# Patient Record
Sex: MALE | Race: WHITE | Employment: FULL TIME | URBAN - NONMETROPOLITAN AREA
[De-identification: names, ages, dates, MRNs, and addresses within clinical notes are randomized per-mention and may not be internally consistent; named-entity substitution may affect disease eponyms.]

---

## 2020-12-29 ENCOUNTER — HOSPITAL ENCOUNTER (OUTPATIENT)
Age: 50
Discharge: HOME | End: 2020-12-29
Payer: COMMERCIAL

## 2020-12-29 LAB
CHOLESTEROL, TOTAL: 208 MG/DL (ref 100–199)
GLUCOSE BLD-MCNC: 96 MG/DL (ref 70–108)
HDLC SERPL-MCNC: 42 MG/DL
LDL CHOLESTEROL CALCULATED: 126 MG/DL
PROSTATE SPECIFIC ANTIGEN: 0.51 NG/ML (ref 0–1)
TRIGL SERPL-MCNC: 200 MG/DL (ref 0–199)

## 2020-12-29 PROCEDURE — 80061 LIPID PANEL: CPT

## 2020-12-29 PROCEDURE — G0103 PSA SCREENING: HCPCS

## 2020-12-29 PROCEDURE — 82947 ASSAY GLUCOSE BLOOD QUANT: CPT

## 2020-12-29 PROCEDURE — 36415 COLL VENOUS BLD VENIPUNCTURE: CPT

## 2022-07-14 ENCOUNTER — HOSPITAL ENCOUNTER (OUTPATIENT)
Dept: ULTRASOUND IMAGING | Age: 52
Discharge: HOME | End: 2022-07-14
Payer: COMMERCIAL

## 2022-07-14 DIAGNOSIS — N50.89 MASS OF RIGHT TESTICLE: ICD-10-CM

## 2022-07-14 PROCEDURE — 76870 US EXAM SCROTUM: CPT

## 2024-08-09 ENCOUNTER — TELEPHONE (OUTPATIENT)
Dept: SURGERY | Age: 54
End: 2024-08-09

## 2024-08-09 NOTE — TELEPHONE ENCOUNTER
Left voicemail; NP; Umbilical hernia without obstruction and without gangrene; [place] referral to Dr. Colon

## 2024-08-16 ENCOUNTER — HOSPITAL ENCOUNTER (OUTPATIENT)
Dept: GENERAL RADIOLOGY | Age: 54
Discharge: HOME | End: 2024-08-16
Payer: COMMERCIAL

## 2024-08-16 ENCOUNTER — HOSPITAL ENCOUNTER (OUTPATIENT)
Age: 54
Discharge: HOME | End: 2024-08-16
Payer: COMMERCIAL

## 2024-08-16 DIAGNOSIS — Z12.5 PROSTATE CANCER SCREENING: ICD-10-CM

## 2024-08-16 DIAGNOSIS — M25.561 ACUTE PAIN OF RIGHT KNEE: ICD-10-CM

## 2024-08-16 DIAGNOSIS — Z00.00 WELLNESS EXAMINATION: ICD-10-CM

## 2024-08-16 DIAGNOSIS — Z13.6 SCREENING FOR ISCHEMIC HEART DISEASE: ICD-10-CM

## 2024-08-16 LAB
CHOLEST SERPL-MCNC: 169 MG/DL (ref 100–199)
HDLC SERPL-MCNC: 33 MG/DL
LDLC SERPL CALC-MCNC: 119 MG/DL
PSA SERPL-MCNC: 0.45 NG/ML (ref 0–1)
TRIGL SERPL-MCNC: 84 MG/DL (ref 0–199)

## 2024-08-16 PROCEDURE — 84153 ASSAY OF PSA TOTAL: CPT

## 2024-08-16 PROCEDURE — 73562 X-RAY EXAM OF KNEE 3: CPT

## 2024-08-16 PROCEDURE — 80061 LIPID PANEL: CPT

## 2024-08-16 PROCEDURE — 36415 COLL VENOUS BLD VENIPUNCTURE: CPT

## 2024-09-09 ENCOUNTER — TELEPHONE (OUTPATIENT)
Dept: SURGERY | Age: 54
End: 2024-09-09

## 2024-09-25 ENCOUNTER — OFFICE VISIT (OUTPATIENT)
Dept: SURGERY | Age: 54
End: 2024-09-25
Payer: COMMERCIAL

## 2024-09-25 VITALS
OXYGEN SATURATION: 97 % | HEART RATE: 75 BPM | RESPIRATION RATE: 18 BRPM | DIASTOLIC BLOOD PRESSURE: 80 MMHG | BODY MASS INDEX: 30.54 KG/M2 | HEIGHT: 70 IN | TEMPERATURE: 98.9 F | SYSTOLIC BLOOD PRESSURE: 126 MMHG | WEIGHT: 213.3 LBS

## 2024-09-25 DIAGNOSIS — K42.0 INCARCERATED UMBILICAL HERNIA: Primary | ICD-10-CM

## 2024-09-25 PROCEDURE — G8427 DOCREV CUR MEDS BY ELIG CLIN: HCPCS | Performed by: SURGERY

## 2024-09-25 PROCEDURE — 3017F COLORECTAL CA SCREEN DOC REV: CPT | Performed by: SURGERY

## 2024-09-25 PROCEDURE — 1036F TOBACCO NON-USER: CPT | Performed by: SURGERY

## 2024-09-25 PROCEDURE — 99204 OFFICE O/P NEW MOD 45 MIN: CPT | Performed by: SURGERY

## 2024-09-25 PROCEDURE — G8417 CALC BMI ABV UP PARAM F/U: HCPCS | Performed by: SURGERY

## 2024-09-28 ASSESSMENT — ENCOUNTER SYMPTOMS
ABDOMINAL DISTENTION: 0
EYE REDNESS: 0
APNEA: 0
EYE ITCHING: 0
CONSTIPATION: 0
SORE THROAT: 0
SINUS PRESSURE: 0
CHEST TIGHTNESS: 0
TROUBLE SWALLOWING: 0
NAUSEA: 0
BACK PAIN: 0
ABDOMINAL PAIN: 1
STRIDOR: 0
ANAL BLEEDING: 0
RHINORRHEA: 0
FACIAL SWELLING: 0
RECTAL PAIN: 0
DIARRHEA: 0
CHOKING: 0
SHORTNESS OF BREATH: 0
BLOOD IN STOOL: 0
COLOR CHANGE: 0
COUGH: 0
WHEEZING: 0
VOICE CHANGE: 0
PHOTOPHOBIA: 0
EYE PAIN: 0
EYE DISCHARGE: 0
VOMITING: 0
ALLERGIC/IMMUNOLOGIC NEGATIVE: 1

## 2024-10-21 ENCOUNTER — HOSPITAL ENCOUNTER (INPATIENT)
Age: 54
LOS: 2 days | Discharge: HOME | DRG: 436 | End: 2024-10-23
Attending: EMERGENCY MEDICINE
Payer: COMMERCIAL

## 2024-10-21 ENCOUNTER — APPOINTMENT (OUTPATIENT)
Dept: MRI IMAGING | Age: 54
DRG: 436 | End: 2024-10-21
Payer: COMMERCIAL

## 2024-10-21 ENCOUNTER — APPOINTMENT (OUTPATIENT)
Dept: CT IMAGING | Age: 54
DRG: 436 | End: 2024-10-21
Payer: COMMERCIAL

## 2024-10-21 DIAGNOSIS — Z51.5 PALLIATIVE CARE PATIENT: ICD-10-CM

## 2024-10-21 DIAGNOSIS — M54.50 ACUTE RIGHT-SIDED LOW BACK PAIN WITHOUT SCIATICA: ICD-10-CM

## 2024-10-21 DIAGNOSIS — G89.3 CANCER RELATED PAIN: ICD-10-CM

## 2024-10-21 DIAGNOSIS — K86.89 PANCREATIC MASS: ICD-10-CM

## 2024-10-21 DIAGNOSIS — R68.89 SUSPECTED MALIGNANT NEOPLASM: ICD-10-CM

## 2024-10-21 DIAGNOSIS — R10.31 ABDOMINAL PAIN, RIGHT LOWER QUADRANT: ICD-10-CM

## 2024-10-21 DIAGNOSIS — K76.9 LIVER LESION: ICD-10-CM

## 2024-10-21 DIAGNOSIS — Q45.3 PANCREATIC ABNORMALITY: Primary | ICD-10-CM

## 2024-10-21 LAB
ALBUMIN SERPL BCG-MCNC: 3.4 G/DL (ref 3.5–5.1)
ALP SERPL-CCNC: 138 U/L (ref 38–126)
ALT SERPL W/O P-5'-P-CCNC: 37 U/L (ref 11–66)
ANION GAP SERPL CALC-SCNC: 12 MEQ/L (ref 8–16)
AST SERPL-CCNC: 42 U/L (ref 5–40)
BACTERIA URNS QL MICRO: ABNORMAL /HPF
BASOPHILS ABSOLUTE: 0 THOU/MM3 (ref 0–0.1)
BASOPHILS NFR BLD AUTO: 0.3 %
BILIRUB CONJ SERPL-MCNC: 0.2 MG/DL (ref 0.1–13.8)
BILIRUB SERPL-MCNC: 0.6 MG/DL (ref 0.3–1.2)
BILIRUB UR QL STRIP.AUTO: NEGATIVE
BUN SERPL-MCNC: 14 MG/DL (ref 7–22)
CALCIUM SERPL-MCNC: 9.1 MG/DL (ref 8.5–10.5)
CANCER AG19-9 SERPL-ACNC: 5483 U/ML (ref 0–35)
CASTS #/AREA URNS LPF: ABNORMAL /LPF
CASTS 2: ABNORMAL /LPF
CHARACTER UR: CLEAR
CHLORIDE SERPL-SCNC: 101 MEQ/L (ref 98–111)
CO2 SERPL-SCNC: 23 MEQ/L (ref 23–33)
COLOR, UA: YELLOW
CREAT SERPL-MCNC: 0.7 MG/DL (ref 0.4–1.2)
CRYSTALS URNS MICRO: ABNORMAL
DEPRECATED RDW RBC AUTO: 42.6 FL (ref 35–45)
EKG ATRIAL RATE: 83 BPM
EKG P AXIS: 41 DEGREES
EKG P-R INTERVAL: 154 MS
EKG Q-T INTERVAL: 370 MS
EKG QRS DURATION: 88 MS
EKG QTC CALCULATION (BAZETT): 434 MS
EKG R AXIS: 7 DEGREES
EKG T AXIS: 10 DEGREES
EKG VENTRICULAR RATE: 83 BPM
EOSINOPHIL NFR BLD AUTO: 1.8 %
EOSINOPHILS ABSOLUTE: 0.2 THOU/MM3 (ref 0–0.4)
EPITHELIAL CELLS, UA: ABNORMAL /HPF
ERYTHROCYTE [DISTWIDTH] IN BLOOD BY AUTOMATED COUNT: 12.5 % (ref 11.5–14.5)
GFR SERPL CREATININE-BSD FRML MDRD: > 90 ML/MIN/1.73M2
GLUCOSE SERPL-MCNC: 113 MG/DL (ref 70–108)
GLUCOSE UR QL STRIP.AUTO: NEGATIVE MG/DL
HCT VFR BLD AUTO: 42 % (ref 42–52)
HGB BLD-MCNC: 14.1 GM/DL (ref 14–18)
HGB UR QL STRIP.AUTO: NEGATIVE
IMM GRANULOCYTES # BLD AUTO: 0.07 THOU/MM3 (ref 0–0.07)
IMM GRANULOCYTES NFR BLD AUTO: 0.6 %
INR PPP: 1.27 (ref 0.85–1.13)
KETONES UR QL STRIP.AUTO: ABNORMAL
LIPASE SERPL-CCNC: 32.9 U/L (ref 5.6–51.3)
LYMPHOCYTES ABSOLUTE: 1.1 THOU/MM3 (ref 1–4.8)
LYMPHOCYTES NFR BLD AUTO: 8.9 %
MAGNESIUM SERPL-MCNC: 2 MG/DL (ref 1.6–2.4)
MCH RBC QN AUTO: 31.1 PG (ref 26–33)
MCHC RBC AUTO-ENTMCNC: 33.6 GM/DL (ref 32.2–35.5)
MCV RBC AUTO: 92.5 FL (ref 80–94)
MISCELLANEOUS 2: ABNORMAL
MONOCYTES ABSOLUTE: 1.1 THOU/MM3 (ref 0.4–1.3)
MONOCYTES NFR BLD AUTO: 9.6 %
NEUTROPHILS ABSOLUTE: 9.3 THOU/MM3 (ref 1.8–7.7)
NEUTROPHILS NFR BLD AUTO: 78.8 %
NITRITE UR QL STRIP: NEGATIVE
NRBC BLD AUTO-RTO: 0 /100 WBC
OSMOLALITY SERPL CALC.SUM OF ELEC: 273.2 MOSMOL/KG (ref 275–300)
PH UR STRIP.AUTO: 5.5 [PH] (ref 5–9)
PLATELET # BLD AUTO: 201 THOU/MM3 (ref 130–400)
PMV BLD AUTO: 9.1 FL (ref 9.4–12.4)
POTASSIUM SERPL-SCNC: 4.5 MEQ/L (ref 3.5–5.2)
PROT SERPL-MCNC: 7 G/DL (ref 6.1–8)
PROT UR STRIP.AUTO-MCNC: ABNORMAL MG/DL
RBC # BLD AUTO: 4.54 MILL/MM3 (ref 4.7–6.1)
RBC URINE: ABNORMAL /HPF
RENAL EPI CELLS #/AREA URNS HPF: ABNORMAL /[HPF]
SODIUM SERPL-SCNC: 136 MEQ/L (ref 135–145)
SP GR UR REFRACT.AUTO: 1.02 (ref 1–1.03)
TROPONIN, HIGH SENSITIVITY: 6 NG/L (ref 0–12)
UROBILINOGEN, URINE: 1 EU/DL (ref 0–1)
WBC # BLD AUTO: 11.8 THOU/MM3 (ref 4.8–10.8)
WBC #/AREA URNS HPF: ABNORMAL /HPF
WBC #/AREA URNS HPF: NEGATIVE /[HPF]
YEAST LIKE FUNGI URNS QL MICRO: ABNORMAL

## 2024-10-21 PROCEDURE — 86301 IMMUNOASSAY TUMOR CA 19-9: CPT

## 2024-10-21 PROCEDURE — 80053 COMPREHEN METABOLIC PANEL: CPT

## 2024-10-21 PROCEDURE — 74177 CT ABD & PELVIS W/CONTRAST: CPT

## 2024-10-21 PROCEDURE — 99223 1ST HOSP IP/OBS HIGH 75: CPT

## 2024-10-21 PROCEDURE — 81001 URINALYSIS AUTO W/SCOPE: CPT

## 2024-10-21 PROCEDURE — 99222 1ST HOSP IP/OBS MODERATE 55: CPT | Performed by: NURSE PRACTITIONER

## 2024-10-21 PROCEDURE — 2580000003 HC RX 258

## 2024-10-21 PROCEDURE — 6360000004 HC RX CONTRAST MEDICATION: Performed by: EMERGENCY MEDICINE

## 2024-10-21 PROCEDURE — 93005 ELECTROCARDIOGRAM TRACING: CPT | Performed by: EMERGENCY MEDICINE

## 2024-10-21 PROCEDURE — 83735 ASSAY OF MAGNESIUM: CPT

## 2024-10-21 PROCEDURE — 99285 EMERGENCY DEPT VISIT HI MDM: CPT

## 2024-10-21 PROCEDURE — A9579 GAD-BASE MR CONTRAST NOS,1ML: HCPCS

## 2024-10-21 PROCEDURE — 84484 ASSAY OF TROPONIN QUANT: CPT

## 2024-10-21 PROCEDURE — 76376 3D RENDER W/INTRP POSTPROCES: CPT

## 2024-10-21 PROCEDURE — 85610 PROTHROMBIN TIME: CPT

## 2024-10-21 PROCEDURE — 74183 MRI ABD W/O CNTR FLWD CNTR: CPT

## 2024-10-21 PROCEDURE — 36415 COLL VENOUS BLD VENIPUNCTURE: CPT

## 2024-10-21 PROCEDURE — 85025 COMPLETE CBC W/AUTO DIFF WBC: CPT

## 2024-10-21 PROCEDURE — 6360000004 HC RX CONTRAST MEDICATION

## 2024-10-21 PROCEDURE — 82248 BILIRUBIN DIRECT: CPT

## 2024-10-21 PROCEDURE — 83690 ASSAY OF LIPASE: CPT

## 2024-10-21 PROCEDURE — 6370000000 HC RX 637 (ALT 250 FOR IP)

## 2024-10-21 PROCEDURE — 93010 ELECTROCARDIOGRAM REPORT: CPT | Performed by: NUCLEAR MEDICINE

## 2024-10-21 PROCEDURE — 1200000003 HC TELEMETRY R&B

## 2024-10-21 RX ORDER — ONDANSETRON 4 MG/1
4 TABLET, ORALLY DISINTEGRATING ORAL EVERY 8 HOURS PRN
Status: DISCONTINUED | OUTPATIENT
Start: 2024-10-21 | End: 2024-10-23 | Stop reason: HOSPADM

## 2024-10-21 RX ORDER — IOPAMIDOL 755 MG/ML
80 INJECTION, SOLUTION INTRAVASCULAR
Status: COMPLETED | OUTPATIENT
Start: 2024-10-21 | End: 2024-10-21

## 2024-10-21 RX ORDER — ACETAMINOPHEN 325 MG/1
650 TABLET ORAL EVERY 6 HOURS PRN
Status: DISCONTINUED | OUTPATIENT
Start: 2024-10-21 | End: 2024-10-23 | Stop reason: HOSPADM

## 2024-10-21 RX ORDER — SODIUM CHLORIDE 0.9 % (FLUSH) 0.9 %
5-40 SYRINGE (ML) INJECTION EVERY 12 HOURS SCHEDULED
Status: DISCONTINUED | OUTPATIENT
Start: 2024-10-21 | End: 2024-10-23 | Stop reason: HOSPADM

## 2024-10-21 RX ORDER — HYDROCODONE BITARTRATE AND ACETAMINOPHEN 5; 325 MG/1; MG/1
2 TABLET ORAL EVERY 4 HOURS PRN
Status: DISCONTINUED | OUTPATIENT
Start: 2024-10-21 | End: 2024-10-23 | Stop reason: HOSPADM

## 2024-10-21 RX ORDER — HYDROCODONE BITARTRATE AND ACETAMINOPHEN 5; 325 MG/1; MG/1
1 TABLET ORAL EVERY 4 HOURS PRN
Status: DISCONTINUED | OUTPATIENT
Start: 2024-10-21 | End: 2024-10-23 | Stop reason: HOSPADM

## 2024-10-21 RX ORDER — SODIUM CHLORIDE 0.9 % (FLUSH) 0.9 %
5-40 SYRINGE (ML) INJECTION PRN
Status: DISCONTINUED | OUTPATIENT
Start: 2024-10-21 | End: 2024-10-23 | Stop reason: HOSPADM

## 2024-10-21 RX ORDER — FAMOTIDINE 20 MG/1
20 TABLET, FILM COATED ORAL DAILY
Status: DISCONTINUED | OUTPATIENT
Start: 2024-10-21 | End: 2024-10-23 | Stop reason: HOSPADM

## 2024-10-21 RX ORDER — LANOLIN ALCOHOL/MO/W.PET/CERES
3 CREAM (GRAM) TOPICAL NIGHTLY PRN
Status: DISCONTINUED | OUTPATIENT
Start: 2024-10-21 | End: 2024-10-23 | Stop reason: HOSPADM

## 2024-10-21 RX ORDER — POTASSIUM CHLORIDE 7.45 MG/ML
10 INJECTION INTRAVENOUS PRN
Status: DISCONTINUED | OUTPATIENT
Start: 2024-10-21 | End: 2024-10-23 | Stop reason: HOSPADM

## 2024-10-21 RX ORDER — SODIUM CHLORIDE 9 MG/ML
INJECTION, SOLUTION INTRAVENOUS PRN
Status: DISCONTINUED | OUTPATIENT
Start: 2024-10-21 | End: 2024-10-23 | Stop reason: HOSPADM

## 2024-10-21 RX ORDER — POTASSIUM CHLORIDE 1500 MG/1
40 TABLET, EXTENDED RELEASE ORAL PRN
Status: DISCONTINUED | OUTPATIENT
Start: 2024-10-21 | End: 2024-10-23 | Stop reason: HOSPADM

## 2024-10-21 RX ORDER — POLYETHYLENE GLYCOL 3350 17 G/17G
17 POWDER, FOR SOLUTION ORAL DAILY PRN
Status: DISCONTINUED | OUTPATIENT
Start: 2024-10-21 | End: 2024-10-23 | Stop reason: HOSPADM

## 2024-10-21 RX ORDER — ONDANSETRON 2 MG/ML
4 INJECTION INTRAMUSCULAR; INTRAVENOUS EVERY 6 HOURS PRN
Status: DISCONTINUED | OUTPATIENT
Start: 2024-10-21 | End: 2024-10-23 | Stop reason: HOSPADM

## 2024-10-21 RX ORDER — ENOXAPARIN SODIUM 100 MG/ML
40 INJECTION SUBCUTANEOUS EVERY 24 HOURS
Status: DISCONTINUED | OUTPATIENT
Start: 2024-10-21 | End: 2024-10-23 | Stop reason: HOSPADM

## 2024-10-21 RX ORDER — MAGNESIUM SULFATE IN WATER 40 MG/ML
2000 INJECTION, SOLUTION INTRAVENOUS PRN
Status: DISCONTINUED | OUTPATIENT
Start: 2024-10-21 | End: 2024-10-23 | Stop reason: HOSPADM

## 2024-10-21 RX ORDER — SODIUM CHLORIDE 9 MG/ML
INJECTION, SOLUTION INTRAVENOUS CONTINUOUS
Status: ACTIVE | OUTPATIENT
Start: 2024-10-21 | End: 2024-10-21

## 2024-10-21 RX ORDER — ACETAMINOPHEN 650 MG/1
650 SUPPOSITORY RECTAL EVERY 6 HOURS PRN
Status: DISCONTINUED | OUTPATIENT
Start: 2024-10-21 | End: 2024-10-23 | Stop reason: HOSPADM

## 2024-10-21 RX ADMIN — FAMOTIDINE 20 MG: 20 TABLET, FILM COATED ORAL at 11:43

## 2024-10-21 RX ADMIN — IOPAMIDOL 80 ML: 755 INJECTION, SOLUTION INTRAVENOUS at 07:11

## 2024-10-21 RX ADMIN — SODIUM CHLORIDE: 9 INJECTION, SOLUTION INTRAVENOUS at 11:42

## 2024-10-21 RX ADMIN — GADOTERIDOL 20 ML: 279.3 INJECTION, SOLUTION INTRAVENOUS at 16:56

## 2024-10-21 RX ADMIN — HYDROCODONE BITARTRATE AND ACETAMINOPHEN 2 TABLET: 5; 325 TABLET ORAL at 17:24

## 2024-10-21 RX ADMIN — ACETAMINOPHEN 650 MG: 325 TABLET ORAL at 13:12

## 2024-10-21 RX ADMIN — SODIUM CHLORIDE, PRESERVATIVE FREE 10 ML: 5 INJECTION INTRAVENOUS at 11:43

## 2024-10-21 ASSESSMENT — ENCOUNTER SYMPTOMS
BLOOD IN STOOL: 0
RESPIRATORY NEGATIVE: 1
ABDOMINAL DISTENTION: 0
ABDOMINAL PAIN: 1
VOMITING: 0
ALLERGIC/IMMUNOLOGIC NEGATIVE: 1
DIARRHEA: 0
RECTAL PAIN: 0
WHEEZING: 0
COUGH: 0
SHORTNESS OF BREATH: 0
EYES NEGATIVE: 1
ANAL BLEEDING: 0
CONSTIPATION: 0
NAUSEA: 0

## 2024-10-21 ASSESSMENT — PAIN SCALES - GENERAL
PAINLEVEL_OUTOF10: 6
PAINLEVEL_OUTOF10: 6
PAINLEVEL_OUTOF10: 7
PAINLEVEL_OUTOF10: 6
PAINLEVEL_OUTOF10: 3

## 2024-10-21 ASSESSMENT — PAIN - FUNCTIONAL ASSESSMENT: PAIN_FUNCTIONAL_ASSESSMENT: 0-10

## 2024-10-21 ASSESSMENT — PAIN DESCRIPTION - ORIENTATION: ORIENTATION: RIGHT;LOWER

## 2024-10-21 ASSESSMENT — PAIN DESCRIPTION - DESCRIPTORS: DESCRIPTORS: STABBING

## 2024-10-21 ASSESSMENT — PAIN DESCRIPTION - LOCATION: LOCATION: BACK

## 2024-10-21 NOTE — ED NOTES
Upon first contact with patient this RN receives bedside shift report. Patient alert and oriented. Respirations easy and unlabored. Updated on POC. Will monitor.

## 2024-10-21 NOTE — ED TRIAGE NOTES
Pt present to ED from home with c/c of right lower back pain and abdominal pain. Pt states pain began about 3 days ago. Pt rates pain a 6, pt has taken aleve for pain. Pt states pain comes and goes but has gotten worse since Friday. Rr easy and unlabored. No distress noted.

## 2024-10-21 NOTE — H&P
right flank pain.  Patient states that he has been having right-sided back pain off and on for approximately 2 months.  He had been worked up for possible kidney stone/UTI by his PCP in the past, however his pain is ongoing prompting him to present to ED on 10/21/2024.  Notes that he also has right periumbilical abdominal pain that wraps up to his right flank. He has also noted intermittent fevers off and on for the last month, stating that they resolve with Advil.  He has not had any changes in his bowels, states that he has approximately 2 bowel movements a day, no new changes to color, no constipation or diarrhea.  Denies any weight changes, headaches, vision changes, chills, diaphoresis, dyspnea, chest pain/pressure/tightness, palpitations, dysuria, urgency, frequency, skin changes, lymphadenopathy, or edema.  He is understandably concerned about the findings on imaging, and is agreeable to admission for further workup to facilitate speedy follow-up and intervention after discharge.    Past Medical History:    No past medical history on file.    Past Surgical History:        Procedure Laterality Date    COLONOSCOPY  10/31/2022       Medications Prior to Admission:   Prior to Admission medications    Medication Sig Start Date End Date Taking? Authorizing Provider   meloxicam (MOBIC) 7.5 MG tablet Take 1 tablet by mouth daily 10/18/24   Kassandra Dawkins APRN - CNP       Allergies:  Patient has no known allergies.    Social History:    The patient currently lives at home with his wife.   Tobacco use:   reports that he has never smoked. He has never used smokeless tobacco.  Alcohol use:   has no history on file for alcohol use.  Drug use:  has no history on file for drug use.     Family History:  as follows:      Problem Relation Age of Onset    Stroke Mother        Review of Systems:   Pertinent positives and negatives as noted in the HPI. Otherwise complete ROS negative.    Physical Exam:    /86   Pulse 84    Temp 98.1 °F (36.7 °C) (Oral)   Resp 22   Ht 1.829 m (6')   Wt 95.3 kg (210 lb)   SpO2 96%   BMI 28.48 kg/m²     General: Pleasant, moderately built,  male, alert, cooperative, in no acute distress, resting in bed on arrival, on room air  Eyes:  PERRLA. Nonicteric, no conjunctivitis, EOMs intact.   HENT: Normocephalic, atraumatic. Nares normal. Oral mucosa moist.  Hearing grossly intact.   Neck: Supple, with full range of motion. No gross JVD appreciated.  Respiratory:  Normal effort. Clear to auscultation, without rales or wheezes or rhonchi.      Cardiovascular: RRR, good S1/S2. No rubs, gallops, or murmurs. No lower extremity edema.   Abdomen: Soft, non-distended. Bowel sounds present.  Mild tenderness to palpation in the right quadrants and along the right costovertebral angle.  Musculoskeletal: No joint swelling or tenderness. Normal tone. No abnormal movements.   Skin: Warm and dry. No rashes or lesions.  Neurologic:  No focal sensory/motor deficits in the upper or lower extremities. Cranial nerves:  grossly non-focal 2-12.     Psychiatric: Alert and oriented, normal insight and thought content.   Capillary Refill: Brisk,< 3 seconds.  Peripheral Pulses: +2 palpable, equal bilaterally.     Labs:     Recent Labs     10/21/24  0543   WBC 11.8*   HGB 14.1   HCT 42.0        Recent Labs     10/21/24  0543      K 4.5      CO2 23   BUN 14   CREATININE 0.7   CALCIUM 9.1     Recent Labs     10/21/24  0543   AST 42*   ALT 37   BILIDIR 0.2   BILITOT 0.6   ALKPHOS 138*     No results for input(s): \"INR\" in the last 72 hours.  No results for input(s): \"CKTOTAL\", \"TROPONINI\" in the last 72 hours.  Lab Results   Component Value Date/Time    NITRU NEGATIVE 10/21/2024 06:30 AM    WBCUA 0-2 10/21/2024 06:30 AM    BACTERIA NONE SEEN 10/21/2024 06:30 AM    RBCUA 0-2 10/21/2024 06:30 AM    BLOODU NEGATIVE 10/21/2024 06:30 AM    SPECGRAV 1.020 10/18/2024 03:31 PM    GLUCOSEU NEGATIVE 10/21/2024

## 2024-10-21 NOTE — PROGRESS NOTES
Order received for liver biopsy and chart reviewed.  Patient will be scheduled for test on 10/22/2024 at 1100.  Please keep him NPO after midnight, hold all blood thinners.  If you have questions, call Interventional Radiology department at [phone].  Spoke with nurse Tyler TRAN to discuss.

## 2024-10-21 NOTE — CONSULTS
bilateral foraminal stenosis.     At L4-5,  there is mild canal and mild to moderate bilateral foraminal stenosis.     At L5-S1 there is mild canal and bilateral foraminal stenosis.        There is mild degenerative change involving the sacroiliac joints bilaterally.     IMPRESSION:     1. Stable CT scan of the lumbar spine, no interval change since previous MRI  scan dated 5/16/2014.  2. No evidence of metastatic disease.              **This report has been created using voice recognition software. It may contain  minor errors which are inherent in voice recognition technology.**           Electronically signed by Dr. Raina Killian   CT ABDOMEN PELVIS W IV CONTRAST Additional Contrast? None  Order: [id number]  Status: Final result       Visible to patient: Yes (seen)       Next appt: 10/22/2024 at 11:15 AM in Radiology (STR CT RM1)    0 Result Notes  Details    Reading Physician Reading Date Result Priority   Vicky Dumont MD  [phone] 10/21/2024      Narrative & Impression  PROCEDURE: CT ABDOMEN PELVIS W IV CONTRAST     CLINICAL INFORMATION: R-sided abdominal pain (RLQ>RUQ); Fever intermittently .  Right-sided abdominal pain. Intermittent fevers.     COMPARISON: None.     TECHNIQUE: Axial 5 mm CT images were obtained through the abdomen and pelvis  after the administration of intravenous contrast. Coronal and sagittal  reconstructions were obtained.     All CT scans at this facility use dose modulation, iterative reconstruction,  and/or weight-based dosing when appropriate to reduce radiation dose to as low  as reasonably achievable.     FINDINGS:        There are no suspicious findings in the lung bases. There is a calcified  granuloma on the left. There is some mild atelectasis. There are some calcified  left hilar lymph nodes. There are calcified subcarinal lymph nodes. Inferior to  the calcified subcarinal lymph nodes, there is some subcarinal adenopathy  measuring 1.7 x 1.4 cm. The base of the heart is             **This report has been created using voice recognition software. It may contain  minor errors which are inherent in voice recognition technology.**           Electronically signed by Dr. Vicky Dumont           Exam Ended: 10/21/24 07:15 EDT Last Resulted: 10/21/24 07:49 EDT        Total time spent in care of patient:  60 minutes collectively between subjective/objective examination, chart review, documentation, clinical reasoning and discussion with attending regarding plan/interval changes. More than 50% of the time involved with counseling, education and coordinating care.    Electronically signed by JESSI HALEY CNP on 10/21/2024 at 6:16 PM

## 2024-10-21 NOTE — ED PROVIDER NOTES
[facility] EMERGENCY DEPT  EMERGENCY DEPARTMENT ENCOUNTER          Pt Name: Gustabo Machado  MRN: [id number]  Birthdate 1970  Date of evaluation: 10/21/2024  Physician: Nicanor Arreola MD  Supervising Attending Physician: Colin Espana DO       CHIEF COMPLAINT     R-lower back pain radiating to RLQ pain      HISTORY OF PRESENT ILLNESS    HPI  Gustabo Machado is a 53 y.o. male with no significant PSH and known umbilical hernia (surgery scheduled with Dr. Colon on 11/14/2024) who presents to the emergency department from home for evaluation of right lower back pain that radiates to RLQ.  States that he was seen at his primary care on 10/18, they got a POCT UA and which was negative and related the pain to a possible muscle strain.  States that pain has worsened since the appointment (now rates pain at 6/10).  States the pain is stabbing, and radiates to RLQ.  Also reports fevers off and on over the past 3 days (Tmax yesterday to 102 F). No personal h/o kidney stones. Has been taking Advil without relief of pain.  States that he does not know of any aggravating or alleviating factors, however tends to have worsening pain when lying on his right side.  Denies tobacco, EtOH, drug use.    Denies headache, lightheadedness, dizziness, vision changes, tinnitus, cough, congestion, sore throat, neck pain/stiffness, chest pain, shortness of breath, nausea, vomiting, constipation, diarrhea, dysuria, blood in the urine or stool, numbness/tingling/weakness in extremities.    The patient has no other acute complaints at this time.      PAST MEDICAL AND SURGICAL HISTORY   No past medical history on file.  Past Surgical History:   Procedure Laterality Date    COLONOSCOPY  10/31/2022         MEDICATIONS   No current facility-administered medications for this encounter.    Current Outpatient Medications:     meloxicam (MOBIC) 7.5 MG tablet, Take 1 tablet by mouth daily, Disp: 30 tablet, Rfl: 0    Previous  Medications    MELOXICAM (MOBIC) 7.5 MG TABLET    Take 1 tablet by mouth daily         SOCIAL HISTORY     Social History     Social History Narrative    Not on file     Social History     Tobacco Use    Smoking status: Never    Smokeless tobacco: Never         ALLERGIES   No Known Allergies      FAMILY HISTORY     Family History   Problem Relation Age of Onset    Stroke Mother          PHYSICAL EXAM     ED Triage Vitals [10/21/24 0535]   BP Systolic BP Percentile Diastolic BP Percentile Temp Temp Source Pulse Respirations SpO2   (!) 144/93 -- -- 98.1 °F (36.7 °C) Oral 83 23 96 %      Height Weight - Scale         1.829 m (6') 95.3 kg (210 lb)           Initial vital signs and nursing assessment reviewed. Body mass index is 28.48 kg/m².     Additional Vital Signs:  Vitals:    10/21/24 0758   BP: 121/86   Pulse: 84   Resp: 22   Temp:    SpO2: 96%       Physical Exam  Vitals and nursing note reviewed.   Constitutional:       General: He is not in acute distress.     Appearance: He is ill-appearing. He is not toxic-appearing or diaphoretic.   HENT:      Head: Normocephalic and atraumatic.      Mouth/Throat:      Mouth: Mucous membranes are moist.      Pharynx: Oropharynx is clear.   Eyes:      General: No scleral icterus.        Right eye: No discharge.         Left eye: No discharge.      Extraocular Movements: Extraocular movements intact.      Pupils: Pupils are equal, round, and reactive to light.   Cardiovascular:      Rate and Rhythm: Normal rate and regular rhythm.      Pulses: Normal pulses.      Heart sounds: No murmur heard.     No friction rub. No gallop.   Pulmonary:      Effort: Pulmonary effort is normal. No respiratory distress.      Breath sounds: Normal breath sounds. No stridor. No wheezing, rhonchi or rales.   Chest:      Chest wall: No tenderness.   Abdominal:      General: Bowel sounds are normal. There is no distension.      Palpations: Abdomen is soft.      Tenderness: There is abdominal

## 2024-10-21 NOTE — CONSULTS
Abdominal:      General: Abdomen is flat. Bowel sounds are normal. There is no distension.      Palpations: Abdomen is soft. There is no mass.      Tenderness: There is no abdominal tenderness. There is no guarding or rebound.   Musculoskeletal:         General: Normal range of motion.      Right lower leg: No edema.      Left lower leg: No edema.   Skin:     General: Skin is warm and dry.      Capillary Refill: Capillary refill takes less than 2 seconds.   Neurological:      Mental Status: He is alert and oriented to person, place, and time.   Psychiatric:         Mood and Affect: Mood normal.         Behavior: Behavior normal.         Thought Content: Thought content normal.         Judgment: Judgment normal.           Labs:   Recent Labs     10/21/24  0543   WBC 11.8*   HGB 14.1   HCT 42.0        Recent Labs     10/21/24  0543      K 4.5      CO2 23   BUN 14   CREATININE 0.7   CALCIUM 9.1     Recent Labs     10/21/24  0543   AST 42*   ALT 37   BILIDIR 0.2   BILITOT 0.6   ALKPHOS 138*     Recent Labs     10/21/24  0840   INR 1.27*       Radiology:   10/21/24 CT Abd/Pelvis W/IV Contrast  FINDINGS:        There are no suspicious findings in the lung bases. There is a calcified  granuloma on the left. There is some mild atelectasis. There are some calcified  left hilar lymph nodes. There are calcified subcarinal lymph nodes. Inferior to  the calcified subcarinal lymph nodes, there is some subcarinal adenopathy  measuring 1.7 x 1.4 cm. The base of the heart is within appropriate limits.     There are numerous masses throughout the liver. These have low attenuation and  lobulated margins. All have a similar appearance. One in the right lobe of the  liver measures 4.7 x 4.2 cm. The margins are indistinct. This is consistent with  numerous liver masses. There is a trace amount of free fluid anterior to the  liver. There is adenopathy at the sylvain hepatis. Multiple enlarged lymph nodes  are seen.  RISA Kaye, JESSI - CNP on 10/21/2024 at 12:12 PM    [facility]  Thank you for the consultation.

## 2024-10-21 NOTE — PLAN OF CARE
Problem: Pain  Goal: Verbalizes/displays adequate comfort level or baseline comfort level  Outcome: Progressing  Flowsheets (Taken 10/21/2024 2838)  Verbalizes/displays adequate comfort level or baseline comfort level:   Encourage patient to monitor pain and request assistance   Assess pain using appropriate pain scale   Administer analgesics based on type and severity of pain and evaluate response   Implement non-pharmacological measures as appropriate and evaluate response   Consider cultural and social influences on pain and pain management

## 2024-10-22 ENCOUNTER — APPOINTMENT (OUTPATIENT)
Dept: CT IMAGING | Age: 54
DRG: 436 | End: 2024-10-22
Payer: COMMERCIAL

## 2024-10-22 LAB
ANION GAP SERPL CALC-SCNC: 13 MEQ/L (ref 8–16)
BUN SERPL-MCNC: 16 MG/DL (ref 7–22)
CALCIUM SERPL-MCNC: 8.8 MG/DL (ref 8.5–10.5)
CHLORIDE SERPL-SCNC: 98 MEQ/L (ref 98–111)
CO2 SERPL-SCNC: 24 MEQ/L (ref 23–33)
CREAT SERPL-MCNC: 0.7 MG/DL (ref 0.4–1.2)
DEPRECATED RDW RBC AUTO: 42.8 FL (ref 35–45)
ERYTHROCYTE [DISTWIDTH] IN BLOOD BY AUTOMATED COUNT: 12.7 % (ref 11.5–14.5)
GFR SERPL CREATININE-BSD FRML MDRD: > 90 ML/MIN/1.73M2
GLUCOSE SERPL-MCNC: 100 MG/DL (ref 70–108)
HCT VFR BLD AUTO: 40.6 % (ref 42–52)
HGB BLD-MCNC: 13.5 GM/DL (ref 14–18)
MAGNESIUM SERPL-MCNC: 1.9 MG/DL (ref 1.6–2.4)
MCH RBC QN AUTO: 30.8 PG (ref 26–33)
MCHC RBC AUTO-ENTMCNC: 33.3 GM/DL (ref 32.2–35.5)
MCV RBC AUTO: 92.5 FL (ref 80–94)
PLATELET # BLD AUTO: 195 THOU/MM3 (ref 130–400)
PMV BLD AUTO: 9.7 FL (ref 9.4–12.4)
POTASSIUM SERPL-SCNC: 4.6 MEQ/L (ref 3.5–5.2)
RBC # BLD AUTO: 4.39 MILL/MM3 (ref 4.7–6.1)
SODIUM SERPL-SCNC: 135 MEQ/L (ref 135–145)
WBC # BLD AUTO: 11.2 THOU/MM3 (ref 4.8–10.8)

## 2024-10-22 PROCEDURE — 6370000000 HC RX 637 (ALT 250 FOR IP)

## 2024-10-22 PROCEDURE — 88341 IMHCHEM/IMCYTCHM EA ADD ANTB: CPT

## 2024-10-22 PROCEDURE — 47000 NEEDLE BIOPSY OF LIVER PERQ: CPT

## 2024-10-22 PROCEDURE — 83735 ASSAY OF MAGNESIUM: CPT

## 2024-10-22 PROCEDURE — 99231 SBSQ HOSP IP/OBS SF/LOW 25: CPT | Performed by: SURGERY

## 2024-10-22 PROCEDURE — 1200000003 HC TELEMETRY R&B

## 2024-10-22 PROCEDURE — 36415 COLL VENOUS BLD VENIPUNCTURE: CPT

## 2024-10-22 PROCEDURE — 2580000003 HC RX 258

## 2024-10-22 PROCEDURE — 88333 PATH CONSLTJ SURG CYTO XM 1: CPT

## 2024-10-22 PROCEDURE — 85027 COMPLETE CBC AUTOMATED: CPT

## 2024-10-22 PROCEDURE — 80048 BASIC METABOLIC PNL TOTAL CA: CPT

## 2024-10-22 PROCEDURE — 0FB13ZX EXCISION OF RIGHT LOBE LIVER, PERCUTANEOUS APPROACH, DIAGNOSTIC: ICD-10-PCS | Performed by: RADIOLOGY

## 2024-10-22 PROCEDURE — 6360000002 HC RX W HCPCS: Performed by: RADIOLOGY

## 2024-10-22 PROCEDURE — 88342 IMHCHEM/IMCYTCHM 1ST ANTB: CPT

## 2024-10-22 PROCEDURE — 99233 SBSQ HOSP IP/OBS HIGH 50: CPT | Performed by: PHYSICIAN ASSISTANT

## 2024-10-22 PROCEDURE — 88307 TISSUE EXAM BY PATHOLOGIST: CPT

## 2024-10-22 RX ORDER — FENTANYL CITRATE 50 UG/ML
INJECTION, SOLUTION INTRAMUSCULAR; INTRAVENOUS PRN
Status: COMPLETED | OUTPATIENT
Start: 2024-10-22 | End: 2024-10-22

## 2024-10-22 RX ORDER — MIDAZOLAM HYDROCHLORIDE 1 MG/ML
INJECTION, SOLUTION INTRAMUSCULAR; INTRAVENOUS PRN
Status: COMPLETED | OUTPATIENT
Start: 2024-10-22 | End: 2024-10-22

## 2024-10-22 RX ORDER — FENTANYL CITRATE 50 UG/ML
50 INJECTION, SOLUTION INTRAMUSCULAR; INTRAVENOUS ONCE
OUTPATIENT
Start: 2024-10-22 | End: 2024-10-22

## 2024-10-22 RX ORDER — MIDAZOLAM HYDROCHLORIDE 1 MG/ML
1 INJECTION, SOLUTION INTRAMUSCULAR; INTRAVENOUS ONCE
OUTPATIENT
Start: 2024-10-22 | End: 2024-10-22

## 2024-10-22 RX ORDER — SODIUM CHLORIDE 450 MG/100ML
INJECTION, SOLUTION INTRAVENOUS CONTINUOUS
OUTPATIENT
Start: 2024-10-22

## 2024-10-22 RX ADMIN — MIDAZOLAM 0.5 MG: 1 INJECTION INTRAMUSCULAR; INTRAVENOUS at 12:23

## 2024-10-22 RX ADMIN — FENTANYL CITRATE 50 MCG: 50 INJECTION, SOLUTION INTRAMUSCULAR; INTRAVENOUS at 12:16

## 2024-10-22 RX ADMIN — ACETAMINOPHEN 650 MG: 325 TABLET ORAL at 21:22

## 2024-10-22 RX ADMIN — HYDROCODONE BITARTRATE AND ACETAMINOPHEN 1 TABLET: 5; 325 TABLET ORAL at 14:02

## 2024-10-22 RX ADMIN — HYDROCODONE BITARTRATE AND ACETAMINOPHEN 2 TABLET: 5; 325 TABLET ORAL at 19:05

## 2024-10-22 RX ADMIN — HYDROCODONE BITARTRATE AND ACETAMINOPHEN 1 TABLET: 5; 325 TABLET ORAL at 03:24

## 2024-10-22 RX ADMIN — FENTANYL CITRATE 25 MCG: 50 INJECTION, SOLUTION INTRAMUSCULAR; INTRAVENOUS at 12:23

## 2024-10-22 RX ADMIN — FAMOTIDINE 20 MG: 20 TABLET, FILM COATED ORAL at 08:30

## 2024-10-22 RX ADMIN — SODIUM CHLORIDE, PRESERVATIVE FREE 10 ML: 5 INJECTION INTRAVENOUS at 21:22

## 2024-10-22 RX ADMIN — MIDAZOLAM 1 MG: 1 INJECTION INTRAMUSCULAR; INTRAVENOUS at 12:16

## 2024-10-22 ASSESSMENT — PAIN DESCRIPTION - ORIENTATION
ORIENTATION: RIGHT
ORIENTATION: RIGHT;LOWER
ORIENTATION: RIGHT;LOWER

## 2024-10-22 ASSESSMENT — PAIN DESCRIPTION - LOCATION
LOCATION: BACK

## 2024-10-22 ASSESSMENT — PAIN DESCRIPTION - DESCRIPTORS
DESCRIPTORS: ACHING;STABBING
DESCRIPTORS: STABBING

## 2024-10-22 ASSESSMENT — PAIN SCALES - GENERAL
PAINLEVEL_OUTOF10: 3
PAINLEVEL_OUTOF10: 7
PAINLEVEL_OUTOF10: 6
PAINLEVEL_OUTOF10: 6

## 2024-10-22 ASSESSMENT — PAIN - FUNCTIONAL ASSESSMENT: PAIN_FUNCTIONAL_ASSESSMENT: ACTIVITIES ARE NOT PREVENTED

## 2024-10-22 NOTE — H&P
[facility]  Sedation/Analgesia History & Physical    Pt Name: Gustabo Machado  MRN: [id number]  YOB: 1970  Provider Performing Procedure: Hermelindo Ley MD, MD  Primary Care Physician: Daniel Agrawal MD    Formulation and discussion of sedation / procedure plans, risks, benefits, side effects and alternatives with patient and/or responsible adult completed.    PRE-PROCEDURE   DNR-CCA/DNR-CC []Yes [x]No  Brief History/Pre-Procedure Diagnosis: Multiple liver lesions          MEDICAL HISTORY  []CAD/Valve  []Liver Disease  []Lung Disease []Diabetes  []Hypertension []Renal Disease  []Additional information:       has no past medical history on file.    SURGICAL HISTORY   has a past surgical history that includes Colonoscopy (10/31/2022).  Additional information:       ALLERGIES   Allergies as of 10/21/2024    (No Known Allergies)     Additional information:       MEDICATIONS   Coumadin Use Last 5 Days [x]No []Yes  Antiplatelet drug therapy use last 5 days  [x]No []Yes  Other anticoagulant use last 5 days  [x]No []Yes    Current Facility-Administered Medications:     sodium chloride flush 0.9 % injection 5-40 mL, 5-40 mL, IntraVENous, 2 times per day, Alia Brown MD, 10 mL at 10/21/24 1143    sodium chloride flush 0.9 % injection 5-40 mL, 5-40 mL, IntraVENous, PRN, Alia Brown MD    0.9 % sodium chloride infusion, , IntraVENous, PRN, Alia Brown MD    potassium chloride (KLOR-CON M) extended release tablet 40 mEq, 40 mEq, Oral, PRN **OR** potassium bicarb-citric acid (EFFER-K) effervescent tablet 40 mEq, 40 mEq, Oral, PRN **OR** potassium chloride 10 mEq/100 mL IVPB (Peripheral Line), 10 mEq, IntraVENous, PRN, Alia Brown MD    magnesium sulfate 2000 mg in 50 mL IVPB premix, 2,000 mg, IntraVENous, PRN, Alia Brown MD    [Held by provider] enoxaparin (LOVENOX) injection 40 mg, 40 mg, SubCUTAneous, Q24H,

## 2024-10-22 NOTE — PROGRESS NOTES
1155 Patient received in CT scanning for liver biopsy pre-procedure assessment.Monitor applied.   1200 Dr. Ley here; spoke to patient. This procedure has been fully reviewed with the patient and written informed consent has been obtained. Pathology called to CT area for procedure.  1208 Patient assisted to CT table and pre scan started.  1210 Pathology arrived to CT.   1216 Procedure started with Dr. Ley.  1228 Samples collected and given to pathologist for further review.   1230 Procedure completed; patient tolerated well. Post scan obtained.   1232 Bacitracin oint, band aid to site; no bleeding noted.  1234 Patient on bed; comfort ensured.  1236 Report called to 5K and patient taken to 5K via bed.  Pt alert and oriented x3; follows commands. Skin pink, warm, and dry. Respirations easy, regular, and nonlabored.

## 2024-10-22 NOTE — PROGRESS NOTES
Formulation and discussion of sedation / procedure plans, risks, benefits, side effects and alternatives with patient and/or responsible adult completed.    History and Physical reviewed and unchanged.    Electronically signed by Hermelindo Ley MD on 10/22/24 at 12:09 PM EDT

## 2024-10-22 NOTE — CARE COORDINATION
10/22/24 1010   Service Assessment   Patient Orientation Alert and Oriented;Person;Place;Situation;Self   Cognition Alert   History Provided By Patient;Medical Record   Primary Caregiver Self   Accompanied By/Relationship Spouse, Nanci   Support Systems Spouse/Significant Other;Children;Family Members   Patient's Healthcare Decision Maker is: Named in Scanned ACP Document  (Patient's wife states she will bring in documents.)   Prior Functional Level Independent in ADLs/IADLs;Bathing;Dressing;Toileting;Feeding;Cooking;Housework;Shopping;Mobility   Current Functional Level Independent in ADLs/IADLs;Bathing;Dressing;Toileting;Feeding;Cooking;Housework;Shopping;Mobility   Can patient return to prior living arrangement Yes   Ability to make needs known: Good   Family able to assist with home care needs: Yes   Would you like for me to discuss the discharge plan with any other family members/significant others, and if so, who? No   Financial Resources Other (Comment)  ([facility])   Community Resources None   CM/SW Referral Other (see comment)  (N/A)   Social/Functional History   Active  Yes   Occupation Full time employment   Discharge Planning   Type of Residence House   Living Arrangements Spouse/Significant Other;Children   Current Services Prior To Admission None   Potential Assistance Needed N/A   DME Ordered? No   Potential Assistance Purchasing Medications No   Type of Home Care Services None   Patient expects to be discharged to: House   History of falls? 0   Services At/After Discharge   Transition of Care Consult (CM Consult) N/A   Services At/After Discharge None   Confirm Follow Up Transport Family   Condition of Participation: Discharge Planning   The Plan for Transition of Care is related to the following treatment goals: liver biopsy today   Freedom of Choice list was provided with basic dialogue that supports the patient's individualized plan of care/goals, treatment preferences, and shares

## 2024-10-22 NOTE — PLAN OF CARE
Problem: Pain  Goal: Verbalizes/displays adequate comfort level or baseline comfort level  Outcome: Progressing  Flowsheets (Taken 10/22/2024 1345)  Verbalizes/displays adequate comfort level or baseline comfort level:   Encourage patient to monitor pain and request assistance   Assess pain using appropriate pain scale   Implement non-pharmacological measures as appropriate and evaluate response   Administer analgesics based on type and severity of pain and evaluate response   Pain Assessment: 0-10  Pain Level: 3   Patient's Stated Pain Goal: 0 - No pain   Is pain goal met at this time?  No    Problem: Discharge Planning  Goal: Discharge to home or other facility with appropriate resources  Outcome: Progressing  Flowsheets (Taken 10/22/2024 0830)  Discharge to home or other facility with appropriate resources:   Identify barriers to discharge with patient and caregiver   Arrange for needed discharge resources and transportation as appropriate   Identify discharge learning needs (meds, wound care, etc)   Discharge plan is home with wife.    Problem: Skin/Tissue Integrity  Goal: Absence of new skin breakdown  Description: 1.  Monitor for areas of redness and/or skin breakdown  2.  Assess vascular access sites hourly  3.  Every 4-6 hours minimum:  Change oxygen saturation probe site  4.  Every 4-6 hours:  If on nasal continuous positive airway pressure, respiratory therapy assess nares and determine need for appliance change or resting period.  Outcome: Progressing   Patient remains free from skin breakdown. Biopsy site remains free from complications.     Problem: Nutrition Deficit:  Goal: Optimize nutritional status  Outcome: Progressing   Patient tolerating diet.    Problem: Safety - Adult  Goal: Free from fall injury  Outcome: Progressing   Fall assessment completed. Patient using call light appropriately to call for assistance with ambulation to bathroom. Personal items within reach. Patient is also compliant

## 2024-10-22 NOTE — PROGRESS NOTES
[facility]  JESSI Campbell-FRANCISCO for Dr. Alexander Colon  General Surgery Daily Progress Note    Pt Name: Gustabo Machado  Medical Record Number: [id number]  Date of Birth 1970   Today's Date: 10/22/2024    ASSESSMENT   Abdominal/flank pain   Suspicious mass at the tail of the pancreas  Multiple liver lesions suspicious for metastatic disease  Small umbilical hernia  Elevated CA 19-9   has no past medical history on file.  PLAN   Awaiting MRCP findings  Status post IR liver biopsy this afternoon.  Await pathology.  Hospitalist for medical management  Lovenox on hold until after procedures  GI following.  Recommend IU referral as an outpatient.  Nothing from a surgical standpoint.  Discussed with GI and hospitalist.  Reconsult as needed.  Will cancel outpatient umbilical hernia repair at this time until further workup completed.  SUBJECTIVE   Chief complaint: No new complaints    Patient was stable overnight. Chart reviewed. Updated by nursing staff. Denies chest discomfort or dyspnea. No N/V; (+) belching, flatus. Tolerating Diet NPO Exceptions are: Sips of Water with Meds, Ice Chips diet. Pain controlled with analgesia. Up ad juan manuel.  CURRENT MEDICATIONS   Scheduled Meds:   sodium chloride flush  5-40 mL IntraVENous 2 times per day    [Held by provider] enoxaparin  40 mg SubCUTAneous Q24H    famotidine  20 mg Oral Daily     Continuous Infusions:   sodium chloride       PRN Meds:.sodium chloride flush, sodium chloride, potassium chloride **OR** potassium alternative oral replacement **OR** potassium chloride, magnesium sulfate, ondansetron **OR** ondansetron, polyethylene glycol, acetaminophen **OR** acetaminophen, melatonin, HYDROcodone 5 mg - acetaminophen **OR** HYDROcodone 5 mg - acetaminophen  OBJECTIVE   CURRENT VITALS:  height is 1.829 m (6') and weight is 95.3 kg (210 lb). His oral temperature is 99.6 °F (37.6 °C). His blood pressure is 129/84 and his pulse is 84. His respiration is 18

## 2024-10-22 NOTE — PROGRESS NOTES
Gastroenterology Progress Note:     Patient Name:  Gustabo Machado   MRN: [id number]  [id number]  YOB: 1970  Admit Date: 10/21/2024  5:30 AM  Primary Care Physician: Daniel Agrawal MD   5K-09/009-A     Patient seen and examined.  24 hours events and chart reviewed.    Subjective: Patient alert and oriented in bed. Wife at bedside.  Denies any nausea, vomiting, hematemesis, or melena.  Just had liver biopsy completed.  Awaiting MRCP results- called down to MRI earlier and they were changing this to a STAT read- still awaiting those results.  CA 19-9 >5000.  Discussed with wife and patient outpatient workup in a tertiary care center- they would like referral outpatient to .  Office is working on this referral.     Objective:  /74   Pulse 78   Temp 98.1 °F (36.7 °C) (Oral)   Resp 18   Ht 1.829 m (6')   Wt 95.3 kg (210 lb)   SpO2 95%   BMI 28.48 kg/m²     Physical Exam  Vitals and nursing note reviewed.   Constitutional:       General: He is not in acute distress.     Appearance: Normal appearance. He is normal weight. He is not ill-appearing.   HENT:      Mouth/Throat:      Mouth: Mucous membranes are moist.      Pharynx: Oropharynx is clear.   Cardiovascular:      Rate and Rhythm: Normal rate and regular rhythm.      Pulses: Normal pulses.      Heart sounds: Normal heart sounds. No murmur heard.  Pulmonary:      Effort: Pulmonary effort is normal. No respiratory distress.      Breath sounds: Normal breath sounds. No stridor. No wheezing, rhonchi or rales.   Chest:      Chest wall: No tenderness.   Abdominal:      General: Abdomen is flat. Bowel sounds are normal. There is no distension.      Palpations: Abdomen is soft. There is no mass.      Tenderness: There is no abdominal tenderness. There is no guarding or rebound.      Hernia: No hernia is present.   Skin:     General: Skin is warm and dry.      Capillary Refill: Capillary refill takes less than 2 seconds.   Neurological:      Mental  that he also has right periumbilical abdominal pain that wraps up to his right flank. He has also noted intermittent fevers off and on for the last month, stating that they resolve with Advil.  He has not had any changes in his bowels, states that he has approximately 2 bowel movements a day, no new changes to color, no constipation or diarrhea.  Denies any weight changes, headaches, vision changes, chills, diaphoresis, dyspnea, chest pain/pressure/tightness, palpitations, dysuria, urgency, frequency, skin changes, lymphadenopathy, or edema.  He is understandably concerned about the findings on imaging, and is agreeable to admission for further workup to facilitate speedy follow-up and intervention after discharge. Denies tobacco, EtOH, drug use. No family history of GI cancers.   Abdominal pain  Ascites  New multiple liver lesions that are suspicious for metastatic disease  Concern regarding abnormal tail of the pancreas.   Plan:    MRCP pending results- awaiting STAT read as this order was changed earlier today.   US guided liver biopsy today at 1100  CA 19-9 5483  Supportive care per primary  Outpatient follow up/work up discussed with patient and wife- they would like to go to - our office is working on this referral.    Once MRCP resulted ok with discharge home per primary.   GI following    Case reviewed and impression/plan reviewed in collaboration with Dr. Giulia Stephen  Electronically signed by JESSI Oreilly CNP on 10/22/2024 at 9:03 AM    [facility]

## 2024-10-22 NOTE — PROGRESS NOTES
Hospitalist Progress Note      Patient:  Gustabo Machado 53 y.o. male     : 1970  Unit/Bed:[address] [place] [address]  Date of Admission: 10/21/2024        ASSESSMENT AND PLAN    Active Problems  Pancreatic mass with numerous liver lesions  CT abdomen pelvis highly suspicious for metastatic pancreatic cancer demonstrating innumerable liver masses, omental caking, adenopathy  CA 19-9 5483  CT-guided liver biopsy today  MRCP pending  Neurosurgery is signed off  GI consulted-if MRCP negative, no further inpatient recommendations at this time.  Referral to  hepatobiliary specialist is being set up  Palliative care consulted  Referral for outpatient oncology placed      LDA: []CVC / []PICC / []Midline / []Chavez / []Drains / []Mediport / [x]None  Antibiotics: none  Steroids: none   Labs (still needed?): []Yes / [x]No  IVF (still needed?): []Yes / [x]No    Level of care: []Step Down / []Med-Surg  Bed Status: []Inpatient / []Observation  Telemetry: []Yes / []No  PT/OT: []Yes / [x]No    DVT Prophylaxis: [] Lovenox / [] Heparin / [] SCDs / [] Already on Systemic Anticoagulation / [x] None     Expected discharge date:  today vs tomorrow   Disposition: home     Code status: Full Code     ===================================================================    Chief Complaint: abdominal and right sided back sujatha     Subjective (past 24 hours):   Patient laying in bed, wife is present at bedside.  Extensive time spent at bedside reviewing lab work, CT imaging.  Educated patient on plan moving forward with referrals to oncology and what to expect in the next few weeks.  Patient in agreement for palliative care consult.      Medications:    Infusion Medications    sodium chloride      Scheduled Medications    sodium chloride flush  5-40 mL IntraVENous 2 times per day    [Held by provider] enoxaparin  40 mg SubCUTAneous Q24H    famotidine  20 mg Oral Daily    PRN Meds: sodium chloride flush, sodium chloride, potassium chloride **OR**

## 2024-10-23 VITALS
TEMPERATURE: 99.8 F | SYSTOLIC BLOOD PRESSURE: 129 MMHG | HEIGHT: 72 IN | WEIGHT: 210 LBS | DIASTOLIC BLOOD PRESSURE: 83 MMHG | RESPIRATION RATE: 16 BRPM | BODY MASS INDEX: 28.44 KG/M2 | OXYGEN SATURATION: 98 % | HEART RATE: 98 BPM

## 2024-10-23 PROBLEM — G89.3 CANCER RELATED PAIN: Status: ACTIVE | Noted: 2024-10-23

## 2024-10-23 PROBLEM — R68.89 SUSPECTED MALIGNANT NEOPLASM: Status: ACTIVE | Noted: 2024-10-23

## 2024-10-23 PROBLEM — Z51.5 PALLIATIVE CARE PATIENT: Status: ACTIVE | Noted: 2024-10-23

## 2024-10-23 LAB
ANION GAP SERPL CALC-SCNC: 11 MEQ/L (ref 8–16)
BUN SERPL-MCNC: 16 MG/DL (ref 7–22)
CALCIUM SERPL-MCNC: 8.7 MG/DL (ref 8.5–10.5)
CHLORIDE SERPL-SCNC: 98 MEQ/L (ref 98–111)
CO2 SERPL-SCNC: 25 MEQ/L (ref 23–33)
CREAT SERPL-MCNC: 0.7 MG/DL (ref 0.4–1.2)
DEPRECATED RDW RBC AUTO: 42.2 FL (ref 35–45)
ERYTHROCYTE [DISTWIDTH] IN BLOOD BY AUTOMATED COUNT: 12.4 % (ref 11.5–14.5)
GFR SERPL CREATININE-BSD FRML MDRD: > 90 ML/MIN/1.73M2
GLUCOSE SERPL-MCNC: 120 MG/DL (ref 70–108)
HCT VFR BLD AUTO: 41.8 % (ref 42–52)
HGB BLD-MCNC: 13.9 GM/DL (ref 14–18)
MAGNESIUM SERPL-MCNC: 2.1 MG/DL (ref 1.6–2.4)
MCH RBC QN AUTO: 31 PG (ref 26–33)
MCHC RBC AUTO-ENTMCNC: 33.3 GM/DL (ref 32.2–35.5)
MCV RBC AUTO: 93.3 FL (ref 80–94)
PLATELET # BLD AUTO: 196 THOU/MM3 (ref 130–400)
PMV BLD AUTO: 9.4 FL (ref 9.4–12.4)
POTASSIUM SERPL-SCNC: 5.1 MEQ/L (ref 3.5–5.2)
RBC # BLD AUTO: 4.48 MILL/MM3 (ref 4.7–6.1)
SODIUM SERPL-SCNC: 134 MEQ/L (ref 135–145)
WBC # BLD AUTO: 11.8 THOU/MM3 (ref 4.8–10.8)

## 2024-10-23 PROCEDURE — 36415 COLL VENOUS BLD VENIPUNCTURE: CPT

## 2024-10-23 PROCEDURE — 99223 1ST HOSP IP/OBS HIGH 75: CPT | Performed by: STUDENT IN AN ORGANIZED HEALTH CARE EDUCATION/TRAINING PROGRAM

## 2024-10-23 PROCEDURE — 85027 COMPLETE CBC AUTOMATED: CPT

## 2024-10-23 PROCEDURE — 80048 BASIC METABOLIC PNL TOTAL CA: CPT

## 2024-10-23 PROCEDURE — 83735 ASSAY OF MAGNESIUM: CPT

## 2024-10-23 PROCEDURE — 2580000003 HC RX 258

## 2024-10-23 PROCEDURE — 6370000000 HC RX 637 (ALT 250 FOR IP)

## 2024-10-23 PROCEDURE — 99239 HOSP IP/OBS DSCHRG MGMT >30: CPT | Performed by: PHYSICIAN ASSISTANT

## 2024-10-23 RX ORDER — HYDROCODONE BITARTRATE AND ACETAMINOPHEN 5; 325 MG/1; MG/1
1 TABLET ORAL EVERY 4 HOURS PRN
Qty: 42 TABLET | Refills: 0 | Status: SHIPPED | OUTPATIENT
Start: 2024-10-23 | End: 2024-10-30

## 2024-10-23 RX ORDER — FAMOTIDINE 20 MG/1
20 TABLET, FILM COATED ORAL DAILY
Qty: 60 TABLET | Refills: 0 | Status: SHIPPED | OUTPATIENT
Start: 2024-10-24

## 2024-10-23 RX ORDER — ONDANSETRON 4 MG/1
4 TABLET, ORALLY DISINTEGRATING ORAL EVERY 8 HOURS PRN
Qty: 30 TABLET | Refills: 0 | Status: SHIPPED | OUTPATIENT
Start: 2024-10-23

## 2024-10-23 RX ADMIN — SODIUM CHLORIDE, PRESERVATIVE FREE 10 ML: 5 INJECTION INTRAVENOUS at 10:16

## 2024-10-23 RX ADMIN — FAMOTIDINE 20 MG: 20 TABLET, FILM COATED ORAL at 10:16

## 2024-10-23 NOTE — CARE COORDINATION
10/23/24, 12:51 PM EDT    Patient goals/plan/ treatment preferences discussed by  and .  Patient goals/plan/ treatment preferences reviewed with patient/ family.  Patient/ family verbalize understanding of discharge plan and are in agreement with goal/plan/treatment preferences.  Understanding was demonstrated using the teach back method.  AVS provided by RN at time of discharge, which includes all necessary medical information pertaining to the patients current course of illness, treatment, post-discharge goals of care, and treatment preferences.     Services At/After Discharge: None

## 2024-10-23 NOTE — PROGRESS NOTES
Gastroenterology Progress Note:     Patient Name:  Gustabo Machado   MRN: [id number]  [id number]  YOB: 1970  Admit Date: 10/21/2024  5:30 AM  Primary Care Physician: Daniel Agrawal MD   [id number]/[id number]     Patient seen and examined.  24 hours events and chart reviewed.    Subjective: Patient sitting up in chair, tolerating diet well. Denies any pain or nausea/vomiting. Wife at bedside.  Reviewed MRCP results along with plan of care for referral to .      Objective:  /81   Pulse 79   Temp 98.4 °F (36.9 °C) (Oral)   Resp 16   Ht 1.829 m (6')   Wt 95.3 kg (210 lb)   SpO2 97%   BMI 28.48 kg/m²     Physical Exam  Vitals and nursing note reviewed.   Constitutional:       General: He is not in acute distress.     Appearance: Normal appearance. He is normal weight. He is not ill-appearing.   HENT:      Mouth/Throat:      Mouth: Mucous membranes are moist.      Pharynx: Oropharynx is clear.   Cardiovascular:      Rate and Rhythm: Normal rate and regular rhythm.      Pulses: Normal pulses.      Heart sounds: Normal heart sounds. No murmur heard.  Pulmonary:      Effort: Pulmonary effort is normal. No respiratory distress.      Breath sounds: Normal breath sounds. No stridor. No wheezing, rhonchi or rales.   Chest:      Chest wall: No tenderness.   Abdominal:      General: Abdomen is flat. Bowel sounds are normal. There is no distension.      Palpations: Abdomen is soft. There is no mass.      Tenderness: There is no abdominal tenderness. There is no guarding or rebound.      Hernia: No hernia is present.   Musculoskeletal:         General: Normal range of motion.      Right lower leg: No edema.      Left lower leg: No edema.   Skin:     General: Skin is warm and dry.      Capillary Refill: Capillary refill takes less than 2 seconds.   Neurological:      Mental Status: He is alert and oriented to person, place, and time.   Psychiatric:         Mood and Affect: Mood normal.         Behavior: Behavior  ongoing prompting him to present to ED on 10/21/2024 with increased pain and pain now in the umbilical region.  Notes that he also has right periumbilical abdominal pain that wraps up to his right flank. He has also noted intermittent fevers off and on for the last month, stating that they resolve with Advil.  He has not had any changes in his bowels, states that he has approximately 2 bowel movements a day, no new changes to color, no constipation or diarrhea.  Denies any weight changes, headaches, vision changes, chills, diaphoresis, dyspnea, chest pain/pressure/tightness, palpitations, dysuria, urgency, frequency, skin changes, lymphadenopathy, or edema.  He is understandably concerned about the findings on imaging, and is agreeable to admission for further workup to facilitate speedy follow-up and intervention after discharge. Denies tobacco, EtOH, drug use. No family history of GI cancers.   Abdominal pain  Ascites  New multiple liver lesions that are suspicious for metastatic disease  Concern regarding abnormal tail of the pancreas.     Plan:    Ok for discharge today.   [facility] office coordinating [facility] referral - will be scheduled once Liver biopsy is resulted.   Supportive care per primary.   GI following.     Case reviewed and impression/plan reviewed in collaboration with Dr. Giulia Stephen.   Electronically signed by JESSI Oreilly CNP on 10/23/2024 at 10:06 AM    [facility]

## 2024-10-23 NOTE — PLAN OF CARE
Problem: Pain  Goal: Verbalizes/displays adequate comfort level or baseline comfort level  10/23/2024 0022 by Nerissa Alvarez, RN  Outcome: Progressing     Problem: Discharge Planning  Goal: Discharge to home or other facility with appropriate resources  10/23/2024 0022 by Nerissa Alvarez, RN  Outcome: Progressing     Problem: Skin/Tissue Integrity  Goal: Absence of new skin breakdown  Description: 1.  Monitor for areas of redness and/or skin breakdown  2.  Assess vascular access sites hourly  3.  Every 4-6 hours minimum:  Change oxygen saturation probe site  4.  Every 4-6 hours:  If on nasal continuous positive airway pressure, respiratory therapy assess nares and determine need for appliance change or resting period.  10/23/2024 0022 by Nerissa Alvarez, RN  Outcome: Progressing     Problem: Nutrition Deficit:  Goal: Optimize nutritional status  10/23/2024 0022 by Nerissa Alvarez, RN  Outcome: Progressing     Problem: Safety - Adult  Goal: Free from fall injury  10/23/2024 0022 by Nerissa Alvarez, RN  Outcome: Progressing    Care plan reviewed with patient.  Patient verbalize understanding of the plan of care and contribute to goal setting.

## 2024-10-23 NOTE — PALLIATIVE CARE
Follow Up / Progress Note        Patient:   Gustabo Machado  YOB: 1970  Age:  53 y.o.  Room:  [facility]09/009-A  MRN:  [id number]               Plan/Follow-Up:  Dr. Singh's contact information provided to the patient at this time per Dr. Singh's request.         Electronically signed by Park Carlos RN on 10/23/2024 at 11:59 AM             Palliative Care Office: [phone]

## 2024-10-23 NOTE — PROGRESS NOTES
Discharge instructions explained to patient and wife with verbalized understanding. Patient returning home with all personal belongings and medications.

## 2024-10-23 NOTE — CONSULTS
Provider Consult Note        Patient:   Gustabo Machado  YOB: 1970  Age:  53 y.o.  Room:  [facility]09/009-A  MRN:  [id number]   Acct: [id number]  PCP: Daniel Agrawal MD    Date of Admission:  10/21/2024  5:30 AM  Date of Service:  10/23/2024    Reason for Consult: Goals of Care             Subjective   Chief Complaint:-   No chief complaint on file.       History Obtained From:-  Patient, Spouse, and Electronic Medical Record    History of Present Illness:-                   Gustabo Machado is a 53 y.o. male who  has no past medical history on file. They present to the hospital with No chief complaint on file.   and are admitted for Pancreatic mass.  Patient presented with right flank pain has been ongoing right-sided back pain off and on for approximately 2 months.  Initially thought to be kidney stone and was worked up in the past.  He is also noted to have a periumbilical abdominal pain that wraps around the right flank.  CT scan showed liver lesions with a pancreatic mass concerning for malignancy.  GI general surgery and oncology was consulted.  [place] pain panel was ordered for pain control.  General surgery signing off.  Plan is for have patient follow-up in the outpatient setting with oncology and [facility] for further medical management recommendations.  Palliative care was consulted for Goals of Care.    Symptoms:  Pain: They deny pain.  Shortness of breath: Patient denies shortness of breath or difficulty breathing.  Sleep: Patient reports they are not having issues with sleep.  Fatigue/Drowsiness: Patient denies fatigue.   Appetite: Patient denied issues with appetite.  Wt. Loss: Patient's weight is stable.   Dry Mouth: Patient denies dry mouth.  Nausea/Vomiting: Patient denies nausea and vomiting.  Constipation/Diarrhea: Patient denies constipation or diarrhea.  Depression: Patient denies depression or issues with mood.  Anxiety: Patient denies

## 2024-10-28 ENCOUNTER — TELEPHONE (OUTPATIENT)
Dept: PALLATIVE CARE | Age: 54
End: 2024-10-28

## 2024-10-28 NOTE — TELEPHONE ENCOUNTER
Pt's spouse Nanci called stating that the patient is complaining of a Bloating/ abdominal pain/ tightness in abdomen feeling. Wife states that the patients BM are very small and maybe once a day or less. Patient takes pepcid for bloating and that does not help. Encourage wife to try OTC constipation medication such as Miralax, colace, or senna. Pt's wife verbalized understanding.

## 2024-10-29 ENCOUNTER — PREP FOR PROCEDURE (OUTPATIENT)
Dept: SURGERY | Age: 54
End: 2024-10-29

## 2024-10-29 ENCOUNTER — CLINICAL DOCUMENTATION (OUTPATIENT)
Dept: CASE MANAGEMENT | Age: 54
End: 2024-10-29

## 2024-10-29 ENCOUNTER — HOSPITAL ENCOUNTER (OUTPATIENT)
Dept: CT IMAGING | Age: 54
Discharge: HOME | End: 2024-10-29
Attending: INTERNAL MEDICINE
Payer: COMMERCIAL

## 2024-10-29 ENCOUNTER — OFFICE VISIT (OUTPATIENT)
Dept: ONCOLOGY | Age: 54
End: 2024-10-29
Payer: COMMERCIAL

## 2024-10-29 ENCOUNTER — TELEPHONE (OUTPATIENT)
Dept: SURGERY | Age: 54
End: 2024-10-29

## 2024-10-29 ENCOUNTER — HOSPITAL ENCOUNTER (OUTPATIENT)
Dept: INFUSION THERAPY | Age: 54
Discharge: HOME | End: 2024-10-29
Payer: COMMERCIAL

## 2024-10-29 VITALS
TEMPERATURE: 97.4 F | RESPIRATION RATE: 18 BRPM | WEIGHT: 216 LBS | BODY MASS INDEX: 29.26 KG/M2 | OXYGEN SATURATION: 95 % | SYSTOLIC BLOOD PRESSURE: 123 MMHG | HEART RATE: 107 BPM | HEIGHT: 72 IN | DIASTOLIC BLOOD PRESSURE: 89 MMHG

## 2024-10-29 VITALS
SYSTOLIC BLOOD PRESSURE: 123 MMHG | OXYGEN SATURATION: 95 % | HEART RATE: 107 BPM | RESPIRATION RATE: 18 BRPM | DIASTOLIC BLOOD PRESSURE: 89 MMHG | TEMPERATURE: 97.4 F

## 2024-10-29 DIAGNOSIS — C25.2 MALIGNANT NEOPLASM OF TAIL OF PANCREAS (HCC): Primary | ICD-10-CM

## 2024-10-29 DIAGNOSIS — K86.89 PANCREATIC MASS: Primary | ICD-10-CM

## 2024-10-29 DIAGNOSIS — C79.9 METASTATIC ADENOCARCINOMA (HCC): ICD-10-CM

## 2024-10-29 DIAGNOSIS — C25.2 MALIGNANT NEOPLASM OF TAIL OF PANCREAS (HCC): ICD-10-CM

## 2024-10-29 DIAGNOSIS — K86.89 PANCREATIC MASS: ICD-10-CM

## 2024-10-29 PROCEDURE — 99211 OFF/OP EST MAY X REQ PHY/QHP: CPT

## 2024-10-29 PROCEDURE — 99245 OFF/OP CONSLTJ NEW/EST HI 55: CPT | Performed by: INTERNAL MEDICINE

## 2024-10-29 PROCEDURE — 71250 CT THORAX DX C-: CPT

## 2024-10-29 RX ORDER — SODIUM CHLORIDE 0.9 % (FLUSH) 0.9 %
5-40 SYRINGE (ML) INJECTION PRN
Status: CANCELLED | OUTPATIENT
Start: 2024-10-29

## 2024-10-29 RX ORDER — SODIUM CHLORIDE 0.9 % (FLUSH) 0.9 %
5-40 SYRINGE (ML) INJECTION EVERY 12 HOURS SCHEDULED
Status: CANCELLED | OUTPATIENT
Start: 2024-10-29

## 2024-10-29 RX ORDER — AMOXICILLIN 250 MG
1 CAPSULE ORAL 2 TIMES DAILY
Qty: 60 TABLET | Refills: 2 | Status: SHIPPED | OUTPATIENT
Start: 2024-10-29

## 2024-10-29 RX ORDER — SODIUM CHLORIDE 9 MG/ML
INJECTION, SOLUTION INTRAVENOUS CONTINUOUS
Status: CANCELLED | OUTPATIENT
Start: 2024-10-29

## 2024-10-29 RX ORDER — ONDANSETRON 4 MG/1
4 TABLET, ORALLY DISINTEGRATING ORAL 3 TIMES DAILY PRN
Qty: 21 TABLET | Refills: 0 | Status: SHIPPED | OUTPATIENT
Start: 2024-10-29

## 2024-10-29 RX ORDER — SODIUM CHLORIDE 9 MG/ML
INJECTION, SOLUTION INTRAVENOUS PRN
Status: CANCELLED | OUTPATIENT
Start: 2024-10-29

## 2024-10-29 RX ORDER — PROCHLORPERAZINE MALEATE 10 MG
10 TABLET ORAL EVERY 6 HOURS PRN
Qty: 30 TABLET | Refills: 1 | Status: SHIPPED | OUTPATIENT
Start: 2024-10-29

## 2024-10-29 ASSESSMENT — ENCOUNTER SYMPTOMS
CONSTIPATION: 1
ABDOMINAL PAIN: 1
ABDOMINAL DISTENTION: 1

## 2024-10-29 NOTE — PATIENT INSTRUCTIONS
Chemo education FOLFIRINOX  PORT urgent, referral to suregry  Paracentesis urgent  Start chemo after port placement  NGS liver biopsy ( [facility] )  Ct chest WO  Genetic consult

## 2024-10-29 NOTE — TELEPHONE ENCOUNTER
Pt in need of urgent mediport to start chemo.    Spoke w/ pt's wife, surgery scheduled 10/31, arrive at 5:45 am 2nd floor registration desk, NPO after midnight, have a .  Informed wife that Dr. Colon would see Gustabo and answer any questions morning of surgery.    Nanci voiced understanding

## 2024-10-29 NOTE — PROGRESS NOTES
minor errors which are inherent in voice recognition technology.** Electronically signed by Dr. Orion Ley    MRI ABDOMEN W WO CONTRAST MRCP    Result Date: 10/22/2024  1. A 5.2 x 1.9 cm area of mild increased enhancement in the distal body and tail of the pancreas that is suspicious for the presence of a pancreatic mass. 2. Multiple hepatic lesions are seen relating to metastatic disease. 3. Enlarged peripancreatic, portacaval and retroperitoneal lymph nodes. 4. Omental caking with small ascites that can relate to peritoneal carcinomatosis. 5. There is compression of the intrahepatic inferior vena cava with compression of the proximal aspect of the hepatic veins **This report has been created using voice recognition software.  It may contain minor errors which are inherent in voice recognition technology.** Electronically signed by Dr. Mindi Ramos    CT LUMBAR RECONSTRUCTION WO POST PROCESS    Result Date: 10/21/2024  1. Stable CT scan of the lumbar spine, no interval change since previous MRI scan dated 5/16/2014. 2. No evidence of metastatic disease. **This report has been created using voice recognition software. It may contain minor errors which are inherent in voice recognition technology.** Electronically signed by Dr. Raina Killian    CT ABDOMEN PELVIS W IV CONTRAST Additional Contrast? None    Result Date: 10/21/2024  1. Ascites, omental caking, numerous liver masses and adenopathy consistent with metastatic disease. 2. The tail of the pancreas appears abnormal. This is suspicious for a pancreatic primary. **This report has been created using voice recognition software. It may contain minor errors which are inherent in voice recognition technology.** Electronically signed by Dr. Vicky Dumont       PROCEDURES:  Port-A-Cath  Paracentesis parent    PATHOLOGY:   Liver biopsy October 22nd, 2024 consistent with adenocarcinoma of pancreatic origin    GENETICS:  Pending genetic counselor and

## 2024-10-30 ENCOUNTER — HOSPITAL ENCOUNTER (OUTPATIENT)
Dept: INFUSION THERAPY | Age: 54
Discharge: HOME | End: 2024-10-30

## 2024-10-30 ENCOUNTER — HOSPITAL ENCOUNTER (OUTPATIENT)
Dept: ULTRASOUND IMAGING | Age: 54
Discharge: HOME | End: 2024-10-30
Attending: INTERNAL MEDICINE
Payer: COMMERCIAL

## 2024-10-30 ENCOUNTER — SOCIAL WORK (OUTPATIENT)
Dept: INFUSION THERAPY | Age: 54
End: 2024-10-30

## 2024-10-30 DIAGNOSIS — K86.89 PANCREATIC MASS: ICD-10-CM

## 2024-10-30 PROCEDURE — 49083 ABD PARACENTESIS W/IMAGING: CPT

## 2024-10-30 RX ORDER — DIPHENHYDRAMINE HYDROCHLORIDE 50 MG/ML
50 INJECTION INTRAMUSCULAR; INTRAVENOUS
Status: CANCELLED | OUTPATIENT
Start: 2024-10-30

## 2024-10-30 RX ORDER — ACETAMINOPHEN 325 MG/1
650 TABLET ORAL
Status: CANCELLED | OUTPATIENT
Start: 2024-10-30

## 2024-10-30 RX ORDER — HEPARIN 100 UNIT/ML
500 SYRINGE INTRAVENOUS PRN
Status: CANCELLED | OUTPATIENT
Start: 2024-10-30

## 2024-10-30 RX ORDER — ONDANSETRON 2 MG/ML
8 INJECTION INTRAMUSCULAR; INTRAVENOUS
Status: CANCELLED | OUTPATIENT
Start: 2024-10-30

## 2024-10-30 RX ORDER — EPINEPHRINE 1 MG/ML
0.3 INJECTION, SOLUTION INTRAMUSCULAR; SUBCUTANEOUS PRN
Status: CANCELLED | OUTPATIENT
Start: 2024-10-30

## 2024-10-30 RX ORDER — SODIUM CHLORIDE 0.9 % (FLUSH) 0.9 %
5-40 SYRINGE (ML) INJECTION PRN
Status: CANCELLED | OUTPATIENT
Start: 2024-10-30

## 2024-10-30 RX ORDER — SODIUM CHLORIDE 9 MG/ML
INJECTION, SOLUTION INTRAVENOUS CONTINUOUS
Status: CANCELLED | OUTPATIENT
Start: 2024-10-30

## 2024-10-30 RX ORDER — ALBUTEROL SULFATE 90 UG/1
4 INHALANT RESPIRATORY (INHALATION) PRN
Status: CANCELLED | OUTPATIENT
Start: 2024-10-30

## 2024-10-30 RX ORDER — SODIUM CHLORIDE 9 MG/ML
25 INJECTION, SOLUTION INTRAVENOUS PRN
Status: CANCELLED | OUTPATIENT
Start: 2024-10-30

## 2024-10-30 NOTE — PROGRESS NOTES
Chemotherapy/Immunotherapy Teaching Checklist    Treatment Plan: Oxaliplatin/camptosar/leucovorin/CADD  Frequency: q 2 Weeks  Patient accompanied by  Wife      Day of chemo instructions:  [x] Must have    [x] Eat light breakfast  [x] Bring pain medications/other routine medications scheduled during appointment time  [] Hold blood pressure medications morning of treatment    Treatment process:  [x] Flow of appointment  [x] IV access Peripheral start  or  PORT access process [x] EMLA cream  [x] Premedication/ Hydration  [x] Length of treatment  [x] Tour of clinic    Diet and hydration:  [x] Discuss [place] diet    [x] Eating Hints book provided  [x] Importance of hydration 48- 64 ounces daily   [x] Hydration handout provided     Side Effects:  [x] Chemotherapy and you book provided  [x] Side effects and management discussed   [x] Diarrhea[x]Nausea/Vomiting []home antiemetic [x]hair loss[x]Neuropathy[x] Constipation[x] Fatigue[x]Mouth sores[x] Dehydration[x] Skin/Nail Changes []Pneumonitis []Colitis [] Hepatitis []Thyroid changes  []Fertility issues (birth control, sperm/egg banking issues)    Labs:  [x]BMP- renal function and electrolytes discussed  [x] CBC- RBC, WBC with ANC, platelet counts discussed  [x]Understanding your Blood hand out given   [x]Neutropenia handout/Reduce infection handout [x]Thrombocytopenia handout   [x][place] discussed    Complications that require immediate attention from physician:  [x]Fever 100.3 [x]signs of infection- chills, fever, burning with urination, worsening cough   [x]Uncontrolled vomiting [x]Uncontrolled diarrhea/constipation   [x]Unable to drink 48 ounces [x]Uncontrolled pain  [x] When to notify provider handout given  [x] After hours contact process discussed    Home instructions:  [x] Instruct to shut the lid and double flush toilet for 48 hours after chemotherapy  [x] Instruct family members to wear gloves when will be handling  body fluids    Support Services:  [x]  Financial navigator   [x]RN navigator explained  []Local cancer society  [x]     Patient and family verbalized understanding and all  questions answered. Encouraged to call for any concerns. Approximately 45 minutes spent with patient and family. Discharged in satisfactory condition. Ambulated off unit per self.

## 2024-10-30 NOTE — PLAN OF CARE
Problem: Discharge Planning  Goal: Discharge to home or other facility with appropriate resources  Outcome: Progressing     Problem: Safety - Adult  Goal: Free from fall injury  Outcome: Progressing     Problem: Chronic Conditions and Co-morbidities  Goal: Patient's chronic conditions and co-morbidity symptoms are monitored and maintained or improved  Outcome: Progressing  Flowsheets (Taken 10/30/2024 0475)  Care Plan - Patient's Chronic Conditions and Co-Morbidity Symptoms are Monitored and Maintained or Improved: Monitor and assess patient's chronic conditions and comorbid symptoms for stability, deterioration, or improvement   Care plan reviewed with patient.  Patient verbalizes understanding of the plan of care and contributes to goal setting.

## 2024-10-30 NOTE — PROGRESS NOTES
Oncology Social Work    Date: 10/30/2024  Time: 2:29 PM  Name: Gustabo Machado  MRN: [id number]     Contact Type: Distress Tool Follow-up    Note:   Situation: This staff introduced myself as the Oncology Social Worker to Gustabo Machado and his wife (Nanci) while in his chemo teaching today.     Background: Gustabo was referred to this staff by the Med Onc Dept after a recent appointment here. This staff was able to review the Distress Thermometer provided during their visit.    Coping Score 2    Areas of Concern Identified    Physical Concern: Pain or muscle tension, Sleep disturbance, and Fatigue or excessive sweating    Expressive Concern:     Social Concern:     Practical Concern:     Existential Concern: Fears of death, dying or afterlife    Assessment: This staff had the opportunity to speak with Gustabo and Nanci. He seemed pleasant as we discussed the purpose of the visit was to educate about the services provided by the . He had a couple outstanding concerns and shared otherwise he is coping well at this time. They explained [facility] to [facility] for additional information, since they specialize in his type cancer. This staff and the nurse agreed, we will continue to help and support where ever we are needed to make this journey better for them.   - The opportunity to complete an Advance Directive was offered since it is not available in Medical Records.  He will consider and let me know if he chooses to complete it in the future.  - No community referrals were placed now because it is too early to know or understand what services may be need. Therefore, referral services may be reconsidered should needs regarding assistance change.    Recommendation: Follow-up will be initiated based on need.  provided my contact information and will remain available for support.        DAYANA Rodríguez, LSW, TALON-  Oncology Social Worker      Electronically signed by DAYANA Rodríguez,

## 2024-10-30 NOTE — PROGRESS NOTES
New chemotherapy validation note:    Diagnosis for chemotherapy: Metastatic Pancreatic adenocarcinoma     Regimen ordered: mFOLFIRINOX       Reference or literature used for validation: NCCN      Date literature or guideline last updated 2/2024     Deviation from literature or guideline used:   - Empiric 25% dose reduction for entire plan     Summary of any verbal or telephone information obtained: Per MD Note \"Consider starting him on FOLFIRINOX with 25% dose reduction for cycle to ensure tolerability. We can increase the dose back to 100% if tolerated well.\"    Morgan Barbosa RPH, PharmD, BCPS  Clinical Pharmacy Specialist  10/30/2024 7:37 AM

## 2024-10-30 NOTE — DISCHARGE SUMMARY
caking, numerous liver masses and adenopathy consistent with   metastatic disease.   2. The tail of the pancreas appears abnormal. This is suspicious for a   pancreatic primary.               **This report has been created using voice recognition software. It may contain   minor errors which are inherent in voice recognition technology.**            Electronically signed by Dr. Vicky Dumont             Consults:     IP CONSULT TO GENERAL SURGERY  IP CONSULT TO GI  IP CONSULT TO PALLIATIVE CARE  PALLIATIVE CARE EVAL    Disposition:    [x] Home       [] TCU       [] Rehab       [] Psych       [] SNF       [] Long Term Care Facility       [] Other-    Condition at Discharge: Stable    Code Status:  Prior       Patient Instructions:      Diet: No diet orders on file      Follow-up visits:   Jeffery Gonzalez MD  [place] [id number] [facility] [address] [phone]    Call in 1 week(s)  Lorri is working on setting up referral appt. once liver biopsy results are back.    [facility]    Follow up  [facility] working on referral  Lorri at [facility] will send referral to  once liver biopsy results are returned.    Awada, Hassan, MD  [address]  [address]  [facility] [address]  [phone]    Go on 10/23/2024  The office indicated they will reach out to the patient to schedule         Discharge Medications:        Medication List        START taking these medications      famotidine 20 MG tablet  Commonly known as: PEPCID  Take 1 tablet by mouth daily     HYDROcodone-acetaminophen 5-325 MG per tablet  Commonly known as: NORCO  Take 1 tablet by mouth every 4 hours as needed for Pain for up to 7 days. Max Daily Amount: 6 tablets     ondansetron 4 MG disintegrating tablet  Commonly known as: ZOFRAN-ODT  Take 1 tablet by mouth every 8 hours as needed for Nausea or Vomiting            STOP taking these medications      meloxicam 7.5 MG tablet  Commonly known as: Mobic

## 2024-10-31 ENCOUNTER — ANESTHESIA (OUTPATIENT)
Dept: OPERATING ROOM | Age: 54
End: 2024-10-31
Payer: COMMERCIAL

## 2024-10-31 ENCOUNTER — APPOINTMENT (OUTPATIENT)
Dept: GENERAL RADIOLOGY | Age: 54
End: 2024-10-31
Attending: SURGERY
Payer: COMMERCIAL

## 2024-10-31 ENCOUNTER — HOSPITAL ENCOUNTER (OUTPATIENT)
Age: 54
Setting detail: OUTPATIENT SURGERY
Discharge: HOME | End: 2024-10-31
Attending: SURGERY | Admitting: SURGERY
Payer: COMMERCIAL

## 2024-10-31 ENCOUNTER — ANESTHESIA EVENT (OUTPATIENT)
Dept: OPERATING ROOM | Age: 54
End: 2024-10-31
Payer: COMMERCIAL

## 2024-10-31 VITALS
DIASTOLIC BLOOD PRESSURE: 86 MMHG | HEIGHT: 72 IN | BODY MASS INDEX: 28.91 KG/M2 | RESPIRATION RATE: 16 BRPM | OXYGEN SATURATION: 96 % | SYSTOLIC BLOOD PRESSURE: 119 MMHG | HEART RATE: 95 BPM | TEMPERATURE: 97.8 F | WEIGHT: 213.4 LBS

## 2024-10-31 PROCEDURE — 6360000002 HC RX W HCPCS: Performed by: NURSE PRACTITIONER

## 2024-10-31 PROCEDURE — 77001 FLUOROGUIDE FOR VEIN DEVICE: CPT | Performed by: SURGERY

## 2024-10-31 PROCEDURE — C1788 PORT, INDWELLING, IMP: HCPCS | Performed by: SURGERY

## 2024-10-31 PROCEDURE — 2580000003 HC RX 258: Performed by: NURSE PRACTITIONER

## 2024-10-31 PROCEDURE — 3600000012 HC SURGERY LEVEL 2 ADDTL 15MIN: Performed by: SURGERY

## 2024-10-31 PROCEDURE — 36561 INSERT TUNNELED CV CATH: CPT | Performed by: SURGERY

## 2024-10-31 PROCEDURE — 71045 X-RAY EXAM CHEST 1 VIEW: CPT

## 2024-10-31 PROCEDURE — 6360000002 HC RX W HCPCS: Performed by: SURGERY

## 2024-10-31 PROCEDURE — 2500000003 HC RX 250 WO HCPCS: Performed by: SURGERY

## 2024-10-31 PROCEDURE — 3600000002 HC SURGERY LEVEL 2 BASE: Performed by: SURGERY

## 2024-10-31 PROCEDURE — 7100000011 HC PHASE II RECOVERY - ADDTL 15 MIN: Performed by: SURGERY

## 2024-10-31 PROCEDURE — 77001 FLUOROGUIDE FOR VEIN DEVICE: CPT

## 2024-10-31 PROCEDURE — 2580000003 HC RX 258: Performed by: NURSE ANESTHETIST, CERTIFIED REGISTERED

## 2024-10-31 PROCEDURE — 7100000010 HC PHASE II RECOVERY - FIRST 15 MIN: Performed by: SURGERY

## 2024-10-31 PROCEDURE — 3700000001 HC ADD 15 MINUTES (ANESTHESIA): Performed by: SURGERY

## 2024-10-31 PROCEDURE — 2709999900 HC NON-CHARGEABLE SUPPLY: Performed by: SURGERY

## 2024-10-31 PROCEDURE — 6360000002 HC RX W HCPCS: Performed by: NURSE ANESTHETIST, CERTIFIED REGISTERED

## 2024-10-31 PROCEDURE — 3700000000 HC ANESTHESIA ATTENDED CARE: Performed by: SURGERY

## 2024-10-31 DEVICE — PORT INFUS SGL LUMN ATTCH POLYUR OPN END CATH 8FR POWERPRT: Type: IMPLANTABLE DEVICE | Site: CHEST | Status: FUNCTIONAL

## 2024-10-31 RX ORDER — MIDAZOLAM HYDROCHLORIDE 1 MG/ML
INJECTION, SOLUTION INTRAMUSCULAR; INTRAVENOUS
Status: DISCONTINUED | OUTPATIENT
Start: 2024-10-31 | End: 2024-10-31 | Stop reason: SDUPTHER

## 2024-10-31 RX ORDER — FAMOTIDINE 20 MG/1
20 TABLET, FILM COATED ORAL 2 TIMES DAILY
Status: CANCELLED | OUTPATIENT
Start: 2024-10-31

## 2024-10-31 RX ORDER — HEPARIN 100 UNIT/ML
SYRINGE INTRAVENOUS PRN
Status: DISCONTINUED | OUTPATIENT
Start: 2024-10-31 | End: 2024-10-31 | Stop reason: ALTCHOICE

## 2024-10-31 RX ORDER — OXYCODONE HYDROCHLORIDE 5 MG/1
5 TABLET ORAL EVERY 4 HOURS PRN
Status: CANCELLED | OUTPATIENT
Start: 2024-10-31

## 2024-10-31 RX ORDER — SODIUM CHLORIDE 9 MG/ML
INJECTION, SOLUTION INTRAVENOUS PRN
Status: DISCONTINUED | OUTPATIENT
Start: 2024-10-31 | End: 2024-10-31 | Stop reason: HOSPADM

## 2024-10-31 RX ORDER — ONDANSETRON 2 MG/ML
4 INJECTION INTRAMUSCULAR; INTRAVENOUS EVERY 6 HOURS PRN
Status: CANCELLED | OUTPATIENT
Start: 2024-10-31

## 2024-10-31 RX ORDER — SODIUM CHLORIDE 0.9 % (FLUSH) 0.9 %
5-40 SYRINGE (ML) INJECTION PRN
Status: DISCONTINUED | OUTPATIENT
Start: 2024-10-31 | End: 2024-10-31 | Stop reason: HOSPADM

## 2024-10-31 RX ORDER — SODIUM CHLORIDE 0.9 % (FLUSH) 0.9 %
5-40 SYRINGE (ML) INJECTION PRN
Status: CANCELLED | OUTPATIENT
Start: 2024-10-31

## 2024-10-31 RX ORDER — MORPHINE SULFATE 4 MG/ML
4 INJECTION, SOLUTION INTRAMUSCULAR; INTRAVENOUS
Status: CANCELLED | OUTPATIENT
Start: 2024-10-31

## 2024-10-31 RX ORDER — SODIUM CHLORIDE 9 MG/ML
INJECTION, SOLUTION INTRAVENOUS PRN
Status: CANCELLED | OUTPATIENT
Start: 2024-10-31

## 2024-10-31 RX ORDER — SODIUM CHLORIDE 0.9 % (FLUSH) 0.9 %
5-40 SYRINGE (ML) INJECTION EVERY 12 HOURS SCHEDULED
Status: DISCONTINUED | OUTPATIENT
Start: 2024-10-31 | End: 2024-10-31 | Stop reason: HOSPADM

## 2024-10-31 RX ORDER — SODIUM CHLORIDE 9 MG/ML
INJECTION, SOLUTION INTRAVENOUS
Status: DISCONTINUED | OUTPATIENT
Start: 2024-10-31 | End: 2024-10-31 | Stop reason: SDUPTHER

## 2024-10-31 RX ORDER — ONDANSETRON 4 MG/1
4 TABLET, ORALLY DISINTEGRATING ORAL EVERY 8 HOURS PRN
Status: CANCELLED | OUTPATIENT
Start: 2024-10-31

## 2024-10-31 RX ORDER — HYDROCODONE BITARTRATE AND ACETAMINOPHEN 5; 325 MG/1; MG/1
1 TABLET ORAL EVERY 4 HOURS PRN
COMMUNITY

## 2024-10-31 RX ORDER — MORPHINE SULFATE 2 MG/ML
2 INJECTION, SOLUTION INTRAMUSCULAR; INTRAVENOUS
Status: CANCELLED | OUTPATIENT
Start: 2024-10-31

## 2024-10-31 RX ORDER — PROPOFOL 10 MG/ML
INJECTION, EMULSION INTRAVENOUS
Status: DISCONTINUED | OUTPATIENT
Start: 2024-10-31 | End: 2024-10-31 | Stop reason: SDUPTHER

## 2024-10-31 RX ORDER — SODIUM CHLORIDE 0.9 % (FLUSH) 0.9 %
5-40 SYRINGE (ML) INJECTION EVERY 12 HOURS SCHEDULED
Status: CANCELLED | OUTPATIENT
Start: 2024-10-31

## 2024-10-31 RX ORDER — OXYCODONE HYDROCHLORIDE 5 MG/1
10 TABLET ORAL EVERY 4 HOURS PRN
Status: CANCELLED | OUTPATIENT
Start: 2024-10-31

## 2024-10-31 RX ORDER — LIDOCAINE HYDROCHLORIDE 10 MG/ML
INJECTION, SOLUTION EPIDURAL; INFILTRATION; INTRACAUDAL; PERINEURAL PRN
Status: DISCONTINUED | OUTPATIENT
Start: 2024-10-31 | End: 2024-10-31 | Stop reason: ALTCHOICE

## 2024-10-31 RX ORDER — SODIUM CHLORIDE 9 MG/ML
INJECTION, SOLUTION INTRAVENOUS CONTINUOUS
Status: DISCONTINUED | OUTPATIENT
Start: 2024-10-31 | End: 2024-10-31

## 2024-10-31 RX ADMIN — WATER 2000 MG: 1 INJECTION INTRAMUSCULAR; INTRAVENOUS; SUBCUTANEOUS at 07:20

## 2024-10-31 RX ADMIN — MIDAZOLAM 2 MG: 1 INJECTION INTRAMUSCULAR; INTRAVENOUS at 07:15

## 2024-10-31 RX ADMIN — SODIUM CHLORIDE: 9 INJECTION, SOLUTION INTRAVENOUS at 07:15

## 2024-10-31 RX ADMIN — PROPOFOL 400 MG: 10 INJECTION, EMULSION INTRAVENOUS at 07:39

## 2024-10-31 RX ADMIN — SODIUM CHLORIDE: 9 INJECTION, SOLUTION INTRAVENOUS at 06:25

## 2024-10-31 ASSESSMENT — PAIN DESCRIPTION - DESCRIPTORS: DESCRIPTORS: ACHING

## 2024-10-31 ASSESSMENT — PAIN - FUNCTIONAL ASSESSMENT
PAIN_FUNCTIONAL_ASSESSMENT: ACTIVITIES ARE NOT PREVENTED
PAIN_FUNCTIONAL_ASSESSMENT: 0-10

## 2024-10-31 NOTE — ANESTHESIA POSTPROCEDURE EVALUATION
Department of Anesthesiology  Postprocedure Note    Patient: Gustabo Machado  MRN: [id number]  YOB: 1970  Date of evaluation: 10/31/2024    Procedure Summary       Date: 10/31/24 Room / Location: [facility] OR 08 / [facility] OR    Anesthesia Start: 0715 Anesthesia Stop: 0753    Procedure: Single lumen Smartport insertion (Chest) Diagnosis:       Metastatic adenocarcinoma (HCC)      (Metastatic adenocarcinoma (HCC) [C79.9])    Surgeons: Alexander Colon MD Responsible Provider: Nicanor Silveira DO    Anesthesia Type: MAC ASA Status: 3            Anesthesia Type: No value filed.    Yoni Phase I: Yoni Score: 10    Yoni Phase II:      Anesthesia Post Evaluation    Patient location during evaluation: PACU  Patient participation: complete - patient participated  Level of consciousness: awake  Pain score: 0  Airway patency: patent  Nausea & Vomiting: no nausea and no vomiting  Cardiovascular status: hemodynamically stable  Respiratory status: acceptable  Hydration status: euvolemic        No notable events documented.

## 2024-10-31 NOTE — DISCHARGE INSTRUCTIONS
DR. DUNN'S DISCHARGE INSTRUCTIONS    Pt Name: Gustabo Machado  Medical Record Number: [id number]  Today's Date: 10/31/2024    GENERAL ANESTHESIA OR SEDATION  1. Do not drive or operate hazardous machinery for 24 hours.  2. Do not make important business or personal decisions for 24 hours.  3. Do not drink alcoholic beverages or use tobacco for 24 hours.    ACTIVITY INSTRUCTIONS:  [] Rest today. Resume light to normal activity tomorrow.   [x] You may resume normal activity tomorrow. Do not engage in strenuous activity that may place stress on your incision.  [x] Do not drive for 3-5 days and avoid heavy lifting, tugging, pullings greater than 10-20 lbs until seen in the office.      DIET INSTRUCTIONS:  [x]Begin with clear liquids. If not nauseated, may increase to a low-fat diet when you desire. Greasy and spicy foods are not advised.  [x]Regular diet as tolerated.  []Other:     MEDICATIONS  [x]Prescription sent with you to be used as directed.   []Valium   [x]Norco   []Percocet   [x]Toradol   []Oxycontin     Do not drink alcohol or drive while taking these medications. You may experience dizziness or drowsiness with these medications. You may also experience constipation which can be relieved with stool softners or laxatives.    - Take Colace 100 mg 1-2 tabs daily as needed for constipation  - Take MiraLax 1-2 cap fulls daily as needed for constipation    [x]You may resume your daily prescription medication schedule unless otherwise specified.  []Do not take 325mg Aspirin or other blood thinners such as Coumadin or Plavix for 5 days.    **Pain medication at discharge - use only as prescribed- refills may be available to you at your follow up appointments if needed and warranted.  Narcotics should be used for only short term and we highly encouraged our patients to wean off appropriately and use other means for pain such as non pharmacologic measures and over the counter tylenol or ibuprofen if no restrictions  apply. We do  know that surgical pain is real and will not hesitate to help eliminate some of your discomfort. However we will not be able to completely make you pain free and it is important to determine what pain level is tolerable for you **    WOUND/DRESSING INSTRUCTIONS:  Always ensure you and your care giver clean hands before and after caring for the wound.  [x] Keep dressing clean and dry for 24 hours. Change when soiled or wet.      [] Allow steri-strips to fall off on their own.   [x] Ice operative site as needed for 20 minutes 4 times a day.     [x] May wash over incision in shower in 24 hours, but do not soak in a bath.  [] Take sitz bath for 20 minutes twice daily and after bowel movements.  [] Keep the abdominal binder in place during the day. May remove to shower and at night.  [] Remove packing from wound in 24 hours and replace daily.  [] Empty SIDNEY drain daily and record the amounts.    FOLLOW-UP CARE. SPECIFICALLY WATCH FOR:   Fever over 101 degrees by mouth   Increased redness, warmth, hardness at operative site.   Blood soaked dressing (small amounts of oozing may be normal.)   Increased or progressive drainage from the surgical area   Inability to urinate or blood in the urine   Pain not relieved by the medications ordered   Persistent nausea and/or vomiting, unable to retain fluids.   Pain or swelling in your legs.   Shortness of breath.    FOLLOW-UP APPOINTMENT   []1 week   [x]2 weeks   []Other    Call my office if you have any problem that concerns you [phone]. After hours, you can reach the answering service via the office phone number. IF YOU NEED IMMEDIATE ATTENTION, GO TO THE EMERGENCY ROOM AND YOUR DOCTOR WILL BE CONTACTED.    Alexander Colon MD [address]  [place] [address]  Electronically signed 10/31/2024 at 6:18 AM

## 2024-10-31 NOTE — OP NOTE
[address] [address]                            OPERATIVE REPORT      PATIENT NAME: FAITH CASTRO              : 1970  MED REC NO: [id number]                       ROOM: [facility]                                               (General) POOL                                               RM    ACCOUNT NO: [id number]                       ADMIT DATE: 10/31/2024  PROVIDER: Alexander Colon MD      DATE OF PROCEDURE:  10/31/2024    SURGEON:  Alexander Colon MD    PREOPERATIVE DIAGNOSES:    1. Metastatic adenocarcinoma.  2. Pancreatic mass.    POSTOPERATIVE DIAGNOSES:    1. Metastatic adenocarcinoma.  2. Pancreatic mass.    PROCEDURES:    1. Intraoperative fluoroscopy.  2. Insertion of tunneled right subclavian vein single-lumen port.    ASSISTANTS:  [place] Gavin/Isaiah Sutton CNP    ANESTHESIA:  IV sedation/local.    ESTIMATED BLOOD LOSS:  10 mL.    DRAINS:  None.    COMPLICATIONS:  None.    DISPOSITION:  Stable to the recovery room.    INDICATIONS:  The patient is an unfortunate for 53-year-old male who had abdominal pain, and was found to have a large pancreatic mass in the tail of the pancreas.  He was found to have metastatic adenocarcinoma.  He was in need of chemotherapy.  Both operative and nonoperative intervention plans were discussed in regard to a port.  Some of the risks included, but were not limited to, bleeding, infection, the need for reoperation, severe chronic postoperative pain or numbness, major vascular or nerve injury, cardiopulmonary complications, anesthetic complications, seroma or hematoma formation, wound breakdown, air embolism, pneumothorax, DVT, port/catheter malfunction, infection, and death.  After all of the questions were answered in their entirety and the patient was completely aware of the current situation, he elected to proceed with the procedure.    DESCRIPTION OF PROCEDURE:  After informed

## 2024-10-31 NOTE — PROGRESS NOTES
Name: Gustabo Machado  : 1970  MRN: [id number]    Oncology Navigation- Initial Note:    Intake-  Contact Type: Medical Oncology  Spouse Nanci accompanied consultation    Diagnosis:  Pancreatic cancer metastatic to liver  -stage IV    Home Disposition: Lives with other who is able to assist-spouse  -minimal activity/spouse assists with care  -c/o coughing/SOB/severe fatigue  -c/o severe abdominal pain(across upper abdomen)-haven't been taking Norco d/t constipation. Saw Dr. Singh yesterday - adjusted bowel regimen/resume Norco  - off work from [employer]/filled out disability   - 2 children at home 1 boy/1 girl    Patient needs and barriers to care: Coordination of Care, Knowledge deficit, and Symptom Management     Referral Source: Outpatient    Receptive to Advanced Care Planning/ Palliative Care:  following    Interventions-              Oncologist Plan of Care:                 -MD reviewed disease pathology & therapeutic recommendations                -reviewed NCCN guidelines                 -chemotherapy FOLFIRINOX ordered- awaiting auth (oliverio spoke  to                    expedite quickly)                  -US abdomen/possible paracentesis urgent                 -CT chest urgent                  -chemo education 10/30/24                  -ordered foundation One on tissue                   -RX zofran/compazine/senekot S                   -referral to genetic counselor Clementine Briggs                       -return MD apt  for lab/ start C1D1 tx                           Education oliverio reiterated ONC POC     General Interventions: Introduce myself to patient & family as a nurse navigator- explained the role & process of nurse navigation. Welcome folder reviewed & given;                                                      including contact information.         Referrals: Dr. Colon for mediport insertion 10/31/2024     Biopsy site status: liver biopsy-metastatic adenocarcinoma     [facility]

## 2024-10-31 NOTE — ANESTHESIA PRE PROCEDURE
Department of Anesthesiology  Preprocedure Note       Name:  Gustabo Mahcado   Age:  53 y.o.  :  1970                                          MRN:  [id number]         Date:  10/31/2024      Surgeon: Surgeon(s):  Alexander Colon MD    Procedure: Procedure(s):  Single lumen Smartport insertion    Medications prior to admission:   Prior to Admission medications    Medication Sig Start Date End Date Taking? Authorizing Provider   HYDROcodone-acetaminophen (NORCO) 5-325 MG per tablet Take 1 tablet by mouth every 4 hours as needed for Pain. Max Daily Amount: 6 tablets   Yes Provider, MD Lisa   senna-docusate (SENOKOT S) 8.6-50 MG per tablet Take 1 tablet by mouth 2 times daily 10/29/24  Yes Jose Irving MD   famotidine (PEPCID) 20 MG tablet Take 1 tablet by mouth daily 10/24/24  Yes Ama Petersen PA-C   ondansetron (ZOFRAN-ODT) 4 MG disintegrating tablet Take 1 tablet by mouth 3 times daily as needed for Nausea or Vomiting 10/29/24   Jose Irving MD   prochlorperazine (COMPAZINE) 10 MG tablet Take 1 tablet by mouth every 6 hours as needed (nausea) 10/29/24   Jose Irving MD   ondansetron (ZOFRAN-ODT) 4 MG disintegrating tablet Take 1 tablet by mouth every 8 hours as needed for Nausea or Vomiting 10/23/24   Ama Petersen PA-C       Current medications:    Current Facility-Administered Medications   Medication Dose Route Frequency Provider Last Rate Last Admin   • sodium chloride flush 0.9 % injection 5-40 mL  5-40 mL IntraVENous 2 times per day Isaiha Stuton APRN - CNP       • sodium chloride flush 0.9 % injection 5-40 mL  5-40 mL IntraVENous PRN Isaiah Sutton APRN - CNP       • 0.9 % sodium chloride infusion   IntraVENous PRN Isaiah Sutton APRN - CNP       • 0.9 % sodium chloride infusion   IntraVENous Continuous Isaiah Sutton APRN - CNP 20 mL/hr at 10/31/24 0625 New Bag at 10/31/24 0625   • ceFAZolin (ANCEF) 2,000 mg in sterile water 20 mL IV syringe

## 2024-10-31 NOTE — BRIEF OP NOTE
Brief Postoperative Note      Patient: Gustabo Machado  YOB: 1970  MRN: [id number]    Date of Procedure: 10/31/2024    Pre-Op Diagnosis Codes:      * Metastatic adenocarcinoma (HCC) [C79.9]    Post-Op Diagnosis: Same       Procedure:  1.  Intraoperative fluoroscopy  2.  Insertion tunneled right subclavian vein single-lumen port    Surgeon(s):  Alexander Colon MD    Assistant:  First Assistant: Nicanor Gavin RN    Anesthesia: Monitor Anesthesia Care/local    Estimated Blood Loss (mL): 10ml    Complications: None    Specimens:   * No specimens in log *    Implants:  Implant Name Type Inv. Item Serial No.  Lot No. LRB No. Used Action   PORT INFUS SGL LUMN ATTCH POLYUR OPN END CATH 8FR POWERPRT - [id number]  PORT INFUS SGL LUMN ATTCH POLYUR OPN END CATH 8FR POWERPRT  [employer]WD [id number] 1 Implanted         Drains: * No LDAs found *    Findings:  Infection Present At Time Of Surgery (PATOS) (choose all levels that have infection present):  No infection present    Other Findings: see op note    Electronically signed by Alexander Colon MD on 10/31/2024 at 7:49 AM

## 2024-10-31 NOTE — PROGRESS NOTES
Pt has met discharge criteria and states he is ready for discharge to home. IV removed, gauze and tape applied. Dressed in own clothes and personal belongings gathered. Discharge instructions given to pt and family; pt and family verbalized understanding of discharge instructions, prescriptions and follow up appointments. Pt transported to discharge lobby by [facility] staff.

## 2024-10-31 NOTE — H&P
port.  2. He will undergo pre-operative clearance per anesthesia guidelines with risk factors listed under the past medical history diagnosis & problem list.  3. The risks, benefits and alternatives were discussed with Gustabo including non-operative management.  All questions answered. He understands and wishes to proceed with surgical intervention.  4. Restrictions discussed with [place] and he expresses understanding.  5. He is advised to call back directly if there are further questions/concerns, or if his symptoms worsen prior to surgery.    Electronically signed by Alexander Colon MD on 10/31/24 at 6:17 AM EDT

## 2024-10-31 NOTE — PROGRESS NOTES
Patient oriented to Same Day department and admitted to Same Day Surgery room 6.   Patient verbalized approval for first name, last initial with physician name on unit whiteboard.     Plan of care reviewed with patient.   Patient room whiteboard filled out and discussed with patient and responsible adult.   Patient and responsible adult offered Same Day Welcome Packet to review.    Call light in reach.   Bed in lowest position, locked, with one bed rail up.   SCDs and warming blanket in place.  Appropriate arm bands on patient.   Bathroom offered.   All questions and concerns of patient addressed.        Meds to Beds:   Patient informed of [facility] Meds to Beds program during admission. Patient is agreeable to program.   Contact information for the pharmacy and the Meds to Beds program:   Name: Nanci   Relationship to patient:spouse/significant other   Phone number: [phone]

## 2024-10-31 NOTE — INTERVAL H&P NOTE
Update History & Physical    The patient's History and Physical was reviewed with the patient and I examined the patient. There was no change. The surgical site was confirmed by the patient and me.     Plan: The risks, benefits, expected outcome, and alternative to the recommended procedure have been discussed with the patient. Patient understands and wants to proceed with the procedure.     Electronically signed by Alexander oClon MD on 10/31/2024 at 6:17 AM

## 2024-11-01 ENCOUNTER — TELEPHONE (OUTPATIENT)
Dept: SURGERY | Age: 54
End: 2024-11-01

## 2024-11-01 LAB — FOUNDATION MEDICINE RESULT STATUS: NORMAL

## 2024-11-02 LAB — FOUNDATION MEDICINE RESULT STATUS: NORMAL

## 2024-11-04 ENCOUNTER — CASE MANAGEMENT (OUTPATIENT)
Dept: CASE MANAGEMENT | Age: 54
End: 2024-11-04

## 2024-11-04 RX ORDER — LIDOCAINE/PRILOCAINE 2.5 %-2.5%
CREAM (GRAM) TOPICAL
Qty: 1 EACH | Refills: 0 | Status: SHIPPED | OUTPATIENT
Start: 2024-11-04

## 2024-11-04 NOTE — PROGRESS NOTES
Name: Gustabo Machado  : 1970  MRN: [id number]    Oncology Navigation Follow-Up Note    Contact Type:  Telephone    Notes: shaquille received phone call from Nanci vivar-no RX for EMLA sent to pharmacy.  Shaquille spoke to Dr. Irving-RX ray harrison- shaquille called spouse and informed.    Electronically signed by Shawna Box RN on 2024 at 3:42 PM

## 2024-11-05 ENCOUNTER — HOSPITAL ENCOUNTER (OUTPATIENT)
Dept: INFUSION THERAPY | Age: 54
Discharge: HOME | End: 2024-11-05
Attending: INTERNAL MEDICINE
Payer: COMMERCIAL

## 2024-11-05 ENCOUNTER — CASE MANAGEMENT (OUTPATIENT)
Dept: CASE MANAGEMENT | Age: 54
End: 2024-11-05

## 2024-11-05 ENCOUNTER — OFFICE VISIT (OUTPATIENT)
Dept: ONCOLOGY | Age: 54
End: 2024-11-05
Payer: COMMERCIAL

## 2024-11-05 VITALS
OXYGEN SATURATION: 95 % | BODY MASS INDEX: 29.93 KG/M2 | WEIGHT: 221 LBS | HEART RATE: 112 BPM | DIASTOLIC BLOOD PRESSURE: 81 MMHG | RESPIRATION RATE: 18 BRPM | HEIGHT: 72 IN | SYSTOLIC BLOOD PRESSURE: 124 MMHG | TEMPERATURE: 97.7 F

## 2024-11-05 VITALS
BODY MASS INDEX: 29.93 KG/M2 | RESPIRATION RATE: 18 BRPM | DIASTOLIC BLOOD PRESSURE: 83 MMHG | TEMPERATURE: 97.7 F | HEIGHT: 72 IN | WEIGHT: 221 LBS | HEART RATE: 95 BPM | SYSTOLIC BLOOD PRESSURE: 133 MMHG | OXYGEN SATURATION: 94 %

## 2024-11-05 DIAGNOSIS — C25.2 MALIGNANT NEOPLASM OF TAIL OF PANCREAS (HCC): Primary | ICD-10-CM

## 2024-11-05 LAB
ALBUMIN SERPL BCG-MCNC: 2.4 G/DL (ref 3.5–5.1)
ALP SERPL-CCNC: 293 U/L (ref 38–126)
ALT SERPL W/O P-5'-P-CCNC: 63 U/L (ref 11–66)
AST SERPL-CCNC: 74 U/L (ref 5–40)
BASOPHILS ABSOLUTE: 0 THOU/MM3 (ref 0–0.1)
BASOPHILS NFR BLD AUTO: 0 % (ref 0–3)
BILIRUB CONJ SERPL-MCNC: 0.5 MG/DL (ref 0.1–13.8)
BILIRUB SERPL-MCNC: 0.7 MG/DL (ref 0.3–1.2)
BUN BLDP-MCNC: 25 MG/DL (ref 8–26)
CHLORIDE BLD-SCNC: 94 MEQ/L (ref 98–109)
CREAT BLD-MCNC: 1.1 MG/DL (ref 0.5–1.2)
EOSINOPHIL NFR BLD AUTO: 1 % (ref 0–4)
EOSINOPHILS ABSOLUTE: 0.2 THOU/MM3 (ref 0–0.4)
ERYTHROCYTE [DISTWIDTH] IN BLOOD BY AUTOMATED COUNT: 12.8 % (ref 11.5–14.5)
GFR SERPL CREATININE-BSD FRML MDRD: 79 ML/MIN/1.73M2
GLUCOSE BLD-MCNC: 172 MG/DL (ref 70–108)
HCT VFR BLD AUTO: 42 % (ref 42–52)
HGB BLD-MCNC: 14.3 GM/DL (ref 14–18)
IMMATURE GRANULOCYTES %: 3 %
IMMATURE GRANULOCYTES ABSOLUTE: 0.69 THOU/MM3 (ref 0–0.07)
IONIZED CALCIUM, WHOLE BLOOD: 1.19 MMOL/L (ref 1.12–1.32)
LYMPHOCYTES ABSOLUTE: 0.9 THOU/MM3 (ref 1–4.8)
LYMPHOCYTES NFR BLD AUTO: 4 % (ref 15–47)
MCH RBC QN AUTO: 31.4 PG (ref 26–33)
MCHC RBC AUTO-ENTMCNC: 34 GM/DL (ref 32.2–35.5)
MCV RBC AUTO: 92 FL (ref 80–94)
MONOCYTES ABSOLUTE: 2 THOU/MM3 (ref 0.4–1.3)
MONOCYTES NFR BLD AUTO: 9 % (ref 0–12)
NEUTROPHILS ABSOLUTE: 18 THOU/MM3 (ref 1.8–7.7)
NEUTROPHILS NFR BLD AUTO: 83 % (ref 43–75)
PLATELET # BLD AUTO: 318 THOU/MM3 (ref 130–400)
PMV BLD AUTO: 8.8 FL (ref 9.4–12.4)
POTASSIUM BLD-SCNC: 5 MEQ/L (ref 3.5–4.9)
PROT SERPL-MCNC: 5.9 G/DL (ref 6.1–8)
RBC # BLD AUTO: 4.56 MILL/MM3 (ref 4.7–6.1)
SODIUM BLD-SCNC: 128 MEQ/L (ref 138–146)
TOTAL CO2, WHOLE BLOOD: 28 MEQ/L (ref 23–33)
WBC # BLD AUTO: 21.8 THOU/MM3 (ref 4.8–10.8)

## 2024-11-05 PROCEDURE — 96367 TX/PROPH/DG ADDL SEQ IV INF: CPT

## 2024-11-05 PROCEDURE — 85025 COMPLETE CBC W/AUTO DIFF WBC: CPT

## 2024-11-05 PROCEDURE — 96413 CHEMO IV INFUSION 1 HR: CPT

## 2024-11-05 PROCEDURE — 2580000003 HC RX 258: Performed by: INTERNAL MEDICINE

## 2024-11-05 PROCEDURE — 80047 BASIC METABLC PNL IONIZED CA: CPT

## 2024-11-05 PROCEDURE — 96368 THER/DIAG CONCURRENT INF: CPT

## 2024-11-05 PROCEDURE — 80076 HEPATIC FUNCTION PANEL: CPT

## 2024-11-05 PROCEDURE — 36591 DRAW BLOOD OFF VENOUS DEVICE: CPT

## 2024-11-05 PROCEDURE — 96416 CHEMO PROLONG INFUSE W/PUMP: CPT

## 2024-11-05 PROCEDURE — 96417 CHEMO IV INFUS EACH ADDL SEQ: CPT

## 2024-11-05 PROCEDURE — 96375 TX/PRO/DX INJ NEW DRUG ADDON: CPT

## 2024-11-05 PROCEDURE — 96415 CHEMO IV INFUSION ADDL HR: CPT

## 2024-11-05 PROCEDURE — 6360000002 HC RX W HCPCS: Performed by: INTERNAL MEDICINE

## 2024-11-05 PROCEDURE — 99212 OFFICE O/P EST SF 10 MIN: CPT

## 2024-11-05 PROCEDURE — 99215 OFFICE O/P EST HI 40 MIN: CPT | Performed by: INTERNAL MEDICINE

## 2024-11-05 RX ORDER — PALONOSETRON 0.05 MG/ML
0.25 INJECTION, SOLUTION INTRAVENOUS ONCE
Status: CANCELLED | OUTPATIENT
Start: 2024-11-05 | End: 2024-11-05

## 2024-11-05 RX ORDER — ATROPINE SULFATE 0.4 MG/ML
0.4 INJECTION, SOLUTION INTRAVENOUS ONCE
Status: DISCONTINUED | OUTPATIENT
Start: 2024-11-05 | End: 2024-11-06 | Stop reason: HOSPADM

## 2024-11-05 RX ORDER — FUROSEMIDE 10 MG/ML
20 INJECTION INTRAMUSCULAR; INTRAVENOUS ONCE
Status: COMPLETED | OUTPATIENT
Start: 2024-11-05 | End: 2024-11-05

## 2024-11-05 RX ORDER — EPINEPHRINE 1 MG/ML
0.3 INJECTION, SOLUTION, CONCENTRATE INTRAVENOUS PRN
Status: CANCELLED | OUTPATIENT
Start: 2024-11-05

## 2024-11-05 RX ORDER — SPIRONOLACTONE 25 MG/1
25 TABLET ORAL 2 TIMES DAILY
Qty: 30 TABLET | Refills: 3 | Status: SHIPPED | OUTPATIENT
Start: 2024-11-05

## 2024-11-05 RX ORDER — ALBUTEROL SULFATE 90 UG/1
4 INHALANT RESPIRATORY (INHALATION) PRN
Status: CANCELLED | OUTPATIENT
Start: 2024-11-05

## 2024-11-05 RX ORDER — DEXTROSE MONOHYDRATE 50 MG/ML
5-250 INJECTION, SOLUTION INTRAVENOUS PRN
Status: CANCELLED | OUTPATIENT
Start: 2024-11-05

## 2024-11-05 RX ORDER — PROCHLORPERAZINE MALEATE 10 MG
10 TABLET ORAL EVERY 6 HOURS PRN
Qty: 30 TABLET | Refills: 1 | Status: SHIPPED | OUTPATIENT
Start: 2024-11-05

## 2024-11-05 RX ORDER — DEXAMETHASONE SODIUM PHOSPHATE 4 MG/ML
12 INJECTION, SOLUTION INTRA-ARTICULAR; INTRALESIONAL; INTRAMUSCULAR; INTRAVENOUS; SOFT TISSUE ONCE
Status: COMPLETED | OUTPATIENT
Start: 2024-11-05 | End: 2024-11-05

## 2024-11-05 RX ORDER — MEPERIDINE HYDROCHLORIDE 50 MG/ML
12.5 INJECTION INTRAMUSCULAR; INTRAVENOUS; SUBCUTANEOUS PRN
Status: CANCELLED | OUTPATIENT
Start: 2024-11-05

## 2024-11-05 RX ORDER — ACETAMINOPHEN 325 MG/1
650 TABLET ORAL
OUTPATIENT
Start: 2024-11-05

## 2024-11-05 RX ORDER — FAMOTIDINE 10 MG/ML
20 INJECTION, SOLUTION INTRAVENOUS
Status: CANCELLED | OUTPATIENT
Start: 2024-11-05

## 2024-11-05 RX ORDER — DIPHENHYDRAMINE HYDROCHLORIDE 50 MG/ML
50 INJECTION INTRAMUSCULAR; INTRAVENOUS
OUTPATIENT
Start: 2024-11-05

## 2024-11-05 RX ORDER — EPINEPHRINE 1 MG/ML
0.3 INJECTION, SOLUTION INTRAMUSCULAR; SUBCUTANEOUS PRN
OUTPATIENT
Start: 2024-11-05

## 2024-11-05 RX ORDER — ALBUTEROL SULFATE 90 UG/1
4 INHALANT RESPIRATORY (INHALATION) PRN
OUTPATIENT
Start: 2024-11-05

## 2024-11-05 RX ORDER — PALONOSETRON 0.05 MG/ML
0.25 INJECTION, SOLUTION INTRAVENOUS ONCE
Status: COMPLETED | OUTPATIENT
Start: 2024-11-05 | End: 2024-11-05

## 2024-11-05 RX ORDER — HEPARIN SODIUM (PORCINE) LOCK FLUSH IV SOLN 100 UNIT/ML 100 UNIT/ML
500 SOLUTION INTRAVENOUS PRN
Status: CANCELLED | OUTPATIENT
Start: 2024-11-05

## 2024-11-05 RX ORDER — POLYETHYLENE GLYCOL 3350 17 G/17G
17 POWDER, FOR SOLUTION ORAL DAILY PRN
Status: ON HOLD | COMMUNITY

## 2024-11-05 RX ORDER — SODIUM CHLORIDE 9 MG/ML
INJECTION, SOLUTION INTRAVENOUS CONTINUOUS
Status: CANCELLED | OUTPATIENT
Start: 2024-11-05

## 2024-11-05 RX ORDER — LOPERAMIDE HYDROCHLORIDE 2 MG/1
2 TABLET ORAL 4 TIMES DAILY PRN
Qty: 60 TABLET | Refills: 1 | Status: SHIPPED | OUTPATIENT
Start: 2024-11-05

## 2024-11-05 RX ORDER — ONDANSETRON 2 MG/ML
8 INJECTION INTRAMUSCULAR; INTRAVENOUS
OUTPATIENT
Start: 2024-11-05

## 2024-11-05 RX ORDER — HEPARIN SODIUM (PORCINE) LOCK FLUSH IV SOLN 100 UNIT/ML 100 UNIT/ML
500 SOLUTION INTRAVENOUS PRN
Status: CANCELLED | OUTPATIENT
Start: 2024-11-07

## 2024-11-05 RX ORDER — SODIUM CHLORIDE 0.9 % (FLUSH) 0.9 %
5-40 SYRINGE (ML) INJECTION PRN
Status: DISCONTINUED | OUTPATIENT
Start: 2024-11-05 | End: 2024-11-06 | Stop reason: HOSPADM

## 2024-11-05 RX ORDER — HEPARIN 100 UNIT/ML
500 SYRINGE INTRAVENOUS PRN
OUTPATIENT
Start: 2024-11-05

## 2024-11-05 RX ORDER — SODIUM CHLORIDE 9 MG/ML
INJECTION, SOLUTION INTRAVENOUS CONTINUOUS
OUTPATIENT
Start: 2024-11-05

## 2024-11-05 RX ORDER — PROCHLORPERAZINE EDISYLATE 5 MG/ML
5 INJECTION INTRAMUSCULAR; INTRAVENOUS
Status: CANCELLED | OUTPATIENT
Start: 2024-11-05

## 2024-11-05 RX ORDER — ONDANSETRON 2 MG/ML
8 INJECTION INTRAMUSCULAR; INTRAVENOUS
Status: CANCELLED | OUTPATIENT
Start: 2024-11-05

## 2024-11-05 RX ORDER — DEXTROSE MONOHYDRATE 50 MG/ML
5-250 INJECTION, SOLUTION INTRAVENOUS PRN
Status: DISCONTINUED | OUTPATIENT
Start: 2024-11-05 | End: 2024-11-06 | Stop reason: HOSPADM

## 2024-11-05 RX ORDER — SODIUM CHLORIDE 0.9 % (FLUSH) 0.9 %
5-40 SYRINGE (ML) INJECTION PRN
Status: CANCELLED | OUTPATIENT
Start: 2024-11-07

## 2024-11-05 RX ORDER — HEPARIN 100 UNIT/ML
500 SYRINGE INTRAVENOUS PRN
Status: DISCONTINUED | OUTPATIENT
Start: 2024-11-05 | End: 2024-11-06 | Stop reason: HOSPADM

## 2024-11-05 RX ORDER — SODIUM CHLORIDE 0.9 % (FLUSH) 0.9 %
5-40 SYRINGE (ML) INJECTION PRN
Status: CANCELLED | OUTPATIENT
Start: 2024-11-05

## 2024-11-05 RX ORDER — SODIUM CHLORIDE 9 MG/ML
5-250 INJECTION, SOLUTION INTRAVENOUS PRN
Status: CANCELLED | OUTPATIENT
Start: 2024-11-07

## 2024-11-05 RX ORDER — FUROSEMIDE 20 MG/1
20 TABLET ORAL DAILY
Qty: 60 TABLET | Refills: 3 | Status: SHIPPED | OUTPATIENT
Start: 2024-11-05

## 2024-11-05 RX ORDER — ACETAMINOPHEN 325 MG/1
650 TABLET ORAL
Status: CANCELLED | OUTPATIENT
Start: 2024-11-05

## 2024-11-05 RX ORDER — SODIUM CHLORIDE 0.9 % (FLUSH) 0.9 %
5-40 SYRINGE (ML) INJECTION PRN
OUTPATIENT
Start: 2024-11-05

## 2024-11-05 RX ORDER — OLANZAPINE 5 MG/1
5 TABLET ORAL NIGHTLY
Qty: 30 TABLET | Refills: 0 | Status: SHIPPED | OUTPATIENT
Start: 2024-11-05

## 2024-11-05 RX ORDER — SODIUM CHLORIDE 9 MG/ML
25 INJECTION, SOLUTION INTRAVENOUS PRN
OUTPATIENT
Start: 2024-11-05

## 2024-11-05 RX ORDER — SODIUM CHLORIDE 9 MG/ML
5-250 INJECTION, SOLUTION INTRAVENOUS PRN
Status: CANCELLED | OUTPATIENT
Start: 2024-11-05

## 2024-11-05 RX ORDER — DIPHENHYDRAMINE HYDROCHLORIDE 50 MG/ML
50 INJECTION INTRAMUSCULAR; INTRAVENOUS
Status: CANCELLED | OUTPATIENT
Start: 2024-11-05

## 2024-11-05 RX ADMIN — SODIUM CHLORIDE, PRESERVATIVE FREE 30 ML: 5 INJECTION INTRAVENOUS at 08:06

## 2024-11-05 RX ADMIN — SODIUM CHLORIDE 150 MG: 9 INJECTION, SOLUTION INTRAVENOUS at 10:03

## 2024-11-05 RX ADMIN — DEXAMETHASONE SODIUM PHOSPHATE 12 MG: 4 INJECTION, SOLUTION INTRAMUSCULAR; INTRAVENOUS at 09:24

## 2024-11-05 RX ADMIN — PALONOSETRON HYDROCHLORIDE 0.25 MG: 0.25 INJECTION INTRAVENOUS at 09:30

## 2024-11-05 RX ADMIN — OXALIPLATIN 135 MG: 5 INJECTION, SOLUTION INTRAVENOUS at 10:48

## 2024-11-05 RX ADMIN — IRINOTECAN HYDROCHLORIDE 240 MG: 20 INJECTION, SOLUTION INTRAVENOUS at 13:20

## 2024-11-05 RX ADMIN — LEUCOVORIN CALCIUM 600 MG: 350 INJECTION, POWDER, LYOPHILIZED, FOR SUSPENSION INTRAMUSCULAR; INTRAVENOUS at 13:21

## 2024-11-05 RX ADMIN — DEXTROSE MONOHYDRATE 20 ML/HR: 50 INJECTION, SOLUTION INTRAVENOUS at 09:24

## 2024-11-05 RX ADMIN — FLUOROURACIL 4000 MG: 50 INJECTION, SOLUTION INTRAVENOUS at 15:34

## 2024-11-05 RX ADMIN — FUROSEMIDE 20 MG: 10 INJECTION, SOLUTION INTRAMUSCULAR; INTRAVENOUS at 15:42

## 2024-11-05 ASSESSMENT — PAIN SCALES - GENERAL: PAINLEVEL_OUTOF10: 0

## 2024-11-05 ASSESSMENT — ENCOUNTER SYMPTOMS
CONSTIPATION: 1
ABDOMINAL DISTENTION: 1
ABDOMINAL PAIN: 1

## 2024-11-05 NOTE — PLAN OF CARE
Problem: Discharge Planning  Goal: Discharge to home or other facility with appropriate resources  Outcome: Adequate for Discharge  Flowsheets (Taken 11/5/2024 1212)  Discharge to home or other facility with appropriate resources: Identify barriers to discharge with patient and caregiver     Problem: Safety - Adult  Goal: Free from fall injury  Outcome: Adequate for Discharge  Flowsheets (Taken 11/5/2024 1212)  Free From Fall Injury: Instruct family/caregiver on patient safety     Problem: Chronic Conditions and Co-morbidities  Goal: Patient's chronic conditions and co-morbidity symptoms are monitored and maintained or improved  Outcome: Adequate for Discharge  Flowsheets (Taken 11/5/2024 1212)  Care Plan - Patient's Chronic Conditions and Co-Morbidity Symptoms are Monitored and Maintained or Improved: Monitor and assess patient's chronic conditions and comorbid symptoms for stability, deterioration, or improvement  Note:   Chemotherapy Teaching     What is Chemotherapy   Drug action [x]   Method of Administration [x]   Handouts given []     Side Effects  Nausea/vomiting [x]   Diarrhea [x]   Fatigue [x]   Signs / Symptoms of infection [x]   Neutropenia [x]   Thrombocytopenia [x]   Alopecia [x]   neuropathy [x]   [place] diet &  the importance of fluids [x]       Micellaneous  Importance of nutrition [x]   Importance of oral hygiene [x]   When to call the MD [x]   Monitoring labs [x]   Use of supportive services []     Explanation of Drug Regimen / Frequency  Camptosar/Oxaliplatin/Leucovorin/5FU     Comments  Verbalized understanding to drug,action,side effects and when to call MD         Problem: Infection - Adult  Goal: Absence of infection at discharge  Outcome: Adequate for Discharge  Flowsheets (Taken 11/5/2024 1212)  Absence of infection at discharge: Monitor all insertion sites i.e., indwelling lines, tubes and drains  Note: Mediport site with no redness or warmth. Skin over port site intact with no signs of  breakdown noted. Patient verbalizes signs/symptoms of port infection and when to notify the physician.

## 2024-11-05 NOTE — PROGRESS NOTES
After end of chemotherapy patient noticed more distention of the abdomen.  Most likely worsening ascites with IV fluid.  Will give Lasix 20 mg IV.  Will prescribe him Lasix 20 mg p.o. daily Aldactone 25 mg twice daily.  Already requested interventional radiology for paracentesis and insertion of catheter for self drainage.

## 2024-11-05 NOTE — PROGRESS NOTES
Name: Gustabo Machado  : 1970  MRN: [id number]    Oncology Navigation Follow-Up Note    Contact Type:  Medical Oncology    Notes: [employer] Genetic kit-blood drawn,packaged,demographics & pathology printed.  [employer]  confirmation/label scanned under media tab.    Electronically signed by Shawna Box RN on 2024 at 10:20 AM

## 2024-11-05 NOTE — DISCHARGE INSTRUCTIONS
Per Dr. Irving:  OK for chemotherapy today  IR for cath drainage for ascites.   Return to clinic in 2 weeks with labs    Addendum:    After end of chemotherapy patient noticed more distention of the abdomen.  Most likely worsening ascites with IV fluid.  Will give Lasix 20 mg IV.  Will prescribe him Lasix 20 mg p.o. daily Aldactone 25 mg twice daily.  Already requested interventional radiology for paracentesis and insertion of catheter for self drainage.       Please contact your Oncologist if you have any questions regarding the chemotherapy Oxaliplatin/Camptosar/Leucovorin/5FU per CADD that you received today.      Patient instructed if experience any of the symptoms following today's chemotherapy / to notify MD immediately or go to emergency department.    * dizziness/lightheadedness  *acute nausea/vomiting - not relieved with medication  *headache - not relieved from Tylenol/pain medication  *chest pain/pressure  *rash/itching  *shortness of breath        Drink fluids - 48oz fluids daily  Call if develop fever/ chills/ signs or symptoms of infection

## 2024-11-05 NOTE — ONCOLOGY
Chemotherapy Administration    Pre-assessment Data: Antineoplastic Agents  See toxicity flow sheet for assessment                                          [x]         Interventions:   Chemotherapy SQ injection given []   Taxol administered-VS per protocol []   Blood pressure meds held 12 hours prior to Rituxan/Ruxience []   Rituxan/Ruxience administered- VS and precautions per guidelines []   Emergency drugs available as appropriate [x]   Anaphylaxis assessment completed [x]   Pre-medications administered as ordered [x]   Blood return noted upon initiation of chemotherapy [x]   Blood return noted each 1-2ml of a vesicant medication if given IV push []   Navelbine, Vincristine and Velban given as a monitored wide open drip, blood return noted before during and after infusion. []   Blood return noted each 2-3ml of a non-vesicant medication if given IV push []   Patient aware of potential Immunotherapy toxicities []   Monitor for signs / symptoms of hypersensitivity reaction [x]   Chemotherapy orders (drug/dose/rate) verified by 2 Chemo certified RN’s [x]   Monitor IV site and blood return throughout the infusion of the medication [x]   Document IV site checks on the IV assessment form [x]   Document chemotherapy teaching on the Patient Education tab [x]   Document patient verbalizes understanding of medications being administered [x]   If IV infiltration, see ONS Guidelines []   Other:     Oxaliplatin/Camptosar/5FU []

## 2024-11-05 NOTE — PROGRESS NOTES
Patient tolerated Oxaliplatin/Camptosar/5FU without any complications.  Denies dizziness, lightheadedness, acute nausea or vomiting, headache, heart palpitations, rash/itching or increased SOB.    Patient with complaints of feeling hot/sweaty during chemotherapy and feeling increased abdominal tightness/distension post chemo infusion. Dr. Irving notified, came to bedside to evaluate patient. Ordered 20mg IV Lasix to be given prior to discharge and also PO Lasix and Aldactone to be prescribed to take at home.    CADD pump 5FU added via mediport to infuse at 5.4ml/hr over 46 hours. Connections secured and tape to chest. Patient and family instructed on pump- it's usage,what to do if alarm goes off, and who to call if any questions. Verified pump running before discharge.      Last vital signs  /83   Pulse 95   Temp 97.7 °F (36.5 °C) (Oral)   Resp 18   Ht 1.829 m (6')   Wt 100.2 kg (221 lb)   SpO2 94%   BMI 29.97 kg/m²     Patient instructed if they experience any of the above symptoms following today's visit, he/she is to notify the Physician or go to the Emergency Dept.    Discharge instructions given to patient, Verbalizes understanding. Ambulated off unit per self in stable condition with all belongings.

## 2024-11-05 NOTE — PROGRESS NOTES
[facility]  [facility]  [address]  [address]  [place] [id number]  Dept: [phone]  Loc: [phone]   Hematology/Oncology Consult (Clinic)        10/29/24     Gustabo Machado   1970     No ref. provider found   Daniel Agrawal MD       Reason:   Chief Complaint   Patient presents with    Follow-up     Pancreatic mass          HPI: 53-year-old male patient was complaining of abdominal pain abdominal bloating and constipation radiating to the right lower back, he was evaluated in the emergency room on October 21, 2024. he underwent CT scan of the abdomen and pelvis with IV contrast on October 21, 2024 which revealed a mass in the tail of the pancreas in addition to  ascites, omental caking,  and numerous liver masses with intra-abdominal adenopathy all consistent with metastatic disease.  He underwent further evaluation with MRI of the abdomen on October 21, 2024 which revealed a 5 x 2 cm enhancement in the distal body and tail of the pancreas suspicious for malignancy.  Multiple hepatic lesions suspicious for metastatic disease.  Enlarged.  Pancreatic portacaval and retroperitoneal adenopathy.  In addition to omental caking and small ascites.  He underwent a CT needle guided biopsy from the liver on October 22nd, 2024, pathology of the liver lesion came back consistent with metastatic adenocarcinoma.  Positive for CK7 and negative for CK20.  Negative for TTF. Patchy  positive for CDX2.  Findings support pancreatic cancer for upper GI.  CA 19-9 very high at 5483.  Total bilirubin was normal at 0.6 alkaline phosphatase elevated slight elevation of AST.  Due to radiation to the back CT lumbar was performed October 21, 2024 which showed no evidence for metastatic disease in the lumbar spine.     patient was seen by palliative team on October 21, 2024, he is currently on Norco for pain every 4 hours as needed.    Of note patient underwent screening

## 2024-11-07 ENCOUNTER — HOSPITAL ENCOUNTER (OUTPATIENT)
Dept: INFUSION THERAPY | Age: 54
Discharge: HOME | End: 2024-11-07
Attending: INTERNAL MEDICINE
Payer: COMMERCIAL

## 2024-11-07 VITALS
TEMPERATURE: 96.9 F | HEART RATE: 97 BPM | RESPIRATION RATE: 18 BRPM | SYSTOLIC BLOOD PRESSURE: 135 MMHG | OXYGEN SATURATION: 97 % | DIASTOLIC BLOOD PRESSURE: 85 MMHG

## 2024-11-07 DIAGNOSIS — C25.2 MALIGNANT NEOPLASM OF TAIL OF PANCREAS (HCC): Primary | ICD-10-CM

## 2024-11-07 LAB — FOUNDATION MEDICINE RESULT STATUS: NORMAL

## 2024-11-07 PROCEDURE — 96523 IRRIG DRUG DELIVERY DEVICE: CPT

## 2024-11-07 PROCEDURE — 2580000003 HC RX 258: Performed by: INTERNAL MEDICINE

## 2024-11-07 PROCEDURE — 6360000002 HC RX W HCPCS: Performed by: INTERNAL MEDICINE

## 2024-11-07 RX ORDER — HEPARIN 100 UNIT/ML
500 SYRINGE INTRAVENOUS PRN
Status: DISCONTINUED | OUTPATIENT
Start: 2024-11-07 | End: 2024-11-08 | Stop reason: HOSPADM

## 2024-11-07 RX ORDER — SODIUM CHLORIDE 0.9 % (FLUSH) 0.9 %
5-40 SYRINGE (ML) INJECTION PRN
Status: DISCONTINUED | OUTPATIENT
Start: 2024-11-07 | End: 2024-11-08 | Stop reason: HOSPADM

## 2024-11-07 RX ADMIN — HEPARIN 500 UNITS: 100 SYRINGE at 13:45

## 2024-11-07 RX ADMIN — SODIUM CHLORIDE, PRESERVATIVE FREE 20 ML: 5 INJECTION INTRAVENOUS at 13:45

## 2024-11-07 NOTE — PLAN OF CARE
Problem: Discharge Planning  Goal: Discharge to home or other facility with appropriate resources  Outcome: Adequate for Discharge  Flowsheets (Taken 11/7/2024 1347)  Discharge to home or other facility with appropriate resources:   Identify barriers to discharge with patient and caregiver   Identify discharge learning needs (meds, wound care, etc)  Note: Verbalize understanding of discharge instructions, follow up appointments, and when to call Physician.Discuss understanding of discharge instructions, follow up appointments and when to call Physician.        Problem: Safety - Adult  Goal: Free from fall injury  Outcome: Adequate for Discharge  Flowsheets (Taken 11/7/2024 1347)  Free From Fall Injury:   Instruct family/caregiver on patient safety   Based on caregiver fall risk screen, instruct family/caregiver to ask for assistance with transferring infant if caregiver noted to have fall risk factors  Note: Free from falls while in O.P. Oncology.Discussed the need to use the call light for assistance when getting up to ambulate.        Problem: Chronic Conditions and Co-morbidities  Goal: Patient's chronic conditions and co-morbidity symptoms are monitored and maintained or improved  Outcome: Adequate for Discharge  Flowsheets (Taken 11/7/2024 1347)  Care Plan - Patient's Chronic Conditions and Co-Morbidity Symptoms are Monitored and Maintained or Improved:   Collaborate with multidisciplinary team to address chronic and comorbid conditions and prevent exacerbation or deterioration   Monitor and assess patient's chronic conditions and comorbid symptoms for stability, deterioration, or improvement  Note: Patient verbalizes understanding to verbal information given on pump removal,action and possible side effects. Aware to call MD if develop complications.      Care plan reviewed with patient. Patient verbalizes understanding of the plan of care and contributes to goal setting.

## 2024-11-07 NOTE — PROGRESS NOTES
Patient assessed for the following post chemotherapy pump off:    Dizziness   No  Lightheadedness  No      Acute nausea/vomiting No  Headache   No  Chest pain/pressure  No  Rash/itching   No  Shortness of breath  No    Patient tolerated chemotherapy treatment with pump off without any complications.    Last vital signs:   /85   Pulse 97   Temp 96.9 °F (36.1 °C) (Tympanic)   Resp 18   SpO2 97%     Patient instructed if experience any of the above symptoms following today's infusion pump off, he is to notify MD immediately or go to the emergency department.    Discharge instructions given to patient. Verbalizes understanding. Ambulated off unit per self, accompanied by spouse, with belongings.

## 2024-11-08 ENCOUNTER — TELEPHONE (OUTPATIENT)
Dept: ONCOLOGY | Age: 54
End: 2024-11-08

## 2024-11-08 ENCOUNTER — CASE MANAGEMENT (OUTPATIENT)
Dept: CASE MANAGEMENT | Age: 54
End: 2024-11-08

## 2024-11-08 DIAGNOSIS — B37.0 ORAL THRUSH: Primary | ICD-10-CM

## 2024-11-08 RX ORDER — FLUCONAZOLE 100 MG/1
TABLET ORAL
Qty: 15 TABLET | Refills: 0 | Status: ON HOLD | OUTPATIENT
Start: 2024-11-08

## 2024-11-08 NOTE — PROGRESS NOTES
Name: Gustabo Machado  : 1970  MRN: [id number]    Oncology Navigation Follow-Up Note    Contact Type:  Telephone    Notes: oliverio received phone call from spouse- pt c/o throat tightness-denies any swelling or difficulty breathing. Does seem to hurt when swallowing. Spouse looked into mouth- has white patches in back of throat and roof of mouth. Denies any fever or chills. Unable to eat and drink. Oliverio spoke to Dr. Awada - RX sent for diflucan. Oliverio called spouse informed of RX. Instructed if develop any worsening of symptoms or fever 100.4 to go directly to ED. Voiced understanding.          Electronically signed by Shawna Box RN on 2024 at 1:20 PM

## 2024-11-08 NOTE — TELEPHONE ENCOUNTER
After submitting clinical information about the request to approve [facility] Liquid Bx test, [employer] sent a fax that this request has been denied. A peer to peer can be completed with a Medical Director at [phone].

## 2024-11-11 ENCOUNTER — TELEPHONE (OUTPATIENT)
Dept: ONCOLOGY | Age: 54
End: 2024-11-11

## 2024-11-11 ENCOUNTER — CASE MANAGEMENT (OUTPATIENT)
Dept: CASE MANAGEMENT | Age: 54
End: 2024-11-11

## 2024-11-11 DIAGNOSIS — C25.2 MALIGNANT NEOPLASM OF TAIL OF PANCREAS (HCC): Primary | ICD-10-CM

## 2024-11-11 NOTE — PROGRESS NOTES
Name: Gustabo Machado  : 1970  MRN: [id number]    Oncology Navigation Follow-Up Note    Contact Type:  Telephone    Notes: shaquille received phone call from spouse Bianka-thrush improving but still have a few areas. Hurts to swallow- asking for mouth suspension. He is also needing his abdomen drained- she called last week but hasn't heard anything.  Shaquille spoke to Dr. Colvin - orders placed.  Shaquille called spouse informed RX for magic mouthwash -directions rinse & spit QID but able to swallow x2. Scheduled for paracentesis  @10am -be radiology @ 0930. Informed if feel needs to be drained today then go to ED. Voiced understanding.      Electronically signed by Shawna Box RN on 2024 at 8:33 AM

## 2024-11-11 NOTE — TELEPHONE ENCOUNTER
Good morning, richie voicemail message from spouse stating the pt is c/o being uncomfortable with the \"fluid building on his belly\". Pt has only seen Dr Irving thus far but is scheduled to see you in December.

## 2024-11-12 ENCOUNTER — HOSPITAL ENCOUNTER (OUTPATIENT)
Dept: ULTRASOUND IMAGING | Age: 54
Discharge: HOME | End: 2024-11-12
Payer: COMMERCIAL

## 2024-11-12 DIAGNOSIS — C25.2 MALIGNANT NEOPLASM OF TAIL OF PANCREAS (HCC): ICD-10-CM

## 2024-11-12 PROCEDURE — 49083 ABD PARACENTESIS W/IMAGING: CPT

## 2024-11-12 PROCEDURE — 0W9G3ZZ DRAINAGE OF PERITONEAL CAVITY, PERCUTANEOUS APPROACH: ICD-10-PCS | Performed by: RADIOLOGY

## 2024-11-12 PROCEDURE — 88305 TISSUE EXAM BY PATHOLOGIST: CPT

## 2024-11-12 PROCEDURE — 88112 CYTOPATH CELL ENHANCE TECH: CPT

## 2024-11-12 NOTE — H&P
Formulation and discussion of sedation / procedure plans, risks, benefits, side effects and alternatives with patient and/or responsible adult completed.    History and Physical reviewed and unchanged.    Electronically signed by Glen Stephen MD on 11/12/24 at 10:07 AM EST

## 2024-11-12 NOTE — OP NOTE
Pre-Procedure Diagnosis: Ascites    Procedure Performed:  Paracentesis    Anesthesia: local    Findings: Clear yellow serous fluid obtained.    Immediate Complications:  None    Estimated Blood Loss: minimal    SEE DICTATED PROCEDURE NOTE FOR COMPLETE DETAILS.    Glen Stephen MD   11/12/2024 10:07 AM

## 2024-11-13 ENCOUNTER — CLINICAL DOCUMENTATION (OUTPATIENT)
Dept: CASE MANAGEMENT | Age: 54
End: 2024-11-13

## 2024-11-13 ENCOUNTER — APPOINTMENT (OUTPATIENT)
Dept: CT IMAGING | Age: 54
DRG: 981 | End: 2024-11-13
Payer: COMMERCIAL

## 2024-11-13 ENCOUNTER — HOSPITAL ENCOUNTER (INPATIENT)
Age: 54
LOS: 7 days | Discharge: HOME | DRG: 981 | End: 2024-11-20
Attending: STUDENT IN AN ORGANIZED HEALTH CARE EDUCATION/TRAINING PROGRAM
Payer: COMMERCIAL

## 2024-11-13 DIAGNOSIS — C25.2 MALIGNANT NEOPLASM OF TAIL OF PANCREAS (HCC): ICD-10-CM

## 2024-11-13 DIAGNOSIS — E87.1 HYPONATREMIA: Primary | ICD-10-CM

## 2024-11-13 DIAGNOSIS — G89.3 CANCER RELATED PAIN: ICD-10-CM

## 2024-11-13 DIAGNOSIS — Z51.5 PALLIATIVE CARE PATIENT: ICD-10-CM

## 2024-11-13 DIAGNOSIS — I26.99 BILATERAL PULMONARY EMBOLISM (HCC): ICD-10-CM

## 2024-11-13 LAB
ALBUMIN SERPL BCG-MCNC: 2.1 G/DL (ref 3.5–5.1)
ALP SERPL-CCNC: 228 U/L (ref 38–126)
ALT SERPL W/O P-5'-P-CCNC: 51 U/L (ref 11–66)
ANION GAP SERPL CALC-SCNC: 10 MEQ/L (ref 8–16)
AST SERPL-CCNC: 40 U/L (ref 5–40)
BASOPHILS ABSOLUTE: 0 THOU/MM3 (ref 0–0.1)
BASOPHILS NFR BLD AUTO: 0.2 %
BILIRUB SERPL-MCNC: 0.6 MG/DL (ref 0.3–1.2)
BUN SERPL-MCNC: 20 MG/DL (ref 7–22)
CALCIUM SERPL-MCNC: 8.5 MG/DL (ref 8.5–10.5)
CHLORIDE SERPL-SCNC: 90 MEQ/L (ref 98–111)
CO2 SERPL-SCNC: 23 MEQ/L (ref 23–33)
CREAT SERPL-MCNC: 0.9 MG/DL (ref 0.4–1.2)
CREAT UR-MCNC: 54.7 MG/DL
DEPRECATED RDW RBC AUTO: 42.2 FL (ref 35–45)
EOSINOPHIL NFR BLD AUTO: 0.4 %
EOSINOPHILS ABSOLUTE: 0 THOU/MM3 (ref 0–0.4)
ERYTHROCYTE [DISTWIDTH] IN BLOOD BY AUTOMATED COUNT: 12.9 % (ref 11.5–14.5)
GFR SERPL CREATININE-BSD FRML MDRD: > 90 ML/MIN/1.73M2
GLUCOSE SERPL-MCNC: 114 MG/DL (ref 70–108)
HCT VFR BLD AUTO: 40.5 % (ref 42–52)
HGB BLD-MCNC: 13.6 GM/DL (ref 14–18)
IMM GRANULOCYTES # BLD AUTO: 0.04 THOU/MM3 (ref 0–0.07)
IMM GRANULOCYTES NFR BLD AUTO: 0.5 %
LYMPHOCYTES ABSOLUTE: 0.6 THOU/MM3 (ref 1–4.8)
LYMPHOCYTES NFR BLD AUTO: 7.3 %
MAGNESIUM SERPL-MCNC: 2.2 MG/DL (ref 1.6–2.4)
MCH RBC QN AUTO: 30 PG (ref 26–33)
MCHC RBC AUTO-ENTMCNC: 33.6 GM/DL (ref 32.2–35.5)
MCV RBC AUTO: 89.2 FL (ref 80–94)
MONOCYTES ABSOLUTE: 0.7 THOU/MM3 (ref 0.4–1.3)
MONOCYTES NFR BLD AUTO: 8.7 %
NEUTROPHILS ABSOLUTE: 7 THOU/MM3 (ref 1.8–7.7)
NEUTROPHILS NFR BLD AUTO: 82.9 %
NRBC BLD AUTO-RTO: 0 /100 WBC
OSMOLALITY SERPL CALC.SUM OF ELEC: 251.3 MOSMOL/KG (ref 275–300)
OSMOLALITY UR: NORMAL MOSMOL/KG (ref 250–750)
PLATELET # BLD AUTO: 144 THOU/MM3 (ref 130–400)
PMV BLD AUTO: 10 FL (ref 9.4–12.4)
POTASSIUM SERPL-SCNC: 5.1 MEQ/L (ref 3.5–5.2)
PROT SERPL-MCNC: 5.9 G/DL (ref 6.1–8)
RBC # BLD AUTO: 4.54 MILL/MM3 (ref 4.7–6.1)
SODIUM SERPL-SCNC: 123 MEQ/L (ref 135–145)
SODIUM UR-SCNC: 25 MEQ/L
UUN 24H UR-MCNC: 512 MG/DL
WBC # BLD AUTO: 8.5 THOU/MM3 (ref 4.8–10.8)

## 2024-11-13 PROCEDURE — 80053 COMPREHEN METABOLIC PANEL: CPT

## 2024-11-13 PROCEDURE — 6360000002 HC RX W HCPCS

## 2024-11-13 PROCEDURE — 6370000000 HC RX 637 (ALT 250 FOR IP)

## 2024-11-13 PROCEDURE — 84540 ASSAY OF URINE/UREA-N: CPT

## 2024-11-13 PROCEDURE — 83935 ASSAY OF URINE OSMOLALITY: CPT

## 2024-11-13 PROCEDURE — 70491 CT SOFT TISSUE NECK W/DYE: CPT

## 2024-11-13 PROCEDURE — 99285 EMERGENCY DEPT VISIT HI MDM: CPT

## 2024-11-13 PROCEDURE — 36415 COLL VENOUS BLD VENIPUNCTURE: CPT

## 2024-11-13 PROCEDURE — 1200000003 HC TELEMETRY R&B

## 2024-11-13 PROCEDURE — 2580000003 HC RX 258

## 2024-11-13 PROCEDURE — 84295 ASSAY OF SERUM SODIUM: CPT

## 2024-11-13 PROCEDURE — 6360000004 HC RX CONTRAST MEDICATION

## 2024-11-13 PROCEDURE — 84300 ASSAY OF URINE SODIUM: CPT

## 2024-11-13 PROCEDURE — 99223 1ST HOSP IP/OBS HIGH 75: CPT | Performed by: STUDENT IN AN ORGANIZED HEALTH CARE EDUCATION/TRAINING PROGRAM

## 2024-11-13 PROCEDURE — 82570 ASSAY OF URINE CREATININE: CPT

## 2024-11-13 PROCEDURE — 83735 ASSAY OF MAGNESIUM: CPT

## 2024-11-13 PROCEDURE — P9047 ALBUMIN (HUMAN), 25%, 50ML: HCPCS

## 2024-11-13 PROCEDURE — 85025 COMPLETE CBC W/AUTO DIFF WBC: CPT

## 2024-11-13 PROCEDURE — 93005 ELECTROCARDIOGRAM TRACING: CPT

## 2024-11-13 RX ORDER — POTASSIUM CHLORIDE 1500 MG/1
40 TABLET, EXTENDED RELEASE ORAL PRN
Status: DISCONTINUED | OUTPATIENT
Start: 2024-11-13 | End: 2024-11-20 | Stop reason: HOSPADM

## 2024-11-13 RX ORDER — FAMOTIDINE 20 MG/1
20 TABLET, FILM COATED ORAL DAILY
Status: DISCONTINUED | OUTPATIENT
Start: 2024-11-14 | End: 2024-11-20 | Stop reason: HOSPADM

## 2024-11-13 RX ORDER — NYSTATIN 100000 [USP'U]/ML
100000 SUSPENSION ORAL 4 TIMES DAILY
Status: DISCONTINUED | OUTPATIENT
Start: 2024-11-13 | End: 2024-11-13

## 2024-11-13 RX ORDER — ACETAMINOPHEN 325 MG/1
650 TABLET ORAL EVERY 6 HOURS PRN
Status: DISCONTINUED | OUTPATIENT
Start: 2024-11-13 | End: 2024-11-20 | Stop reason: HOSPADM

## 2024-11-13 RX ORDER — POTASSIUM CHLORIDE 7.45 MG/ML
10 INJECTION INTRAVENOUS PRN
Status: DISCONTINUED | OUTPATIENT
Start: 2024-11-13 | End: 2024-11-20 | Stop reason: HOSPADM

## 2024-11-13 RX ORDER — ONDANSETRON 2 MG/ML
4 INJECTION INTRAMUSCULAR; INTRAVENOUS EVERY 6 HOURS PRN
Status: DISCONTINUED | OUTPATIENT
Start: 2024-11-13 | End: 2024-11-20 | Stop reason: HOSPADM

## 2024-11-13 RX ORDER — ALBUMIN (HUMAN) 12.5 G/50ML
50 SOLUTION INTRAVENOUS ONCE
Status: DISCONTINUED | OUTPATIENT
Start: 2024-11-13 | End: 2024-11-13 | Stop reason: SDUPTHER

## 2024-11-13 RX ORDER — FUROSEMIDE 10 MG/ML
40 INJECTION INTRAMUSCULAR; INTRAVENOUS DAILY
Status: DISCONTINUED | OUTPATIENT
Start: 2024-11-13 | End: 2024-11-20 | Stop reason: HOSPADM

## 2024-11-13 RX ORDER — SODIUM CHLORIDE 0.9 % (FLUSH) 0.9 %
5-40 SYRINGE (ML) INJECTION PRN
Status: DISCONTINUED | OUTPATIENT
Start: 2024-11-13 | End: 2024-11-20 | Stop reason: HOSPADM

## 2024-11-13 RX ORDER — SODIUM CHLORIDE 9 MG/ML
INJECTION, SOLUTION INTRAVENOUS PRN
Status: DISCONTINUED | OUTPATIENT
Start: 2024-11-13 | End: 2024-11-20 | Stop reason: HOSPADM

## 2024-11-13 RX ORDER — HYDROCODONE BITARTRATE AND ACETAMINOPHEN 5; 325 MG/1; MG/1
1 TABLET ORAL EVERY 4 HOURS PRN
Status: DISCONTINUED | OUTPATIENT
Start: 2024-11-13 | End: 2024-11-20 | Stop reason: HOSPADM

## 2024-11-13 RX ORDER — HYDROCODONE BITARTRATE AND ACETAMINOPHEN 5; 325 MG/1; MG/1
2 TABLET ORAL EVERY 4 HOURS PRN
Status: DISCONTINUED | OUTPATIENT
Start: 2024-11-13 | End: 2024-11-20 | Stop reason: HOSPADM

## 2024-11-13 RX ORDER — IOPAMIDOL 755 MG/ML
80 INJECTION, SOLUTION INTRAVASCULAR
Status: COMPLETED | OUTPATIENT
Start: 2024-11-13 | End: 2024-11-13

## 2024-11-13 RX ORDER — NYSTATIN 100000 [USP'U]/ML
500000 SUSPENSION ORAL 4 TIMES DAILY
Status: DISCONTINUED | OUTPATIENT
Start: 2024-11-14 | End: 2024-11-17

## 2024-11-13 RX ORDER — POLYETHYLENE GLYCOL 3350 17 G/17G
17 POWDER, FOR SOLUTION ORAL DAILY PRN
Status: DISCONTINUED | OUTPATIENT
Start: 2024-11-13 | End: 2024-11-16

## 2024-11-13 RX ORDER — NYSTATIN 100000 [USP'U]/ML
500000 SUSPENSION ORAL 4 TIMES DAILY
COMMUNITY
Start: 2024-11-11

## 2024-11-13 RX ORDER — SPIRONOLACTONE 25 MG/1
25 TABLET ORAL 2 TIMES DAILY
Status: DISCONTINUED | OUTPATIENT
Start: 2024-11-13 | End: 2024-11-20 | Stop reason: HOSPADM

## 2024-11-13 RX ORDER — SODIUM CHLORIDE, SODIUM LACTATE, POTASSIUM CHLORIDE, AND CALCIUM CHLORIDE .6; .31; .03; .02 G/100ML; G/100ML; G/100ML; G/100ML
1000 INJECTION, SOLUTION INTRAVENOUS ONCE
Status: DISCONTINUED | OUTPATIENT
Start: 2024-11-13 | End: 2024-11-13

## 2024-11-13 RX ORDER — SODIUM CHLORIDE 0.9 % (FLUSH) 0.9 %
5-40 SYRINGE (ML) INJECTION EVERY 12 HOURS SCHEDULED
Status: DISCONTINUED | OUTPATIENT
Start: 2024-11-13 | End: 2024-11-20 | Stop reason: HOSPADM

## 2024-11-13 RX ORDER — MAGNESIUM SULFATE IN WATER 40 MG/ML
2000 INJECTION, SOLUTION INTRAVENOUS PRN
Status: DISCONTINUED | OUTPATIENT
Start: 2024-11-13 | End: 2024-11-20 | Stop reason: HOSPADM

## 2024-11-13 RX ORDER — ALBUMIN (HUMAN) 12.5 G/50ML
25 SOLUTION INTRAVENOUS
Status: COMPLETED | OUTPATIENT
Start: 2024-11-13 | End: 2024-11-13

## 2024-11-13 RX ORDER — ACETAMINOPHEN 650 MG/1
650 SUPPOSITORY RECTAL EVERY 6 HOURS PRN
Status: DISCONTINUED | OUTPATIENT
Start: 2024-11-13 | End: 2024-11-20 | Stop reason: HOSPADM

## 2024-11-13 RX ORDER — ONDANSETRON 4 MG/1
4 TABLET, ORALLY DISINTEGRATING ORAL EVERY 8 HOURS PRN
Status: DISCONTINUED | OUTPATIENT
Start: 2024-11-13 | End: 2024-11-20 | Stop reason: HOSPADM

## 2024-11-13 RX ADMIN — NYSTATIN 100000 UNITS: 100000 SUSPENSION ORAL at 20:33

## 2024-11-13 RX ADMIN — ALBUMIN (HUMAN) 25 G: 0.25 INJECTION, SOLUTION INTRAVENOUS at 17:14

## 2024-11-13 RX ADMIN — NYSTATIN 100000 UNITS: 100000 SUSPENSION ORAL at 17:35

## 2024-11-13 RX ADMIN — SODIUM CHLORIDE, PRESERVATIVE FREE 10 ML: 5 INJECTION INTRAVENOUS at 20:34

## 2024-11-13 RX ADMIN — ALBUMIN (HUMAN) 25 G: 0.25 INJECTION, SOLUTION INTRAVENOUS at 17:41

## 2024-11-13 RX ADMIN — FUROSEMIDE 40 MG: 10 INJECTION, SOLUTION INTRAMUSCULAR; INTRAVENOUS at 17:00

## 2024-11-13 RX ADMIN — HYDROCODONE BITARTRATE AND ACETAMINOPHEN 2 TABLET: 5; 325 TABLET ORAL at 20:32

## 2024-11-13 RX ADMIN — IOPAMIDOL 80 ML: 755 INJECTION, SOLUTION INTRAVENOUS at 14:38

## 2024-11-13 ASSESSMENT — PAIN SCALES - GENERAL
PAINLEVEL_OUTOF10: 10
PAINLEVEL_OUTOF10: 10

## 2024-11-13 NOTE — ED NOTES
Patient to ED for possible dehydration. Patient recently diagnosed with pancreatic CA and has had one run of IV chemo. Patient developed thrush and was given medications to help. Patient reports that last 3 days he has had a low oral intake due to feeling as if he cannot swallow. Patient called oncology nurse who advised him to come to ER for evaluation.  On arrival patient is alert and oriented, family at bedside, VSS.

## 2024-11-13 NOTE — ED NOTES
ED to inpatient nurses report      Chief Complaint:  No chief complaint on file.    Present to ED from: Home    MOA:     LOC: alert and orientated to name, place, date  Mobility: Requires assistance * 1  Oxygen Baseline: RA    Current needs required: RA     Code Status:   Prior    What abnormal results were found and what did you give/do to treat them? Low Sodium (no interventions yet)  Any procedures or intervention occur? CT/Labs    Mental Status:  Level of Consciousness: Alert (0)    Psych Assessment:        Vitals:  Patient Vitals for the past 24 hrs:   BP Temp Pulse Resp SpO2 Height Weight   11/13/24 1547 120/85 -- 99 25 93 % -- --   11/13/24 1454 117/88 -- 95 26 95 % -- --   11/13/24 1339 133/85 -- 92 20 95 % -- --   11/13/24 1231 (!) 137/92 98 °F (36.7 °C) 97 20 95 % 1.829 m (6') 96.6 kg (213 lb)        LDAs:      Ambulatory Status:  No data recorded    Diagnosis:  DISPOSITION Admitted 11/13/2024 03:27:19 PM   Final diagnoses:   Hyponatremia        Consults:  None     Pain Score:       C-SSRS:        Sepsis Screening:       Coy Fall Risk:       Swallow Screening        Preferred Language:   English      ALLERGIES     Patient has no known allergies.    SURGICAL HISTORY       Past Surgical History:   Procedure Laterality Date    COLONOSCOPY  10/31/2022    CT NEEDLE BIOPSY LIVER PERCUTANEOUS  10/22/2024    CT NEEDLE BIOPSY LIVER PERCUTANEOUS 10/22/2024 [facility] CT SCAN    PORT SURGERY N/A 10/31/2024    Single lumen Smartport insertion performed by Alexander Colon MD at [facility] OR    SKIN CANCER EXCISION  2011    BCC_ nose       PAST MEDICAL HISTORY       Past Medical History:   Diagnosis Date    Cancer (HCC)     Pancreatic cancer (HCC)            Electronically signed by Micheal Thao RN on 11/13/2024 at 3:48 PM

## 2024-11-13 NOTE — PROGRESS NOTES
Name: Gustabo Machado  : 1970  MRN: [id number]    Oncology Navigation Follow-Up Note    Contact Type:  Telephone    Notes: shaquille received a call from spouse Austin-pt c/o harder to swallow. White patches are gone but struggling with swallowing. Intake consists of 1 16 oz bottle water/ 8oz smoothie/ few bites of food- mainly grapes & watermellon. C/o feeling weak,denies dizzy or lightheaded,denies any fever or chills or diarrhea.   Shaquille spoke to Dr. Colvin- received orders. Shaquille called spouse and instructed to take to ED.  Voiced understanding.    Electronically signed by Shawna Box RN on 2024 at 11:44 AM

## 2024-11-13 NOTE — ED NOTES
Patient to be transferres to [facility]. Patient is in stable condition at this time. Staff notified prior to transfer.

## 2024-11-13 NOTE — ED NOTES
Patient lying in bed with blankets and family sitting at the bedside talking on the phone. Patient respirations are regular and unlabored. Patient appears to be in no current distress. Patient VSS. Call light is within reach. Patient denies needs.

## 2024-11-13 NOTE — ED PROVIDER NOTES
[facility] EMERGENCY DEPARTMENT    EMERGENCY MEDICINE     Patient Name: Gustabo Machado  MRN: [id number]  YOB: 1970  Date of Evaluation: 11/13/2024  Treating Resident Physician: Rebecca Sauceda DO  Supervising Physician: Kalie Rajan MD     CHIEF COMPLAINT     No chief complaint on file.      HISTORY OF PRESENT ILLNESS      History obtained from chart review and the patient.    Gustabo Machado is a 53 y.o. male who presents to the emergency department from home by private vehicle for evaluation of mouth sores.   Patient has a known metastatic pancreatic and liver adenocarcinoma.  He started chemotherapy last weeks on Tuesdays and Thursday.  About 2 to 3 days ago patient noticed sores in his mouth got it evaluated and got diagnosed with oral thrush.  He was given fluconazole and Magic mouthwash that he has been using patient with improvement.  However, he reports pain with swallowing.  He is not having any difficulty with swallowing solids or liquid.  He is able to swallow his secretions, no excessive drooling.  No prior history of abnormal EGD or esophageal structural abnormalities.    No fevers, no chest pain no shortness of breath no abdominal back pain that is new.  No diarrhea, no urinary frequency or pain, no edema.         Pertinent previous and/or external records reviewed: Non-contributory    PAST MEDICAL AND SURGICAL HISTORY     Past Medical History:   Diagnosis Date    Cancer (HCC)     Pancreatic cancer (HCC)        Past Surgical History:   Procedure Laterality Date    COLONOSCOPY  10/31/2022    CT NEEDLE BIOPSY LIVER PERCUTANEOUS  10/22/2024    CT NEEDLE BIOPSY LIVER PERCUTANEOUS 10/22/2024 [facility] CT SCAN    PORT SURGERY N/A 10/31/2024    Single lumen Smartport insertion performed by Alexander Colon MD at [facility] OR    SKIN CANCER EXCISION  2011    BCC_ nose       CURRENT MEDICATIONS     Previous Medications    FAMOTIDINE (PEPCID) 20 MG TABLET    Take 1 tablet by mouth daily

## 2024-11-13 NOTE — ED NOTES
Patient in bed and talking with Dr. Sauceda at the bedside. Dr. Sauceda states to hold the fluids until urine results are back. Patient VSS and patient does not appear to be in any current distress at this time. Call light within reach. Urine sample collected and sent to lab.

## 2024-11-14 PROBLEM — C78.6 PERITONEAL CARCINOMATOSIS (HCC): Status: ACTIVE | Noted: 2024-11-14

## 2024-11-14 PROBLEM — R18.0 MALIGNANT ASCITES: Status: ACTIVE | Noted: 2024-11-14

## 2024-11-14 PROBLEM — C78.7 METASTATIC ADENOCARCINOMA TO LIVER (HCC): Status: ACTIVE | Noted: 2024-10-29

## 2024-11-14 LAB
ALBUMIN SERPL BCG-MCNC: 2.8 G/DL (ref 3.5–5.1)
ALP SERPL-CCNC: 189 U/L (ref 38–126)
ALT SERPL W/O P-5'-P-CCNC: 38 U/L (ref 11–66)
ANION GAP SERPL CALC-SCNC: 11 MEQ/L (ref 8–16)
ANISOCYTOSIS BLD QL SMEAR: PRESENT
AST SERPL-CCNC: 35 U/L (ref 5–40)
BASOPHILS ABSOLUTE: 0 THOU/MM3 (ref 0–0.1)
BASOPHILS NFR BLD AUTO: 0.4 %
BILIRUB SERPL-MCNC: 0.8 MG/DL (ref 0.3–1.2)
BUN SERPL-MCNC: 19 MG/DL (ref 7–22)
CALCIUM SERPL-MCNC: 8.9 MG/DL (ref 8.5–10.5)
CHLORIDE SERPL-SCNC: 92 MEQ/L (ref 98–111)
CO2 SERPL-SCNC: 27 MEQ/L (ref 23–33)
CREAT SERPL-MCNC: 1 MG/DL (ref 0.4–1.2)
DEPRECATED RDW RBC AUTO: 42.5 FL (ref 35–45)
EKG ATRIAL RATE: 97 BPM
EKG P AXIS: 37 DEGREES
EKG P-R INTERVAL: 144 MS
EKG Q-T INTERVAL: 326 MS
EKG QRS DURATION: 94 MS
EKG QTC CALCULATION (BAZETT): 414 MS
EKG R AXIS: 12 DEGREES
EKG T AXIS: 31 DEGREES
EKG VENTRICULAR RATE: 97 BPM
EOSINOPHIL NFR BLD AUTO: 1.6 %
EOSINOPHILS ABSOLUTE: 0.1 THOU/MM3 (ref 0–0.4)
ERYTHROCYTE [DISTWIDTH] IN BLOOD BY AUTOMATED COUNT: 13.1 % (ref 11.5–14.5)
FOUNDATION MEDICINE RESULT STATUS: NORMAL
GFR SERPL CREATININE-BSD FRML MDRD: 89 ML/MIN/1.73M2
GLUCOSE SERPL-MCNC: 89 MG/DL (ref 70–108)
HCT VFR BLD AUTO: 37.8 % (ref 42–52)
HGB BLD-MCNC: 12.6 GM/DL (ref 14–18)
HYPOCHROMIA BLD QL SMEAR: PRESENT
IMM GRANULOCYTES # BLD AUTO: 0.02 THOU/MM3 (ref 0–0.07)
IMM GRANULOCYTES NFR BLD AUTO: 0.4 %
INR PPP: 1.28 (ref 0.85–1.13)
LYMPHOCYTES ABSOLUTE: 0.5 THOU/MM3 (ref 1–4.8)
LYMPHOCYTES NFR BLD AUTO: 9.3 %
MCH RBC QN AUTO: 30.1 PG (ref 26–33)
MCHC RBC AUTO-ENTMCNC: 33.3 GM/DL (ref 32.2–35.5)
MCV RBC AUTO: 90.2 FL (ref 80–94)
MONOCYTES ABSOLUTE: 0.6 THOU/MM3 (ref 0.4–1.3)
MONOCYTES NFR BLD AUTO: 11.3 %
NEUTROPHILS ABSOLUTE: 3.9 THOU/MM3 (ref 1.8–7.7)
NEUTROPHILS NFR BLD AUTO: 77 %
NRBC BLD AUTO-RTO: 0 /100 WBC
ORIGINAL SAMPLE NUMBER: NORMAL
PLATELET # BLD AUTO: 100 THOU/MM3 (ref 130–400)
PLATELET BLD QL SMEAR: ABNORMAL
PMV BLD AUTO: 9.8 FL (ref 9.4–12.4)
POTASSIUM SERPL-SCNC: 5.1 MEQ/L (ref 3.5–5.2)
PROT SERPL-MCNC: 5.9 G/DL (ref 6.1–8)
RBC # BLD AUTO: 4.19 MILL/MM3 (ref 4.7–6.1)
REFERENCE LOCATION: NORMAL
REFERENCE RANGE: NORMAL
SCAN OF BLOOD SMEAR: NORMAL
SMUDGE CELLS BLD QL SMEAR: PRESENT
SODIUM SERPL-SCNC: 126 MEQ/L (ref 135–145)
SODIUM SERPL-SCNC: 130 MEQ/L (ref 135–145)
SODIUM SERPL-SCNC: 131 MEQ/L (ref 135–145)
SODIUM SERPL-SCNC: 132 MEQ/L (ref 135–145)
TEST RESULTS WITH UNITS: 481
TEST(S) BEING PERFORMED: NORMAL
WBC # BLD AUTO: 5 THOU/MM3 (ref 4.8–10.8)

## 2024-11-14 PROCEDURE — 36415 COLL VENOUS BLD VENIPUNCTURE: CPT

## 2024-11-14 PROCEDURE — 1200000000 HC SEMI PRIVATE

## 2024-11-14 PROCEDURE — 2580000003 HC RX 258

## 2024-11-14 PROCEDURE — 36591 DRAW BLOOD OFF VENOUS DEVICE: CPT

## 2024-11-14 PROCEDURE — 6360000002 HC RX W HCPCS

## 2024-11-14 PROCEDURE — 6370000000 HC RX 637 (ALT 250 FOR IP)

## 2024-11-14 PROCEDURE — 99223 1ST HOSP IP/OBS HIGH 75: CPT | Performed by: STUDENT IN AN ORGANIZED HEALTH CARE EDUCATION/TRAINING PROGRAM

## 2024-11-14 PROCEDURE — 93010 ELECTROCARDIOGRAM REPORT: CPT | Performed by: INTERNAL MEDICINE

## 2024-11-14 PROCEDURE — 84295 ASSAY OF SERUM SODIUM: CPT

## 2024-11-14 PROCEDURE — 85610 PROTHROMBIN TIME: CPT

## 2024-11-14 PROCEDURE — 80053 COMPREHEN METABOLIC PANEL: CPT

## 2024-11-14 PROCEDURE — 85025 COMPLETE CBC W/AUTO DIFF WBC: CPT

## 2024-11-14 PROCEDURE — 99232 SBSQ HOSP IP/OBS MODERATE 35: CPT

## 2024-11-14 RX ORDER — GABAPENTIN 100 MG/1
100 CAPSULE ORAL 3 TIMES DAILY
Status: CANCELLED | OUTPATIENT
Start: 2024-11-14

## 2024-11-14 RX ORDER — FLUCONAZOLE 100 MG/1
100 TABLET ORAL DAILY
Status: DISCONTINUED | OUTPATIENT
Start: 2024-11-15 | End: 2024-11-20 | Stop reason: HOSPADM

## 2024-11-14 RX ORDER — GABAPENTIN 100 MG/1
100 CAPSULE ORAL 3 TIMES DAILY
Status: DISCONTINUED | OUTPATIENT
Start: 2024-11-14 | End: 2024-11-17

## 2024-11-14 RX ORDER — FLUCONAZOLE 200 MG/1
200 TABLET ORAL ONCE
Status: COMPLETED | OUTPATIENT
Start: 2024-11-14 | End: 2024-11-14

## 2024-11-14 RX ADMIN — Medication 5 ML: at 11:00

## 2024-11-14 RX ADMIN — GABAPENTIN 100 MG: 100 CAPSULE ORAL at 21:31

## 2024-11-14 RX ADMIN — FLUCONAZOLE 200 MG: 200 TABLET ORAL at 13:31

## 2024-11-14 RX ADMIN — NYSTATIN 500000 UNITS: 100000 SUSPENSION ORAL at 08:06

## 2024-11-14 RX ADMIN — NYSTATIN 500000 UNITS: 100000 SUSPENSION ORAL at 13:32

## 2024-11-14 RX ADMIN — SODIUM CHLORIDE, PRESERVATIVE FREE 10 ML: 5 INJECTION INTRAVENOUS at 21:31

## 2024-11-14 RX ADMIN — GABAPENTIN 100 MG: 100 CAPSULE ORAL at 13:31

## 2024-11-14 RX ADMIN — HYDROCODONE BITARTRATE AND ACETAMINOPHEN 2 TABLET: 5; 325 TABLET ORAL at 10:59

## 2024-11-14 RX ADMIN — FUROSEMIDE 40 MG: 10 INJECTION, SOLUTION INTRAMUSCULAR; INTRAVENOUS at 08:05

## 2024-11-14 RX ADMIN — NYSTATIN 500000 UNITS: 100000 SUSPENSION ORAL at 16:27

## 2024-11-14 RX ADMIN — NYSTATIN 500000 UNITS: 100000 SUSPENSION ORAL at 21:31

## 2024-11-14 RX ADMIN — FAMOTIDINE 20 MG: 20 TABLET, FILM COATED ORAL at 08:06

## 2024-11-14 RX ADMIN — HYDROCODONE BITARTRATE AND ACETAMINOPHEN 2 TABLET: 5; 325 TABLET ORAL at 21:31

## 2024-11-14 RX ADMIN — SODIUM CHLORIDE, PRESERVATIVE FREE 10 ML: 5 INJECTION INTRAVENOUS at 08:07

## 2024-11-14 RX ADMIN — ALTEPLASE 1 MG: 2.2 INJECTION, POWDER, LYOPHILIZED, FOR SOLUTION INTRAVENOUS at 15:40

## 2024-11-14 ASSESSMENT — PAIN DESCRIPTION - LOCATION: LOCATION: BACK

## 2024-11-14 ASSESSMENT — PAIN SCALES - GENERAL
PAINLEVEL_OUTOF10: 5
PAINLEVEL_OUTOF10: 9
PAINLEVEL_OUTOF10: 8
PAINLEVEL_OUTOF10: 5

## 2024-11-14 ASSESSMENT — PAIN DESCRIPTION - ORIENTATION: ORIENTATION: MID

## 2024-11-14 ASSESSMENT — PAIN DESCRIPTION - DESCRIPTORS: DESCRIPTORS: ACHING;DISCOMFORT

## 2024-11-14 NOTE — CARE COORDINATION
11/14/24 1000   Readmission Assessment   Number of Days since last admission? 8-30 days   Previous Disposition Home with Family   Who is being Interviewed Patient   What was the patient's/caregiver's perception as to why they think they needed to return back to the hospital? Other (Comment)  (Difficulty swallowing, mouth sores, recommendation from Oncologist)   Did you visit your Primary Care Physician after you left the hospital, before you returned this time? No   Why weren't you able to visit your PCP? Did not have an appointment   Did you see a specialist, such as Cardiac, Pulmonary, Orthopedic Physician, etc. after you left the hospital? Yes  (Oncology)   Who advised the patient to return to the hospital? Physician   Does the patient report anything that got in the way of taking their medications? No   In our efforts to provide the best possible care to you and others like you, can you think of anything that we could have done to help you after you left the hospital the first time, so that you might not have needed to return so soon? Other (Comment)  (\"No.\")

## 2024-11-14 NOTE — FLOWSHEET NOTE
Cath dominguez instilled to [facility]. Lumens labeled with stickers to alert not to use  1715 Cath dominguez removed with brisk blood return. Both ports flushed and new posiflo caps applied.

## 2024-11-14 NOTE — CARE COORDINATION
Case Management Assessment Initial Evaluation    Date/Time of Evaluation: 2024 7:07 AM  Assessment Completed by: Francy Jose RN    If patient is discharged prior to next notation, then this note serves as note for discharge by case management.    Patient Name: Gustabo Machado                   YOB: 1970  Diagnosis: Hyponatremia [E87.1]                   Date / Time: 2024 12:18 PM  Location: [address] [place] [address]     Patient Admission Status: Inpatient   Readmission Risk Low 0-14, Mod 15-19), High > 20: Readmission Risk Score: 16.6    Current PCP: Daniel Agrawal MD  Health Care Decision Makers:   Primary Decision Maker: Nanci Machado - [facility] - [phone]    Additional Case Management Notes: To ED w/ difficulty swallowing, mouth sores and concern for dehydration. Hospitalist following. GI and palliative RN to see. Telemetry. NPO. SLP to eval. Plan for paracentesis today. Na q4h.Pepcid.  IV lasix daily. Norco prn. Magic mouthwash prn. Nystatin mouth rinse. Na 130 (126) Hgb 12.6 (13.6)     Procedures:    Plan for paracentesis    Imagin/13 CT Soft Tissue Neck: No cervical adenopathy. Normal CT appearance of the esophagus.2. Small bilateral layering pleural effusions. This is larger on the left than the right. 3. Ascites.    Patient Goals/Plan/Treatment Preferences: Met w/ Gustabo. Verified insurance and PCP. He is independent and drives. Denies needs for DME, HH or OP services. Plans to return home w/ his wife and 2 children (16yo, 18yo).   24 1000   Service Assessment   Patient Orientation Alert and Oriented   Cognition Alert   History Provided By Patient   Primary Caregiver Self   Accompanied By/Relationship Wife   Support Systems Spouse/Significant Other;Children   Patient's Healthcare Decision Maker is: Patient Declined (Legal Next of Kin Remains as Decision Maker)   PCP Verified by CM Yes   Last Visit to PCP Within last 3 months   Prior Functional Level Independent in  Medicine Progress Note    Patient is a 84y old  Male who presents with a chief complaint of Lumbar Radiculopathy (01 Feb 2023 09:57)      SUBJECTIVE / OVERNIGHT EVENTS:  seen and examined  Chart reviewed  No overnight events  Limb weakness improving with therapy  BM+  No pain  No complaints    ADDITIONAL REVIEW OF SYSTEMS:  no fever/chills/CP/sob/palpitation/dizziness/ abd pain/nausea/vomiting/diarrhea/constipation/headaches. all other ROS neg    MEDICATIONS  (STANDING):  atorvastatin 80 milliGRAM(s) Oral at bedtime  bisacodyl 5 milliGRAM(s) Oral every 12 hours  cholecalciferol 2000 Unit(s) Oral <User Schedule>  dextrose 5%. 1000 milliLiter(s) (50 mL/Hr) IV Continuous <Continuous>  dextrose 5%. 1000 milliLiter(s) (100 mL/Hr) IV Continuous <Continuous>  dextrose 50% Injectable 25 Gram(s) IV Push once  dextrose 50% Injectable 12.5 Gram(s) IV Push once  dextrose 50% Injectable 25 Gram(s) IV Push once  famotidine    Tablet 20 milliGRAM(s) Oral daily  glucagon  Injectable 1 milliGRAM(s) IntraMuscular once  heparin   Injectable 5000 Unit(s) SubCutaneous every 12 hours  insulin glargine Injectable (LANTUS) 9 Unit(s) SubCutaneous at bedtime  insulin lispro (ADMELOG) corrective regimen sliding scale   SubCutaneous three times a day before meals  insulin lispro (ADMELOG) corrective regimen sliding scale   SubCutaneous at bedtime  lactulose Syrup 10 Gram(s) Oral once  lisinopril 10 milliGRAM(s) Oral daily  melatonin 3 milliGRAM(s) Oral at bedtime  metFORMIN 500 milliGRAM(s) Oral two times a day with meals  metoprolol tartrate 25 milliGRAM(s) Oral two times a day  polyethylene glycol 3350 17 Gram(s) Oral two times a day  saline laxative (FLEET) Rectal Enema 1 Enema Rectal once  senna 2 Tablet(s) Oral at bedtime  tamsulosin 0.4 milliGRAM(s) Oral at bedtime    MEDICATIONS  (PRN):  acetaminophen     Tablet .. 650 milliGRAM(s) Oral every 6 hours PRN Temp greater or equal to 38C (100.4F), Mild Pain (1 - 3)  dextrose Oral Gel 15 Gram(s) Oral once PRN Blood Glucose LESS THAN 70 milliGRAM(s)/deciliter  oxyCODONE    IR 5 milliGRAM(s) Oral every 6 hours PRN Moderate Pain (4 - 6)  tiZANidine 2 milliGRAM(s) Oral every 8 hours PRN Muscle Spasm    CAPILLARY BLOOD GLUCOSE      POCT Blood Glucose.: 179 mg/dL (01 Feb 2023 11:35)  POCT Blood Glucose.: 145 mg/dL (01 Feb 2023 07:43)  POCT Blood Glucose.: 175 mg/dL (31 Jan 2023 22:27)  POCT Blood Glucose.: 104 mg/dL (31 Jan 2023 17:06)    I&O's Summary      PHYSICAL EXAM:  Vital Signs Last 24 Hrs  T(C): 36.7 (01 Feb 2023 08:45), Max: 36.7 (01 Feb 2023 08:45)  T(F): 98.1 (01 Feb 2023 08:45), Max: 98.1 (01 Feb 2023 08:45)  HR: 95 (01 Feb 2023 08:45) (94 - 96)  BP: 118/72 (01 Feb 2023 08:45) (111/68 - 127/77)  BP(mean): --  RR: 16 (01 Feb 2023 08:45) (16 - 16)  SpO2: 97% (01 Feb 2023 08:45) (96% - 98%)    Parameters below as of 01 Feb 2023 08:45  Patient On (Oxygen Delivery Method): room air    GENERAL: Not in distress. Alert    HEENT: AT/NC. clear conjuctiva, MMM.   no pallor or icterus  CARDIOVASCULAR: RRR S1, S2. No murmur/rubs/gallop  LUNGS: BLAE+, no rales, no wheezing, no rhonchi.    ABDOMEN: ND. Soft,  NT, no guarding / rebound / rigidity. BS normoactive. No CVA tenderness.    BACK: No spine tenderness.  EXTREMITIES: no edema. no leg or calf TP.  SKIN: no rash. or skin break or ulcer on exposed skin. No cellulitis.  NEUROLOGIC: AAO*3. global weakness, mild. sensation intact, speech fluent.    PSYCHIATRIC: Calm.  No agitation.    LABS:                    COVID-19 PCR: NotDetec (27 Jan 2023 19:06)      RADIOLOGY & ADDITIONAL TESTS:  Imaging from Last 24 Hours:    Electrocardiogram/QTc Interval:    COORDINATION OF CARE:  Care Discussed with Consultants/Other Providers:

## 2024-11-15 ENCOUNTER — APPOINTMENT (OUTPATIENT)
Dept: ULTRASOUND IMAGING | Age: 54
DRG: 981 | End: 2024-11-15
Payer: COMMERCIAL

## 2024-11-15 LAB
ALBUMIN FLD-MCNC: 1.8 GM/DL
ALBUMIN SERPL BCG-MCNC: 2.6 G/DL (ref 3.5–5.1)
ALP SERPL-CCNC: 192 U/L (ref 38–126)
ALT SERPL W/O P-5'-P-CCNC: 32 U/L (ref 11–66)
AMYLASE FLD-CCNC: 24 U/L
ANION GAP SERPL CALC-SCNC: 11 MEQ/L (ref 8–16)
AST SERPL-CCNC: 35 U/L (ref 5–40)
BILIRUB CONJ SERPL-MCNC: 0.4 MG/DL (ref 0.1–13.8)
BILIRUB SERPL-MCNC: 0.6 MG/DL (ref 0.3–1.2)
BUN SERPL-MCNC: 22 MG/DL (ref 7–22)
CALCIUM SERPL-MCNC: 8.5 MG/DL (ref 8.5–10.5)
CHARACTER, BODY FLUID: CLEAR
CHLORIDE SERPL-SCNC: 93 MEQ/L (ref 98–111)
CO2 SERPL-SCNC: 26 MEQ/L (ref 23–33)
COLOR FLD: YELLOW
CREAT SERPL-MCNC: 0.9 MG/DL (ref 0.4–1.2)
DEPRECATED RDW RBC AUTO: 42.8 FL (ref 35–45)
ERYTHROCYTE [DISTWIDTH] IN BLOOD BY AUTOMATED COUNT: 13.1 % (ref 11.5–14.5)
GFR SERPL CREATININE-BSD FRML MDRD: > 90 ML/MIN/1.73M2
GLUCOSE BLD STRIP.AUTO-MCNC: 121 MG/DL (ref 70–108)
GLUCOSE FLD-MCNC: 65 MG/DL
GLUCOSE SERPL-MCNC: 93 MG/DL (ref 70–108)
GRANULOCYTES NFR FLD AUTO: 29.7 %
HCT VFR BLD AUTO: 38.3 % (ref 42–52)
HGB BLD-MCNC: 12.6 GM/DL (ref 14–18)
MCH RBC QN AUTO: 29.7 PG (ref 26–33)
MCHC RBC AUTO-ENTMCNC: 32.9 GM/DL (ref 32.2–35.5)
MCV RBC AUTO: 90.3 FL (ref 80–94)
MESOTHELIAL CELLS BODY FLUID: NORMAL
MONONUC CELLS NFR FLD AUTO: 70.3 %
NUC CELL # FLD AUTO: 340 /CUMM (ref 0–500)
PATHOLOGIST REVIEW: NORMAL
PLATELET # BLD AUTO: 110 THOU/MM3 (ref 130–400)
PMV BLD AUTO: 10.3 FL (ref 9.4–12.4)
POTASSIUM SERPL-SCNC: 4.5 MEQ/L (ref 3.5–5.2)
PROT FLD-MCNC: 3.4 GM/DL
PROT SERPL-MCNC: 5.9 G/DL (ref 6.1–8)
RBC # BLD AUTO: 4.24 MILL/MM3 (ref 4.7–6.1)
RBC # FLD AUTO: 2000 /CUMM
SODIUM SERPL-SCNC: 126 MEQ/L (ref 135–145)
SODIUM SERPL-SCNC: 130 MEQ/L (ref 135–145)
SODIUM SERPL-SCNC: 130 MEQ/L (ref 135–145)
SPECIMEN: NORMAL
TOTAL VOLUME RECEIVED BODY FLUID: 70 ML
WBC # BLD AUTO: 4.5 THOU/MM3 (ref 4.8–10.8)

## 2024-11-15 PROCEDURE — 6360000002 HC RX W HCPCS

## 2024-11-15 PROCEDURE — 85027 COMPLETE CBC AUTOMATED: CPT

## 2024-11-15 PROCEDURE — 99232 SBSQ HOSP IP/OBS MODERATE 35: CPT

## 2024-11-15 PROCEDURE — 6370000000 HC RX 637 (ALT 250 FOR IP)

## 2024-11-15 PROCEDURE — P9047 ALBUMIN (HUMAN), 25%, 50ML: HCPCS

## 2024-11-15 PROCEDURE — 84295 ASSAY OF SERUM SODIUM: CPT

## 2024-11-15 PROCEDURE — 88305 TISSUE EXAM BY PATHOLOGIST: CPT

## 2024-11-15 PROCEDURE — 87205 SMEAR GRAM STAIN: CPT

## 2024-11-15 PROCEDURE — 88112 CYTOPATH CELL ENHANCE TECH: CPT

## 2024-11-15 PROCEDURE — 87070 CULTURE OTHR SPECIMN AEROBIC: CPT

## 2024-11-15 PROCEDURE — 84157 ASSAY OF PROTEIN OTHER: CPT

## 2024-11-15 PROCEDURE — 82248 BILIRUBIN DIRECT: CPT

## 2024-11-15 PROCEDURE — 88108 CYTOPATH CONCENTRATE TECH: CPT

## 2024-11-15 PROCEDURE — 82945 GLUCOSE OTHER FLUID: CPT

## 2024-11-15 PROCEDURE — 87075 CULTR BACTERIA EXCEPT BLOOD: CPT

## 2024-11-15 PROCEDURE — 0W9G3ZZ DRAINAGE OF PERITONEAL CAVITY, PERCUTANEOUS APPROACH: ICD-10-PCS | Performed by: RADIOLOGY

## 2024-11-15 PROCEDURE — 80053 COMPREHEN METABOLIC PANEL: CPT

## 2024-11-15 PROCEDURE — 82150 ASSAY OF AMYLASE: CPT

## 2024-11-15 PROCEDURE — 82042 OTHER SOURCE ALBUMIN QUAN EA: CPT

## 2024-11-15 PROCEDURE — 1200000000 HC SEMI PRIVATE

## 2024-11-15 PROCEDURE — 49083 ABD PARACENTESIS W/IMAGING: CPT

## 2024-11-15 PROCEDURE — 89051 BODY FLUID CELL COUNT: CPT

## 2024-11-15 PROCEDURE — 2580000003 HC RX 258

## 2024-11-15 PROCEDURE — 82948 REAGENT STRIP/BLOOD GLUCOSE: CPT

## 2024-11-15 RX ORDER — ALBUMIN (HUMAN) 12.5 G/50ML
25 SOLUTION INTRAVENOUS ONCE
Status: COMPLETED | OUTPATIENT
Start: 2024-11-15 | End: 2024-11-15

## 2024-11-15 RX ADMIN — GABAPENTIN 100 MG: 100 CAPSULE ORAL at 21:17

## 2024-11-15 RX ADMIN — FUROSEMIDE 40 MG: 10 INJECTION, SOLUTION INTRAMUSCULAR; INTRAVENOUS at 09:33

## 2024-11-15 RX ADMIN — GABAPENTIN 100 MG: 100 CAPSULE ORAL at 09:07

## 2024-11-15 RX ADMIN — NYSTATIN 500000 UNITS: 100000 SUSPENSION ORAL at 20:30

## 2024-11-15 RX ADMIN — HYDROCODONE BITARTRATE AND ACETAMINOPHEN 2 TABLET: 5; 325 TABLET ORAL at 17:14

## 2024-11-15 RX ADMIN — NYSTATIN 500000 UNITS: 100000 SUSPENSION ORAL at 09:07

## 2024-11-15 RX ADMIN — ALBUMIN (HUMAN) 25 G: 0.25 INJECTION, SOLUTION INTRAVENOUS at 12:07

## 2024-11-15 RX ADMIN — SODIUM CHLORIDE, PRESERVATIVE FREE 10 ML: 5 INJECTION INTRAVENOUS at 09:08

## 2024-11-15 RX ADMIN — FLUCONAZOLE 100 MG: 100 TABLET ORAL at 09:08

## 2024-11-15 RX ADMIN — FAMOTIDINE 20 MG: 20 TABLET, FILM COATED ORAL at 09:02

## 2024-11-15 RX ADMIN — SODIUM CHLORIDE, PRESERVATIVE FREE 10 ML: 5 INJECTION INTRAVENOUS at 20:30

## 2024-11-15 RX ADMIN — HYDROCODONE BITARTRATE AND ACETAMINOPHEN 2 TABLET: 5; 325 TABLET ORAL at 09:00

## 2024-11-15 RX ADMIN — NYSTATIN 500000 UNITS: 100000 SUSPENSION ORAL at 17:14

## 2024-11-15 RX ADMIN — SODIUM CHLORIDE, PRESERVATIVE FREE 10 ML: 5 INJECTION INTRAVENOUS at 13:13

## 2024-11-15 RX ADMIN — NYSTATIN 500000 UNITS: 100000 SUSPENSION ORAL at 12:56

## 2024-11-15 RX ADMIN — GABAPENTIN 100 MG: 100 CAPSULE ORAL at 15:16

## 2024-11-15 ASSESSMENT — PAIN DESCRIPTION - DESCRIPTORS
DESCRIPTORS: ACHING
DESCRIPTORS: ACHING

## 2024-11-15 ASSESSMENT — PAIN DESCRIPTION - ORIENTATION
ORIENTATION: MID;LOWER
ORIENTATION: MID

## 2024-11-15 ASSESSMENT — PAIN SCALES - GENERAL
PAINLEVEL_OUTOF10: 8
PAINLEVEL_OUTOF10: 8

## 2024-11-15 ASSESSMENT — PAIN DESCRIPTION - LOCATION
LOCATION: BACK
LOCATION: BACK

## 2024-11-15 NOTE — CONSENT
Formulation and discussion of sedation / procedure plans, risks, benefits, side effects and alternatives with patient and/or responsible adult completed.    History and Physical reviewed and unchanged.    Electronically signed by Glen Stephen MD on 11/15/24 at 11:16 AM EST

## 2024-11-15 NOTE — PROCEDURES
Pre-Procedure Diagnosis: Ascites    Procedure Performed:  Paracentesis    Anesthesia: local    Findings: Clear yellow serous fluid obtained.    Immediate Complications:  None    Estimated Blood Loss: minimal    SEE DICTATED PROCEDURE NOTE FOR COMPLETE DETAILS.    Glen Stephen MD   11/15/2024 11:17 AM

## 2024-11-16 LAB
ANION GAP SERPL CALC-SCNC: 8 MEQ/L (ref 8–16)
BUN SERPL-MCNC: 22 MG/DL (ref 7–22)
CALCIUM SERPL-MCNC: 8.3 MG/DL (ref 8.5–10.5)
CHLORIDE SERPL-SCNC: 93 MEQ/L (ref 98–111)
CO2 SERPL-SCNC: 27 MEQ/L (ref 23–33)
CREAT SERPL-MCNC: 1 MG/DL (ref 0.4–1.2)
DEPRECATED RDW RBC AUTO: 43.2 FL (ref 35–45)
ERYTHROCYTE [DISTWIDTH] IN BLOOD BY AUTOMATED COUNT: 13 % (ref 11.5–14.5)
GFR SERPL CREATININE-BSD FRML MDRD: 89 ML/MIN/1.73M2
GLUCOSE SERPL-MCNC: 97 MG/DL (ref 70–108)
HCT VFR BLD AUTO: 37 % (ref 42–52)
HGB BLD-MCNC: 12.2 GM/DL (ref 14–18)
MCH RBC QN AUTO: 30.1 PG (ref 26–33)
MCHC RBC AUTO-ENTMCNC: 33 GM/DL (ref 32.2–35.5)
MCV RBC AUTO: 91.4 FL (ref 80–94)
PLATELET # BLD AUTO: 123 THOU/MM3 (ref 130–400)
PMV BLD AUTO: 10 FL (ref 9.4–12.4)
POTASSIUM SERPL-SCNC: 4.4 MEQ/L (ref 3.5–5.2)
RBC # BLD AUTO: 4.05 MILL/MM3 (ref 4.7–6.1)
SODIUM SERPL-SCNC: 125 MEQ/L (ref 135–145)
SODIUM SERPL-SCNC: 128 MEQ/L (ref 135–145)
WBC # BLD AUTO: 3.7 THOU/MM3 (ref 4.8–10.8)

## 2024-11-16 PROCEDURE — 2580000003 HC RX 258

## 2024-11-16 PROCEDURE — 6370000000 HC RX 637 (ALT 250 FOR IP)

## 2024-11-16 PROCEDURE — 99222 1ST HOSP IP/OBS MODERATE 55: CPT | Performed by: INTERNAL MEDICINE

## 2024-11-16 PROCEDURE — 85027 COMPLETE CBC AUTOMATED: CPT

## 2024-11-16 PROCEDURE — 1200000000 HC SEMI PRIVATE

## 2024-11-16 PROCEDURE — P9047 ALBUMIN (HUMAN), 25%, 50ML: HCPCS

## 2024-11-16 PROCEDURE — 6360000002 HC RX W HCPCS

## 2024-11-16 PROCEDURE — 84295 ASSAY OF SERUM SODIUM: CPT

## 2024-11-16 PROCEDURE — 99233 SBSQ HOSP IP/OBS HIGH 50: CPT

## 2024-11-16 PROCEDURE — 6370000000 HC RX 637 (ALT 250 FOR IP): Performed by: INTERNAL MEDICINE

## 2024-11-16 PROCEDURE — 80048 BASIC METABOLIC PNL TOTAL CA: CPT

## 2024-11-16 RX ORDER — ALBUMIN (HUMAN) 12.5 G/50ML
25 SOLUTION INTRAVENOUS ONCE
Status: COMPLETED | OUTPATIENT
Start: 2024-11-16 | End: 2024-11-16

## 2024-11-16 RX ORDER — SODIUM CHLORIDE 1 G/1
2 TABLET ORAL ONCE
Status: COMPLETED | OUTPATIENT
Start: 2024-11-16 | End: 2024-11-16

## 2024-11-16 RX ORDER — POLYETHYLENE GLYCOL 3350 17 G/17G
17 POWDER, FOR SOLUTION ORAL DAILY
Status: DISCONTINUED | OUTPATIENT
Start: 2024-11-16 | End: 2024-11-20 | Stop reason: HOSPADM

## 2024-11-16 RX ORDER — SENNOSIDES A AND B 8.6 MG/1
1 TABLET, FILM COATED ORAL 2 TIMES DAILY
Status: DISCONTINUED | OUTPATIENT
Start: 2024-11-16 | End: 2024-11-20 | Stop reason: HOSPADM

## 2024-11-16 RX ADMIN — HYDROCODONE BITARTRATE AND ACETAMINOPHEN 2 TABLET: 5; 325 TABLET ORAL at 10:52

## 2024-11-16 RX ADMIN — SODIUM CHLORIDE, PRESERVATIVE FREE 10 ML: 5 INJECTION INTRAVENOUS at 08:54

## 2024-11-16 RX ADMIN — NYSTATIN 500000 UNITS: 100000 SUSPENSION ORAL at 16:54

## 2024-11-16 RX ADMIN — ALBUMIN (HUMAN) 25 G: 0.25 INJECTION, SOLUTION INTRAVENOUS at 13:52

## 2024-11-16 RX ADMIN — NYSTATIN 500000 UNITS: 100000 SUSPENSION ORAL at 08:53

## 2024-11-16 RX ADMIN — SENNOSIDES 8.6 MG: 8.6 TABLET, FILM COATED ORAL at 15:10

## 2024-11-16 RX ADMIN — SODIUM CHLORIDE 2 G: 1 TABLET ORAL at 20:06

## 2024-11-16 RX ADMIN — HYDROCODONE BITARTRATE AND ACETAMINOPHEN 1 TABLET: 5; 325 TABLET ORAL at 16:56

## 2024-11-16 RX ADMIN — GABAPENTIN 100 MG: 100 CAPSULE ORAL at 20:07

## 2024-11-16 RX ADMIN — SODIUM CHLORIDE, PRESERVATIVE FREE 10 ML: 5 INJECTION INTRAVENOUS at 20:07

## 2024-11-16 RX ADMIN — POLYETHYLENE GLYCOL (3350) 17 G: 17 POWDER, FOR SOLUTION ORAL at 15:10

## 2024-11-16 RX ADMIN — HYDROCODONE BITARTRATE AND ACETAMINOPHEN 2 TABLET: 5; 325 TABLET ORAL at 21:50

## 2024-11-16 RX ADMIN — FLUCONAZOLE 100 MG: 100 TABLET ORAL at 08:56

## 2024-11-16 RX ADMIN — SENNOSIDES 8.6 MG: 8.6 TABLET, FILM COATED ORAL at 20:05

## 2024-11-16 RX ADMIN — NYSTATIN 500000 UNITS: 100000 SUSPENSION ORAL at 20:07

## 2024-11-16 RX ADMIN — HYDROCODONE BITARTRATE AND ACETAMINOPHEN 2 TABLET: 5; 325 TABLET ORAL at 02:23

## 2024-11-16 RX ADMIN — FAMOTIDINE 20 MG: 20 TABLET, FILM COATED ORAL at 08:53

## 2024-11-16 RX ADMIN — GABAPENTIN 100 MG: 100 CAPSULE ORAL at 08:56

## 2024-11-16 RX ADMIN — NYSTATIN 500000 UNITS: 100000 SUSPENSION ORAL at 13:14

## 2024-11-16 RX ADMIN — GABAPENTIN 100 MG: 100 CAPSULE ORAL at 13:14

## 2024-11-16 RX ADMIN — FUROSEMIDE 40 MG: 10 INJECTION, SOLUTION INTRAMUSCULAR; INTRAVENOUS at 08:53

## 2024-11-16 RX ADMIN — Medication 5 ML: at 02:25

## 2024-11-16 ASSESSMENT — PAIN DESCRIPTION - LOCATION
LOCATION: BUTTOCKS
LOCATION: MOUTH
LOCATION: BUTTOCKS
LOCATION: BACK
LOCATION: COCCYX;BACK

## 2024-11-16 ASSESSMENT — PAIN DESCRIPTION - DESCRIPTORS
DESCRIPTORS: ACHING;SORE
DESCRIPTORS: ACHING
DESCRIPTORS: DISCOMFORT
DESCRIPTORS: ACHING;SORE;SHARP

## 2024-11-16 ASSESSMENT — PAIN SCALES - GENERAL
PAINLEVEL_OUTOF10: 0
PAINLEVEL_OUTOF10: 5
PAINLEVEL_OUTOF10: 0
PAINLEVEL_OUTOF10: 9
PAINLEVEL_OUTOF10: 2

## 2024-11-16 ASSESSMENT — PAIN DESCRIPTION - ORIENTATION
ORIENTATION: LOWER
ORIENTATION: MID

## 2024-11-16 ASSESSMENT — PAIN DESCRIPTION - ONSET: ONSET: AWAKENED FROM SLEEP

## 2024-11-16 ASSESSMENT — PAIN DESCRIPTION - FREQUENCY: FREQUENCY: INTERMITTENT

## 2024-11-16 ASSESSMENT — PAIN DESCRIPTION - PAIN TYPE: TYPE: ACUTE PAIN

## 2024-11-16 ASSESSMENT — PAIN - FUNCTIONAL ASSESSMENT
PAIN_FUNCTIONAL_ASSESSMENT: ACTIVITIES ARE NOT PREVENTED

## 2024-11-16 NOTE — PLAN OF CARE
Problem: Discharge Planning  Goal: Discharge to home or other facility with appropriate resources  11/16/2024 0104 by Joao Adams RN  Outcome: Progressing  11/15/2024 1141 by Gilberto Contreras, RN  Outcome: Progressing     Problem: Pain  Goal: Verbalizes/displays adequate comfort level or baseline comfort level  11/16/2024 0104 by Joao Adams RN  Outcome: Progressing  11/15/2024 1141 by Gilberto Contreras, RN  Outcome: Progressing     Problem: Safety - Adult  Goal: Free from fall injury  11/16/2024 0104 by Joao Adams RN  Outcome: Progressing  11/15/2024 1141 by Gilberto Contreras, RN  Outcome: Progressing

## 2024-11-16 NOTE — PLAN OF CARE
Problem: Discharge Planning  Goal: Discharge to home or other facility with appropriate resources  11/16/2024 1413 by Leif Busby RN  Outcome: Progressing  11/16/2024 0104 by Joao Adams RN  Outcome: Progressing     Problem: Pain  Goal: Verbalizes/displays adequate comfort level or baseline comfort level  11/16/2024 1413 by Leif Busby RN  Outcome: Progressing  11/16/2024 0104 by Joao Adams RN  Outcome: Progressing     Problem: Safety - Adult  Goal: Free from fall injury  11/16/2024 1413 by Leif Busby RN  Outcome: Progressing  11/16/2024 0104 by Joao Adams RN  Outcome: Progressing

## 2024-11-17 ENCOUNTER — APPOINTMENT (OUTPATIENT)
Age: 54
DRG: 981 | End: 2024-11-17
Payer: COMMERCIAL

## 2024-11-17 ENCOUNTER — APPOINTMENT (OUTPATIENT)
Dept: GENERAL RADIOLOGY | Age: 54
DRG: 981 | End: 2024-11-17
Payer: COMMERCIAL

## 2024-11-17 ENCOUNTER — APPOINTMENT (OUTPATIENT)
Dept: INTERVENTIONAL RADIOLOGY/VASCULAR | Age: 54
DRG: 981 | End: 2024-11-17
Payer: COMMERCIAL

## 2024-11-17 ENCOUNTER — APPOINTMENT (OUTPATIENT)
Dept: CT IMAGING | Age: 54
DRG: 981 | End: 2024-11-17
Payer: COMMERCIAL

## 2024-11-17 LAB
ALBUMIN SERPL BCG-MCNC: 2.8 G/DL (ref 3.5–5.1)
ALP SERPL-CCNC: 172 U/L (ref 38–126)
ALT SERPL W/O P-5'-P-CCNC: 22 U/L (ref 11–66)
ANION GAP SERPL CALC-SCNC: 9 MEQ/L (ref 8–16)
ARTERIAL PATENCY WRIST A: POSITIVE
AST SERPL-CCNC: 30 U/L (ref 5–40)
BASE EXCESS BLDA CALC-SCNC: 2.5 MMOL/L (ref -2.5–2.5)
BASOPHILS ABSOLUTE: 0 THOU/MM3 (ref 0–0.1)
BASOPHILS NFR BLD AUTO: 1 %
BDY SITE: ABNORMAL
BILIRUB SERPL-MCNC: 0.5 MG/DL (ref 0.3–1.2)
BUN SERPL-MCNC: 20 MG/DL (ref 7–22)
CALCIUM SERPL-MCNC: 8.6 MG/DL (ref 8.5–10.5)
CHLORIDE SERPL-SCNC: 94 MEQ/L (ref 98–111)
CO2 SERPL-SCNC: 29 MEQ/L (ref 23–33)
COLLECTED BY:: ABNORMAL
CREAT SERPL-MCNC: 1 MG/DL (ref 0.4–1.2)
DEPRECATED RDW RBC AUTO: 44.5 FL (ref 35–45)
DEVICE: ABNORMAL
ECHO AO ASC DIAM: 3.9 CM
ECHO AO ASCENDING AORTA INDEX: 1.83 CM/M2
ECHO AO SINUS VALSALVA DIAM: 3.7 CM
ECHO AO SINUS VALSALVA INDEX: 1.74 CM/M2
ECHO AO ST JNCT DIAM: 3.3 CM
ECHO AV CUSP MM: 2.1 CM
ECHO AV MEAN GRADIENT: 4 MMHG
ECHO AV MEAN VELOCITY: 0.9 M/S
ECHO AV PEAK GRADIENT: 7 MMHG
ECHO AV PEAK VELOCITY: 1.3 M/S
ECHO AV VELOCITY RATIO: 0.77
ECHO AV VTI: 17.7 CM
ECHO BSA: 2.22 M2
ECHO EST RA PRESSURE: 3 MMHG
ECHO LA AREA 4C: 13.8 CM2
ECHO LA DIAMETER INDEX: 1.41 CM/M2
ECHO LA DIAMETER: 3 CM
ECHO LA MAJOR AXIS: 5 CM
ECHO LA VOL MOD A4C: 29 ML (ref 18–58)
ECHO LA VOLUME INDEX MOD A4C: 14 ML/M2 (ref 16–34)
ECHO LV E' LATERAL VELOCITY: 12.3 CM/S
ECHO LV E' SEPTAL VELOCITY: 9.1 CM/S
ECHO LV EF PHYSICIAN: 65 %
ECHO LV EJECTION FRACTION BIPLANE: 57 % (ref 55–100)
ECHO LV FRACTIONAL SHORTENING: 29 % (ref 28–44)
ECHO LV INTERNAL DIMENSION DIASTOLE INDEX: 1.78 CM/M2
ECHO LV INTERNAL DIMENSION DIASTOLIC: 3.8 CM (ref 4.2–5.9)
ECHO LV INTERNAL DIMENSION SYSTOLIC INDEX: 1.27 CM/M2
ECHO LV INTERNAL DIMENSION SYSTOLIC: 2.7 CM
ECHO LV ISOVOLUMETRIC RELAXATION TIME (IVRT): 79 MS
ECHO LV IVSD: 1.1 CM (ref 0.6–1)
ECHO LV MASS 2D: 134.7 G (ref 88–224)
ECHO LV MASS INDEX 2D: 63.2 G/M2 (ref 49–115)
ECHO LV POSTERIOR WALL DIASTOLIC: 1.1 CM (ref 0.6–1)
ECHO LV RELATIVE WALL THICKNESS RATIO: 0.58
ECHO LVOT AV VTI INDEX: 0.93
ECHO LVOT MEAN GRADIENT: 2 MMHG
ECHO LVOT PEAK GRADIENT: 4 MMHG
ECHO LVOT PEAK VELOCITY: 1 M/S
ECHO LVOT VTI: 16.5 CM
ECHO MV A VELOCITY: 0.74 M/S
ECHO MV E DECELERATION TIME (DT): 156 MS
ECHO MV E VELOCITY: 0.51 M/S
ECHO MV E/A RATIO: 0.69
ECHO MV E/E' LATERAL: 4.15
ECHO MV E/E' RATIO (AVERAGED): 4.88
ECHO MV E/E' SEPTAL: 5.6
ECHO PV MAX VELOCITY: 0.7 M/S
ECHO PV PEAK GRADIENT: 2 MMHG
ECHO RIGHT VENTRICULAR SYSTOLIC PRESSURE (RVSP): 23 MMHG
ECHO RV FREE WALL PEAK S': 11.6 CM/S
ECHO RV GLOBAL SYSTOLIC STRAIN (GLS): -14 %
ECHO RV INTERNAL DIMENSION: 3.3 CM
ECHO RV TAPSE: 1.3 CM (ref 1.7–?)
ECHO TV E WAVE: 0.5 M/S
ECHO TV REGURGITANT MAX VELOCITY: 2.26 M/S
ECHO TV REGURGITANT PEAK GRADIENT: 20 MMHG
EKG ATRIAL RATE: 125 BPM
EKG P AXIS: 46 DEGREES
EKG P-R INTERVAL: 112 MS
EKG Q-T INTERVAL: 296 MS
EKG QRS DURATION: 90 MS
EKG QTC CALCULATION (BAZETT): 427 MS
EKG R AXIS: 23 DEGREES
EKG T AXIS: -9 DEGREES
EKG VENTRICULAR RATE: 125 BPM
EOSINOPHIL NFR BLD AUTO: 3.5 %
EOSINOPHILS ABSOLUTE: 0.1 THOU/MM3 (ref 0–0.4)
ERYTHROCYTE [DISTWIDTH] IN BLOOD BY AUTOMATED COUNT: 13.1 % (ref 11.5–14.5)
GFR SERPL CREATININE-BSD FRML MDRD: 89 ML/MIN/1.73M2
GLUCOSE BLD-MCNC: 136 MG/DL (ref 70–108)
GLUCOSE SERPL-MCNC: 98 MG/DL (ref 70–108)
HCO3 BLDA-SCNC: 26 MMOL/L (ref 23–28)
HCT VFR BLD AUTO: 37.9 % (ref 42–52)
HEPARIN UNFRACTIONATED: 0.49 U/ML (ref 0.3–0.7)
HEPARIN UNFRACTIONATED: 0.5 U/ML (ref 0.3–0.7)
HGB BLD-MCNC: 12.3 GM/DL (ref 14–18)
IMM GRANULOCYTES # BLD AUTO: 0.03 THOU/MM3 (ref 0–0.07)
IMM GRANULOCYTES NFR BLD AUTO: 1 %
INR PPP: 1.57 (ref 0.85–1.13)
LACTATE SERPL-SCNC: 1.4 MMOL/L (ref 0.5–2)
LACTATE SERPL-SCNC: 2.9 MMOL/L (ref 0.5–2)
LYMPHOCYTES ABSOLUTE: 0.7 THOU/MM3 (ref 1–4.8)
LYMPHOCYTES NFR BLD AUTO: 22.7 %
MCH RBC QN AUTO: 30.1 PG (ref 26–33)
MCHC RBC AUTO-ENTMCNC: 32.5 GM/DL (ref 32.2–35.5)
MCV RBC AUTO: 92.9 FL (ref 80–94)
MONOCYTES ABSOLUTE: 0.7 THOU/MM3 (ref 0.4–1.3)
MONOCYTES NFR BLD AUTO: 22 %
NEUTROPHILS ABSOLUTE: 1.5 THOU/MM3 (ref 1.8–7.7)
NEUTROPHILS NFR BLD AUTO: 49.8 %
NRBC BLD AUTO-RTO: 0 /100 WBC
PCO2 BLDA: 34 MMHG (ref 35–45)
PH BLDA: 7.48 [PH] (ref 7.35–7.45)
PLATELET # BLD AUTO: 136 THOU/MM3 (ref 130–400)
PLATELET BLD QL SMEAR: ADEQUATE
PMV BLD AUTO: 10.3 FL (ref 9.4–12.4)
PO2 BLDA: 47 MMHG (ref 71–104)
POTASSIUM SERPL-SCNC: 4.5 MEQ/L (ref 3.5–5.2)
PROT SERPL-MCNC: 5.6 G/DL (ref 6.1–8)
RBC # BLD AUTO: 4.08 MILL/MM3 (ref 4.7–6.1)
SAO2 % BLDA: 86 %
SCAN OF BLOOD SMEAR: NORMAL
SODIUM SERPL-SCNC: 131 MEQ/L (ref 135–145)
SODIUM SERPL-SCNC: 132 MEQ/L (ref 135–145)
TROPONIN, HIGH SENSITIVITY: 27 NG/L (ref 0–12)
VARIANT LYMPHS BLD QL SMEAR: ABNORMAL %
VENTILATION MODE VENT: ABNORMAL
WBC # BLD AUTO: 3.1 THOU/MM3 (ref 4.8–10.8)

## 2024-11-17 PROCEDURE — 6360000002 HC RX W HCPCS

## 2024-11-17 PROCEDURE — 71275 CT ANGIOGRAPHY CHEST: CPT

## 2024-11-17 PROCEDURE — P9047 ALBUMIN (HUMAN), 25%, 50ML: HCPCS

## 2024-11-17 PROCEDURE — 2580000003 HC RX 258

## 2024-11-17 PROCEDURE — 93010 ELECTROCARDIOGRAM REPORT: CPT | Performed by: INTERNAL MEDICINE

## 2024-11-17 PROCEDURE — 6370000000 HC RX 637 (ALT 250 FOR IP)

## 2024-11-17 PROCEDURE — 99233 SBSQ HOSP IP/OBS HIGH 50: CPT

## 2024-11-17 PROCEDURE — 02CQ4ZZ EXTIRPATION OF MATTER FROM RIGHT PULMONARY ARTERY, PERCUTANEOUS ENDOSCOPIC APPROACH: ICD-10-PCS | Performed by: RADIOLOGY

## 2024-11-17 PROCEDURE — 6360000004 HC RX CONTRAST MEDICATION: Performed by: RADIOLOGY

## 2024-11-17 PROCEDURE — 36014 PLACE CATHETER IN ARTERY: CPT

## 2024-11-17 PROCEDURE — 99232 SBSQ HOSP IP/OBS MODERATE 35: CPT | Performed by: INTERNAL MEDICINE

## 2024-11-17 PROCEDURE — 82947 ASSAY GLUCOSE BLOOD QUANT: CPT

## 2024-11-17 PROCEDURE — 02CR4ZZ EXTIRPATION OF MATTER FROM LEFT PULMONARY ARTERY, PERCUTANEOUS ENDOSCOPIC APPROACH: ICD-10-PCS | Performed by: RADIOLOGY

## 2024-11-17 PROCEDURE — 83605 ASSAY OF LACTIC ACID: CPT

## 2024-11-17 PROCEDURE — 2500000003 HC RX 250 WO HCPCS: Performed by: RADIOLOGY

## 2024-11-17 PROCEDURE — 6360000002 HC RX W HCPCS: Performed by: RADIOLOGY

## 2024-11-17 PROCEDURE — 6370000000 HC RX 637 (ALT 250 FOR IP): Performed by: INTERNAL MEDICINE

## 2024-11-17 PROCEDURE — 84484 ASSAY OF TROPONIN QUANT: CPT

## 2024-11-17 PROCEDURE — 36415 COLL VENOUS BLD VENIPUNCTURE: CPT

## 2024-11-17 PROCEDURE — 93356 MYOCRD STRAIN IMG SPCKL TRCK: CPT | Performed by: INTERNAL MEDICINE

## 2024-11-17 PROCEDURE — 2140000000 HC CCU INTERMEDIATE R&B

## 2024-11-17 PROCEDURE — 37184 PRIM ART M-THRMBC 1ST VSL: CPT

## 2024-11-17 PROCEDURE — 5A0955A ASSISTANCE WITH RESPIRATORY VENTILATION, GREATER THAN 96 CONSECUTIVE HOURS, HIGH NASAL FLOW/VELOCITY: ICD-10-PCS | Performed by: STUDENT IN AN ORGANIZED HEALTH CARE EDUCATION/TRAINING PROGRAM

## 2024-11-17 PROCEDURE — 71045 X-RAY EXAM CHEST 1 VIEW: CPT

## 2024-11-17 PROCEDURE — 93306 TTE W/DOPPLER COMPLETE: CPT

## 2024-11-17 PROCEDURE — 2700000000 HC OXYGEN THERAPY PER DAY

## 2024-11-17 PROCEDURE — 84295 ASSAY OF SERUM SODIUM: CPT

## 2024-11-17 PROCEDURE — 85520 HEPARIN ASSAY: CPT

## 2024-11-17 PROCEDURE — 85610 PROTHROMBIN TIME: CPT

## 2024-11-17 PROCEDURE — 85025 COMPLETE CBC W/AUTO DIFF WBC: CPT

## 2024-11-17 PROCEDURE — 80053 COMPREHEN METABOLIC PANEL: CPT

## 2024-11-17 PROCEDURE — 82803 BLOOD GASES ANY COMBINATION: CPT

## 2024-11-17 PROCEDURE — 2709999900 IR GUIDED THROMB MECH VEIN

## 2024-11-17 PROCEDURE — 93005 ELECTROCARDIOGRAM TRACING: CPT

## 2024-11-17 PROCEDURE — 93306 TTE W/DOPPLER COMPLETE: CPT | Performed by: INTERNAL MEDICINE

## 2024-11-17 PROCEDURE — 6360000004 HC RX CONTRAST MEDICATION

## 2024-11-17 PROCEDURE — 94761 N-INVAS EAR/PLS OXIMETRY MLT: CPT

## 2024-11-17 PROCEDURE — 37799 UNLISTED PX VASCULAR SURGERY: CPT

## 2024-11-17 RX ORDER — FENTANYL CITRATE 50 UG/ML
INJECTION, SOLUTION INTRAMUSCULAR; INTRAVENOUS PRN
Status: COMPLETED | OUTPATIENT
Start: 2024-11-17 | End: 2024-11-17

## 2024-11-17 RX ORDER — NYSTATIN 100000 [USP'U]/ML
500000 SUSPENSION ORAL 4 TIMES DAILY
Status: DISCONTINUED | OUTPATIENT
Start: 2024-11-17 | End: 2024-11-20 | Stop reason: HOSPADM

## 2024-11-17 RX ORDER — MIDAZOLAM HYDROCHLORIDE 1 MG/ML
INJECTION, SOLUTION INTRAMUSCULAR; INTRAVENOUS PRN
Status: COMPLETED | OUTPATIENT
Start: 2024-11-17 | End: 2024-11-17

## 2024-11-17 RX ORDER — ALBUMIN (HUMAN) 12.5 G/50ML
50 SOLUTION INTRAVENOUS ONCE
Status: COMPLETED | OUTPATIENT
Start: 2024-11-17 | End: 2024-11-17

## 2024-11-17 RX ORDER — IOPAMIDOL 612 MG/ML
INJECTION, SOLUTION INTRAVASCULAR PRN
Status: COMPLETED | OUTPATIENT
Start: 2024-11-17 | End: 2024-11-17

## 2024-11-17 RX ORDER — IOPAMIDOL 755 MG/ML
80 INJECTION, SOLUTION INTRAVASCULAR
Status: COMPLETED | OUTPATIENT
Start: 2024-11-17 | End: 2024-11-17

## 2024-11-17 RX ORDER — HEPARIN SODIUM 1000 [USP'U]/ML
40 INJECTION, SOLUTION INTRAVENOUS; SUBCUTANEOUS PRN
Status: DISCONTINUED | OUTPATIENT
Start: 2024-11-17 | End: 2024-11-19

## 2024-11-17 RX ORDER — HEPARIN SODIUM 1000 [USP'U]/ML
80 INJECTION, SOLUTION INTRAVENOUS; SUBCUTANEOUS ONCE
Status: COMPLETED | OUTPATIENT
Start: 2024-11-17 | End: 2024-11-17

## 2024-11-17 RX ORDER — HEPARIN SODIUM 10000 [USP'U]/100ML
5-30 INJECTION, SOLUTION INTRAVENOUS CONTINUOUS
Status: DISCONTINUED | OUTPATIENT
Start: 2024-11-17 | End: 2024-11-19

## 2024-11-17 RX ORDER — HEPARIN SODIUM 1000 [USP'U]/ML
80 INJECTION, SOLUTION INTRAVENOUS; SUBCUTANEOUS PRN
Status: DISCONTINUED | OUTPATIENT
Start: 2024-11-17 | End: 2024-11-19

## 2024-11-17 RX ORDER — SODIUM CHLORIDE 1 G/1
2 TABLET ORAL ONCE
Status: COMPLETED | OUTPATIENT
Start: 2024-11-17 | End: 2024-11-17

## 2024-11-17 RX ORDER — LIDOCAINE HYDROCHLORIDE 20 MG/ML
INJECTION, SOLUTION EPIDURAL; INFILTRATION; INTRACAUDAL; PERINEURAL PRN
Status: COMPLETED | OUTPATIENT
Start: 2024-11-17 | End: 2024-11-17

## 2024-11-17 RX ORDER — ENOXAPARIN SODIUM 100 MG/ML
1 INJECTION SUBCUTANEOUS 2 TIMES DAILY
Status: DISCONTINUED | OUTPATIENT
Start: 2024-11-17 | End: 2024-11-17

## 2024-11-17 RX ORDER — GABAPENTIN 100 MG/1
100 CAPSULE ORAL 3 TIMES DAILY
Status: DISCONTINUED | OUTPATIENT
Start: 2024-11-17 | End: 2024-11-20 | Stop reason: HOSPADM

## 2024-11-17 RX ADMIN — ACETAMINOPHEN 650 MG: 325 TABLET ORAL at 15:51

## 2024-11-17 RX ADMIN — NYSTATIN 500000 UNITS: 100000 SUSPENSION ORAL at 21:04

## 2024-11-17 RX ADMIN — HYDROCODONE BITARTRATE AND ACETAMINOPHEN 2 TABLET: 5; 325 TABLET ORAL at 14:37

## 2024-11-17 RX ADMIN — Medication 5 ML: at 20:18

## 2024-11-17 RX ADMIN — GABAPENTIN 100 MG: 100 CAPSULE ORAL at 20:18

## 2024-11-17 RX ADMIN — HEPARIN SODIUM 18 UNITS/KG/HR: 10000 INJECTION, SOLUTION INTRAVENOUS at 09:09

## 2024-11-17 RX ADMIN — HYDROCODONE BITARTRATE AND ACETAMINOPHEN 2 TABLET: 5; 325 TABLET ORAL at 23:29

## 2024-11-17 RX ADMIN — ALTEPLASE 4 MG: 2.2 INJECTION, POWDER, LYOPHILIZED, FOR SOLUTION INTRAVENOUS at 10:43

## 2024-11-17 RX ADMIN — Medication 5 ML: at 02:52

## 2024-11-17 RX ADMIN — LIDOCAINE HYDROCHLORIDE 15 ML: 20 INJECTION, SOLUTION EPIDURAL; INFILTRATION; INTRACAUDAL; PERINEURAL at 10:58

## 2024-11-17 RX ADMIN — MIDAZOLAM 0.5 MG: 1 INJECTION INTRAMUSCULAR; INTRAVENOUS at 10:22

## 2024-11-17 RX ADMIN — Medication 5 ML: at 11:46

## 2024-11-17 RX ADMIN — ALBUMIN (HUMAN) 50 G: 0.25 INJECTION, SOLUTION INTRAVENOUS at 09:42

## 2024-11-17 RX ADMIN — HEPARIN SODIUM 7200 UNITS: 1000 INJECTION INTRAVENOUS; SUBCUTANEOUS at 09:05

## 2024-11-17 RX ADMIN — SENNOSIDES 8.6 MG: 8.6 TABLET, FILM COATED ORAL at 11:50

## 2024-11-17 RX ADMIN — FENTANYL CITRATE 25 MCG: 50 INJECTION, SOLUTION INTRAMUSCULAR; INTRAVENOUS at 10:22

## 2024-11-17 RX ADMIN — GABAPENTIN 100 MG: 100 CAPSULE ORAL at 11:45

## 2024-11-17 RX ADMIN — MIDAZOLAM 0.5 MG: 1 INJECTION INTRAMUSCULAR; INTRAVENOUS at 10:30

## 2024-11-17 RX ADMIN — SENNOSIDES 8.6 MG: 8.6 TABLET, FILM COATED ORAL at 20:17

## 2024-11-17 RX ADMIN — ALTEPLASE 4 MG: 2.2 INJECTION, POWDER, LYOPHILIZED, FOR SOLUTION INTRAVENOUS at 10:49

## 2024-11-17 RX ADMIN — SODIUM CHLORIDE 2 G: 1 TABLET ORAL at 12:44

## 2024-11-17 RX ADMIN — IOPAMIDOL 80 ML: 755 INJECTION, SOLUTION INTRAVENOUS at 08:51

## 2024-11-17 RX ADMIN — IOPAMIDOL 30 ML: 612 INJECTION, SOLUTION INTRAVENOUS at 10:59

## 2024-11-17 RX ADMIN — FLUCONAZOLE 100 MG: 100 TABLET ORAL at 11:45

## 2024-11-17 RX ADMIN — FAMOTIDINE 20 MG: 20 TABLET, FILM COATED ORAL at 11:50

## 2024-11-17 RX ADMIN — NYSTATIN 500000 UNITS: 100000 SUSPENSION ORAL at 16:54

## 2024-11-17 RX ADMIN — SODIUM CHLORIDE, PRESERVATIVE FREE 10 ML: 5 INJECTION INTRAVENOUS at 11:46

## 2024-11-17 RX ADMIN — FENTANYL CITRATE 25 MCG: 50 INJECTION, SOLUTION INTRAMUSCULAR; INTRAVENOUS at 10:30

## 2024-11-17 RX ADMIN — POLYETHYLENE GLYCOL (3350) 17 G: 17 POWDER, FOR SOLUTION ORAL at 11:50

## 2024-11-17 RX ADMIN — NYSTATIN 500000 UNITS: 100000 SUSPENSION ORAL at 11:46

## 2024-11-17 ASSESSMENT — PAIN DESCRIPTION - PAIN TYPE: TYPE: CHRONIC PAIN

## 2024-11-17 ASSESSMENT — PAIN DESCRIPTION - DESCRIPTORS
DESCRIPTORS: ACHING
DESCRIPTORS: ACHING;DISCOMFORT

## 2024-11-17 ASSESSMENT — PAIN DESCRIPTION - LOCATION
LOCATION: BACK
LOCATION: BACK

## 2024-11-17 ASSESSMENT — PAIN SCALES - GENERAL
PAINLEVEL_OUTOF10: 8
PAINLEVEL_OUTOF10: 0
PAINLEVEL_OUTOF10: 9
PAINLEVEL_OUTOF10: 0
PAINLEVEL_OUTOF10: 0

## 2024-11-17 ASSESSMENT — PAIN DESCRIPTION - FREQUENCY: FREQUENCY: CONTINUOUS

## 2024-11-17 ASSESSMENT — PAIN DESCRIPTION - ONSET: ONSET: ON-GOING

## 2024-11-17 ASSESSMENT — PAIN - FUNCTIONAL ASSESSMENT: PAIN_FUNCTIONAL_ASSESSMENT: PREVENTS OR INTERFERES SOME ACTIVE ACTIVITIES AND ADLS

## 2024-11-17 ASSESSMENT — PAIN DESCRIPTION - ORIENTATION: ORIENTATION: MID

## 2024-11-17 NOTE — OP NOTE
Department of Radiology  Post Procedure Progress Note      Pre-Procedure Diagnosis:  pulmonary emboli    Procedure Performed:   thrombectomy     Anesthesia: local / versed and fentanyl    Findings: successful, final PAP 19/10 mmHg    Immediate Complications:  None    Estimated Blood Loss: minimal    SEE DICTATED PROCEDURE NOTE FOR COMPLETE DETAILS.    Glen Stephen MD   11/17/2024 10:59 AM

## 2024-11-17 NOTE — SIGNIFICANT EVENT
This marks as a Significant Event and Plan of care note.       - RR was called this AM due to sudden onset dyspnea, hypoxia, tachycardia. Pt placed on Non-rebreather mask. STAT CXR, trop, LA, ABG ordered. CTA chest ordered. Pt not on SubQ or IV anticogulation noted per chart review.    Initial Ddx included Worsening pleural effusion, PE, ACS.     - CXR stable- no worsening Effusion. EKG stable. Trop, LA pending.   CTA chest shows bilateral pleural effusion with large clot burden with evidence of RHS. Spoke with Dr. Dumont, radiology about this.     - Heparin Gtt initiated. STAT ECHO ordered- spoke with Dr. Smith with cardiology to ask for approval for this. Will start HHFNC. IR procedure request placed.    - Dr. Stephen was contacted, as he is oncall for IR. Case disscussed pt's clinical status, forwarded imaging to him. After collaboration again with Dr. Dumont, Decision for Urgent thrombectomy was made.     - At this time pt remains hemodynamically stable, now developing mild hypotension. In efforts to maintain pre-load, will infuse Albumin at this time. Pt remains tachcyardic, otherwise stable- A&O x 4.     This work and discussion was all done in collaboration with floor RN, resource RN, House sup, RR team.    Pt and wife updated, and express understanding of plan. Awaiting IR  at this time.    Electronically signed by Sandra Parada PA-C on 11/17/2024 at 9:41 AM

## 2024-11-17 NOTE — PROCEDURES
PROCEDURE NOTE  Date: 11/17/2024   Name: Gustabo Machado  YOB: 1970    Procedures  EKG completed during the rapid. Given to the doctor.

## 2024-11-17 NOTE — PLAN OF CARE
Problem: Discharge Planning  Goal: Discharge to home or other facility with appropriate resources  11/16/2024 2313 by Joao Adams RN  Outcome: Progressing  11/16/2024 1413 by Leif Busby RN  Outcome: Progressing     Problem: Pain  Goal: Verbalizes/displays adequate comfort level or baseline comfort level  11/16/2024 2313 by Joao Adams RN  Outcome: Progressing  11/16/2024 1413 by Leif Busby, RN  Outcome: Progressing     Problem: Safety - Adult  Goal: Free from fall injury  11/16/2024 2313 by Joao Adams RN  Outcome: Progressing  11/16/2024 1413 by Leif Busby, RN  Outcome: Progressing

## 2024-11-18 PROBLEM — I26.99 BILATERAL PULMONARY EMBOLISM (HCC): Status: ACTIVE | Noted: 2024-11-18

## 2024-11-18 LAB
ALBUMIN SERPL BCG-MCNC: 3 G/DL (ref 3.5–5.1)
ALP SERPL-CCNC: 154 U/L (ref 38–126)
ALT SERPL W/O P-5'-P-CCNC: 19 U/L (ref 11–66)
ANION GAP SERPL CALC-SCNC: 8 MEQ/L (ref 8–16)
AST SERPL-CCNC: 35 U/L (ref 5–40)
BASOPHILS ABSOLUTE: 0 THOU/MM3 (ref 0–0.1)
BASOPHILS NFR BLD AUTO: 0.8 %
BILIRUB SERPL-MCNC: 0.5 MG/DL (ref 0.3–1.2)
BUN SERPL-MCNC: 16 MG/DL (ref 7–22)
CALCIUM SERPL-MCNC: 8.4 MG/DL (ref 8.5–10.5)
CHLORIDE SERPL-SCNC: 95 MEQ/L (ref 98–111)
CO2 SERPL-SCNC: 29 MEQ/L (ref 23–33)
CREAT SERPL-MCNC: 0.8 MG/DL (ref 0.4–1.2)
DEPRECATED RDW RBC AUTO: 44 FL (ref 35–45)
EOSINOPHIL NFR BLD AUTO: 4.6 %
EOSINOPHILS ABSOLUTE: 0.1 THOU/MM3 (ref 0–0.4)
ERYTHROCYTE [DISTWIDTH] IN BLOOD BY AUTOMATED COUNT: 13.2 % (ref 11.5–14.5)
GFR SERPL CREATININE-BSD FRML MDRD: > 90 ML/MIN/1.73M2
GLUCOSE SERPL-MCNC: 105 MG/DL (ref 70–108)
HCT VFR BLD AUTO: 35.3 % (ref 42–52)
HCT VFR BLD AUTO: 38.8 % (ref 42–52)
HEPARIN UNFRACTIONATED: 0.37 U/ML (ref 0.3–0.7)
HGB BLD-MCNC: 11.3 GM/DL (ref 14–18)
HGB BLD-MCNC: 12.4 GM/DL (ref 14–18)
IMM GRANULOCYTES # BLD AUTO: 0.05 THOU/MM3 (ref 0–0.07)
IMM GRANULOCYTES NFR BLD AUTO: 1.9 %
LYMPHOCYTES ABSOLUTE: 0.6 THOU/MM3 (ref 1–4.8)
LYMPHOCYTES NFR BLD AUTO: 22.6 %
MCH RBC QN AUTO: 29.5 PG (ref 26–33)
MCHC RBC AUTO-ENTMCNC: 32 GM/DL (ref 32.2–35.5)
MCV RBC AUTO: 92.2 FL (ref 80–94)
MONOCYTES ABSOLUTE: 0.6 THOU/MM3 (ref 0.4–1.3)
MONOCYTES NFR BLD AUTO: 24.9 %
NEUTROPHILS ABSOLUTE: 1.2 THOU/MM3 (ref 1.8–7.7)
NEUTROPHILS NFR BLD AUTO: 45.2 %
NRBC BLD AUTO-RTO: 0 /100 WBC
PLATELET # BLD AUTO: 142 THOU/MM3 (ref 130–400)
PMV BLD AUTO: 9.9 FL (ref 9.4–12.4)
POTASSIUM SERPL-SCNC: 4.3 MEQ/L (ref 3.5–5.2)
PROT SERPL-MCNC: 5.8 G/DL (ref 6.1–8)
RBC # BLD AUTO: 3.83 MILL/MM3 (ref 4.7–6.1)
SODIUM SERPL-SCNC: 132 MEQ/L (ref 135–145)
WBC # BLD AUTO: 2.6 THOU/MM3 (ref 4.8–10.8)

## 2024-11-18 PROCEDURE — 85025 COMPLETE CBC W/AUTO DIFF WBC: CPT

## 2024-11-18 PROCEDURE — 99255 IP/OBS CONSLTJ NEW/EST HI 80: CPT | Performed by: NURSE PRACTITIONER

## 2024-11-18 PROCEDURE — 85520 HEPARIN ASSAY: CPT

## 2024-11-18 PROCEDURE — 85014 HEMATOCRIT: CPT

## 2024-11-18 PROCEDURE — 99233 SBSQ HOSP IP/OBS HIGH 50: CPT | Performed by: STUDENT IN AN ORGANIZED HEALTH CARE EDUCATION/TRAINING PROGRAM

## 2024-11-18 PROCEDURE — 2140000000 HC CCU INTERMEDIATE R&B

## 2024-11-18 PROCEDURE — 80053 COMPREHEN METABOLIC PANEL: CPT

## 2024-11-18 PROCEDURE — 94761 N-INVAS EAR/PLS OXIMETRY MLT: CPT

## 2024-11-18 PROCEDURE — 85018 HEMOGLOBIN: CPT

## 2024-11-18 PROCEDURE — 6360000002 HC RX W HCPCS

## 2024-11-18 PROCEDURE — 6370000000 HC RX 637 (ALT 250 FOR IP)

## 2024-11-18 PROCEDURE — 99232 SBSQ HOSP IP/OBS MODERATE 35: CPT | Performed by: INTERNAL MEDICINE

## 2024-11-18 PROCEDURE — 2700000000 HC OXYGEN THERAPY PER DAY

## 2024-11-18 PROCEDURE — 92610 EVALUATE SWALLOWING FUNCTION: CPT

## 2024-11-18 PROCEDURE — 99233 SBSQ HOSP IP/OBS HIGH 50: CPT

## 2024-11-18 PROCEDURE — 6370000000 HC RX 637 (ALT 250 FOR IP): Performed by: INTERNAL MEDICINE

## 2024-11-18 RX ORDER — SODIUM CHLORIDE 1 G/1
2 TABLET ORAL EVERY 12 HOURS
Status: COMPLETED | OUTPATIENT
Start: 2024-11-18 | End: 2024-11-18

## 2024-11-18 RX ORDER — MIDODRINE HYDROCHLORIDE 2.5 MG/1
2.5 TABLET ORAL
Status: DISCONTINUED | OUTPATIENT
Start: 2024-11-19 | End: 2024-11-19

## 2024-11-18 RX ADMIN — SODIUM CHLORIDE 2 G: 1 TABLET ORAL at 10:46

## 2024-11-18 RX ADMIN — NYSTATIN 500000 UNITS: 100000 SUSPENSION ORAL at 14:02

## 2024-11-18 RX ADMIN — HYDROCODONE BITARTRATE AND ACETAMINOPHEN 2 TABLET: 5; 325 TABLET ORAL at 14:02

## 2024-11-18 RX ADMIN — HYDROCODONE BITARTRATE AND ACETAMINOPHEN 2 TABLET: 5; 325 TABLET ORAL at 20:17

## 2024-11-18 RX ADMIN — SENNOSIDES 8.6 MG: 8.6 TABLET, FILM COATED ORAL at 09:54

## 2024-11-18 RX ADMIN — FLUCONAZOLE 100 MG: 100 TABLET ORAL at 09:54

## 2024-11-18 RX ADMIN — HEPARIN SODIUM 18 UNITS/KG/HR: 10000 INJECTION, SOLUTION INTRAVENOUS at 18:01

## 2024-11-18 RX ADMIN — SODIUM CHLORIDE 2 G: 1 TABLET ORAL at 20:12

## 2024-11-18 RX ADMIN — HEPARIN SODIUM 18 UNITS/KG/HR: 10000 INJECTION, SOLUTION INTRAVENOUS at 01:13

## 2024-11-18 RX ADMIN — FAMOTIDINE 20 MG: 20 TABLET, FILM COATED ORAL at 09:54

## 2024-11-18 RX ADMIN — GABAPENTIN 100 MG: 100 CAPSULE ORAL at 14:02

## 2024-11-18 RX ADMIN — NYSTATIN 500000 UNITS: 100000 SUSPENSION ORAL at 09:55

## 2024-11-18 RX ADMIN — SENNOSIDES 8.6 MG: 8.6 TABLET, FILM COATED ORAL at 20:12

## 2024-11-18 RX ADMIN — GABAPENTIN 100 MG: 100 CAPSULE ORAL at 20:12

## 2024-11-18 RX ADMIN — NYSTATIN 500000 UNITS: 100000 SUSPENSION ORAL at 20:12

## 2024-11-18 RX ADMIN — POLYETHYLENE GLYCOL (3350) 17 G: 17 POWDER, FOR SOLUTION ORAL at 09:54

## 2024-11-18 RX ADMIN — NYSTATIN 500000 UNITS: 100000 SUSPENSION ORAL at 17:30

## 2024-11-18 RX ADMIN — Medication 5 ML: at 20:12

## 2024-11-18 RX ADMIN — GABAPENTIN 100 MG: 100 CAPSULE ORAL at 09:54

## 2024-11-18 ASSESSMENT — PAIN - FUNCTIONAL ASSESSMENT
PAIN_FUNCTIONAL_ASSESSMENT: PREVENTS OR INTERFERES SOME ACTIVE ACTIVITIES AND ADLS
PAIN_FUNCTIONAL_ASSESSMENT: ACTIVITIES ARE NOT PREVENTED

## 2024-11-18 ASSESSMENT — PAIN DESCRIPTION - LOCATION
LOCATION: BACK
LOCATION: BACK

## 2024-11-18 ASSESSMENT — PAIN DESCRIPTION - ONSET
ONSET: ON-GOING
ONSET: ON-GOING

## 2024-11-18 ASSESSMENT — PAIN DESCRIPTION - FREQUENCY
FREQUENCY: CONTINUOUS
FREQUENCY: CONTINUOUS

## 2024-11-18 ASSESSMENT — PAIN SCALES - GENERAL
PAINLEVEL_OUTOF10: 8
PAINLEVEL_OUTOF10: 0
PAINLEVEL_OUTOF10: 3
PAINLEVEL_OUTOF10: 8
PAINLEVEL_OUTOF10: 0

## 2024-11-18 ASSESSMENT — PAIN DESCRIPTION - PAIN TYPE
TYPE: CHRONIC PAIN
TYPE: CHRONIC PAIN

## 2024-11-18 ASSESSMENT — PAIN DESCRIPTION - DESCRIPTORS
DESCRIPTORS: ACHING;DISCOMFORT
DESCRIPTORS: ACHING

## 2024-11-18 ASSESSMENT — PAIN DESCRIPTION - ORIENTATION
ORIENTATION: MID
ORIENTATION: LOWER;MID

## 2024-11-18 NOTE — PLAN OF CARE
Problem: Respiratory - Adult  Goal: Achieves optimal ventilation and oxygenation  Outcome: Progressing     Patient mutually agreed on goals.

## 2024-11-18 NOTE — CARE COORDINATION
11/18/24, 2:57 PM EST    DISCHARGE ON GOING EVALUATION    [facility] day: 5  Location: [facility] Reason for admit: Hyponatremia [E87.1]     Procedures:   11/15 US guided Paracentesis: 3L removed.   11/17 IR guided Thrombo-Aspiration:  1. Status post successful bilateral pulmonary artery  thrombectomy/thromboaspiration.  2. Post procedure pulmonary arterial pressure measurement was normal, 19/10 mmHg.    Imaging since last note:   11/17 PCXR: Low lung volumes with a small left-sided pleural effusion. Mild  bibasilar infiltrates versus atelectasis.  11/17 CTA chest:   1. Large burden bilateral pulmonary emboli.  2. Right heart strain.  3. Small layering left-sided pleural effusion.  4. Bibasilar atelectasis. Additionally there are nondependent patchy opacities  in the left lower lobe which could represent pneumonia.  5. Ascites and hepatic metastases.    Barriers to Discharge: Transferred from 8A to 3B s/p rapid response. Pt found to have bilateral PE. Hospitalist, Palliative Care, Nephrology following. Was placed on HHF O2 s/p PE. Weaned to 1L/nc today. Sat 94%, continues to wean. Heparin gtt. Pain control. Nystatin.     PCP: Daniel Agrawal MD  Readmission Risk Score: 19.5    Patient Goals/Plan/Treatment Preferences: Plan home with wife and kids. Hopes to be off the O2 when discharged. Monitor.

## 2024-11-18 NOTE — CARE COORDINATION
11/18/24, 8:14 AM EST    DISCHARGE ON GOING EVALUATION    Transferred to -36. Report given to Kerrie 3B .     Electronically signed by Francy Jose RN on 11/18/2024 at 8:14 AM

## 2024-11-18 NOTE — PLAN OF CARE
Problem: Discharge Planning  Goal: Discharge to home or other facility with appropriate resources  Outcome: Progressing  Flowsheets (Taken 11/18/2024 1234)  Discharge to home or other facility with appropriate resources: Identify barriers to discharge with patient and caregiver     Problem: Pain  Goal: Verbalizes/displays adequate comfort level or baseline comfort level  Outcome: Progressing  Flowsheets (Taken 11/18/2024 1234)  Verbalizes/displays adequate comfort level or baseline comfort level:   Encourage patient to monitor pain and request assistance   Assess pain using appropriate pain scale   Administer analgesics based on type and severity of pain and evaluate response   Implement non-pharmacological measures as appropriate and evaluate response     Problem: Safety - Adult  Goal: Free from fall injury  Outcome: Progressing  Flowsheets (Taken 11/18/2024 1234)  Free From Fall Injury: Instruct family/caregiver on patient safety     Problem: Respiratory - Adult  Goal: Achieves optimal ventilation and oxygenation  Outcome: Progressing  Flowsheets (Taken 11/18/2024 1234)  Achieves optimal ventilation and oxygenation:   Assess for changes in respiratory status   Assess for changes in mentation and behavior   Position to facilitate oxygenation and minimize respiratory effort   Oxygen supplementation based on oxygen saturation or arterial blood gases     Problem: Cardiovascular - Adult  Goal: Maintains optimal cardiac output and hemodynamic stability  Outcome: Progressing  Flowsheets (Taken 11/18/2024 1234)  Maintains optimal cardiac output and hemodynamic stability:   Monitor blood pressure and heart rate   Assess for signs of decreased cardiac output   Administer fluid and/or volume expanders as ordered   Administer vasoactive medications as ordered     Problem: Cardiovascular - Adult  Goal: Absence of cardiac dysrhythmias or at baseline  Outcome: Progressing  Flowsheets (Taken 11/18/2024 1234)  Absence of cardiac

## 2024-11-19 ENCOUNTER — APPOINTMENT (OUTPATIENT)
Dept: INTERVENTIONAL RADIOLOGY/VASCULAR | Age: 54
DRG: 981 | End: 2024-11-19
Payer: COMMERCIAL

## 2024-11-19 ENCOUNTER — HOSPITAL ENCOUNTER (OUTPATIENT)
Dept: INFUSION THERAPY | Age: 54
End: 2024-11-19
Attending: INTERNAL MEDICINE

## 2024-11-19 PROBLEM — E44.0 MODERATE MALNUTRITION (HCC): Status: ACTIVE | Noted: 2024-11-19

## 2024-11-19 LAB
ALBUMIN SERPL BCG-MCNC: 2.8 G/DL (ref 3.5–5.1)
ALP SERPL-CCNC: 153 U/L (ref 38–126)
ALT SERPL W/O P-5'-P-CCNC: 18 U/L (ref 11–66)
ANION GAP SERPL CALC-SCNC: 9 MEQ/L (ref 8–16)
AST SERPL-CCNC: 28 U/L (ref 5–40)
BASOPHILS ABSOLUTE: 0 THOU/MM3 (ref 0–0.1)
BASOPHILS NFR BLD AUTO: 1.1 %
BILIRUB SERPL-MCNC: 0.4 MG/DL (ref 0.3–1.2)
BUN SERPL-MCNC: 14 MG/DL (ref 7–22)
CALCIUM SERPL-MCNC: 8.5 MG/DL (ref 8.5–10.5)
CHLORIDE SERPL-SCNC: 94 MEQ/L (ref 98–111)
CO2 SERPL-SCNC: 28 MEQ/L (ref 23–33)
CREAT SERPL-MCNC: 0.7 MG/DL (ref 0.4–1.2)
DEPRECATED RDW RBC AUTO: 44.2 FL (ref 35–45)
EOSINOPHIL NFR BLD AUTO: 3.6 %
EOSINOPHILS ABSOLUTE: 0.1 THOU/MM3 (ref 0–0.4)
ERYTHROCYTE [DISTWIDTH] IN BLOOD BY AUTOMATED COUNT: 13.2 % (ref 11.5–14.5)
GFR SERPL CREATININE-BSD FRML MDRD: > 90 ML/MIN/1.73M2
GLUCOSE SERPL-MCNC: 99 MG/DL (ref 70–108)
HCT VFR BLD AUTO: 37.3 % (ref 42–52)
HEPARIN UNFRACTIONATED: 0.36 U/ML (ref 0.3–0.7)
HGB BLD-MCNC: 11.9 GM/DL (ref 14–18)
IMM GRANULOCYTES # BLD AUTO: 0.14 THOU/MM3 (ref 0–0.07)
IMM GRANULOCYTES NFR BLD AUTO: 3.8 %
LYMPHOCYTES ABSOLUTE: 1 THOU/MM3 (ref 1–4.8)
LYMPHOCYTES NFR BLD AUTO: 26.6 %
MCH RBC QN AUTO: 29.2 PG (ref 26–33)
MCHC RBC AUTO-ENTMCNC: 31.9 GM/DL (ref 32.2–35.5)
MCV RBC AUTO: 91.6 FL (ref 80–94)
MONOCYTES ABSOLUTE: 0.8 THOU/MM3 (ref 0.4–1.3)
MONOCYTES NFR BLD AUTO: 22.3 %
NEUTROPHILS ABSOLUTE: 1.5 THOU/MM3 (ref 1.8–7.7)
NEUTROPHILS NFR BLD AUTO: 42.6 %
NRBC BLD AUTO-RTO: 0 /100 WBC
PLATELET # BLD AUTO: 187 THOU/MM3 (ref 130–400)
PMV BLD AUTO: 10 FL (ref 9.4–12.4)
POTASSIUM SERPL-SCNC: 4.5 MEQ/L (ref 3.5–5.2)
PROT SERPL-MCNC: 5.6 G/DL (ref 6.1–8)
RBC # BLD AUTO: 4.07 MILL/MM3 (ref 4.7–6.1)
SODIUM SERPL-SCNC: 131 MEQ/L (ref 135–145)
WBC # BLD AUTO: 3.6 THOU/MM3 (ref 4.8–10.8)

## 2024-11-19 PROCEDURE — 2709999900 IR INSERT TUNNELED PLEURA CATH W CUFF

## 2024-11-19 PROCEDURE — 99233 SBSQ HOSP IP/OBS HIGH 50: CPT

## 2024-11-19 PROCEDURE — 0W9G00Z DRAINAGE OF PERITONEAL CAVITY WITH DRAINAGE DEVICE, OPEN APPROACH: ICD-10-PCS | Performed by: RADIOLOGY

## 2024-11-19 PROCEDURE — 80053 COMPREHEN METABOLIC PANEL: CPT

## 2024-11-19 PROCEDURE — 6360000002 HC RX W HCPCS

## 2024-11-19 PROCEDURE — 6360000002 HC RX W HCPCS: Performed by: RADIOLOGY

## 2024-11-19 PROCEDURE — 6370000000 HC RX 637 (ALT 250 FOR IP)

## 2024-11-19 PROCEDURE — 2140000000 HC CCU INTERMEDIATE R&B

## 2024-11-19 PROCEDURE — 85520 HEPARIN ASSAY: CPT

## 2024-11-19 PROCEDURE — 99232 SBSQ HOSP IP/OBS MODERATE 35: CPT | Performed by: INTERNAL MEDICINE

## 2024-11-19 PROCEDURE — 49418 INSERT TUN IP CATH PERC: CPT

## 2024-11-19 PROCEDURE — 85025 COMPLETE CBC W/AUTO DIFF WBC: CPT

## 2024-11-19 RX ORDER — SODIUM CHLORIDE 1 G/1
2 TABLET ORAL EVERY 12 HOURS
Qty: 56 TABLET | Refills: 0 | Status: SHIPPED | OUTPATIENT
Start: 2024-11-19 | End: 2024-12-03

## 2024-11-19 RX ORDER — GABAPENTIN 100 MG/1
100 CAPSULE ORAL 3 TIMES DAILY
Qty: 90 CAPSULE | Refills: 0 | Status: SHIPPED | OUTPATIENT
Start: 2024-11-19 | End: 2024-12-19

## 2024-11-19 RX ORDER — MIDODRINE HYDROCHLORIDE 2.5 MG/1
2.5 TABLET ORAL 2 TIMES DAILY PRN
Qty: 90 TABLET | Refills: 3 | Status: SHIPPED | OUTPATIENT
Start: 2024-11-19

## 2024-11-19 RX ORDER — FENTANYL CITRATE 50 UG/ML
INJECTION, SOLUTION INTRAMUSCULAR; INTRAVENOUS PRN
Status: COMPLETED | OUTPATIENT
Start: 2024-11-19 | End: 2024-11-19

## 2024-11-19 RX ORDER — MIDAZOLAM HYDROCHLORIDE 1 MG/ML
INJECTION, SOLUTION INTRAMUSCULAR; INTRAVENOUS PRN
Status: COMPLETED | OUTPATIENT
Start: 2024-11-19 | End: 2024-11-19

## 2024-11-19 RX ORDER — MIDODRINE HYDROCHLORIDE 2.5 MG/1
2.5 TABLET ORAL 2 TIMES DAILY PRN
Status: DISCONTINUED | OUTPATIENT
Start: 2024-11-19 | End: 2024-11-20 | Stop reason: HOSPADM

## 2024-11-19 RX ORDER — SODIUM CHLORIDE 1 G/1
2 TABLET ORAL EVERY 12 HOURS
Status: DISPENSED | OUTPATIENT
Start: 2024-11-19 | End: 2024-11-20

## 2024-11-19 RX ADMIN — APIXABAN 10 MG: 5 TABLET, FILM COATED ORAL at 20:21

## 2024-11-19 RX ADMIN — Medication 5 ML: at 17:44

## 2024-11-19 RX ADMIN — SODIUM CHLORIDE 2 G: 1 TABLET ORAL at 20:21

## 2024-11-19 RX ADMIN — HEPARIN SODIUM 18 UNITS/KG/HR: 10000 INJECTION, SOLUTION INTRAVENOUS at 10:52

## 2024-11-19 RX ADMIN — MIDAZOLAM 1 MG: 1 INJECTION INTRAMUSCULAR; INTRAVENOUS at 15:21

## 2024-11-19 RX ADMIN — NYSTATIN 500000 UNITS: 100000 SUSPENSION ORAL at 17:41

## 2024-11-19 RX ADMIN — GABAPENTIN 100 MG: 100 CAPSULE ORAL at 09:01

## 2024-11-19 RX ADMIN — Medication 5 ML: at 20:22

## 2024-11-19 RX ADMIN — HYDROCODONE BITARTRATE AND ACETAMINOPHEN 2 TABLET: 5; 325 TABLET ORAL at 21:22

## 2024-11-19 RX ADMIN — GABAPENTIN 100 MG: 100 CAPSULE ORAL at 14:24

## 2024-11-19 RX ADMIN — NYSTATIN 500000 UNITS: 100000 SUSPENSION ORAL at 20:22

## 2024-11-19 RX ADMIN — GABAPENTIN 100 MG: 100 CAPSULE ORAL at 20:21

## 2024-11-19 RX ADMIN — FLUCONAZOLE 100 MG: 100 TABLET ORAL at 09:01

## 2024-11-19 RX ADMIN — NYSTATIN 500000 UNITS: 100000 SUSPENSION ORAL at 09:01

## 2024-11-19 RX ADMIN — SENNOSIDES 8.6 MG: 8.6 TABLET, FILM COATED ORAL at 20:21

## 2024-11-19 RX ADMIN — POLYETHYLENE GLYCOL (3350) 17 G: 17 POWDER, FOR SOLUTION ORAL at 09:01

## 2024-11-19 RX ADMIN — SENNOSIDES 8.6 MG: 8.6 TABLET, FILM COATED ORAL at 09:01

## 2024-11-19 RX ADMIN — FENTANYL CITRATE 50 MCG: 50 INJECTION, SOLUTION INTRAMUSCULAR; INTRAVENOUS at 15:21

## 2024-11-19 RX ADMIN — HYDROCODONE BITARTRATE AND ACETAMINOPHEN 1 TABLET: 5; 325 TABLET ORAL at 09:01

## 2024-11-19 RX ADMIN — FAMOTIDINE 20 MG: 20 TABLET, FILM COATED ORAL at 09:01

## 2024-11-19 RX ADMIN — HYDROCODONE BITARTRATE AND ACETAMINOPHEN 2 TABLET: 5; 325 TABLET ORAL at 17:41

## 2024-11-19 RX ADMIN — NYSTATIN 500000 UNITS: 100000 SUSPENSION ORAL at 14:24

## 2024-11-19 ASSESSMENT — PAIN DESCRIPTION - LOCATION
LOCATION: ABDOMEN
LOCATION: BACK

## 2024-11-19 ASSESSMENT — PAIN SCALES - GENERAL
PAINLEVEL_OUTOF10: 3
PAINLEVEL_OUTOF10: 6
PAINLEVEL_OUTOF10: 3
PAINLEVEL_OUTOF10: 7
PAINLEVEL_OUTOF10: 8
PAINLEVEL_OUTOF10: 0
PAINLEVEL_OUTOF10: 3

## 2024-11-19 ASSESSMENT — PAIN DESCRIPTION - ONSET
ONSET: GRADUAL
ONSET: ON-GOING

## 2024-11-19 ASSESSMENT — PAIN - FUNCTIONAL ASSESSMENT
PAIN_FUNCTIONAL_ASSESSMENT: ACTIVITIES ARE NOT PREVENTED
PAIN_FUNCTIONAL_ASSESSMENT: ACTIVITIES ARE NOT PREVENTED

## 2024-11-19 ASSESSMENT — PAIN DESCRIPTION - PAIN TYPE
TYPE: ACUTE PAIN
TYPE: SURGICAL PAIN

## 2024-11-19 ASSESSMENT — PAIN DESCRIPTION - FREQUENCY
FREQUENCY: INTERMITTENT
FREQUENCY: CONTINUOUS

## 2024-11-19 ASSESSMENT — PAIN DESCRIPTION - ORIENTATION
ORIENTATION: RIGHT
ORIENTATION: LOWER

## 2024-11-19 ASSESSMENT — PAIN DESCRIPTION - DESCRIPTORS
DESCRIPTORS: ACHING
DESCRIPTORS: DISCOMFORT

## 2024-11-19 NOTE — CARE COORDINATION
11/19/24, 1:40 PM EST    DISCHARGE ON GOING EVALUATION    [facility] day: 6  Location: [address] [place] [address]-A Reason for admit: Hyponatremia [E87.1]     Procedures:   11/15 US guided Paracentesis: 3L removed.   11/17 IR guided Thrombo-Aspiration:  1. Status post successful bilateral pulmonary artery  thrombectomy/thromboaspiration.  2. Post procedure pulmonary arterial pressure measurement was normal, 19/10 mmHg.  11/19 IR guided PleurX drain placement and therapeutic paracentesis: planned.     Imaging since last note: None    Barriers to Discharge: Hospitalist, Palliative Care, Nephrology following. O2 weaned off. Heparin gtt continues, plan to start po after PleurX drain placed.     PCP: Daniel Agrawal MD  Readmission Risk Score: 18.9    Patient Goals/Plan/Treatment Preferences: Plan home with wife and kids. Monitor for possible HH needs. Plan home with PleurX.

## 2024-11-19 NOTE — CONSULTS
taking: Reported on 11/13/2024 10/23/24   Ama Petersen PA-C     Allergies: Patient has no known allergies.  IP meds : Scheduled Meds:   albumin human 25%  25 g IntraVENous Once    gabapentin  100 mg Oral TID    fluconazole  100 mg Oral Daily    famotidine  20 mg Oral Daily    furosemide  40 mg IntraVENous Daily    sodium chloride flush  5-40 mL IntraVENous 2 times per day    [Held by provider] spironolactone  25 mg Oral BID    nystatin  500,000 Units Oral 4x Daily     Continuous Infusions:   sodium chloride         Review of Systems  Review of Systems   Constitutional:  Positive for appetite change. Negative for activity change and fatigue.   HENT:  Positive for trouble swallowing.    Eyes: Negative.    Respiratory:  Negative for cough and shortness of breath.    Cardiovascular:  Negative for chest pain and leg swelling.   Gastrointestinal:  Negative for abdominal pain, diarrhea, nausea and vomiting.   Genitourinary:  Negative for difficulty urinating, dysuria, flank pain and frequency.   Musculoskeletal:  Negative for back pain and neck pain.   Skin:  Negative for rash and wound.   Neurological:  Negative for dizziness, light-headedness and headaches.   Psychiatric/Behavioral:  Negative for agitation and confusion.     Physical Exam  Physical Exam  Constitutional:       General: He is not in acute distress.     Appearance: Normal appearance.   HENT:      Head: Normocephalic and atraumatic.      Right Ear: External ear normal.      Left Ear: External ear normal.      Nose: Nose normal.      Mouth/Throat:      Mouth: Mucous membranes are moist.   Eyes:      General: No scleral icterus.        Right eye: No discharge.         Left eye: No discharge.      Conjunctiva/sclera: Conjunctivae normal.   Cardiovascular:      Rate and Rhythm: Normal rate and regular rhythm.      Heart sounds: Normal heart sounds.   Pulmonary:      Effort: Pulmonary effort is normal. No respiratory distress.      Breath sounds: Normal 
nodes  bilaterally. There is a right-sided Port-A-Cath. The tip is in the distal SVC.     There is ascites seen in the upper abdomen. There are bilateral small layering  pleural effusions. This is larger on the left than the right. There is some  dependent pulmonary atelectasis. There are no suspicious osseous lesions. There  is some mucosal thickening in the maxillary sinuses and ethmoid air cells. There  are no gross abnormalities in the brain parenchyma.        IMPRESSION:     1. No cervical adenopathy. Normal CT appearance of the esophagus.  2. Small bilateral layering pleural effusions. This is larger on the left than  the right.  3. Ascites.    11/14/24 Paracentesis pending     Code Status: Full Code    ASSESSMENT:  Gustabo Machado  is a 53 y.o. male who we are asked to see/evaluate by Tessy Jovel PA-C for medical management of dysphagia.  PMHx of metastatic pancreatic adenocarcinoma, who presents to [facility] with dysphagia.  Patient reports ongoing dysphagia symptoms since 11/8/2024.  Reporting dysphagia to both liquids and solids, denies pain with swallowing.  Recently diagnosed with thrush, initially placed on fluconazole nystatin, Magic mouthwash.  Patient unable to report if dysphagia symptoms were gradual or sudden.  Reported undergoing paracentesis on 11/12 with 2 L removed, initial goal is 4 L.  Patient's abdomen remains distended.  Denies nausea, vomiting, chest pain, shortness of breath.  Has some mild abdominal pain due to abdominal distention.  Denies diarrhea, constipation.  Denies prior history of EGD.  Reports poor oral intake due to dysphagia.    Dysphagia- could be candidal related to oral thrush- has trouble with both solids and liquids that began 11/8/24.   Oral thrush- currently on nystatin  Newly diagnosed pancreatic cancer with liver mets- currently undergoing Chemotherapy.   Hyponatremia likely in the setting of poor oral intake due to dysphagia. 
neuropathic pain in his feet.  Patient will continue to follow with the palliative care clinic on discharge  Palliative Care will continue to have goals of care discussions with the patient and/or family  Palliative Care will continue to follow the patient while they are hospitalized   Please Perfect[employer]erve or call the Palliative Care team with any questions or concerns    CURRENT CODE STATUS:  Full Code No additional code details         Parts of this note may have been dictated by use of voice recognition software and electronically transcribed. The note may contain errors not detected in proofreading    Electronically signed by Bolivar Hutchinson DO on 11/14/2024 at 8:51 AM           Palliative Care Office: [phone]         
calculi. In each kidney, there is a tiny low-density focus which is too small to characterize. No abnormalities of the small bowel loops are noted.  The IVC and aorta are of normal caliber. There is some minimal atherosclerosis of the aorta. There are small retroperitoneal lymph nodes. The urinary bladder is grossly normal. There is pelvic free fluid. There is some diverticulosis of the colon. No colonic mass is identified. There is no adenopathy. No suspicious osseous lesions are identified.     1. Ascites, omental caking, numerous liver masses and adenopathy consistent with metastatic disease. 2. The tail of the pancreas appears abnormal. This is suspicious for a pancreatic primary. **This report has been created using voice recognition software. It may contain minor errors which are inherent in voice recognition technology.** Electronically signed by Dr. Vicky Dumont      Assessment/Recommendations       Metastatic pancreatic cancer  Presented in October 2024 with CT scan abdomen pelvis revealing a mass in the tail of the pancreas as well as ascites omental caking and numerous liver masses with intra-abdominal adenopathy all consistent with metastatic disease.  MRI abdomen 5 x 2 centimeter enhancement in distal body and tail of the pancreas suspicious for malignancy as well as multiple hepatic lesions suspicious for metastatic disease pancreatic portacaval and retroperitoneal adenopathy as well as omental caking and small ascites.  CT-guided biopsy from liver with pathology consistent with metastatic adenocarcinoma positive for CK7 and negative for CK20.  Negative for TTF.  Findings support pancreatic cancer for upper GI.  CA 19-9 very high at 5483.  He is having recurrent ascites buildup.  F/U appnt in our office already established on 12/3/2024.      2.    Recurrent ascites  Patient is having recurrent ascites most likely related to malignancy of pancreatic cancer.  He has had several paracentesis as follows:

## 2024-11-19 NOTE — H&P
History & Physical  Internal Medicine Resident         Patient: Gustabo Machado 53 y.o. male      : 1970  Date of Admission: 2024  Date of Service: Pt seen/examined on 24 and Admitted to Inpatient with expected LOS greater than two midnights due to medical therapy.       ASSESSMENT AND PLAN  Acute versus chronic hypervolemic hyponatremia: Sodium 123, most recent sodium was 134 on 10/23/24.  Likely in the setting of hypoalbuminemia, ascites.  Urine osmolality ordered.  Urine sodium 25 however patient is on diuretics which can skew the urine sodium results.. Patient was initially started on LR in the ED.  Check sodium every 4 hours  IV Lasix 40 mg daily, hold home Aldactone (can consider restarting on )  Paracentesis ordered, patient reports that he was supposed to have 4 L removed yesterday however only 2 L was removed.  Monitor I's and O's  Oropharyngeal versus esophageal dysphagia: Patient reporting ongoing dysphagia since 2024.  Reports dysphagia to both liquids and solids.  Patient unsure if symptoms are progressive.  Has never had a EGD.  Denies pain with swallowing.  Dysphagia could be in the setting of recent thrush.  SLP evaluation.  GI consulted for further evaluation, consideration of EGD.  Keep NPO.  Thrush, improving: Continue Magic mouthwash, nystatin suspension.  Ascites: In the setting of malignancy.  Underwent paracentesis on 2024, 2 L removed per patient.  Abdomen remains distended, patient had held his Lasix and Aldactone the day prior to paracentesis.  Will consult IR for repeat paracentesis.  Per most recent oncology notes, oncology recommended to have a drain placed long-term.  Isolated hypoalbuminemia: In the setting of poor oral intake, ascites.  Albumin 50 g.  Moderate left pleural effusion: Noted on CT scan.  Currently on room air, not in respiratory distress.  IV Lasix as above.  Can consider thoracentesis if needed.  Metastatic pancreatic 
Formulation and discussion of sedation / procedure plans, risks, benefits, side effects and alternatives with patient and/or responsible adult completed.    History and Physical reviewed and unchanged.    Electronically signed by Glen Stephen MD on 11/17/24 at 10:09 AM EST   
Formulation and discussion of sedation / procedure plans, risks, benefits, side effects and alternatives with patient and/or responsible adult completed.    History and Physical reviewed and unchanged.    Electronically signed by Glen Stephen MD on 11/19/24 at 3:19 PM EST   
g, 17 g, Oral, Daily, Sandra Parada PA-C, 17 g at 11/16/24 1510    gabapentin (NEURONTIN) capsule 100 mg, 100 mg, Oral, TID, Bolivar Hutchinson DO, 100 mg at 11/16/24 2007    fluconazole (DIFLUCAN) tablet 100 mg, 100 mg, Oral, Daily, Micky Kaye APRN - CNP, 100 mg at 11/16/24 0856    famotidine (PEPCID) tablet 20 mg, 20 mg, Oral, Daily, Philipp Davila MD, 20 mg at 11/16/24 0853    HYDROcodone-acetaminophen (NORCO) 5-325 MG per tablet 1 tablet, 1 tablet, Oral, Q4H PRN, 1 tablet at 11/16/24 1656 **OR** HYDROcodone-acetaminophen (NORCO) 5-325 MG per tablet 2 tablet, 2 tablet, Oral, Q4H PRN, Philipp Davila MD, 2 tablet at 11/16/24 2150    magic (miracle) mouthwash, 5 mL, Swish & Spit, 4x Daily PRN, Philipp Davila MD, 5 mL at 11/17/24 0252    [Held by provider] furosemide (LASIX) injection 40 mg, 40 mg, IntraVENous, Daily, Philipp Davila MD, 40 mg at 11/16/24 0853    sodium chloride flush 0.9 % injection 5-40 mL, 5-40 mL, IntraVENous, 2 times per day, Philipp Davila MD, 10 mL at 11/16/24 2007    sodium chloride flush 0.9 % injection 5-40 mL, 5-40 mL, IntraVENous, PRN, Philipp Davila MD, 10 mL at 11/15/24 1313    0.9 % sodium chloride infusion, , IntraVENous, PRN, Philipp Davila MD    potassium chloride (KLOR-CON M) extended release tablet 40 mEq, 40 mEq, Oral, PRN **OR** potassium bicarb-citric acid (EFFER-K) effervescent tablet 40 mEq, 40 mEq, Oral, PRN **OR** potassium chloride 10 mEq/100 mL IVPB (Peripheral Line), 10 mEq, IntraVENous, PRN, Philipp Davila MD    magnesium sulfate 2000 mg in 50 mL IVPB premix, 2,000 mg, IntraVENous, PRN, Philipp Davila MD    ondansetron (ZOFRAN-ODT) disintegrating tablet 4 mg, 4 mg, Oral, Q8H PRN **OR** ondansetron (ZOFRAN) injection 4 mg, 4 mg, IntraVENous, Q6H PRN, Philipp Davila MD    acetaminophen (TYLENOL) tablet 650 mg, 650 mg, Oral, Q6H PRN **OR** acetaminophen (TYLENOL) suppository 650 mg, 650 mg, Rectal, Q6H PRN, Philipp Davila MD    [Held by provider] spironolactone 
(COMPAZINE) 10 MG tablet Take 1 tablet by mouth every 6 hours as needed (nausea)  Patient not taking: Reported on 11/13/2024 11/5/24   Jose Irving MD   OLANZapine (ZYPREXA) 5 MG tablet Take 1 tablet by mouth nightly 5 mg PO daily for first 4 days after chemotherapy  Patient not taking: Reported on 11/13/2024 11/5/24   Jose Irving MD   loperamide (IMODIUM A-D) 2 MG tablet Take 1 tablet by mouth 4 times daily as needed for Diarrhea  Patient not taking: Reported on 11/13/2024 11/5/24   Jose Irving MD   ondansetron (ZOFRAN-ODT) 4 MG disintegrating tablet Take 1 tablet by mouth 3 times daily as needed for Nausea or Vomiting  Patient not taking: Reported on 11/13/2024 10/29/24   Jose Irving MD   prochlorperazine (COMPAZINE) 10 MG tablet Take 1 tablet by mouth every 6 hours as needed (nausea)  Patient not taking: Reported on 11/13/2024 10/29/24   Jose Irving MD   ondansetron (ZOFRAN-ODT) 4 MG disintegrating tablet Take 1 tablet by mouth every 8 hours as needed for Nausea or Vomiting  Patient not taking: Reported on 11/13/2024 10/23/24   Ama Petersen PA-C     Additional information:       VITAL SIGNS   Vitals:    11/19/24 1515   BP: 115/68   Pulse: 95   Resp: 18   Temp:    SpO2: 94%       PHYSICAL:   Heart:  [x]Regular rate and rhythm  []Other:    Lungs:  [x]Clear    []Other:    Abdomen: [x]Soft    []Other:    Mental Status: [x]Alert & Oriented  []Other:      PLANNED PROCEDURE   []Biospy []Arteriogram              [x]Drainage   []Mediport Insertion  []Fistulogram []IV access       []Vertebroplasty / Augmentation  []IVC filter []Dialysis catheter []Biliary drainage  []Other: []CAPD Catheter []Nephrostomy Tube / Stent  SEDATION  Planned agent:[x]Midazolam []Meperidine [x]Sublimaze []Dilaudid []Morphine     []Diazepam  []Other:     ASA Classification:  []1 [x]2 []3 []4 []5  Class 1: A normal healthy patient  Class 2: Pt with mild to moderate systemic disease  Class 3: Severe systemic

## 2024-11-20 VITALS
RESPIRATION RATE: 18 BRPM | HEART RATE: 96 BPM | DIASTOLIC BLOOD PRESSURE: 75 MMHG | BODY MASS INDEX: 26.14 KG/M2 | OXYGEN SATURATION: 94 % | HEIGHT: 72 IN | SYSTOLIC BLOOD PRESSURE: 110 MMHG | WEIGHT: 193 LBS | TEMPERATURE: 97.4 F

## 2024-11-20 LAB
ANION GAP SERPL CALC-SCNC: 8 MEQ/L (ref 8–16)
BACTERIA SPEC ANAEROBE CULT: NORMAL
BACTERIA SPEC BFLD CULT: NORMAL
BUN SERPL-MCNC: 14 MG/DL (ref 7–22)
CALCIUM SERPL-MCNC: 8.1 MG/DL (ref 8.5–10.5)
CHLORIDE SERPL-SCNC: 96 MEQ/L (ref 98–111)
CO2 SERPL-SCNC: 28 MEQ/L (ref 23–33)
CREAT SERPL-MCNC: 0.8 MG/DL (ref 0.4–1.2)
GFR SERPL CREATININE-BSD FRML MDRD: > 90 ML/MIN/1.73M2
GLUCOSE SERPL-MCNC: 111 MG/DL (ref 70–108)
GRAM STN SPEC: NORMAL
POTASSIUM SERPL-SCNC: 4.5 MEQ/L (ref 3.5–5.2)
SODIUM SERPL-SCNC: 132 MEQ/L (ref 135–145)

## 2024-11-20 PROCEDURE — 99239 HOSP IP/OBS DSCHRG MGMT >30: CPT | Performed by: INTERNAL MEDICINE

## 2024-11-20 PROCEDURE — 6370000000 HC RX 637 (ALT 250 FOR IP)

## 2024-11-20 PROCEDURE — 99232 SBSQ HOSP IP/OBS MODERATE 35: CPT | Performed by: INTERNAL MEDICINE

## 2024-11-20 PROCEDURE — 2580000003 HC RX 258

## 2024-11-20 PROCEDURE — 80048 BASIC METABOLIC PNL TOTAL CA: CPT

## 2024-11-20 PROCEDURE — 99233 SBSQ HOSP IP/OBS HIGH 50: CPT | Performed by: STUDENT IN AN ORGANIZED HEALTH CARE EDUCATION/TRAINING PROGRAM

## 2024-11-20 RX ORDER — FLUCONAZOLE 100 MG/1
100 TABLET ORAL DAILY
Qty: 18 TABLET | Refills: 0 | Status: SHIPPED | OUTPATIENT
Start: 2024-11-20 | End: 2024-12-08

## 2024-11-20 RX ORDER — HEPARIN 100 UNIT/ML
500 SYRINGE INTRAVENOUS ONCE
Status: DISCONTINUED | OUTPATIENT
Start: 2024-11-20 | End: 2024-11-20 | Stop reason: HOSPADM

## 2024-11-20 RX ORDER — HYDROCODONE BITARTRATE AND ACETAMINOPHEN 5; 325 MG/1; MG/1
1 TABLET ORAL EVERY 4 HOURS PRN
Qty: 84 TABLET | Refills: 0 | Status: SHIPPED | OUTPATIENT
Start: 2024-11-20 | End: 2024-12-04

## 2024-11-20 RX ADMIN — SODIUM CHLORIDE, PRESERVATIVE FREE 10 ML: 5 INJECTION INTRAVENOUS at 08:42

## 2024-11-20 RX ADMIN — FAMOTIDINE 20 MG: 20 TABLET, FILM COATED ORAL at 08:40

## 2024-11-20 RX ADMIN — SENNOSIDES 8.6 MG: 8.6 TABLET, FILM COATED ORAL at 08:40

## 2024-11-20 RX ADMIN — NYSTATIN 500000 UNITS: 100000 SUSPENSION ORAL at 08:40

## 2024-11-20 RX ADMIN — APIXABAN 10 MG: 5 TABLET, FILM COATED ORAL at 08:40

## 2024-11-20 RX ADMIN — FLUCONAZOLE 100 MG: 100 TABLET ORAL at 08:40

## 2024-11-20 RX ADMIN — ACETAMINOPHEN 650 MG: 325 TABLET ORAL at 03:34

## 2024-11-20 RX ADMIN — HYDROCODONE BITARTRATE AND ACETAMINOPHEN 2 TABLET: 5; 325 TABLET ORAL at 04:40

## 2024-11-20 RX ADMIN — GABAPENTIN 100 MG: 100 CAPSULE ORAL at 08:40

## 2024-11-20 RX ADMIN — HYDROCODONE BITARTRATE AND ACETAMINOPHEN 2 TABLET: 5; 325 TABLET ORAL at 09:01

## 2024-11-20 RX ADMIN — POLYETHYLENE GLYCOL (3350) 17 G: 17 POWDER, FOR SOLUTION ORAL at 08:40

## 2024-11-20 ASSESSMENT — PAIN DESCRIPTION - LOCATION
LOCATION: ABDOMEN
LOCATION: BUTTOCKS;BACK

## 2024-11-20 ASSESSMENT — PAIN DESCRIPTION - DESCRIPTORS
DESCRIPTORS: ACHING;DISCOMFORT
DESCRIPTORS: ACHING

## 2024-11-20 ASSESSMENT — PAIN SCALES - GENERAL
PAINLEVEL_OUTOF10: 0
PAINLEVEL_OUTOF10: 9
PAINLEVEL_OUTOF10: 8

## 2024-11-20 ASSESSMENT — PAIN DESCRIPTION - ORIENTATION
ORIENTATION: MID
ORIENTATION: RIGHT

## 2024-11-20 ASSESSMENT — PAIN DESCRIPTION - ONSET: ONSET: ON-GOING

## 2024-11-20 ASSESSMENT — PAIN DESCRIPTION - PAIN TYPE: TYPE: SURGICAL PAIN

## 2024-11-20 ASSESSMENT — PAIN - FUNCTIONAL ASSESSMENT: PAIN_FUNCTIONAL_ASSESSMENT: ACTIVITIES ARE NOT PREVENTED

## 2024-11-20 ASSESSMENT — PAIN DESCRIPTION - FREQUENCY: FREQUENCY: CONTINUOUS

## 2024-11-20 NOTE — CARE COORDINATION
11/19/24 1408   Condition of Participation: Discharge Planning   The Patient and/or Patient Representative was provided with a Choice of Provider? Patient;Patient Representative   Name of the Patient Representative who was provided with the Choice of Provider and agrees with the Discharge Plan?  Bianka, wife   The Patient and/Or Patient Representative agree with the Discharge Plan? Yes   Freedom of Choice list was provided with basic dialogue that supports the patient's individualized plan of care/goals, treatment preferences, and shares the quality data associated with the providers?  Yes  (List provided and preference is [facility])

## 2024-11-20 NOTE — CARE COORDINATION
11/20/24, 12:08 PM EST    Patient goals/plan/ treatment preferences discussed by  and .  Patient goals/plan/ treatment preferences reviewed with patient/ family.  Patient/ family verbalize understanding of discharge plan and are in agreement with goal/plan/treatment preferences.  Understanding was demonstrated using the teach back method.  AVS provided by RN at time of discharge, which includes all necessary medical information pertaining to the patients current course of illness, treatment, post-discharge goals of care, and treatment preferences.     Services At/After Discharge: DME and Home Health    Planning home with wife and kids. New [facility] for nursing services and [facility] for PleurX supplies. Referral made this am to Sharon with [facility]. Paperwork filled out and faxed to [facility]. Copy of [facility] orders (PleurX orders) placed on chart and copy to pt's wife.     Electronically signed by Kerrie Simmons RN on 11/20/2024 at 12:09 PM

## 2024-11-20 NOTE — PLAN OF CARE
Problem: Discharge Planning  Goal: Discharge to home or other facility with appropriate resources  Outcome: Progressing  Flowsheets (Taken 11/20/2024 1136)  Discharge to home or other facility with appropriate resources: Identify barriers to discharge with patient and caregiver     Problem: Pain  Goal: Verbalizes/displays adequate comfort level or baseline comfort level  Outcome: Progressing  Flowsheets (Taken 11/20/2024 1136)  Verbalizes/displays adequate comfort level or baseline comfort level:   Encourage patient to monitor pain and request assistance   Administer analgesics based on type and severity of pain and evaluate response   Assess pain using appropriate pain scale     Problem: Safety - Adult  Goal: Free from fall injury  Outcome: Progressing  Flowsheets (Taken 11/20/2024 1136)  Free From Fall Injury: Instruct family/caregiver on patient safety     Problem: Respiratory - Adult  Goal: Achieves optimal ventilation and oxygenation  Outcome: Progressing  Flowsheets (Taken 11/20/2024 1136)  Achieves optimal ventilation and oxygenation:   Assess for changes in respiratory status   Position to facilitate oxygenation and minimize respiratory effort   Assess for changes in mentation and behavior   Encourage broncho-pulmonary hygiene including cough, deep breathe, incentive spirometry   Assess and instruct to report shortness of breath or any respiratory difficulty     Problem: Cardiovascular - Adult  Goal: Maintains optimal cardiac output and hemodynamic stability  Outcome: Progressing  Flowsheets (Taken 11/20/2024 1136)  Maintains optimal cardiac output and hemodynamic stability:   Monitor blood pressure and heart rate   Assess for signs of decreased cardiac output    Care plan reviewed with patient.  Patient verbalizes understanding of the care plan and contributed to goal setting.

## 2024-11-20 NOTE — DISCHARGE SUMMARY
Discharge Summary    Patient:  Gustabo Machado  YOB: 1970    MRN: [id number]   Acct: [id number]    Primary Care Physician: Daniel Agrawal MD    Admit date:  11/13/2024    Discharge date:  11/20/2024       Discharge Diagnoses:   Hyponatremia  Principal Problem:    Hyponatremia  Active Problems:    Palliative care patient    Cancer related pain    Malignant neoplasm of tail of pancreas (HCC)    Metastatic adenocarcinoma to liver (HCC)    Peritoneal carcinomatosis (HCC)    Malignant ascites    Bilateral pulmonary embolism (HCC)    Moderate malnutrition (HCC)  Resolved Problems:    * No resolved hospital problems. *        Admitted for: (HPI) acute bilateral PE with underlying metastatic pancreatic adenocarcinoma    Hospital Course:  53 y.o. male with PMHx of metastatic pancreatic adenocarcinoma, who presents to [facility] with dysphagia.  Patient reports ongoing dysphagia symptoms since 11/8/2024.  Reporting dysphagia to both liquids and solids, denies pain with swallowing.  Recently diagnosed with thrush, initially placed on fluconazole nystatin, Magic mouthwash.  Patient unable to report if dysphagia symptoms were gradual or sudden.  Reported undergoing paracentesis on 11/12 with 2 L removed, initial goal is 4 L.  Patient's abdomen remains distended.  Denies nausea, vomiting, chest pain, shortness of breath.  Has some mild abdominal pain due to abdominal distention.  Denies diarrhea, constipation.  Denies prior history of EGD.  Reports poor oral intake due to dysphagia.     ED course: /92, HR 97, RR 20, SpO2 95% on room air, temperature 98.  Labs significant for sodium 123, albumin 2.1, alk phos 229, hemoglobin 13.6.  Initially started on LR 1 L in the ED provider.  CT soft tissue neck no cervical adenopathy, normal CT appearance of esophagus, moderate left pleural effusion.\"     11/15: \"Patient reports he is feeling much better today, notes improvements in the

## 2024-11-20 NOTE — PROGRESS NOTES
Pharmacy Discharge Medication Counseling Note    Counseled patient on the following medications utilizing teach-back method. Reviewed indication, directions, and most common side effects.  -Apixaban-new medication   -Gabapentin- new medication  -Midodrine- new medication  -Sodium chloride- new medication   -Furosemide- stopped medication   -Spironolactone- stopped medication    Patient voiced understanding.    
                             Provider Progress Note        Patient:   Gustabo Machado  YOB: 1970  Age:  53 y.o.  Room:  3B-36/036-A  MRN:  [id number]   Acct: [id number]  PCP: Daniel Agrawal MD    Date of Admission:  11/13/2024 12:18 PM  Date of Service:  11/18/2024    Reason for Consult: Symptom Management.    History Obtained From:-  Patient and Electronic Medical Record             Subjective   Gustabo Machado was seen in their room today for follow up with the palliative care team. There was family present at the bedside. Patient is complaining of pain. Patient denies shortness of breath, nausea, vomiting, and diarrhea. They did get a good night sleep last night. The patient is eating or receiving tube feeds.  Patient reports dysphagia improving.  Reports abdominal pain improving.       Objective   Vitals:-    BP 99/69   Pulse 95   Temp 98.7 °F (37.1 °C) (Oral)   Resp 20   Ht 1.829 m (6')   Wt 90.2 kg (198 lb 13.7 oz)   SpO2 96%   PF (!) 14 L/min   BMI 26.97 kg/m²     Physical Exam  Constitutional:       General: He is not in acute distress.     Appearance: He is not ill-appearing.   HENT:      Head: Normocephalic.   Eyes:      Extraocular Movements: Extraocular movements intact.      Pupils: Pupils are equal, round, and reactive to light.   Cardiovascular:      Rate and Rhythm: Normal rate and regular rhythm.      Pulses: Normal pulses.      Heart sounds: No murmur heard.     No gallop.   Pulmonary:      Effort: Pulmonary effort is normal.      Breath sounds: Normal breath sounds.   Abdominal:      General: Abdomen is flat.      Palpations: Abdomen is soft.      Tenderness: There is abdominal tenderness.   Musculoskeletal:         General: Normal range of motion.      Right lower leg: No edema.      Left lower leg: No edema.   Neurological:      General: No focal deficit present.      Mental Status: He is alert and oriented to person, place, and time.   Psychiatric:         Mood and 
                             Provider Progress Note        Patient:   Gustabo Machado  YOB: 1970  Age:  53 y.o.  Room:  3B-36/036-A  MRN:  [id number]   Acct: [id number]  PCP: Daniel Agrawal MD    Date of Admission:  11/13/2024 12:18 PM  Date of Service:  11/20/2024    Reason for Consult: Symptom Management.    History Obtained From:-  Patient and Electronic Medical Record             Subjective   Gustabo Machado was seen in their room today for follow up with the palliative care team. There was family present at the bedside. Patient denies pain, shortness of breath, nausea, vomiting, and diarrhea. Their comfort medications are working well to control their symptoms.  They did not get a good night sleep last night. The patient is eating or receiving tube feeds. They have had a bowel movement in the last 24 hours.  Patient reports doing well this morning. States pain is controlled. States appetite is improving.     Objective   Vitals:-    /71   Pulse 98   Temp 98.6 °F (37 °C) (Oral)   Resp 16   Ht 1.829 m (6')   Wt 87.5 kg (193 lb)   SpO2 90%   PF (!) 14 L/min   BMI 26.18 kg/m²     Physical Exam  Constitutional:       General: He is not in acute distress.     Appearance: He is not ill-appearing.   HENT:      Head: Normocephalic.   Eyes:      Extraocular Movements: Extraocular movements intact.      Pupils: Pupils are equal, round, and reactive to light.   Cardiovascular:      Rate and Rhythm: Normal rate and regular rhythm.      Pulses: Normal pulses.      Heart sounds: No murmur heard.     No gallop.   Pulmonary:      Effort: Pulmonary effort is normal.      Breath sounds: Normal breath sounds.   Abdominal:      General: Abdomen is flat.      Palpations: Abdomen is soft.   Musculoskeletal:         General: Normal range of motion.      Right lower leg: No edema.      Left lower leg: No edema.   Neurological:      General: No focal deficit present.      Mental Status: He is alert and 
    Hospitalist Progress Note      Patient:  Gustabo Machado 53 y.o. male       : 1970  Unit/Bed:3B-36/036-A    Date of Admission: 2024      ASSESSMENT AND PLAN    Active Problems  Acute bilateral PE, with RHS: RR called AM of  for sudden hypoxia, dysnpea, tachycardia. See significant event note. Heparin gtt initiated . IR performed thrombectomy at this time. Pt remains hemodynamically stable. Continue to wean O2 as tolerated. Pharmacy and Heme Onc consulted today to assist with transition to DOAC vs lovenox from heparin gtt.   ECHO obtained  reveals EF 60-65% with noted diastolic dysfunction noted, global mild-moderate longitudinal RV strain.  Hyponatremia:improved.  In the setting of pancreatitic cancer and recurrent ascites, hypoalbuminemia. Nephrology consulted. Suspect secondary to poor PO intake, diuretics.   Urine osmolality 481, urine sodium 25 ().    S/p paracentesis 11/15.   Trial salt tab per nephrology . Monitor daily BMP.  Oral candidiasis, with Oropharyngeal versus esophageal dysphagia: Pt presenting sx. Reports ongoing symptoms since 2024.  GI consulted; SLP consulted.  Overall improved.   Continue fluconazole  100 mg daily x 3 weeks for presumed esophageal candidiasis. Magic mouthwash, nystatin ordered.   If no improvement in symptoms, may consider EGD  Diet advanced to soft and bized with GI bland  Okay for DC per GI  SLP evaluation- awaiting.  Dietitian consult  Recurrent Ascites, with Hx of metastatic Pancreatic adenocarcinoma: Underwent paracentesis 24 and again 11/15. Palliative care onboard. Heme/Onc made aware.  Paracentesis fluid obtained 11/15- culture shows NGTD. Noted with worsening abdominal distention today, however pt does not report significant discomfort with this at this time.   On Folfirinox  May consider drain placement.   Moderate L Pleural Effusion: noted on CT scan. Suspected due to malignancy. CXR, CTA chest - shows 
    Hospitalist Progress Note      Patient:  Gustabo Machado 53 y.o. male       : 1970  Unit/Bed:3B-36/036-A    Date of Admission: 2024      ASSESSMENT AND PLAN    Active Problems  Acute bilateral PE, with RHS: RR called early this AM for sudden hypoxia, dysnpea, tachycardia. See significant event note. Heparin gtt started. IR consulted. Planning thrombectomy at this time. Pt stable on 3B unit.    Hyponatremia:improved.  In the setting of pancreatitic cancer and recurrent ascites, hypoalbuminemia. Nephrology consulted today.     Urine osmolality 481, urine sodium 25 ().    S/p paracentesis 11/15.   Trial salt tab per nephrology . Monitor daily BMP.  Oral candidiasis, with Oropharyngeal versus esophageal dysphagia: Pt presenting sx. Reports ongoing symptoms since 2024.  GI consulted; SLP consulted.  Overall improved.   Continue fluconazole  100 mg daily x 3 weeks for presumed esophageal candidiasis. Magic mouthwash, nystatin ordered.   If no improvement in symptoms, may consider EGD  Diet advanced to soft and bized with GI bland  Okay for DC per GI  SLP evaluation- awaiting.  Dietitian consult  Recurrent Ascites, with Hx of metastatic Pancreatic adenocarcinoma: Underwent paracentesis 24 and again 11/15. Palliative care onboard.   Paracentesis fluid obtained 11/15- culture shows NGTD.   On Folfirinox  Can consider drain placement.   Moderate L Pleural Effusion: noted on CT scan. Suspected due to malignancy. CXR, CTA chest today - shows stable.  If becomes symptomatic, can consider thoracentesis  Hypoalbuminemia: ISO ascites, poor oral intake. Albumin infusion today. Monitor daily CMP. High protein dietary supplements ordered.   Cancer related pain: Palliative care consulted- continue norco    Resolved Problems    Chronic Conditions (reviewed, stable, and home medications resumed, unless otherwise stated)  GERD: PPI  Essential HTN: lasix 20 mg daily (held) as receiving IV lasix. 
  Physician Progress Note      PATIENT:               FAITH CASTRO  CSN #:                  [id number]  :                       1970  ADMIT DATE:       2024 12:18 PM  DISCH DATE:  RESPONDING  PROVIDER #:        Gilberto Fischer MD          QUERY TEXT:    Patient Noted to have Malnutrition Status:  Moderate malnutrition (24   1136) by dietician.  If possible, please document in progress notes and   discharge summary if you are evaluating and /or treating any of the following:    The medical record reflects the following:  Risk Factors: Context:  Acute Illness  Findings of the 6 clinical characteristics of malnutrition:  Energy Intake:  50% or less of estimated energy requirements for 5 or more   days (d/t oral thrush)  Weight Loss:   (9.4% loss since October; note had 2 paracentesis procedures)  Body Fat Loss:  Mild body fat loss Orbital, Buccal region  Muscle Mass Loss:  Mild muscle mass loss Scapula (trapezius), Temples   (temporalis), Clavicles (pectoralis & deltoids), Calf (gastrocnemius)  Clinical Indicators: Nutrition Assessment:  Pt. moderately malnourished AEB criteria as listed above.  At risk for further   nutrition compromise r/t acute bilateral PE with RHS - RR  and s/p   urgent thrombectomy, oral candidiasis vs dysphagia - improving, recurrent   ascites - s/p paracentesis  and 11/15, metastatic pancreatic cancer, and   underlying medical condition (PMHx: GERD, HTN).  Treatment: Diet NPO with dietician consult.    ASPEN Criteria:    https://aspenjournals.onlinelibrary.lovell.com/doi/full/10.1177/216554639112650  5    Thank you. Kerrie Alarcon RN, Clinical Documentation Integrity, [employer], [employer], CRCR  Options provided:  -- Moderate Protein Calorie Malnutrition confirmed  -- Moderate Protein Calorie Malnutrition ruled out  -- Other - I will add my own diagnosis  -- Disagree - Not applicable / Not valid  -- Disagree - Clinically unable to determine / 
0925  Removed slip knot at bedside, site soft and dry.  Patient tolerated well.    
1006 Patient received in IR for DVT thrombectomy procedure with family taken to main radiology.   1010 This procedure has been fully reviewed with the patient and written informed consent has been obtained.  1012 Patient prepped for procedure.  1020 Procedure started with Dr. Stephen..  1022 Access to right femoral vein with use of sonosite.   1032 Right pulmonary thrombectomy with Triever 24 and flowsaver.  1046 Left pulmonary thrombectomy with Triever 24 and flowsaver.  1054 PA pressure 19/10(15)  1058 Procedure completed; patient tolerated well. Right femoral vein sheath removed. Slip knot device deployed along with manual pressure.  1105 Dr. Stephen spoke to patient's family.  1112 Manual pressure discontinued. Gauze and op site to right femoral site covering slip knot; area soft to touch with no bleeding noted.   1120 Patient on bed; comfort ensured. Right femoral dressing remains dry and intact with area soft.    1123 Patient taken to 3B via bed with family at bedside via bed/transporter/resource nurse. right femoral dressing remains dry and intact with area soft. Slip knot to remain in place until IR staff removes in 12-24 hours. Pt alert and oriented x3; follows commands. Skin pink, warm, and dry. Respirations easy, regular,  and nonlabored.    
1456 Patient received in IR for pleurx catheter insertion.   1500 This procedure has been fully reviewed with the patient and written informed consent has been obtained.  1522 Procedure started with .  1530 Procedure completed; patient tolerated well. Dressing to abdomen; no bleeding noted.  1542 Patient on bed; comfort ensured.  1547 Patient taken to 3B36 via bed. Pt alert and oriented x3; follows commands. Skin pink, warm, and dry. Respirations easy, regular, and nonlabored.    
At 0815 this RN went into patient's room to get VS. Pt was sitting in chair, tachypneic and tachycardic. While taking VS patient became cyanotic. Oxygen sat noted to be 59% on room air with a good pleth. 100% NRB placed on patient and at 0821 a rapid response was called. Labs, EKG, CXR, ABG performed. Pt transported for CTA at 0836 then taken to 3B36.Report given and belongings taken to room.  
[facility]  SPEECH THERAPY MISSED TREATMENT NOTE  [facility] MED SURG 8AB      Date: 2024  Patient Name: Gustabo Machado        MRN: [id number]    : 1970  (53 y.o.)    REASON FOR MISSED TREATMENT:  Attempted to see patient for completion of CSE. Per chart review and conversation with RN, patient waiting for GI to evaluate to determine candidacy/appropriateness for EGD prior to CSE and diet initiation with ST. ST to follow up as patient is available/appropriate.     Karolina Shaw M.A., CCC-SLP [id number]            
Comprehensive Nutrition Assessment    Type and Reason for Visit:  Initial, Consult (General nutrition management; Pancreatic cancer and oral thrush)    Nutrition Recommendations/Plan:   Continue diet per provider as tolerated/accepted and encourage adequate PO intake at best efforts.   Modify ONS order: Ensure PLUS/Enlive (TID) - chocolate or strawberry - on each meal tray ([facility] does not have ability to send snack time ONS)  Start ONS: Cottage cheese (Lunch)  Monitor appetite and consider appetite stimulant if appetite continues to be poor outpatient.   Recommend to continue magic mouth wash prn - pt notes improvement in thrush/pain today  Recommend continue bowel regimen for constipation  Handouts on high kcal/protein foods and nutrition tips during cancer tx provided today (11/19) - wife present and all questions answered at this time; contact info provided  Recommend pt to f/u with outpatient oncology RD for nutrition management - will call tomorrow to update as able     Malnutrition Assessment:  Malnutrition Status:  Moderate malnutrition (11/19/24 1134)    Context:  Acute Illness     Findings of the 6 clinical characteristics of malnutrition:  Energy Intake:  50% or less of estimated energy requirements for 5 or more days (d/t oral thrush)  Weight Loss:   (9.4% loss since October; note had 2 paracentesis procedures)     Body Fat Loss:  Mild body fat loss Orbital, Buccal region   Muscle Mass Loss:  Mild muscle mass loss Scapula (trapezius), Temples (temporalis), Clavicles (pectoralis & deltoids), Calf (gastrocnemius)  Fluid Accumulation:  Unable to assess Ascites   Strength:  Not Performed    Nutrition Assessment:     Pt. moderately malnourished AEB criteria as listed above.  At risk for further nutrition compromise r/t acute bilateral PE with RHS - RR 11/17 and s/p urgent thrombectomy, oral candidiasis vs dysphagia - improving, recurrent ascites - s/p paracentesis 11/12 and 11/15, metastatic pancreatic 
Discussed discharge summary with patient. Instructed patient and wife about medications & follow up appointments. Mediport de-accessed by IV team. Patient denies any additional questions. Patient was discharged with all belongings. No distress noted. Patient discharged to home. Taken down to the vehicle by this RN per wheelchair.   
Evaluated DOAC pricing for Anitra Mitchell lance Oliver: Requires prior authorization    Xarelto: Requires prior authorization    Pradaxa: Requires prior authorization    If script is sent to [facility] for any of the above medications, I can attempt a PA if I am notified when script is sent.     Thank you,  Kassandra Donovan, [facility] - Prescription Assistance ([phone]) 11/19/2024,9:16 AM    
Gastroenterology Progress Note:     Patient Name:  Gustabo Machado   MRN: [id number]  [id number]  YOB: 1970  Admit Date: 11/13/2024 12:18 PM  Primary Care Physician: Daniel Agrawal MD   8A-16/016-A     Patient seen and examined.  24 hours events and chart reviewed.    Subjective: Patient sitting up in chair today doing well.  Reports feeling better and feels as though his swallowing has improved from yesterday as well.  He is having a paracentesis today and is hoping to go home.  Sodium levels stable at 130.  Mother at bedside today.     Objective:  /72   Pulse (!) 102   Temp 97.8 °F (36.6 °C) (Oral)   Resp 20   Ht 1.829 m (6')   Wt 90.2 kg (198 lb 13.7 oz)   SpO2 95%   BMI 26.97 kg/m²     Physical Exam  Vitals and nursing note reviewed.   Constitutional:       General: He is not in acute distress.     Appearance: Normal appearance. He is normal weight. He is ill-appearing.   HENT:      Mouth/Throat:      Mouth: Mucous membranes are moist.      Pharynx: Oropharynx is clear.   Cardiovascular:      Rate and Rhythm: Normal rate and regular rhythm.      Pulses: Normal pulses.      Heart sounds: Normal heart sounds. No murmur heard.  Pulmonary:      Effort: Pulmonary effort is normal. No respiratory distress.      Breath sounds: Normal breath sounds. No stridor. No wheezing, rhonchi or rales.   Chest:      Chest wall: No tenderness.   Abdominal:      General: Abdomen is flat. Bowel sounds are normal. There is distension.      Palpations: There is no mass.      Tenderness: There is no abdominal tenderness. There is no guarding or rebound.      Hernia: No hernia is present.   Musculoskeletal:      Right lower leg: No edema.      Left lower leg: No edema.   Skin:     General: Skin is warm and dry.      Capillary Refill: Capillary refill takes less than 2 seconds.   Neurological:      Mental Status: He is alert and oriented to person, place, and time.   Psychiatric:         Mood and Affect: Mood 
Gastroenterology Status Update    GI signed off yesterday.  Please call if any questions.       Monae Andrade MD  
[facility]   Nephrology progress note  11/17/2024, 12:12 PM      Pt Name:    Gustabo Machado  MRN:     [id number]     YOB: 1970  Admit Date:    11/13/2024 12:18 PM    Chief Complaint: Nephrology following for hyponatremia.    Subjective:  Patient seen and examined  No chest pain patient started to have some shortness of breath and now diagnosed with PE transferred to  and placed on a heparin drip  Is also needing high flow oxygen    Objective:  24HR INTAKE/OUTPUT:    Intake/Output Summary (Last 24 hours) at 11/17/2024 1212  Last data filed at 11/17/2024 0427  Gross per 24 hour   Intake 330.49 ml   Output 1750 ml   Net -1419.51 ml      Admission weight: 96.6 kg (213 lb)  Wt Readings from Last 3 Encounters:   11/15/24 90.2 kg (198 lb 13.7 oz)   11/05/24 100.2 kg (221 lb)   11/05/24 100.2 kg (221 lb)        Vitals :   Vitals:    11/17/24 1100 11/17/24 1105 11/17/24 1130 11/17/24 1145   BP: 104/74 104/74 97/73 106/78   Pulse: (!) 113 (!) 112 (!) 113 (!) 111   Resp: 16 19 16    Temp:       TempSrc:       SpO2: 99% 99%     Weight:       Height:       PF:   (!) 14 L/min        Physical examination  General Appearance:  Well developed. No distress  Mouth/Throat:  Oral mucosa moist  Neck: No JVD  Lungs:  Breath sounds: clear  Heart::  S1,S2 heard  Abdomen:  Soft, non - tender  Musculoskeletal:  Edema -no edema    Medications:  Infusion:    heparin (PORCINE) Infusion 18 Units/kg/hr (11/17/24 0909)    sodium chloride       Meds:    nystatin  500,000 Units Oral 4x Daily    gabapentin  100 mg Oral TID    senna  1 tablet Oral BID    polyethylene glycol  17 g Oral Daily    fluconazole  100 mg Oral Daily    famotidine  20 mg Oral Daily    [Held by provider] furosemide  40 mg IntraVENous Daily    sodium chloride flush  5-40 mL IntraVENous 2 times per day    [Held by provider] spironolactone  25 mg Oral BID       Lab Data :  CBC:   Recent Labs     11/15/24  0617 11/16/24  0539 11/17/24  0540 
[facility]   Nephrology progress note  11/18/2024, 10:00 AM      Pt Name:    Gustabo Machado  MRN:     [id number]     YOB: 1970  Admit Date:    11/13/2024 12:18 PM    Chief Complaint: Nephrology following for hyponatremia.    Subjective:  Patient seen and examined  Feels better oxygen requirement improved  Continues to be on a heparin drip    Objective:  24HR INTAKE/OUTPUT:    Intake/Output Summary (Last 24 hours) at 11/18/2024 1000  Last data filed at 11/17/2024 2021  Gross per 24 hour   Intake 0 ml   Output 250 ml   Net -250 ml      Admission weight: 96.6 kg (213 lb)  Wt Readings from Last 3 Encounters:   11/15/24 90.2 kg (198 lb 13.7 oz)   11/05/24 100.2 kg (221 lb)   11/05/24 100.2 kg (221 lb)        Vitals :   Vitals:    11/18/24 0600 11/18/24 0630 11/18/24 0645 11/18/24 0945   BP:    99/69   Pulse: 89   95   Resp: 20   20   Temp:    98.7 °F (37.1 °C)   TempSrc:    Oral   SpO2: 100% 99% 98% 96%   Weight:       Height:       PF:           Physical examination  General Appearance:  Well developed. No distress  Mouth/Throat:  Oral mucosa moist  Neck: No JVD  Lungs:  Breath sounds: clear  Heart::  S1,S2 heard  Abdomen:  Soft, non - tender  Musculoskeletal:  Edema -no edema    Medications:  Infusion:    heparin (PORCINE) Infusion 18 Units/kg/hr (11/18/24 0113)    sodium chloride       Meds:    nystatin  500,000 Units Oral 4x Daily    gabapentin  100 mg Oral TID    senna  1 tablet Oral BID    polyethylene glycol  17 g Oral Daily    fluconazole  100 mg Oral Daily    famotidine  20 mg Oral Daily    [Held by provider] furosemide  40 mg IntraVENous Daily    sodium chloride flush  5-40 mL IntraVENous 2 times per day    [Held by provider] spironolactone  25 mg Oral BID       Lab Data :  CBC:   Recent Labs     11/16/24  0539 11/17/24  0540 11/18/24  0220   WBC 3.7* 3.1* 2.6*   HGB 12.2* 12.3* 11.3*   HCT 37.0* 37.9* 35.3*   * 136 142     CMP:  Recent Labs     11/16/24  0539 
[facility]   Nephrology progress note  11/20/2024, 10:00 AM      Pt Name:    Gustabo Machado  MRN:     [id number]     YOB: 1970  Admit Date:    11/13/2024 12:18 PM    Chief Complaint: Nephrology following for hyponatremia.    Subjective:  Patient seen and examined  Overall feels much better no oxygen requirement    Objective:  24HR INTAKE/OUTPUT:    Intake/Output Summary (Last 24 hours) at 11/20/2024 1000  Last data filed at 11/20/2024 0329  Gross per 24 hour   Intake 680 ml   Output 4000 ml   Net -3320 ml      Admission weight: 96.6 kg (213 lb)  Wt Readings from Last 3 Encounters:   11/19/24 87.5 kg (193 lb)   11/05/24 100.2 kg (221 lb)   11/05/24 100.2 kg (221 lb)        Vitals :   Vitals:    11/19/24 1940 11/19/24 2341 11/20/24 0329 11/20/24 0730   BP: 108/74 99/75 110/79 107/71   Pulse: 100 (!) 101 (!) 106 98   Resp: 18 16 16 16   Temp: 98.8 °F (37.1 °C) 98 °F (36.7 °C) (!) 100.5 °F (38.1 °C) 98.6 °F (37 °C)   TempSrc: Oral Oral Oral Oral   SpO2: 95% 93% 91% 90%   Weight:       Height:       PF:           Physical examination  General Appearance:  Well developed. No distress  Mouth/Throat:  Oral mucosa moist  Neck: No JVD  Lungs:  Breath sounds: clear  Heart::  S1,S2 heard  Abdomen:  Soft, non - tender  Musculoskeletal:  Edema -no edema    Medications:  Infusion:    sodium chloride       Meds:    apixaban  10 mg Oral BID    Followed by    [START ON 11/26/2024] apixaban  5 mg Oral BID    nystatin  500,000 Units Oral 4x Daily    gabapentin  100 mg Oral TID    senna  1 tablet Oral BID    polyethylene glycol  17 g Oral Daily    fluconazole  100 mg Oral Daily    famotidine  20 mg Oral Daily    [Held by provider] furosemide  40 mg IntraVENous Daily    sodium chloride flush  5-40 mL IntraVENous 2 times per day    [Held by provider] spironolactone  25 mg Oral BID       Lab Data :  CBC:   Recent Labs     11/18/24  0220 11/18/24 2130 11/19/24  0330   WBC 2.6*  --  3.6*   HGB 11.3* 
Lab called stating hep ptt is greater than 200. Patient sample was drawn from the port heparin is running through. Will turn off for 15 minutes and redraw.   
Oral Anticoagulation Patient Education    Counseling provided to: patient and wife  Oral anticoagulant: apixaban  Handouts provided to patient include: medication, medications that increase risk of bleeding    Counseling points included:  1. Indication for anticoagulation: PE  2. Explanation of medication  3. Education on A. Fib and stroke; PE/DVT  4. Missed doses and timing of medication (contact healthcare provider if missed multiple doses)  5. Side effects: bruising and bleeding with emphasis on whento call the physician (blood in urine or stool  6. Contact provider if:   A. Changes made to medications   B. Uncontrolled bleeding/ Signs and symptoms of recurrent VTE   C. Procedures      
Patient received last rites/anointing by a . If you are in need of  support, please call [phone]. If you are in need of a  after 6:30pm, please call the house supervisor for the on-call .      Spiritual Health History and Assessment/Progress Note  [facility]    (P) Initial Encounter,  ,  ,      Name: Gustabo Machado MRN: [id number]    Age: 53 y.o.     Sex: male   Language: English   Restoration: Evangelical   Hyponatremia     Date: 11/15/2024            Total Time Calculated: (P) 9 min              Spiritual Assessment continued in [facility] 8AB        Referral/Consult From: (P) Rounding   Encounter Overview/Reason: (P) Initial Encounter  Service Provided For: (P) Patient and family together    Isabel, Belief, Meaning:   Patient identifies as spiritual  Family/Friends identify as spiritual      Importance and Influence:  Patient has spiritual/personal beliefs that influence decisions regarding their health  Family/Friends have spiritual/personal beliefs that influence decisions regarding the patient's health    Community:  Patient is connected with a spiritual community  Family/Friends are connected with a spiritual community:    Assessment and Plan of Care:     Patient Interventions include: Facilitated expression of thoughts and feelings  Family/Friends Interventions include: Facilitated expression of thoughts and feelings    Patient Plan of Care: No spiritual needs identified for follow-up  Family/Friends Plan of Care: No spiritual needs identified for follow-up    Electronically signed by KELLY Lozano on 11/15/2024 at 3:51 PM   
[facility] [place]  SPEECH THERAPY MISSED TREATMENT NOTE  [facility] 8AB      Date: 11/15/2024  Patient Name: Gustabo Machado        MRN: [id number]    : 1970  (53 y.o.)    REASON FOR MISSED TREATMENT:    ST with attempt to complete ordered evaluation.  Patient is currently off the floor for procedure.  Per GI, patient is on a full liquid diet with recommendations to advance as tolerated.  GI suspects patient's difficult swallowing is related to esophageal cadidiasis but will complete EGD if no improvement with treatment.  ST to continue to follow and complete evaluation as appropriate.    Tonja Neil M.A. CCC-SLP [id number]          
Spiritual Health History and Assessment/Progress Note  [facility]    Spiritual/Emotional Needs,  ,  ,      Name: Gustabo Machado MRN: [id number]    Age: 53 y.o.     Sex: male   Language: English   Yarsanism: Zoroastrianism   Hyponatremia     Date: 11/18/2024            Total Time Calculated: (P) 10 min              Spiritual Assessment continued in [facility] CCU-STEPDOWN 3B        Referral/Consult From: Rounding   Encounter Overview/Reason: Spiritual/Emotional Needs  Service Provided For: Patient and family together    Isabel, Belief, Meaning:   Patient identifies as spiritual  Family/Friends identify as spiritual      Importance and Influence:  Patient has spiritual/personal beliefs that influence decisions regarding their health  Family/Friends have spiritual/personal beliefs that influence decisions regarding the patient's health    Community:  Patient is connected with a spiritual community  Family/Friends are connected with a spiritual community:    Assessment and Plan of Care:     Patient Interventions include: Other: In my encounter with the 53 yr old patient the pt’s family was supportively present. While rounding the unit I provided spiritual care to patient and their family through conversation, I also came to assess their spiritual needs.      Family/Friends Interventions include: Facilitated expression of thoughts and feelings    Patient Plan of Care: Spiritual Care available upon further referral  Family/Friends Plan of Care: Spiritual Care available upon further referral    Electronically signed by KELLY Castro on 11/18/2024 at 11:33 AM   
[facility] [place]  SPEECH THERAPY  [facility] CCU-STEPDOWN 3B  Clinical Swallow Evaluation    Discharge Recommendations: Continue to Assess Pending Progress  DIET ORDER RECOMMENDATIONS AFTER EVALUATION: Regular diet + thin liquids   Strategies:  Full Upright Position and Small Bite/Sip     SLP Individual Minutes  Time In: 1105  Time Out: 1114  Minutes: 9  Timed Code Treatment Minutes: 0 Minutes       Date: 2024  Patient Name: Gustabo Machado      CSN: [id number]   : 1970  (53 y.o.)  Gender: male   Referring Physician:  Sandra Parada PA-C    Diagnosis: Hyponatremia    History of Present Illness/Injury:  Gustabo Machado is a 53 y.o. male with PMHx of metastatic pancreatic adenocarcinoma, who presents to [facility] with dysphagia.  Patient reports ongoing dysphagia symptoms since 2024.  Reporting dysphagia to both liquids and solids, denies pain with swallowing.  Recently diagnosed with thrush, initially placed on fluconazole nystatin, Magic mouthwash.  Patient unable to report if dysphagia symptoms were gradual or sudden.  Reported undergoing paracentesis on  with 2 L removed, initial goal is 4 L.  Patient's abdomen remains distended.  Denies nausea, vomiting, chest pain, shortness of breath.  Has some mild abdominal pain due to abdominal distention.  Denies diarrhea, constipation.  Denies prior history of EGD.  Reports poor oral intake due to dysphagia.     ED course: /92, HR 97, RR 20, SpO2 95% on room air, temperature 98.  Labs significant for sodium 123, albumin 2.1, alk phos 229, hemoglobin 13.6.  Initially started on LR 1 L in the ED provider.  CT soft tissue neck no cervical adenopathy, normal CT appearance of esophagus, moderate left pleural effusion.    Chest X-ray: 24  IMPRESSION:  Low lung volumes with a small left-sided pleural effusion. Mild  bibasilar infiltrates versus atelectasis.     Past Medical History:   Diagnosis Date    Cancer (HCC)     
Stat echo completed bedside, Dr Smith to read.  
TRANSFER - OUT REPORT:    Verbal report given to Rogelio RN(name) on Gustabo Machado being transferred to (unit) for routine progression of patient care       Report consisted of patient's Situation, Background, Assessment and   Recommendations(SBAR).     Information from the following report(s) Nurse Handoff Report, Surgery Report, and MAR was reviewed with the receiving nurse.    Opportunity for questions and clarification was provided.      Patient transported with:   Monitor  O2 @ 15 liters nonrebreather    
Update: Eliquis PA approved through 11/19/2025. Patient will have a $10.00/month co-pay with co-pay assistance card. Co-pay card is being provided to patient at time of Meds to Beds delivery for use at his local pharmacy.   Kassandra Donovan CP - Prescription Assistance ([phone]) 11/19/2024,10:46 AM    
35.3* 38.8* 37.3*    142  --  187     CMP:  Recent Labs     11/17/24  0540 11/17/24  2100 11/18/24  0220 11/19/24  0330   * 131* 132* 131*   K 4.5  --  4.3 4.5   CL 94*  --  95* 94*   CO2 29  --  29 28   BUN 20  --  16 14   CREATININE 1.0  --  0.8 0.7   GLUCOSE 98  --  105 99   CALCIUM 8.6  --  8.4* 8.5     Hepatic:   Recent Labs     11/17/24  0540 11/18/24  0220 11/19/24  0330   AST 30 35 28   ALT 22 19 18   BILITOT 0.5 0.5 0.4   ALKPHOS 172* 154* 153*       Assessment and Plan:  Renal -renal function appears to be stable at baseline  Hyponatremia etiology appears most likely due to poor oral intake initially which has initially improved with IV fluids however was started on diuretics and the sodium started to decline  Initial urine studies also suggest the same sodium of 25 in the urine  Diuretic on hold received some salt tablets sodium somewhat improved  Will continue salt gram tablets 2 g twice daily and fluid restriction of 60 ounces per day  Can resume low-dose Lasix if necessary  Metastatic pancreatic carcinoma  Large left pleural effusion  Recent history of thrush  Ascites mostly due to malignancy Lasix would not really help with his abdominal ascites however can try Aldactone in future for possible decrease in portal hypertension  Pulmonary embolism on heparin drip  Meds reviewed and discussed with patient, wife and the primary team    We have discharged sent home on salt tablets 2 g twice daily and follow-up with me in my office in 2 to 3 weeks with a BMP prior    Oscar Montoya MD  [facility]    This report has been created using voice recognition software. It may contain minor errors which are inherent in voice recognition technology  
breath, abdominal pain, nausea or vomiting.  Does feel as if his abdomen is more distended today compared to yesterday.\"    Subjective (past 24 hours):   11/16: Pt resting in bed, wife at bediside. No acute events overnight. Denies CP, SOB. Report has not had a BM since admission. Discussed labs, plan of care.        Medications:    Infusion Medications    sodium chloride      Scheduled Medications    gabapentin  100 mg Oral TID    fluconazole  100 mg Oral Daily    famotidine  20 mg Oral Daily    furosemide  40 mg IntraVENous Daily    sodium chloride flush  5-40 mL IntraVENous 2 times per day    [Held by provider] spironolactone  25 mg Oral BID    nystatin  500,000 Units Oral 4x Daily    PRN Meds: HYDROcodone 5 mg - acetaminophen **OR** HYDROcodone 5 mg - acetaminophen, magic (miracle) mouthwash, sodium chloride flush, sodium chloride, potassium chloride **OR** potassium alternative oral replacement **OR** potassium chloride, magnesium sulfate, ondansetron **OR** ondansetron, polyethylene glycol, acetaminophen **OR** acetaminophen    Exam:  /69   Pulse 96   Temp 98.8 °F (37.1 °C) (Oral)   Resp 16   Ht 1.829 m (6')   Wt 90.2 kg (198 lb 13.7 oz)   SpO2 92%   BMI 26.97 kg/m²   General: Chronically ill appearing. Resting comfortably in chair. No distress, appears stated age.   Eyes:  PERRL. Conjunctivae/corneas clear.  HENT: Head normal appearing. Nares normal. Oral mucosa moist.  Hearing intact.   Neck: Supple, with full range of motion. Trachea midline.  Respiratory:  Course inspiratory rales noted in lower right lung field. Normal effort.   Cardiovascular: Normal rate, regular rhythm with normal S1/S2 without murmurs.    No lower extremity edema.   Abdomen: Soft, non-tender, moderately distended with normal bowel sounds.  Musculoskeletal: No joint swelling or tenderness. Normal tone. No abnormal movements.   Skin: Warm and dry. No rashes or lesions.  Neurologic:  No focal sensory/motor deficits in the 
course: /92, HR 97, RR 20, SpO2 95% on room air, temperature 98.  Labs significant for sodium 123, albumin 2.1, alk phos 229, hemoglobin 13.6.  Initially started on LR 1 L in the ED provider.  CT soft tissue neck no cervical adenopathy, normal CT appearance of esophagus, moderate left pleural effusion.\"    Medications:    Infusion Medications    sodium chloride      Scheduled Medications    gabapentin  100 mg Oral TID    fluconazole  100 mg Oral Daily    famotidine  20 mg Oral Daily    furosemide  40 mg IntraVENous Daily    sodium chloride flush  5-40 mL IntraVENous 2 times per day    [Held by provider] spironolactone  25 mg Oral BID    nystatin  500,000 Units Oral 4x Daily    PRN Meds: HYDROcodone 5 mg - acetaminophen **OR** HYDROcodone 5 mg - acetaminophen, magic (miracle) mouthwash, sodium chloride flush, sodium chloride, potassium chloride **OR** potassium alternative oral replacement **OR** potassium chloride, magnesium sulfate, ondansetron **OR** ondansetron, polyethylene glycol, acetaminophen **OR** acetaminophen    Exam:  /72   Pulse (!) 102   Temp 97.8 °F (36.6 °C) (Oral)   Resp 16   Ht 1.829 m (6')   Wt 90.2 kg (198 lb 13.7 oz)   SpO2 95%   BMI 26.97 kg/m²   General: Resting comfortably in chair. No distress, appears stated age.   Eyes:  PERRL. Conjunctivae/corneas clear.  HENT: Head normal appearing. Nares normal. Oral mucosa moist.  Hearing intact.   Neck: Supple, with full range of motion. Trachea midline.  Respiratory:  Normal effort. Clear to auscultation, diminished through left lung base. No wheezes/rhonchi/rales/  Cardiovascular: Normal rate, regular rhythm with normal S1/S2 without murmurs.    No lower extremity edema.   Abdomen: Soft, tender, moderately distended with normal bowel sounds.  Musculoskeletal: No joint swelling or tenderness. Normal tone. No abnormal movements.   Skin: Warm and dry. No rashes or lesions.  Neurologic:  No focal sensory/motor deficits in the upper or 
significant for sodium 123, albumin 2.1, alk phos 229, hemoglobin 13.6.  Initially started on LR 1 L in the ED provider.  CT soft tissue neck no cervical adenopathy, normal CT appearance of esophagus, moderate left pleural effusion.\"    Medications:    Infusion Medications    sodium chloride      Scheduled Medications    famotidine  20 mg Oral Daily    furosemide  40 mg IntraVENous Daily    sodium chloride flush  5-40 mL IntraVENous 2 times per day    [Held by provider] spironolactone  25 mg Oral BID    nystatin  500,000 Units Oral 4x Daily    PRN Meds: HYDROcodone 5 mg - acetaminophen **OR** HYDROcodone 5 mg - acetaminophen, magic (miracle) mouthwash, sodium chloride flush, sodium chloride, potassium chloride **OR** potassium alternative oral replacement **OR** potassium chloride, magnesium sulfate, ondansetron **OR** ondansetron, polyethylene glycol, acetaminophen **OR** acetaminophen    Exam:  /81   Pulse 93   Temp 98 °F (36.7 °C) (Oral)   Resp 18   Ht 1.829 m (6')   Wt 91.4 kg (201 lb 8 oz)   SpO2 95%   BMI 27.33 kg/m²   General: Resting comfortably in chair. No distress, appears stated age.   Eyes:  PERRL. Conjunctivae/corneas clear.  HENT: Head normal appearing. Nares normal. Oral mucosa moist.  Hearing intact.   Neck: Supple, with full range of motion. Trachea midline.  Respiratory:  Normal effort. Clear to auscultation, diminished through left lung base. No wheezes/rhonchi/rales/  Cardiovascular: Normal rate, regular rhythm with normal S1/S2 without murmurs.    No lower extremity edema.   Abdomen: Soft, minimally diffusely tender, slightly distended with normal bowel sounds.  Musculoskeletal: No joint swelling or tenderness. Normal tone. No abnormal movements.   Skin: Warm and dry. No rashes or lesions.  Neurologic:  No focal sensory/motor deficits in the upper or lower extremities. Cranial nerves:  grossly non-focal 2-12.     Psychiatric: Alert and oriented, normal insight and thought content. 
7:09 AM

## 2024-11-26 ENCOUNTER — OFFICE VISIT (OUTPATIENT)
Dept: PALLATIVE CARE | Age: 54
End: 2024-11-26
Payer: COMMERCIAL

## 2024-11-26 VITALS
BODY MASS INDEX: 25.33 KG/M2 | HEART RATE: 102 BPM | DIASTOLIC BLOOD PRESSURE: 64 MMHG | OXYGEN SATURATION: 98 % | HEIGHT: 72 IN | RESPIRATION RATE: 16 BRPM | SYSTOLIC BLOOD PRESSURE: 96 MMHG | WEIGHT: 187 LBS

## 2024-11-26 DIAGNOSIS — C78.6 PERITONEAL CARCINOMATOSIS (HCC): ICD-10-CM

## 2024-11-26 DIAGNOSIS — B37.0 THRUSH: ICD-10-CM

## 2024-11-26 DIAGNOSIS — C25.2 MALIGNANT NEOPLASM OF TAIL OF PANCREAS (HCC): Primary | ICD-10-CM

## 2024-11-26 DIAGNOSIS — C25.2 MALIGNANT NEOPLASM OF TAIL OF PANCREAS (HCC): ICD-10-CM

## 2024-11-26 DIAGNOSIS — C78.7 METASTATIC ADENOCARCINOMA TO LIVER (HCC): ICD-10-CM

## 2024-11-26 DIAGNOSIS — Z51.5 PALLIATIVE CARE PATIENT: ICD-10-CM

## 2024-11-26 DIAGNOSIS — R43.2 DYSGEUSIA: ICD-10-CM

## 2024-11-26 DIAGNOSIS — C78.7 METASTATIC ADENOCARCINOMA TO LIVER (HCC): Primary | ICD-10-CM

## 2024-11-26 DIAGNOSIS — G89.3 CANCER RELATED PAIN: ICD-10-CM

## 2024-11-26 PROCEDURE — 1036F TOBACCO NON-USER: CPT | Performed by: STUDENT IN AN ORGANIZED HEALTH CARE EDUCATION/TRAINING PROGRAM

## 2024-11-26 PROCEDURE — 3017F COLORECTAL CA SCREEN DOC REV: CPT | Performed by: STUDENT IN AN ORGANIZED HEALTH CARE EDUCATION/TRAINING PROGRAM

## 2024-11-26 PROCEDURE — 99214 OFFICE O/P EST MOD 30 MIN: CPT | Performed by: STUDENT IN AN ORGANIZED HEALTH CARE EDUCATION/TRAINING PROGRAM

## 2024-11-26 PROCEDURE — G8427 DOCREV CUR MEDS BY ELIG CLIN: HCPCS | Performed by: STUDENT IN AN ORGANIZED HEALTH CARE EDUCATION/TRAINING PROGRAM

## 2024-11-26 PROCEDURE — 1111F DSCHRG MED/CURRENT MED MERGE: CPT | Performed by: STUDENT IN AN ORGANIZED HEALTH CARE EDUCATION/TRAINING PROGRAM

## 2024-11-26 PROCEDURE — G8484 FLU IMMUNIZE NO ADMIN: HCPCS | Performed by: STUDENT IN AN ORGANIZED HEALTH CARE EDUCATION/TRAINING PROGRAM

## 2024-11-26 PROCEDURE — G8417 CALC BMI ABV UP PARAM F/U: HCPCS | Performed by: STUDENT IN AN ORGANIZED HEALTH CARE EDUCATION/TRAINING PROGRAM

## 2024-11-26 RX ORDER — GINSENG 100 MG
50 CAPSULE ORAL 3 TIMES DAILY
Qty: 90 TABLET | Refills: 2 | Status: SHIPPED | OUTPATIENT
Start: 2024-11-26 | End: 2025-02-24

## 2024-11-26 NOTE — PROGRESS NOTES
Gustabo Machado is a 53 y.o. male who presents to the palliative care clinic today for:  Chief Complaint   Patient presents with    Follow-up     Hospital follow up    OTHER     Hard to eat solid foods       HPI:   Gustabo Machado presents to the palliative care clinic today For follow-up of symptoms related to his cancer.    Symptoms:  Pain:  He reports his pain is well-controlled.  He is only taking the Norco occasionally.  Shortness of breath: Patient denies shortness of breath or difficulty breathing.  Sleep: Patient reports trouble sleeping. They are having trouble staying asleep. They are currently taking Ambien to help with their sleep. Their medication is not working well to help with their sleep.  Fatigue/Drowsiness: Patient denies fatigue.  Patient denies drowsiness.   Appetite: Family reports poor appetite. They do not think food tastes good.  Wt. Loss: Weight loss of 36 lbs documented over the last Month.  Dry Mouth: Patient denies dry mouth.  Nausea/Vomiting: Patient denies nausea and vomiting.  Constipation/Diarrhea: Patient denies constipation or diarrhea.  Depression: Patient denies depression or issues with mood.  Anxiety: Patient denies anxiety.        Current Outpatient Medications   Medication Sig Dispense Refill    Magic Mouthwash (MIRACLE MOUTHWASH) Swish and spit 5 mLs 4 times daily as needed for Irritation Prepare mixture 1:1:1 DIPHENHYDRAMINE HCL,NYSTATIN,LIDOCAINE  mL 2    zinc 50 MG TABS tablet Take 1 tablet by mouth 3 times daily 90 tablet 2    zolpidem (AMBIEN CR) 12.5 MG extended release tablet Take 1 tablet by mouth nightly as needed for Sleep for up to 180 days. Max Daily Amount: 12.5 mg 30 tablet 5    HYDROcodone-acetaminophen (NORCO) 5-325 MG per tablet Take 1 tablet by mouth every 4 hours as needed for Pain for up to 14 days. Max Daily Amount: 6 tablets 84 tablet 0    fluconazole (DIFLUCAN) 100 MG tablet Take 1 tablet by mouth daily for 18 doses Start taking once you

## 2024-11-26 NOTE — TELEPHONE ENCOUNTER
Pharmacist Anitra from [facility]'s outpatient pharmacy called states that they do not compound medications any more. [facility]'s pharmacy can not fill the magic mouthwash. Pharmacist spoke with Pt's wife Nanci. Wife would like medications sent to [facility]'s pharmacy in [place].

## 2024-11-26 NOTE — PATIENT INSTRUCTIONS
Continue current plan of care for acute and chronic conditions per primary and specialist teams  Continue Norco 5-325 mg every 4 hours as needed for breakthrough cancer related pain  Continue Magic mouthwash for thrush  Start zinc 50 mg 3 times a day scheduled for poor taste  Controlled substances agreement signed  PDMP reviewed  Please call the office if your symptoms worsen or if you were to develop new symptoms  Please call the palliative care office when you need refills

## 2024-11-27 ENCOUNTER — HOSPITAL ENCOUNTER (OUTPATIENT)
Dept: INFUSION THERAPY | Age: 54
Discharge: HOME | End: 2024-11-27
Payer: COMMERCIAL

## 2024-11-27 ENCOUNTER — OFFICE VISIT (OUTPATIENT)
Dept: ONCOLOGY | Age: 54
End: 2024-11-27
Payer: COMMERCIAL

## 2024-11-27 VITALS
WEIGHT: 187.2 LBS | SYSTOLIC BLOOD PRESSURE: 113 MMHG | HEIGHT: 72 IN | HEART RATE: 93 BPM | TEMPERATURE: 97.3 F | OXYGEN SATURATION: 94 % | RESPIRATION RATE: 16 BRPM | DIASTOLIC BLOOD PRESSURE: 72 MMHG | BODY MASS INDEX: 25.35 KG/M2

## 2024-11-27 VITALS
HEIGHT: 72 IN | SYSTOLIC BLOOD PRESSURE: 105 MMHG | OXYGEN SATURATION: 94 % | TEMPERATURE: 97.8 F | BODY MASS INDEX: 25.35 KG/M2 | DIASTOLIC BLOOD PRESSURE: 72 MMHG | WEIGHT: 187.2 LBS | RESPIRATION RATE: 16 BRPM | HEART RATE: 110 BPM

## 2024-11-27 DIAGNOSIS — C25.2 MALIGNANT NEOPLASM OF TAIL OF PANCREAS (HCC): Primary | ICD-10-CM

## 2024-11-27 LAB
ALBUMIN SERPL BCG-MCNC: 2.5 G/DL (ref 3.5–5.1)
ALP SERPL-CCNC: 258 U/L (ref 38–126)
ALT SERPL W/O P-5'-P-CCNC: 78 U/L (ref 11–66)
AST SERPL-CCNC: 115 U/L (ref 5–40)
BASOPHILS ABSOLUTE: 0.1 THOU/MM3 (ref 0–0.1)
BASOPHILS NFR BLD AUTO: 1 % (ref 0–3)
BILIRUB CONJ SERPL-MCNC: 0.2 MG/DL (ref 0.1–13.8)
BILIRUB SERPL-MCNC: 0.4 MG/DL (ref 0.3–1.2)
BUN BLDP-MCNC: 13 MG/DL (ref 8–26)
CHLORIDE BLD-SCNC: 104 MEQ/L (ref 98–109)
CREAT BLD-MCNC: 0.8 MG/DL (ref 0.5–1.2)
EOSINOPHIL NFR BLD AUTO: 0 % (ref 0–4)
EOSINOPHILS ABSOLUTE: 0.1 THOU/MM3 (ref 0–0.4)
ERYTHROCYTE [DISTWIDTH] IN BLOOD BY AUTOMATED COUNT: 14.1 % (ref 11.5–14.5)
GFR SERPL CREATININE-BSD FRML MDRD: > 90 ML/MIN/1.73M2
GLUCOSE BLD-MCNC: 137 MG/DL (ref 70–108)
HCT VFR BLD AUTO: 38.3 % (ref 42–52)
HGB BLD-MCNC: 12 GM/DL (ref 14–18)
IMMATURE GRANULOCYTES %: 4 %
IMMATURE GRANULOCYTES ABSOLUTE: 0.81 THOU/MM3 (ref 0–0.07)
IONIZED CALCIUM, WHOLE BLOOD: 1.2 MMOL/L (ref 1.12–1.32)
LYMPHOCYTES ABSOLUTE: 1.1 THOU/MM3 (ref 1–4.8)
LYMPHOCYTES NFR BLD AUTO: 6 % (ref 15–47)
MCH RBC QN AUTO: 29.6 PG (ref 26–33)
MCHC RBC AUTO-ENTMCNC: 31.3 GM/DL (ref 32.2–35.5)
MCV RBC AUTO: 95 FL (ref 80–94)
MONOCYTES ABSOLUTE: 1.6 THOU/MM3 (ref 0.4–1.3)
MONOCYTES NFR BLD AUTO: 8 % (ref 0–12)
NEUTROPHILS ABSOLUTE: 15.2 THOU/MM3 (ref 1.8–7.7)
NEUTROPHILS NFR BLD AUTO: 81 % (ref 43–75)
PLATELET # BLD AUTO: 301 THOU/MM3 (ref 130–400)
PMV BLD AUTO: 9.1 FL (ref 9.4–12.4)
POTASSIUM BLD-SCNC: 4.3 MEQ/L (ref 3.5–4.9)
PROT SERPL-MCNC: 6.5 G/DL (ref 6.1–8)
RBC # BLD AUTO: 4.05 MILL/MM3 (ref 4.7–6.1)
SODIUM BLD-SCNC: 140 MEQ/L (ref 138–146)
TOTAL CO2, WHOLE BLOOD: 28 MEQ/L (ref 23–33)
WBC # BLD AUTO: 18.8 THOU/MM3 (ref 4.8–10.8)

## 2024-11-27 PROCEDURE — 80047 BASIC METABLC PNL IONIZED CA: CPT

## 2024-11-27 PROCEDURE — G8427 DOCREV CUR MEDS BY ELIG CLIN: HCPCS | Performed by: INTERNAL MEDICINE

## 2024-11-27 PROCEDURE — G8417 CALC BMI ABV UP PARAM F/U: HCPCS | Performed by: INTERNAL MEDICINE

## 2024-11-27 PROCEDURE — 1036F TOBACCO NON-USER: CPT | Performed by: INTERNAL MEDICINE

## 2024-11-27 PROCEDURE — 1111F DSCHRG MED/CURRENT MED MERGE: CPT | Performed by: INTERNAL MEDICINE

## 2024-11-27 PROCEDURE — 80076 HEPATIC FUNCTION PANEL: CPT

## 2024-11-27 PROCEDURE — 96368 THER/DIAG CONCURRENT INF: CPT

## 2024-11-27 PROCEDURE — 96375 TX/PRO/DX INJ NEW DRUG ADDON: CPT

## 2024-11-27 PROCEDURE — 96367 TX/PROPH/DG ADDL SEQ IV INF: CPT

## 2024-11-27 PROCEDURE — 6360000002 HC RX W HCPCS: Performed by: INTERNAL MEDICINE

## 2024-11-27 PROCEDURE — 99211 OFF/OP EST MAY X REQ PHY/QHP: CPT

## 2024-11-27 PROCEDURE — 85025 COMPLETE CBC W/AUTO DIFF WBC: CPT

## 2024-11-27 PROCEDURE — 96416 CHEMO PROLONG INFUSE W/PUMP: CPT

## 2024-11-27 PROCEDURE — 99214 OFFICE O/P EST MOD 30 MIN: CPT | Performed by: INTERNAL MEDICINE

## 2024-11-27 PROCEDURE — 2580000003 HC RX 258: Performed by: INTERNAL MEDICINE

## 2024-11-27 PROCEDURE — 96413 CHEMO IV INFUSION 1 HR: CPT

## 2024-11-27 PROCEDURE — 96417 CHEMO IV INFUS EACH ADDL SEQ: CPT

## 2024-11-27 PROCEDURE — 3017F COLORECTAL CA SCREEN DOC REV: CPT | Performed by: INTERNAL MEDICINE

## 2024-11-27 PROCEDURE — G8484 FLU IMMUNIZE NO ADMIN: HCPCS | Performed by: INTERNAL MEDICINE

## 2024-11-27 PROCEDURE — 96415 CHEMO IV INFUSION ADDL HR: CPT

## 2024-11-27 RX ORDER — ACETAMINOPHEN 325 MG/1
650 TABLET ORAL
OUTPATIENT
Start: 2024-11-27

## 2024-11-27 RX ORDER — EPINEPHRINE 1 MG/ML
0.3 INJECTION, SOLUTION, CONCENTRATE INTRAVENOUS PRN
Status: CANCELLED | OUTPATIENT
Start: 2024-11-27

## 2024-11-27 RX ORDER — ONDANSETRON 2 MG/ML
8 INJECTION INTRAMUSCULAR; INTRAVENOUS
OUTPATIENT
Start: 2024-11-27

## 2024-11-27 RX ORDER — SODIUM CHLORIDE 0.9 % (FLUSH) 0.9 %
5-40 SYRINGE (ML) INJECTION PRN
OUTPATIENT
Start: 2024-11-27

## 2024-11-27 RX ORDER — ONDANSETRON 2 MG/ML
8 INJECTION INTRAMUSCULAR; INTRAVENOUS
Status: CANCELLED | OUTPATIENT
Start: 2024-11-27

## 2024-11-27 RX ORDER — SODIUM CHLORIDE 9 MG/ML
5-250 INJECTION, SOLUTION INTRAVENOUS PRN
Status: CANCELLED | OUTPATIENT
Start: 2024-11-29

## 2024-11-27 RX ORDER — HYDROCORTISONE SODIUM SUCCINATE 100 MG/2ML
100 INJECTION INTRAMUSCULAR; INTRAVENOUS
OUTPATIENT
Start: 2024-11-27

## 2024-11-27 RX ORDER — SODIUM CHLORIDE 0.9 % (FLUSH) 0.9 %
5-40 SYRINGE (ML) INJECTION PRN
Status: CANCELLED | OUTPATIENT
Start: 2024-11-27

## 2024-11-27 RX ORDER — ATROPINE SULFATE 0.4 MG/ML
0.4 INJECTION, SOLUTION INTRAVENOUS ONCE
Status: DISCONTINUED | OUTPATIENT
Start: 2024-11-27 | End: 2024-11-28 | Stop reason: HOSPADM

## 2024-11-27 RX ORDER — DIPHENHYDRAMINE HYDROCHLORIDE 50 MG/ML
50 INJECTION INTRAMUSCULAR; INTRAVENOUS
Status: CANCELLED | OUTPATIENT
Start: 2024-11-27

## 2024-11-27 RX ORDER — HEPARIN SODIUM (PORCINE) LOCK FLUSH IV SOLN 100 UNIT/ML 100 UNIT/ML
500 SOLUTION INTRAVENOUS PRN
Status: CANCELLED | OUTPATIENT
Start: 2024-11-27

## 2024-11-27 RX ORDER — PALONOSETRON 0.05 MG/ML
0.25 INJECTION, SOLUTION INTRAVENOUS ONCE
Status: CANCELLED | OUTPATIENT
Start: 2024-11-27 | End: 2024-11-27

## 2024-11-27 RX ORDER — DEXTROSE MONOHYDRATE 50 MG/ML
5-250 INJECTION, SOLUTION INTRAVENOUS PRN
Status: DISCONTINUED | OUTPATIENT
Start: 2024-11-27 | End: 2024-11-28 | Stop reason: HOSPADM

## 2024-11-27 RX ORDER — HEPARIN SODIUM (PORCINE) LOCK FLUSH IV SOLN 100 UNIT/ML 100 UNIT/ML
500 SOLUTION INTRAVENOUS PRN
Status: CANCELLED | OUTPATIENT
Start: 2024-11-29

## 2024-11-27 RX ORDER — SODIUM CHLORIDE 9 MG/ML
25 INJECTION, SOLUTION INTRAVENOUS PRN
OUTPATIENT
Start: 2024-11-27

## 2024-11-27 RX ORDER — DEXTROSE MONOHYDRATE 50 MG/ML
5-250 INJECTION, SOLUTION INTRAVENOUS PRN
Status: CANCELLED | OUTPATIENT
Start: 2024-11-27

## 2024-11-27 RX ORDER — SODIUM CHLORIDE 0.9 % (FLUSH) 0.9 %
5-40 SYRINGE (ML) INJECTION PRN
Status: CANCELLED | OUTPATIENT
Start: 2024-11-29

## 2024-11-27 RX ORDER — HYDROCORTISONE SODIUM SUCCINATE 100 MG/2ML
100 INJECTION INTRAMUSCULAR; INTRAVENOUS
Status: CANCELLED | OUTPATIENT
Start: 2024-11-27

## 2024-11-27 RX ORDER — ACETAMINOPHEN 325 MG/1
650 TABLET ORAL
Status: CANCELLED | OUTPATIENT
Start: 2024-11-27

## 2024-11-27 RX ORDER — SODIUM CHLORIDE 9 MG/ML
5-250 INJECTION, SOLUTION INTRAVENOUS PRN
Status: CANCELLED | OUTPATIENT
Start: 2024-11-27

## 2024-11-27 RX ORDER — HEPARIN 100 UNIT/ML
500 SYRINGE INTRAVENOUS PRN
Status: DISCONTINUED | OUTPATIENT
Start: 2024-11-27 | End: 2024-11-28 | Stop reason: HOSPADM

## 2024-11-27 RX ORDER — FAMOTIDINE 10 MG/ML
20 INJECTION, SOLUTION INTRAVENOUS
Status: CANCELLED | OUTPATIENT
Start: 2024-11-27

## 2024-11-27 RX ORDER — EPINEPHRINE 1 MG/ML
0.3 INJECTION, SOLUTION INTRAMUSCULAR; SUBCUTANEOUS PRN
OUTPATIENT
Start: 2024-11-27

## 2024-11-27 RX ORDER — DIPHENHYDRAMINE HYDROCHLORIDE 50 MG/ML
50 INJECTION INTRAMUSCULAR; INTRAVENOUS
OUTPATIENT
Start: 2024-11-27

## 2024-11-27 RX ORDER — ALBUTEROL SULFATE 90 UG/1
4 INHALANT RESPIRATORY (INHALATION) PRN
Status: CANCELLED | OUTPATIENT
Start: 2024-11-27

## 2024-11-27 RX ORDER — SODIUM CHLORIDE 9 MG/ML
INJECTION, SOLUTION INTRAVENOUS CONTINUOUS
OUTPATIENT
Start: 2024-11-27

## 2024-11-27 RX ORDER — PROCHLORPERAZINE EDISYLATE 5 MG/ML
5 INJECTION INTRAMUSCULAR; INTRAVENOUS
Status: CANCELLED | OUTPATIENT
Start: 2024-11-27

## 2024-11-27 RX ORDER — MEPERIDINE HYDROCHLORIDE 50 MG/ML
12.5 INJECTION INTRAMUSCULAR; INTRAVENOUS; SUBCUTANEOUS PRN
Status: CANCELLED | OUTPATIENT
Start: 2024-11-27

## 2024-11-27 RX ORDER — SODIUM CHLORIDE 0.9 % (FLUSH) 0.9 %
5-40 SYRINGE (ML) INJECTION PRN
Status: DISCONTINUED | OUTPATIENT
Start: 2024-11-27 | End: 2024-11-28 | Stop reason: HOSPADM

## 2024-11-27 RX ORDER — HEPARIN 100 UNIT/ML
500 SYRINGE INTRAVENOUS PRN
OUTPATIENT
Start: 2024-11-27

## 2024-11-27 RX ORDER — PALONOSETRON 0.05 MG/ML
0.25 INJECTION, SOLUTION INTRAVENOUS ONCE
Status: COMPLETED | OUTPATIENT
Start: 2024-11-27 | End: 2024-11-27

## 2024-11-27 RX ORDER — ALBUTEROL SULFATE 90 UG/1
4 INHALANT RESPIRATORY (INHALATION) PRN
OUTPATIENT
Start: 2024-11-27

## 2024-11-27 RX ORDER — SODIUM CHLORIDE 9 MG/ML
INJECTION, SOLUTION INTRAVENOUS CONTINUOUS
Status: CANCELLED | OUTPATIENT
Start: 2024-11-27

## 2024-11-27 RX ORDER — DEXAMETHASONE SODIUM PHOSPHATE 4 MG/ML
12 INJECTION, SOLUTION INTRA-ARTICULAR; INTRALESIONAL; INTRAMUSCULAR; INTRAVENOUS; SOFT TISSUE ONCE
Status: COMPLETED | OUTPATIENT
Start: 2024-11-27 | End: 2024-11-27

## 2024-11-27 RX ADMIN — SODIUM CHLORIDE 150 MG: 9 INJECTION, SOLUTION INTRAVENOUS at 09:26

## 2024-11-27 RX ADMIN — OXALIPLATIN 125 MG: 5 INJECTION, SOLUTION INTRAVENOUS at 10:05

## 2024-11-27 RX ADMIN — DEXAMETHASONE SODIUM PHOSPHATE 12 MG: 4 INJECTION, SOLUTION INTRAMUSCULAR; INTRAVENOUS at 09:04

## 2024-11-27 RX ADMIN — SODIUM CHLORIDE 3500 MG: 9 INJECTION, SOLUTION INTRAVENOUS at 13:59

## 2024-11-27 RX ADMIN — LEUCOVORIN CALCIUM 600 MG: 200 INJECTION, POWDER, LYOPHILIZED, FOR SUSPENSION INTRAMUSCULAR; INTRAVENOUS at 12:17

## 2024-11-27 RX ADMIN — SODIUM CHLORIDE, PRESERVATIVE FREE 30 ML: 5 INJECTION INTRAVENOUS at 08:20

## 2024-11-27 RX ADMIN — DEXTROSE MONOHYDRATE 55 ML/HR: 50 INJECTION, SOLUTION INTRAVENOUS at 08:59

## 2024-11-27 RX ADMIN — PALONOSETRON HYDROCHLORIDE 0.25 MG: 0.25 INJECTION INTRAVENOUS at 09:01

## 2024-11-27 RX ADMIN — IRINOTECAN HYDROCHLORIDE 220 MG: 20 INJECTION, SOLUTION INTRAVENOUS at 12:16

## 2024-11-27 ASSESSMENT — ENCOUNTER SYMPTOMS
ABDOMINAL DISTENTION: 1
ABDOMINAL PAIN: 1
CONSTIPATION: 1

## 2024-11-27 NOTE — PROGRESS NOTES
once you have finished fluconazole prescription from prior to admission. Last day of fluconazole will be 12/13/24. 18 tablet 0    apixaban (ELIQUIS) 5 MG TABS tablet Take 1 tablet by mouth 2 times daily 60 tablet 0    sodium chloride 1 g tablet Take 2 tablets by mouth every 12 hours for 14 days 56 tablet 0    midodrine (PROAMATINE) 2.5 MG tablet Take 1 tablet by mouth 2 times daily as needed (For SBP < 90mmhg) For systolic blood pressure < 90mmHg.     To be taken with meal 90 tablet 3    fluconazole (DIFLUCAN) 100 MG tablet Take 200 mg the first day; 100 mg once daily thereafter, for 14 days 15 tablet 0    polyethylene glycol (GLYCOLAX) 17 g packet Take 1 packet by mouth daily as needed for Constipation      loperamide (IMODIUM A-D) 2 MG tablet Take 1 tablet by mouth 4 times daily as needed for Diarrhea 60 tablet 1    lidocaine-prilocaine (EMLA) 2.5-2.5 % cream Apply topically as needed. 1 each 0    senna-docusate (SENOKOT S) 8.6-50 MG per tablet Take 1 tablet by mouth 2 times daily 60 tablet 2    famotidine (PEPCID) 20 MG tablet Take 1 tablet by mouth daily 60 tablet 0    ondansetron (ZOFRAN-ODT) 4 MG disintegrating tablet Take 1 tablet by mouth every 8 hours as needed for Nausea or Vomiting 30 tablet 0     No current facility-administered medications for this visit.     Facility-Administered Medications Ordered in Other Visits   Medication Dose Route Frequency Provider Last Rate Last Admin    sodium chloride flush 0.9 % injection 5-40 mL  5-40 mL IntraVENous PRN Jose Irving MD   30 mL at 11/27/24 0820    heparin (PF) 100 UNIT/ML injection 500 Units  500 Units IntraCATHeter PRN Jose Irving MD        dextrose 5 % solution  5-250 mL/hr IntraVENous PRN Jose Irving MD        dexAMETHasone Sodium Phosphate injection 12 mg  12 mg IntraVENous Once Jose Irving MD        palonosetron (ALOXI) injection 0.25 mg  0.25 mg IntraVENous Once Jose Irving MD        fosaprepitant (EMEND) 150 mg in

## 2024-11-27 NOTE — ONCOLOGY

## 2024-11-27 NOTE — DISCHARGE INSTRUCTIONS
Please contact your Oncologist if you have any questions regarding the chemotherapy Oxaliplatin/Camptosar/Leucovorin/5FU that you received today.      Patient instructed if experience any of the symptoms following today's chemotherapy treatment with CADD to notify MD immediately or go to emergency department.    * dizziness/lightheadedness  *acute nausea/vomiting - not relieved with medication  *headache - not relieved from Tylenol/pain medication  *chest pain/pressure  *rash/itching  *shortness of breath        Drink fluids - 48oz fluids daily  Call if develop fever 100.4 or greater/ chills/ signs or symptoms of infection   Please call with any questions/alarms related to CADD    Physician's instructions:  Instructions  Return in about 2 weeks (around 12/11/2024).  For chemo today  Omit Neulasta due to neutrophilia

## 2024-11-27 NOTE — PLAN OF CARE
of breakdown noted. Patient verbalizes signs/symptoms of port infection and when to notify the physician.

## 2024-11-27 NOTE — PROGRESS NOTES
Patient assessed for the following post chemotherapy:    Dizziness   No  Lightheadedness  No      Acute nausea/vomiting No  Headache   No  Chest pain/pressure  No  Rash/itching   No  Shortness of breath  No    Patient kept for 20 minutes observation post infusion chemotherapy.    Patient tolerated chemotherapy treatment Oxaliplatin/Camptosar/Leucovorin/5FU without any complications.    Last vital signs:   /72   Pulse (!) 110   Temp 97.8 °F (36.6 °C) (Oral)   Resp 16   Ht 1.829 m (6' 0.01\")   Wt 84.9 kg (187 lb 3.2 oz)   SpO2 94%   BMI 25.38 kg/m²     Physician's instructions:  For chemo today  Omit Neulasta due to neutrophilia     Patient instructed if experience any of the above symptoms following today's infusion, he is to notify MD immediately or go to the emergency department.    Discharge instructions given to patient. Verbalizes understanding. Ambulated off unit per self, accompanied by spouse, with belongings and CADD infusing.

## 2024-11-29 ENCOUNTER — HOSPITAL ENCOUNTER (OUTPATIENT)
Dept: INFUSION THERAPY | Age: 54
Discharge: HOME | End: 2024-11-29
Payer: COMMERCIAL

## 2024-11-29 VITALS
HEART RATE: 95 BPM | DIASTOLIC BLOOD PRESSURE: 73 MMHG | RESPIRATION RATE: 16 BRPM | SYSTOLIC BLOOD PRESSURE: 118 MMHG | OXYGEN SATURATION: 97 % | TEMPERATURE: 97.2 F

## 2024-11-29 DIAGNOSIS — C25.2 MALIGNANT NEOPLASM OF TAIL OF PANCREAS (HCC): Primary | ICD-10-CM

## 2024-11-29 PROCEDURE — 6360000002 HC RX W HCPCS: Performed by: INTERNAL MEDICINE

## 2024-11-29 PROCEDURE — 2580000003 HC RX 258: Performed by: INTERNAL MEDICINE

## 2024-11-29 PROCEDURE — 96523 IRRIG DRUG DELIVERY DEVICE: CPT

## 2024-11-29 RX ORDER — SODIUM CHLORIDE 0.9 % (FLUSH) 0.9 %
5-40 SYRINGE (ML) INJECTION PRN
Status: DISCONTINUED | OUTPATIENT
Start: 2024-11-29 | End: 2024-11-30 | Stop reason: HOSPADM

## 2024-11-29 RX ORDER — HEPARIN 100 UNIT/ML
500 SYRINGE INTRAVENOUS PRN
Status: DISCONTINUED | OUTPATIENT
Start: 2024-11-29 | End: 2024-11-30 | Stop reason: HOSPADM

## 2024-11-29 RX ADMIN — SODIUM CHLORIDE, PRESERVATIVE FREE 10 ML: 5 INJECTION INTRAVENOUS at 12:07

## 2024-11-29 RX ADMIN — HEPARIN 500 UNITS: 100 SYRINGE at 12:07

## 2024-11-29 NOTE — PLAN OF CARE
Problem: Discharge Planning  Goal: Discharge to home or other facility with appropriate resources  Outcome: Progressing     Problem: Safety - Adult  Goal: Free from fall injury  Outcome: Progressing     Problem: Chronic Conditions and Co-morbidities  Goal: Patient's chronic conditions and co-morbidity symptoms are monitored and maintained or improved  Outcome: Progressing  Flowsheets (Taken 11/29/2024 0322)  Care Plan - Patient's Chronic Conditions and Co-Morbidity Symptoms are Monitored and Maintained or Improved: Monitor and assess patient's chronic conditions and comorbid symptoms for stability, deterioration, or improvement  Note: Tolerated pump off   Care plan reviewed with patient.  Patient verbalizes understanding of the plan of care and contributes to goal setting.

## 2024-12-05 LAB
FOUNDATION MEDICINE BLOOD TUMOR MUTATIONAL BURDEN: NORMAL
FOUNDATION MEDICINE CTDNA TUMOR FRACTION: NORMAL
FOUNDATION MEDICINE RESULT STATUS: NORMAL
MSI CA SPEC-IMP: NORMAL

## 2024-12-09 DIAGNOSIS — C25.2 MALIGNANT NEOPLASM OF TAIL OF PANCREAS (HCC): ICD-10-CM

## 2024-12-10 ENCOUNTER — HOSPITAL ENCOUNTER (OUTPATIENT)
Dept: INFUSION THERAPY | Age: 54
Discharge: HOME | End: 2024-12-10
Attending: INTERNAL MEDICINE
Payer: COMMERCIAL

## 2024-12-10 ENCOUNTER — CASE MANAGEMENT (OUTPATIENT)
Dept: CASE MANAGEMENT | Age: 54
End: 2024-12-10

## 2024-12-10 ENCOUNTER — OFFICE VISIT (OUTPATIENT)
Dept: ONCOLOGY | Age: 54
End: 2024-12-10

## 2024-12-10 VITALS
OXYGEN SATURATION: 96 % | DIASTOLIC BLOOD PRESSURE: 76 MMHG | RESPIRATION RATE: 16 BRPM | HEART RATE: 107 BPM | WEIGHT: 177.4 LBS | BODY MASS INDEX: 24.03 KG/M2 | HEIGHT: 72 IN | SYSTOLIC BLOOD PRESSURE: 107 MMHG | TEMPERATURE: 97.5 F

## 2024-12-10 VITALS
SYSTOLIC BLOOD PRESSURE: 102 MMHG | WEIGHT: 177.4 LBS | OXYGEN SATURATION: 97 % | TEMPERATURE: 98.1 F | HEART RATE: 90 BPM | RESPIRATION RATE: 16 BRPM | BODY MASS INDEX: 24.05 KG/M2 | DIASTOLIC BLOOD PRESSURE: 66 MMHG

## 2024-12-10 DIAGNOSIS — C25.2 MALIGNANT NEOPLASM OF TAIL OF PANCREAS (HCC): Primary | ICD-10-CM

## 2024-12-10 DIAGNOSIS — Z51.5 PALLIATIVE CARE PATIENT: ICD-10-CM

## 2024-12-10 DIAGNOSIS — G89.3 CANCER RELATED PAIN: ICD-10-CM

## 2024-12-10 DIAGNOSIS — Z51.11 ENCOUNTER FOR CHEMOTHERAPY MANAGEMENT: ICD-10-CM

## 2024-12-10 LAB
ALBUMIN SERPL BCG-MCNC: 2.8 G/DL (ref 3.5–5.1)
ALP SERPL-CCNC: 214 U/L (ref 38–126)
ALT SERPL W/O P-5'-P-CCNC: 37 U/L (ref 11–66)
AST SERPL-CCNC: 34 U/L (ref 5–40)
BASOPHILS ABSOLUTE: 0 THOU/MM3 (ref 0–0.1)
BASOPHILS NFR BLD AUTO: 0 % (ref 0–3)
BILIRUB CONJ SERPL-MCNC: 0.2 MG/DL (ref 0.1–13.8)
BILIRUB SERPL-MCNC: 0.3 MG/DL (ref 0.3–1.2)
BUN BLDP-MCNC: 13 MG/DL (ref 8–26)
CHLORIDE BLD-SCNC: 103 MEQ/L (ref 98–109)
CREAT BLD-MCNC: 0.8 MG/DL (ref 0.5–1.2)
EOSINOPHIL NFR BLD AUTO: 3 % (ref 0–4)
EOSINOPHILS ABSOLUTE: 0.2 THOU/MM3 (ref 0–0.4)
ERYTHROCYTE [DISTWIDTH] IN BLOOD BY AUTOMATED COUNT: 14.5 % (ref 11.5–14.5)
GFR SERPL CREATININE-BSD FRML MDRD: > 90 ML/MIN/1.73M2
GLUCOSE BLD-MCNC: 123 MG/DL (ref 70–108)
HCT VFR BLD AUTO: 34.6 % (ref 42–52)
HGB BLD-MCNC: 11 GM/DL (ref 14–18)
IMMATURE GRANULOCYTES %: 0 %
IMMATURE GRANULOCYTES ABSOLUTE: 0.02 THOU/MM3 (ref 0–0.07)
IONIZED CALCIUM, WHOLE BLOOD: 1.22 MMOL/L (ref 1.12–1.32)
LYMPHOCYTES ABSOLUTE: 0.7 THOU/MM3 (ref 1–4.8)
LYMPHOCYTES NFR BLD AUTO: 12 % (ref 15–47)
MCH RBC QN AUTO: 29.8 PG (ref 26–33)
MCHC RBC AUTO-ENTMCNC: 31.8 GM/DL (ref 32.2–35.5)
MCV RBC AUTO: 94 FL (ref 80–94)
MONOCYTES ABSOLUTE: 0.8 THOU/MM3 (ref 0.4–1.3)
MONOCYTES NFR BLD AUTO: 14 % (ref 0–12)
NEUTROPHILS ABSOLUTE: 4.1 THOU/MM3 (ref 1.8–7.7)
NEUTROPHILS NFR BLD AUTO: 71 % (ref 43–75)
PLATELET # BLD AUTO: 215 THOU/MM3 (ref 130–400)
PMV BLD AUTO: 9.5 FL (ref 9.4–12.4)
POTASSIUM BLD-SCNC: 3.5 MEQ/L (ref 3.5–4.9)
PROT SERPL-MCNC: 6.5 G/DL (ref 6.1–8)
RBC # BLD AUTO: 3.69 MILL/MM3 (ref 4.7–6.1)
SODIUM BLD-SCNC: 141 MEQ/L (ref 138–146)
TOTAL CO2, WHOLE BLOOD: 27 MEQ/L (ref 23–33)
WBC # BLD AUTO: 5.7 THOU/MM3 (ref 4.8–10.8)

## 2024-12-10 PROCEDURE — 96375 TX/PRO/DX INJ NEW DRUG ADDON: CPT

## 2024-12-10 PROCEDURE — 2580000003 HC RX 258: Performed by: INTERNAL MEDICINE

## 2024-12-10 PROCEDURE — 96367 TX/PROPH/DG ADDL SEQ IV INF: CPT

## 2024-12-10 PROCEDURE — 96413 CHEMO IV INFUSION 1 HR: CPT

## 2024-12-10 PROCEDURE — 96368 THER/DIAG CONCURRENT INF: CPT

## 2024-12-10 PROCEDURE — 85025 COMPLETE CBC W/AUTO DIFF WBC: CPT

## 2024-12-10 PROCEDURE — 80047 BASIC METABLC PNL IONIZED CA: CPT

## 2024-12-10 PROCEDURE — 96417 CHEMO IV INFUS EACH ADDL SEQ: CPT

## 2024-12-10 PROCEDURE — 80076 HEPATIC FUNCTION PANEL: CPT

## 2024-12-10 PROCEDURE — 36591 DRAW BLOOD OFF VENOUS DEVICE: CPT

## 2024-12-10 PROCEDURE — 96415 CHEMO IV INFUSION ADDL HR: CPT

## 2024-12-10 PROCEDURE — 99211 OFF/OP EST MAY X REQ PHY/QHP: CPT

## 2024-12-10 PROCEDURE — 6360000002 HC RX W HCPCS: Performed by: INTERNAL MEDICINE

## 2024-12-10 PROCEDURE — 96416 CHEMO PROLONG INFUSE W/PUMP: CPT

## 2024-12-10 RX ORDER — DEXTROSE MONOHYDRATE 50 MG/ML
5-250 INJECTION, SOLUTION INTRAVENOUS PRN
Status: CANCELLED | OUTPATIENT
Start: 2024-12-11

## 2024-12-10 RX ORDER — PALONOSETRON 0.05 MG/ML
0.25 INJECTION, SOLUTION INTRAVENOUS ONCE
Status: CANCELLED | OUTPATIENT
Start: 2024-12-11 | End: 2024-12-11

## 2024-12-10 RX ORDER — FAMOTIDINE 10 MG/ML
20 INJECTION, SOLUTION INTRAVENOUS
Status: CANCELLED | OUTPATIENT
Start: 2024-12-11

## 2024-12-10 RX ORDER — HEPARIN 100 UNIT/ML
500 SYRINGE INTRAVENOUS PRN
OUTPATIENT
Start: 2024-12-10

## 2024-12-10 RX ORDER — HYDROCORTISONE SODIUM SUCCINATE 100 MG/2ML
100 INJECTION INTRAMUSCULAR; INTRAVENOUS
Status: CANCELLED | OUTPATIENT
Start: 2024-12-11

## 2024-12-10 RX ORDER — SODIUM CHLORIDE 9 MG/ML
25 INJECTION, SOLUTION INTRAVENOUS PRN
OUTPATIENT
Start: 2024-12-10

## 2024-12-10 RX ORDER — ONDANSETRON 2 MG/ML
8 INJECTION INTRAMUSCULAR; INTRAVENOUS
Status: CANCELLED | OUTPATIENT
Start: 2024-12-11

## 2024-12-10 RX ORDER — SODIUM CHLORIDE 0.9 % (FLUSH) 0.9 %
5-40 SYRINGE (ML) INJECTION PRN
Status: CANCELLED | OUTPATIENT
Start: 2024-12-13

## 2024-12-10 RX ORDER — ONDANSETRON 2 MG/ML
8 INJECTION INTRAMUSCULAR; INTRAVENOUS
OUTPATIENT
Start: 2024-12-10

## 2024-12-10 RX ORDER — SODIUM CHLORIDE 9 MG/ML
5-250 INJECTION, SOLUTION INTRAVENOUS PRN
Status: CANCELLED | OUTPATIENT
Start: 2024-12-11

## 2024-12-10 RX ORDER — HEPARIN SODIUM (PORCINE) LOCK FLUSH IV SOLN 100 UNIT/ML 100 UNIT/ML
500 SOLUTION INTRAVENOUS PRN
Status: CANCELLED | OUTPATIENT
Start: 2024-12-11

## 2024-12-10 RX ORDER — DIPHENHYDRAMINE HYDROCHLORIDE 50 MG/ML
50 INJECTION INTRAMUSCULAR; INTRAVENOUS
Status: CANCELLED | OUTPATIENT
Start: 2024-12-11

## 2024-12-10 RX ORDER — HYDROCORTISONE SODIUM SUCCINATE 100 MG/2ML
100 INJECTION INTRAMUSCULAR; INTRAVENOUS
OUTPATIENT
Start: 2024-12-10

## 2024-12-10 RX ORDER — ALBUTEROL SULFATE 90 UG/1
4 INHALANT RESPIRATORY (INHALATION) PRN
Status: CANCELLED | OUTPATIENT
Start: 2024-12-11

## 2024-12-10 RX ORDER — ATROPINE SULFATE 0.4 MG/ML
0.4 INJECTION, SOLUTION INTRAVENOUS ONCE
Status: COMPLETED | OUTPATIENT
Start: 2024-12-10 | End: 2024-12-10

## 2024-12-10 RX ORDER — SODIUM CHLORIDE 9 MG/ML
5-250 INJECTION, SOLUTION INTRAVENOUS PRN
Status: CANCELLED | OUTPATIENT
Start: 2024-12-13

## 2024-12-10 RX ORDER — EPINEPHRINE 1 MG/ML
0.3 INJECTION, SOLUTION, CONCENTRATE INTRAVENOUS PRN
Status: CANCELLED | OUTPATIENT
Start: 2024-12-11

## 2024-12-10 RX ORDER — ALBUTEROL SULFATE 90 UG/1
4 INHALANT RESPIRATORY (INHALATION) PRN
OUTPATIENT
Start: 2024-12-10

## 2024-12-10 RX ORDER — ACETAMINOPHEN 325 MG/1
650 TABLET ORAL
OUTPATIENT
Start: 2024-12-10

## 2024-12-10 RX ORDER — HEPARIN 100 UNIT/ML
500 SYRINGE INTRAVENOUS PRN
Status: DISCONTINUED | OUTPATIENT
Start: 2024-12-10 | End: 2024-12-11 | Stop reason: HOSPADM

## 2024-12-10 RX ORDER — DEXTROSE MONOHYDRATE 50 MG/ML
5-250 INJECTION, SOLUTION INTRAVENOUS PRN
Status: DISCONTINUED | OUTPATIENT
Start: 2024-12-10 | End: 2024-12-11 | Stop reason: HOSPADM

## 2024-12-10 RX ORDER — SODIUM CHLORIDE 0.9 % (FLUSH) 0.9 %
5-40 SYRINGE (ML) INJECTION PRN
Status: DISCONTINUED | OUTPATIENT
Start: 2024-12-10 | End: 2024-12-11 | Stop reason: HOSPADM

## 2024-12-10 RX ORDER — DEXAMETHASONE SODIUM PHOSPHATE 4 MG/ML
12 INJECTION, SOLUTION INTRA-ARTICULAR; INTRALESIONAL; INTRAMUSCULAR; INTRAVENOUS; SOFT TISSUE ONCE
Status: COMPLETED | OUTPATIENT
Start: 2024-12-10 | End: 2024-12-10

## 2024-12-10 RX ORDER — SODIUM CHLORIDE 9 MG/ML
INJECTION, SOLUTION INTRAVENOUS CONTINUOUS
Status: CANCELLED | OUTPATIENT
Start: 2024-12-11

## 2024-12-10 RX ORDER — SODIUM CHLORIDE 0.9 % (FLUSH) 0.9 %
5-40 SYRINGE (ML) INJECTION PRN
Status: CANCELLED | OUTPATIENT
Start: 2024-12-11

## 2024-12-10 RX ORDER — HEPARIN SODIUM (PORCINE) LOCK FLUSH IV SOLN 100 UNIT/ML 100 UNIT/ML
500 SOLUTION INTRAVENOUS PRN
Status: CANCELLED | OUTPATIENT
Start: 2024-12-13

## 2024-12-10 RX ORDER — PROCHLORPERAZINE EDISYLATE 5 MG/ML
5 INJECTION INTRAMUSCULAR; INTRAVENOUS
Status: CANCELLED | OUTPATIENT
Start: 2024-12-11

## 2024-12-10 RX ORDER — SODIUM CHLORIDE 9 MG/ML
INJECTION, SOLUTION INTRAVENOUS CONTINUOUS
OUTPATIENT
Start: 2024-12-10

## 2024-12-10 RX ORDER — MEPERIDINE HYDROCHLORIDE 50 MG/ML
12.5 INJECTION INTRAMUSCULAR; INTRAVENOUS; SUBCUTANEOUS PRN
Status: CANCELLED | OUTPATIENT
Start: 2024-12-11

## 2024-12-10 RX ORDER — ACETAMINOPHEN 325 MG/1
650 TABLET ORAL
Status: CANCELLED | OUTPATIENT
Start: 2024-12-11

## 2024-12-10 RX ORDER — EPINEPHRINE 1 MG/ML
0.3 INJECTION, SOLUTION INTRAMUSCULAR; SUBCUTANEOUS PRN
OUTPATIENT
Start: 2024-12-10

## 2024-12-10 RX ORDER — SODIUM CHLORIDE 0.9 % (FLUSH) 0.9 %
5-40 SYRINGE (ML) INJECTION PRN
OUTPATIENT
Start: 2024-12-10

## 2024-12-10 RX ORDER — PALONOSETRON 0.05 MG/ML
0.25 INJECTION, SOLUTION INTRAVENOUS ONCE
Status: COMPLETED | OUTPATIENT
Start: 2024-12-10 | End: 2024-12-10

## 2024-12-10 RX ORDER — DIPHENHYDRAMINE HYDROCHLORIDE 50 MG/ML
50 INJECTION INTRAMUSCULAR; INTRAVENOUS
OUTPATIENT
Start: 2024-12-10

## 2024-12-10 RX ADMIN — DEXAMETHASONE SODIUM PHOSPHATE 12 MG: 4 INJECTION, SOLUTION INTRAMUSCULAR; INTRAVENOUS at 11:20

## 2024-12-10 RX ADMIN — PALONOSETRON 0.25 MG: 0.05 INJECTION, SOLUTION INTRAVENOUS at 11:17

## 2024-12-10 RX ADMIN — OXALIPLATIN 125 MG: 5 INJECTION, SOLUTION INTRAVENOUS at 12:33

## 2024-12-10 RX ADMIN — LEUCOVORIN CALCIUM 600 MG: 200 INJECTION, POWDER, LYOPHILIZED, FOR SUSPENSION INTRAMUSCULAR; INTRAVENOUS at 14:49

## 2024-12-10 RX ADMIN — SODIUM CHLORIDE, PRESERVATIVE FREE 30 ML: 5 INJECTION INTRAVENOUS at 10:17

## 2024-12-10 RX ADMIN — IRINOTECAN HYDROCHLORIDE 220 MG: 20 INJECTION, SOLUTION INTRAVENOUS at 14:48

## 2024-12-10 RX ADMIN — FLUOROURACIL 3500 MG: 50 INJECTION, SOLUTION INTRAVENOUS at 16:31

## 2024-12-10 RX ADMIN — ATROPINE SULFATE 0.4 MG: 0.4 INJECTION, SOLUTION INTRAVENOUS at 14:45

## 2024-12-10 RX ADMIN — SODIUM CHLORIDE 150 MG: 9 INJECTION, SOLUTION INTRAVENOUS at 11:57

## 2024-12-10 RX ADMIN — DEXTROSE MONOHYDRATE 55 ML/HR: 50 INJECTION, SOLUTION INTRAVENOUS at 11:16

## 2024-12-10 NOTE — PLAN OF CARE
Problem: Discharge Planning  Goal: Discharge to home or other facility with appropriate resources  Outcome: Adequate for Discharge  Flowsheets (Taken 12/10/2024 1015)  Discharge to home or other facility with appropriate resources: Identify barriers to discharge with patient and caregiver     Problem: Safety - Adult  Goal: Free from fall injury  Outcome: Adequate for Discharge  Flowsheets (Taken 12/10/2024 1015)  Free From Fall Injury: Instruct family/caregiver on patient safety     Problem: Chronic Conditions and Co-morbidities  Goal: Patient's chronic conditions and co-morbidity symptoms are monitored and maintained or improved  Outcome: Adequate for Discharge  Flowsheets (Taken 12/10/2024 1015)  Care Plan - Patient's Chronic Conditions and Co-Morbidity Symptoms are Monitored and Maintained or Improved:   Monitor and assess patient's chronic conditions and comorbid symptoms for stability, deterioration, or improvement   Collaborate with multidisciplinary team to address chronic and comorbid conditions and prevent exacerbation or deterioration  Note:   Chemotherapy Teaching     What is Chemotherapy   Drug action [x]   Method of Administration [x]   Handouts given []     Side Effects  Nausea/vomiting [x]   Diarrhea [x]   Fatigue [x]   Signs / Symptoms of infection [x]   Neutropenia [x]   Thrombocytopenia [x]   Alopecia [x]   neuropathy [x]   [place] diet &  the importance of fluids [x]       Micellaneous  Importance of nutrition [x]   Importance of oral hygiene [x]   When to call the MD [x]   Monitoring labs [x]   Use of supportive services []     Explanation of Drug Regimen / Frequency  Oxaliplatin/Camptosar/5FU     Comments  Verbalized understanding to drug,action,side effects and when to call MD        Problem: Infection - Adult  Goal: Absence of infection at discharge  Outcome: Adequate for Discharge  Flowsheets (Taken 12/10/2024 1015)  Absence of infection at discharge:   Assess and monitor for signs and

## 2024-12-10 NOTE — PROGRESS NOTES
Patient assessed for the following post chemotherapy:    Dizziness   No  Lightheadedness  No      Acute nausea/vomiting No  Headache   No  Chest pain/pressure  No  Rash/itching   No  Shortness of breath  No    Patient kept for 20 minutes observation post infusion chemotherapy.    Patient tolerated chemotherapy treatment Oxaliplatin/Camptosar/Leucovorin/5FU without any complications.    Last vital signs:   /76   Pulse (!) 107   Temp 97.5 °F (36.4 °C) (Oral)   Resp 16   Wt 80.5 kg (177 lb 6.4 oz)   SpO2 96%   BMI 24.05 kg/m²     Physician's instructions:  Return for cycle 4 of FOLFIRINOX on 12/24/24 with labs and NP visit for toxicity check.  Proceed with treatment today.    Patient instructed if experience any of the above symptoms following today's infusion, he is to notify MD immediately or go to the emergency department.    Discharge instructions given to patient. Verbalizes understanding. Ambulated off unit per self, accompanied by spouse, with belongings and CADD infusing.

## 2024-12-10 NOTE — ONCOLOGY

## 2024-12-10 NOTE — DISCHARGE INSTRUCTIONS
Please contact your Oncologist if you have any questions regarding the chemotherapy Oxaliplatin/Camptosar/Leucovorin/5FU that you received today.      Patient instructed if experience any of the symptoms following today's chemotherapy treatment with CADD to notify MD immediately or go to emergency department.    * dizziness/lightheadedness  *acute nausea/vomiting - not relieved with medication  *headache - not relieved from Tylenol/pain medication  *chest pain/pressure  *rash/itching  *shortness of breath        Drink fluids - 48oz fluids daily  Call if develop fever 100.3 or greater/ chills/ signs or symptoms of infection     Physician's instructions:

## 2024-12-10 NOTE — PATIENT INSTRUCTIONS
Past Medical History:   Diagnosis Date    ALS (amyotrophic lateral sclerosis)     Diabetes mellitus     Hearing decreased     wears hearing aids    Hypertension     Ventilator dependent      Dr. Montes De Oca visited at bedside, discussed findings and recommendations with patient and family member; all questions asked and answered. Verbalized understanding of information give. Handout provided at time of discharge.   Return for cycle 4 of FOLFIRINOX on 12/24/24 with labs and NP visit for toxicity check.    Proceed with treatment today.

## 2024-12-10 NOTE — PROGRESS NOTES
Name: Gustabo Machado  : 1970  MRN: [id number]    Oncology Navigation Follow-Up Note    Contact Type:  Telephone    Notes: shaquille informed Dr. Ronel Jara. Shaquille contacted rep Sofia informed inadequate tissue for [facility] testing. Pathology report faxed to Sofia -will contact to see if enough tissue available for testing. Rep to contact shaquille.    Electronically signed by Shawna Box RN on 12/10/2024 at 2:08 PM

## 2024-12-10 NOTE — PROGRESS NOTES
-Evaluated by genetic counselor at [facility].    -, for paracentesis as needed. Has a cath inserted for drainage.    -Cancer related pain, he is following up with palliative medicine.  He is on Norco as needed.    -Constipation, MiraLAX daily Senokot-S 1 tablet twice daily    -Nutrition, was advised regarding improving nutrition and high-protein nutrition.    Plan for 12/10/24:    -May proceed with treatment today.  30% dose reduction to remain in place.  Tolerating the treatment well so far.  -Continue with abdominal draining as needed.  Not distended and no jaundice noted.  -NGS testing showed high acidity of the blood.  May benefit from adding a CT DNA measurement test.  Unable to add Signatera since this is not enough tissue to make a panel however guardant is none tissue-based blood testing.  May consider that.  Will not change treatment at this point.  -Pain management as per palliative care.    Follow up:    Return for cycle 4 of FOLFIRINOX on 12/24/24 with labs and NP visit for toxicity check.      Prognosis / Disease Status: Incurable malignancy.  The treatment is palliative.  Prognosis poor.      Symptom Management: (include nausea, vomiting, diarrhea, constipation, appetite, weight loss, energy, anxiety, depression, SOB, neuropathy, pain, Patient main concern today)      Discussed symptoms related to pancreatic cancer versus symptoms related to chemotherapy toxicity.    Treatment goal:      Treatment plan:      For palliative chemotherapy with FOLFIRINOX.  Consider initially 25% dose reduction to ensure tolerability.       Medications reviewed.   Prescriptions today:              No orders of the defined types were placed in this encounter.       I spent 45 minutes face-to-face with the patient and family.  Greater than 50% of time spent on counseling and coordinating the patient's care.  Face-to-face counseling included prognosis, review of risks and benefits of any treatment along with

## 2024-12-12 ENCOUNTER — HOSPITAL ENCOUNTER (OUTPATIENT)
Dept: INFUSION THERAPY | Age: 54
Discharge: HOME | End: 2024-12-12
Attending: INTERNAL MEDICINE
Payer: COMMERCIAL

## 2024-12-12 ENCOUNTER — OFFICE VISIT (OUTPATIENT)
Dept: NEPHROLOGY | Age: 54
End: 2024-12-12
Payer: COMMERCIAL

## 2024-12-12 VITALS
RESPIRATION RATE: 18 BRPM | SYSTOLIC BLOOD PRESSURE: 104 MMHG | OXYGEN SATURATION: 98 % | DIASTOLIC BLOOD PRESSURE: 63 MMHG | HEART RATE: 79 BPM

## 2024-12-12 VITALS
OXYGEN SATURATION: 98 % | SYSTOLIC BLOOD PRESSURE: 110 MMHG | HEART RATE: 91 BPM | DIASTOLIC BLOOD PRESSURE: 80 MMHG | WEIGHT: 182 LBS | BODY MASS INDEX: 24.68 KG/M2

## 2024-12-12 DIAGNOSIS — C25.2 MALIGNANT NEOPLASM OF TAIL OF PANCREAS (HCC): Primary | ICD-10-CM

## 2024-12-12 DIAGNOSIS — E87.1 HYPONATREMIA: Primary | ICD-10-CM

## 2024-12-12 PROCEDURE — G8427 DOCREV CUR MEDS BY ELIG CLIN: HCPCS | Performed by: INTERNAL MEDICINE

## 2024-12-12 PROCEDURE — 1111F DSCHRG MED/CURRENT MED MERGE: CPT | Performed by: INTERNAL MEDICINE

## 2024-12-12 PROCEDURE — 6360000002 HC RX W HCPCS: Performed by: INTERNAL MEDICINE

## 2024-12-12 PROCEDURE — 96523 IRRIG DRUG DELIVERY DEVICE: CPT

## 2024-12-12 PROCEDURE — 2580000003 HC RX 258: Performed by: INTERNAL MEDICINE

## 2024-12-12 PROCEDURE — 99213 OFFICE O/P EST LOW 20 MIN: CPT | Performed by: INTERNAL MEDICINE

## 2024-12-12 PROCEDURE — G8484 FLU IMMUNIZE NO ADMIN: HCPCS | Performed by: INTERNAL MEDICINE

## 2024-12-12 PROCEDURE — G8420 CALC BMI NORM PARAMETERS: HCPCS | Performed by: INTERNAL MEDICINE

## 2024-12-12 PROCEDURE — 1036F TOBACCO NON-USER: CPT | Performed by: INTERNAL MEDICINE

## 2024-12-12 PROCEDURE — 3017F COLORECTAL CA SCREEN DOC REV: CPT | Performed by: INTERNAL MEDICINE

## 2024-12-12 RX ORDER — HEPARIN 100 UNIT/ML
500 SYRINGE INTRAVENOUS PRN
Status: DISCONTINUED | OUTPATIENT
Start: 2024-12-12 | End: 2024-12-13 | Stop reason: HOSPADM

## 2024-12-12 RX ORDER — SODIUM CHLORIDE 0.9 % (FLUSH) 0.9 %
5-40 SYRINGE (ML) INJECTION PRN
Status: DISCONTINUED | OUTPATIENT
Start: 2024-12-12 | End: 2024-12-13 | Stop reason: HOSPADM

## 2024-12-12 RX ADMIN — HEPARIN 500 UNITS: 100 SYRINGE at 14:30

## 2024-12-12 RX ADMIN — SODIUM CHLORIDE, PRESERVATIVE FREE 20 ML: 5 INJECTION INTRAVENOUS at 14:30

## 2024-12-12 NOTE — PROGRESS NOTES
[facility]        Outpatient Follow-Up note         12/12/2024 1:15 PM    Patient Name:   Gustabo Machado  YOB: 1970  Primary Care Physician:  Daniel Agrawal MD   [facility] [place] [facility]  [facility]  [address]  [address]  [place] [address]  Dept: [phone]  Loc: [phone]     Chief Complaint / Reason for follow-up : Follow Up of hyponatremia/ post hospital discharge     Interval History :  Patient seen and examined by me.   No complaints  Was in the hospital for hyponatremia     Past History :  Past Medical History:   Diagnosis Date    Cancer (HCC)     Pancreatic cancer (HCC)      Past Surgical History:   Procedure Laterality Date    COLONOSCOPY  10/31/2022    CT NEEDLE BIOPSY LIVER PERCUTANEOUS  10/22/2024    CT NEEDLE BIOPSY LIVER PERCUTANEOUS 10/22/2024 [facility] CT SCAN    IR INSERT TUNNELED PLEURA CATH W CUFF  11/19/2024    IR INSERT TUNNELED PLEURA CATH W CUFF 11/19/2024 [facility] SPECIAL PROCEDURES    IR THROMB MECH VEIN  11/17/2024    IR THROMB MECH VEIN 11/17/2024 [facility] SPECIAL PROCEDURES    PORT SURGERY N/A 10/31/2024    Single lumen Smartport insertion performed by Alexander Colon MD at [facility] OR    SKIN CANCER EXCISION  2011    BCC_ nose        Medications :     Outpatient Medications Marked as Taking for the 12/12/24 encounter (Office Visit) with Oscar Montoya MD   Medication Sig Dispense Refill    zinc 50 MG TABS tablet Take 1 tablet by mouth 3 times daily 90 tablet 2    Magic Mouthwash (MIRACLE MOUTHWASH) Swish and spit 5 mLs 4 times daily as needed for Irritation Prepare mixture 1:1:1 DIPHENHYDRAMINE HCL,NYSTATIN,LIDOCAINE  mL 2    zolpidem (AMBIEN CR) 12.5 MG extended release tablet Take 1 tablet by mouth nightly as needed for Sleep for up to 180 days. Max Daily Amount: 12.5 mg 30 tablet 5    apixaban (ELIQUIS) 5 MG TABS tablet Take 1 tablet by mouth 2 times daily 60 tablet 0

## 2024-12-12 NOTE — PROGRESS NOTES
Patient tolerated continuous 5-fu infusion without any complications. Patient was assessed following the infusion and reports:    Dizziness    No    Lightheadedness   No        Acute nausea/vomiting  No    Headache    No    Chest pain/pressure   No    Rash/itching    No  Shortness of breath   No      Patient instructed if they experience any of the above symptoms,he/she is to notify the physician immediately or go to the emergency department; verbalizes understanding.    Discharge instructions given to patient. Verbalizes understanding. Ambulated off unit per self.

## 2024-12-12 NOTE — PLAN OF CARE
Problem: Discharge Planning  Goal: Discharge to home or other facility with appropriate resources  Outcome: Adequate for Discharge  Flowsheets (Taken 12/12/2024 1412)  Discharge to home or other facility with appropriate resources:   Identify barriers to discharge with patient and caregiver   Identify discharge learning needs (meds, wound care, etc)  Note: Verbalized understanding of discharge instructions, follow ups and when to call doctor     Problem: Safety - Adult  Goal: Free from fall injury  Outcome: Adequate for Discharge  Flowsheets (Taken 12/12/2024 1412)  Free From Fall Injury:   Instruct family/caregiver on patient safety   Based on caregiver fall risk screen, instruct family/caregiver to ask for assistance with transferring infant if caregiver noted to have fall risk factors  Note: Free from falls while in infusion center. Verbalized understanding of fall prevention and to ask for assistance with ambulation     Problem: Chronic Conditions and Co-morbidities  Goal: Patient's chronic conditions and co-morbidity symptoms are monitored and maintained or improved  Outcome: Adequate for Discharge  Flowsheets (Taken 12/12/2024 1412)  Care Plan - Patient's Chronic Conditions and Co-Morbidity Symptoms are Monitored and Maintained or Improved:   Monitor and assess patient's chronic conditions and comorbid symptoms for stability, deterioration, or improvement   Collaborate with multidisciplinary team to address chronic and comorbid conditions and prevent exacerbation or deterioration  Note: Patient verbalizes understanding to verbal information given on 5 FU,action and possible side effects. Aware to call MD if develop complications.      Problem: Infection - Adult  Goal: Absence of infection at discharge  Outcome: Adequate for Discharge  Flowsheets (Taken 12/12/2024 1412)  Absence of infection at discharge:   Assess and monitor for signs and symptoms of infection   Monitor lab/diagnostic results   Monitor all  insertion sites i.e., indwelling lines, tubes and drains  Note: Mediport site with no redness or warmth. Skin over port intact with no signs of breakdown noted. Patient verbalizes signs/symptoms of port infection and when to notify the physician.

## 2024-12-12 NOTE — DISCHARGE INSTRUCTIONS
Please contact your Oncologist if you have any questions regarding the chemotherapy 5 FU that you received today.      Patient instructed if experience any of the symptoms following today's chemotherapy / to notify MD immediately or go to emergency department.    * dizziness/lightheadedness  *acute nausea/vomiting - not relieved with medication  *headache - not relieved from Tylenol/pain medication  *chest pain/pressure  *rash/itching  *shortness of breath        Drink fluids - 48oz fluids daily  Call if develop fever/ chills/ signs or symptoms of infection

## 2024-12-13 ASSESSMENT — ENCOUNTER SYMPTOMS
BACK PAIN: 0
ABDOMINAL DISTENTION: 1
FACIAL SWELLING: 0
SORE THROAT: 0
VOMITING: 0
EYE REDNESS: 0
DIARRHEA: 0
COUGH: 0
NAUSEA: 0
SHORTNESS OF BREATH: 0
PHOTOPHOBIA: 0
CHEST TIGHTNESS: 0
COLOR CHANGE: 0

## 2024-12-17 ENCOUNTER — CASE MANAGEMENT (OUTPATIENT)
Dept: CASE MANAGEMENT | Age: 54
End: 2024-12-17

## 2024-12-17 NOTE — PROGRESS NOTES
Name: Gustabo Machado  : 1970  MRN: [id number]    Oncology Navigation Follow-Up Note    Contact Type:  Email    Notes: oliverio received an email from Sofia Lee informing not enough tissue to perform Signatera. Sofia also informing .    Electronically signed by Shawna Box RN on 2024 at 2:59 PM

## 2024-12-18 ENCOUNTER — CLINICAL DOCUMENTATION (OUTPATIENT)
Dept: NUTRITION | Age: 54
End: 2024-12-18

## 2024-12-18 NOTE — PROGRESS NOTES
Oncology Nutrition    Contact Type: phone    Contact Reason: referral from inpatient dietitians to follow-up with patient as outpatient.   *Pancreatic cancer with oral thrush, weight loss, poor appetite.  I attempted to reach the patient today via phone ([phone]), no answer to call, voice message left with reason for call and direct call back number.    Follow-up: await a call back    Electronically signed by Taylor Franz RD, LD on 12/18/2024 at 1:30 PM  [phone]

## 2024-12-23 RX ORDER — FAMOTIDINE 20 MG/1
20 TABLET, FILM COATED ORAL DAILY
Qty: 60 TABLET | Refills: 0 | Status: SHIPPED | OUTPATIENT
Start: 2024-12-23

## 2024-12-23 NOTE — TELEPHONE ENCOUNTER
Pt's wife Nanci called requesting refill Famotidine. Wife states medication was started in the hospital and pt is about out. Nanci states that the medication did help with indigestion/ acid reflux.    [facility] pharmacy verified.

## 2024-12-24 ENCOUNTER — HOSPITAL ENCOUNTER (OUTPATIENT)
Dept: INFUSION THERAPY | Age: 54
Discharge: HOME | End: 2024-12-24
Attending: INTERNAL MEDICINE
Payer: COMMERCIAL

## 2024-12-24 ENCOUNTER — OFFICE VISIT (OUTPATIENT)
Dept: ONCOLOGY | Age: 54
End: 2024-12-24
Payer: COMMERCIAL

## 2024-12-24 VITALS
HEART RATE: 112 BPM | OXYGEN SATURATION: 98 % | RESPIRATION RATE: 16 BRPM | SYSTOLIC BLOOD PRESSURE: 110 MMHG | BODY MASS INDEX: 23.51 KG/M2 | WEIGHT: 173.6 LBS | TEMPERATURE: 97.4 F | HEIGHT: 72 IN | DIASTOLIC BLOOD PRESSURE: 77 MMHG

## 2024-12-24 VITALS
OXYGEN SATURATION: 98 % | HEIGHT: 72 IN | BODY MASS INDEX: 23.51 KG/M2 | RESPIRATION RATE: 16 BRPM | TEMPERATURE: 97.7 F | HEART RATE: 93 BPM | DIASTOLIC BLOOD PRESSURE: 69 MMHG | WEIGHT: 173.6 LBS | SYSTOLIC BLOOD PRESSURE: 104 MMHG

## 2024-12-24 DIAGNOSIS — C25.2 MALIGNANT NEOPLASM OF TAIL OF PANCREAS (HCC): Primary | ICD-10-CM

## 2024-12-24 DIAGNOSIS — K86.89 PANCREATIC MASS: ICD-10-CM

## 2024-12-24 DIAGNOSIS — K76.9 LIVER LESION: ICD-10-CM

## 2024-12-24 LAB
ALBUMIN SERPL BCG-MCNC: 2.8 G/DL (ref 3.5–5.1)
ALP SERPL-CCNC: 171 U/L (ref 38–126)
ALT SERPL W/O P-5'-P-CCNC: 17 U/L (ref 11–66)
AST SERPL-CCNC: 24 U/L (ref 5–40)
BASOPHILS ABSOLUTE: 0 THOU/MM3 (ref 0–0.1)
BASOPHILS NFR BLD AUTO: 0 % (ref 0–3)
BILIRUB CONJ SERPL-MCNC: 0.2 MG/DL (ref 0.1–13.8)
BILIRUB SERPL-MCNC: 0.4 MG/DL (ref 0.3–1.2)
BUN BLDP-MCNC: 11 MG/DL (ref 8–26)
CHLORIDE BLD-SCNC: 107 MEQ/L (ref 98–109)
CREAT BLD-MCNC: 0.7 MG/DL (ref 0.5–1.2)
EOSINOPHIL NFR BLD AUTO: 1 % (ref 0–4)
EOSINOPHILS ABSOLUTE: 0.1 THOU/MM3 (ref 0–0.4)
ERYTHROCYTE [DISTWIDTH] IN BLOOD BY AUTOMATED COUNT: 15.9 % (ref 11.5–14.5)
GFR SERPL CREATININE-BSD FRML MDRD: > 90 ML/MIN/1.73M2
GLUCOSE BLD-MCNC: 128 MG/DL (ref 70–108)
HCT VFR BLD AUTO: 33.7 % (ref 42–52)
HGB BLD-MCNC: 10.7 GM/DL (ref 14–18)
IMMATURE GRANULOCYTES %: 1 %
IMMATURE GRANULOCYTES ABSOLUTE: 0.03 THOU/MM3 (ref 0–0.07)
IONIZED CALCIUM, WHOLE BLOOD: 1.24 MMOL/L (ref 1.12–1.32)
LYMPHOCYTES ABSOLUTE: 0.6 THOU/MM3 (ref 1–4.8)
LYMPHOCYTES NFR BLD AUTO: 13 % (ref 15–47)
MCH RBC QN AUTO: 29.8 PG (ref 26–33)
MCHC RBC AUTO-ENTMCNC: 31.8 GM/DL (ref 32.2–35.5)
MCV RBC AUTO: 94 FL (ref 80–94)
MONOCYTES ABSOLUTE: 0.7 THOU/MM3 (ref 0.4–1.3)
MONOCYTES NFR BLD AUTO: 16 % (ref 0–12)
NEUTROPHILS ABSOLUTE: 3.2 THOU/MM3 (ref 1.8–7.7)
NEUTROPHILS NFR BLD AUTO: 69 % (ref 43–75)
PLATELET # BLD AUTO: 152 THOU/MM3 (ref 130–400)
PMV BLD AUTO: 9.7 FL (ref 9.4–12.4)
POTASSIUM BLD-SCNC: 3.3 MEQ/L (ref 3.5–4.9)
PROT SERPL-MCNC: 6.4 G/DL (ref 6.1–8)
RBC # BLD AUTO: 3.59 MILL/MM3 (ref 4.7–6.1)
SODIUM BLD-SCNC: 141 MEQ/L (ref 138–146)
TOTAL CO2, WHOLE BLOOD: 24 MEQ/L (ref 23–33)
WBC # BLD AUTO: 4.7 THOU/MM3 (ref 4.8–10.8)

## 2024-12-24 PROCEDURE — 2580000003 HC RX 258: Performed by: INTERNAL MEDICINE

## 2024-12-24 PROCEDURE — G8420 CALC BMI NORM PARAMETERS: HCPCS | Performed by: INTERNAL MEDICINE

## 2024-12-24 PROCEDURE — 6370000000 HC RX 637 (ALT 250 FOR IP): Performed by: INTERNAL MEDICINE

## 2024-12-24 PROCEDURE — 99211 OFF/OP EST MAY X REQ PHY/QHP: CPT

## 2024-12-24 PROCEDURE — 6360000002 HC RX W HCPCS: Performed by: INTERNAL MEDICINE

## 2024-12-24 PROCEDURE — 96375 TX/PRO/DX INJ NEW DRUG ADDON: CPT

## 2024-12-24 PROCEDURE — G8484 FLU IMMUNIZE NO ADMIN: HCPCS | Performed by: INTERNAL MEDICINE

## 2024-12-24 PROCEDURE — 80076 HEPATIC FUNCTION PANEL: CPT

## 2024-12-24 PROCEDURE — 96416 CHEMO PROLONG INFUSE W/PUMP: CPT

## 2024-12-24 PROCEDURE — G8427 DOCREV CUR MEDS BY ELIG CLIN: HCPCS | Performed by: INTERNAL MEDICINE

## 2024-12-24 PROCEDURE — 99214 OFFICE O/P EST MOD 30 MIN: CPT | Performed by: INTERNAL MEDICINE

## 2024-12-24 PROCEDURE — 1036F TOBACCO NON-USER: CPT | Performed by: INTERNAL MEDICINE

## 2024-12-24 PROCEDURE — 85025 COMPLETE CBC W/AUTO DIFF WBC: CPT

## 2024-12-24 PROCEDURE — 96368 THER/DIAG CONCURRENT INF: CPT

## 2024-12-24 PROCEDURE — 96417 CHEMO IV INFUS EACH ADDL SEQ: CPT

## 2024-12-24 PROCEDURE — 3017F COLORECTAL CA SCREEN DOC REV: CPT | Performed by: INTERNAL MEDICINE

## 2024-12-24 PROCEDURE — 96367 TX/PROPH/DG ADDL SEQ IV INF: CPT

## 2024-12-24 PROCEDURE — 80047 BASIC METABLC PNL IONIZED CA: CPT

## 2024-12-24 PROCEDURE — 96415 CHEMO IV INFUSION ADDL HR: CPT

## 2024-12-24 PROCEDURE — 96413 CHEMO IV INFUSION 1 HR: CPT

## 2024-12-24 PROCEDURE — 2500000003 HC RX 250 WO HCPCS: Performed by: INTERNAL MEDICINE

## 2024-12-24 RX ORDER — PALONOSETRON 0.05 MG/ML
0.25 INJECTION, SOLUTION INTRAVENOUS ONCE
Status: CANCELLED | OUTPATIENT
Start: 2024-12-24 | End: 2024-12-24

## 2024-12-24 RX ORDER — DEXAMETHASONE SODIUM PHOSPHATE 4 MG/ML
12 INJECTION, SOLUTION INTRA-ARTICULAR; INTRALESIONAL; INTRAMUSCULAR; INTRAVENOUS; SOFT TISSUE ONCE
Status: COMPLETED | OUTPATIENT
Start: 2024-12-24 | End: 2024-12-24

## 2024-12-24 RX ORDER — HYDROCORTISONE SODIUM SUCCINATE 100 MG/2ML
100 INJECTION INTRAMUSCULAR; INTRAVENOUS
Status: CANCELLED | OUTPATIENT
Start: 2024-12-24

## 2024-12-24 RX ORDER — HEPARIN SODIUM (PORCINE) LOCK FLUSH IV SOLN 100 UNIT/ML 100 UNIT/ML
500 SOLUTION INTRAVENOUS PRN
Status: CANCELLED | OUTPATIENT
Start: 2024-12-24

## 2024-12-24 RX ORDER — SODIUM CHLORIDE 0.9 % (FLUSH) 0.9 %
5-40 SYRINGE (ML) INJECTION PRN
Status: DISCONTINUED | OUTPATIENT
Start: 2024-12-24 | End: 2024-12-25 | Stop reason: HOSPADM

## 2024-12-24 RX ORDER — ALBUTEROL SULFATE 90 UG/1
4 INHALANT RESPIRATORY (INHALATION) PRN
Status: CANCELLED | OUTPATIENT
Start: 2024-12-24

## 2024-12-24 RX ORDER — MEPERIDINE HYDROCHLORIDE 50 MG/ML
12.5 INJECTION INTRAMUSCULAR; INTRAVENOUS; SUBCUTANEOUS PRN
Status: CANCELLED | OUTPATIENT
Start: 2024-12-24

## 2024-12-24 RX ORDER — SODIUM CHLORIDE 9 MG/ML
5-250 INJECTION, SOLUTION INTRAVENOUS PRN
Status: CANCELLED | OUTPATIENT
Start: 2024-12-26

## 2024-12-24 RX ORDER — EPINEPHRINE 1 MG/ML
0.3 INJECTION, SOLUTION, CONCENTRATE INTRAVENOUS PRN
Status: CANCELLED | OUTPATIENT
Start: 2024-12-24

## 2024-12-24 RX ORDER — DIPHENHYDRAMINE HYDROCHLORIDE 50 MG/ML
50 INJECTION INTRAMUSCULAR; INTRAVENOUS
Status: CANCELLED | OUTPATIENT
Start: 2024-12-24

## 2024-12-24 RX ORDER — ONDANSETRON 2 MG/ML
8 INJECTION INTRAMUSCULAR; INTRAVENOUS
Status: CANCELLED | OUTPATIENT
Start: 2024-12-24

## 2024-12-24 RX ORDER — SODIUM CHLORIDE 0.9 % (FLUSH) 0.9 %
5-40 SYRINGE (ML) INJECTION PRN
Status: CANCELLED | OUTPATIENT
Start: 2024-12-24

## 2024-12-24 RX ORDER — PALONOSETRON 0.05 MG/ML
0.25 INJECTION, SOLUTION INTRAVENOUS ONCE
Status: COMPLETED | OUTPATIENT
Start: 2024-12-24 | End: 2024-12-24

## 2024-12-24 RX ORDER — HEPARIN 100 UNIT/ML
500 SYRINGE INTRAVENOUS PRN
Status: DISCONTINUED | OUTPATIENT
Start: 2024-12-24 | End: 2024-12-25 | Stop reason: HOSPADM

## 2024-12-24 RX ORDER — EPINEPHRINE 1 MG/ML
0.3 INJECTION, SOLUTION INTRAMUSCULAR; SUBCUTANEOUS PRN
Status: CANCELLED | OUTPATIENT
Start: 2024-12-24

## 2024-12-24 RX ORDER — ACETAMINOPHEN 325 MG/1
650 TABLET ORAL
Status: CANCELLED | OUTPATIENT
Start: 2024-12-24

## 2024-12-24 RX ORDER — POTASSIUM CHLORIDE 750 MG/1
10 TABLET, EXTENDED RELEASE ORAL 2 TIMES DAILY
Qty: 60 TABLET | Refills: 0 | Status: SHIPPED | OUTPATIENT
Start: 2024-12-24

## 2024-12-24 RX ORDER — POTASSIUM CHLORIDE 1500 MG/1
20 TABLET, EXTENDED RELEASE ORAL ONCE
Status: COMPLETED | OUTPATIENT
Start: 2024-12-24 | End: 2024-12-24

## 2024-12-24 RX ORDER — FAMOTIDINE 10 MG/ML
20 INJECTION, SOLUTION INTRAVENOUS
Status: CANCELLED | OUTPATIENT
Start: 2024-12-24

## 2024-12-24 RX ORDER — SODIUM CHLORIDE 9 MG/ML
INJECTION, SOLUTION INTRAVENOUS CONTINUOUS
Status: CANCELLED | OUTPATIENT
Start: 2024-12-24

## 2024-12-24 RX ORDER — HEPARIN 100 UNIT/ML
500 SYRINGE INTRAVENOUS PRN
Status: CANCELLED | OUTPATIENT
Start: 2024-12-24

## 2024-12-24 RX ORDER — PROCHLORPERAZINE EDISYLATE 5 MG/ML
5 INJECTION INTRAMUSCULAR; INTRAVENOUS
Status: CANCELLED | OUTPATIENT
Start: 2024-12-24

## 2024-12-24 RX ORDER — HEPARIN SODIUM (PORCINE) LOCK FLUSH IV SOLN 100 UNIT/ML 100 UNIT/ML
500 SOLUTION INTRAVENOUS PRN
Status: CANCELLED | OUTPATIENT
Start: 2024-12-26

## 2024-12-24 RX ORDER — SODIUM CHLORIDE 9 MG/ML
5-250 INJECTION, SOLUTION INTRAVENOUS PRN
Status: CANCELLED | OUTPATIENT
Start: 2024-12-24

## 2024-12-24 RX ORDER — DEXTROSE MONOHYDRATE 50 MG/ML
5-250 INJECTION, SOLUTION INTRAVENOUS PRN
Status: DISCONTINUED | OUTPATIENT
Start: 2024-12-24 | End: 2024-12-25 | Stop reason: HOSPADM

## 2024-12-24 RX ORDER — SODIUM CHLORIDE 0.9 % (FLUSH) 0.9 %
5-40 SYRINGE (ML) INJECTION PRN
Status: CANCELLED | OUTPATIENT
Start: 2024-12-26

## 2024-12-24 RX ORDER — SODIUM CHLORIDE 9 MG/ML
25 INJECTION, SOLUTION INTRAVENOUS PRN
Status: CANCELLED | OUTPATIENT
Start: 2024-12-24

## 2024-12-24 RX ORDER — ATROPINE SULFATE 0.4 MG/ML
0.4 INJECTION, SOLUTION INTRAVENOUS ONCE
Status: COMPLETED | OUTPATIENT
Start: 2024-12-24 | End: 2024-12-24

## 2024-12-24 RX ORDER — DEXTROSE MONOHYDRATE 50 MG/ML
5-250 INJECTION, SOLUTION INTRAVENOUS PRN
Status: CANCELLED | OUTPATIENT
Start: 2024-12-24

## 2024-12-24 RX ADMIN — SODIUM CHLORIDE, PRESERVATIVE FREE 30 ML: 5 INJECTION INTRAVENOUS at 08:15

## 2024-12-24 RX ADMIN — OXALIPLATIN 125 MG: 5 INJECTION, SOLUTION INTRAVENOUS at 09:39

## 2024-12-24 RX ADMIN — PALONOSETRON 0.25 MG: 0.05 INJECTION, SOLUTION INTRAVENOUS at 08:43

## 2024-12-24 RX ADMIN — LEUCOVORIN CALCIUM 600 MG: 350 INJECTION, POWDER, LYOPHILIZED, FOR SUSPENSION INTRAMUSCULAR; INTRAVENOUS at 11:48

## 2024-12-24 RX ADMIN — ATROPINE SULFATE 0.4 MG: 0.4 INJECTION, SOLUTION INTRAVENOUS at 11:45

## 2024-12-24 RX ADMIN — DEXTROSE MONOHYDRATE 55 ML/HR: 50 INJECTION, SOLUTION INTRAVENOUS at 08:42

## 2024-12-24 RX ADMIN — POTASSIUM CHLORIDE 20 MEQ: 1500 TABLET, EXTENDED RELEASE ORAL at 08:39

## 2024-12-24 RX ADMIN — IRINOTECAN HYDROCHLORIDE 220 MG: 20 INJECTION, SOLUTION INTRAVENOUS at 11:47

## 2024-12-24 RX ADMIN — DEXAMETHASONE SODIUM PHOSPHATE 12 MG: 4 INJECTION, SOLUTION INTRAMUSCULAR; INTRAVENOUS at 08:45

## 2024-12-24 RX ADMIN — FLUOROURACIL 3500 MG: 50 INJECTION, SOLUTION INTRAVENOUS at 13:24

## 2024-12-24 RX ADMIN — SODIUM CHLORIDE 150 MG: 0.9 INJECTION, SOLUTION INTRAVENOUS at 09:04

## 2024-12-24 ASSESSMENT — ENCOUNTER SYMPTOMS
PHOTOPHOBIA: 0
FACIAL SWELLING: 0
BACK PAIN: 0
CHEST TIGHTNESS: 0
COUGH: 0
COLOR CHANGE: 0
DIARRHEA: 0
NAUSEA: 0
SORE THROAT: 0
EYE REDNESS: 0
VOMITING: 0
ABDOMINAL DISTENTION: 0
SHORTNESS OF BREATH: 0

## 2024-12-24 NOTE — PLAN OF CARE
Problem: Discharge Planning  Goal: Discharge to home or other facility with appropriate resources  Outcome: Adequate for Discharge  Flowsheets (Taken 12/24/2024 0846)  Discharge to home or other facility with appropriate resources: Identify barriers to discharge with patient and caregiver     Problem: Safety - Adult  Goal: Free from fall injury  Outcome: Adequate for Discharge  Flowsheets (Taken 12/24/2024 0846)  Free From Fall Injury: Instruct family/caregiver on patient safety     Problem: Chronic Conditions and Co-morbidities  Goal: Patient's chronic conditions and co-morbidity symptoms are monitored and maintained or improved  Outcome: Adequate for Discharge  Flowsheets (Taken 12/24/2024 0846)  Care Plan - Patient's Chronic Conditions and Co-Morbidity Symptoms are Monitored and Maintained or Improved: Monitor and assess patient's chronic conditions and comorbid symptoms for stability, deterioration, or improvement  Note:   Chemotherapy Teaching     What is Chemotherapy   Drug action [x]   Method of Administration [x]   Handouts given []     Side Effects  Nausea/vomiting [x]   Diarrhea [x]   Fatigue [x]   Signs / Symptoms of infection [x]   Neutropenia [x]   Thrombocytopenia [x]   Alopecia [x]   neuropathy [x]   [place] diet &  the importance of fluids [x]       Micellaneous  Importance of nutrition [x]   Importance of oral hygiene [x]   When to call the MD [x]   Monitoring labs [x]   Use of supportive services []     Explanation of Drug Regimen / Frequency  Oxaliplatin/camptosar/leucovorin/5fu     Comments  Verbalized understanding to drug,action,side effects and when to call MD         Problem: Infection - Adult  Goal: Absence of infection at discharge  Outcome: Adequate for Discharge  Flowsheets (Taken 12/24/2024 0846)  Absence of infection at discharge: Monitor all insertion sites i.e., indwelling lines, tubes and drains  Note: Mediport site with no redness or warmth. Skin over port site intact with no signs  of breakdown noted. Patient verbalizes signs/symptoms of port infection and when to notify the physician.

## 2024-12-24 NOTE — PROGRESS NOTES
Patient assessed for the following post chemotherapy:    Dizziness   No  Lightheadedness  No      Acute nausea/vomiting No  Headache   No  Chest pain/pressure  No  Rash/itching   No  Shortness of breath  No    Patient kept for 20 minutes observation post infusion chemotherapy.    Patient tolerated chemotherapy treatment Oxaliplatin/Camptosar/Leucovorin/5Fu without any complications.    Last vital signs:   /77   Pulse (!) 112   Temp 97.4 °F (36.3 °C) (Oral)   Resp 16   Ht 1.829 m (6')   Wt 78.7 kg (173 lb 9.6 oz)   SpO2 98%   BMI 23.54 kg/m²     Physician's instructions:     Ok for chemo today  KCL 20 meq PO today  CT CAP in Mid January 2025  RTC 2 weeks        Patient instructed if experience any of the above symptoms following today's infusion, he is to notify MD immediately or go to the emergency department.    Discharge instructions given to patient. Verbalizes understanding. Ambulated off unit per self, with belongings and CADD infusing.

## 2024-12-24 NOTE — ONCOLOGY

## 2024-12-24 NOTE — PROGRESS NOTES
[facility] [place] [facility]  [facility]  [address]  [address]  [place] [id number]  Dept: [phone]  Loc: [phone]     Hematology/Oncology  Follow Up Office Visit        12/10/2024    Gustabo Ortizger   1970     No ref. provider found   Daniel Agrawal MD       Reason:   Chief Complaint   Patient presents with    Follow-up     Malignant neoplasm of tail of pancreas (HCC)          HPI: This is a 53 y.o.male  patient was complaining of abdominal pain abdominal bloating and constipation radiating to the right lower back, he was evaluated in the emergency room on October 21, 2024. he underwent CT scan of the abdomen and pelvis with IV contrast on October 21, 2024 which revealed a mass in the tail of the pancreas in addition to  ascites, omental caking,  and numerous liver masses with intra-abdominal adenopathy all consistent with metastatic disease.  He underwent further evaluation with MRI of the abdomen on October 21, 2024 which revealed a 5 x 2 cm enhancement in the distal body and tail of the pancreas suspicious for malignancy.  Multiple hepatic lesions suspicious for metastatic disease.  Enlarged.  Pancreatic portacaval and retroperitoneal adenopathy.  In addition to omental caking and small ascites.  He underwent a CT needle guided biopsy from the liver on October 22nd, 2024, pathology of the liver lesion came back consistent with metastatic adenocarcinoma.  Positive for CK7 and negative for CK20.  Negative for TTF. Patchy  positive for CDX2.  Findings support pancreatic cancer for upper GI.  CA 19-9 very high at 5483.  Total bilirubin was normal at 0.6 alkaline phosphatase elevated slight elevation of AST.  Due to radiation to the back CT lumbar was performed October 21, 2024 which showed no evidence for metastatic disease in the lumbar spine.    Patient was seen by palliative team on October 21, 2024, he is currently on Norco for pain every 4 hours as

## 2024-12-24 NOTE — DISCHARGE INSTRUCTIONS
Please contact your Oncologist if you have any questions regarding the chemotherapy Oxaliplatin/Camptosar/Leucovorin/5FU that you received today.      Patient instructed if experience any of the symptoms following today's chemotherapy treatment to notify MD immediately or go to emergency department.    * dizziness/lightheadedness  *acute nausea/vomiting - not relieved with medication  *headache - not relieved from Tylenol/pain medication  *chest pain/pressure  *rash/itching  *shortness of breath        Drink fluids - 48oz fluids daily  Call if develop fever/ chills/ signs or symptoms of infection     Physician's instructions:

## 2024-12-26 ENCOUNTER — HOSPITAL ENCOUNTER (OUTPATIENT)
Dept: INFUSION THERAPY | Age: 54
Discharge: HOME | End: 2024-12-26

## 2024-12-26 NOTE — PROGRESS NOTES
CADD not infusing, partial occlusion, spoke with Dr. Maria Fernanda seymour to increase rate to 6 ml hour and patient will return at 330 pm tomorrow for pump off to infuse as much of the CADD as possible.

## 2024-12-27 ENCOUNTER — HOSPITAL ENCOUNTER (OUTPATIENT)
Dept: INFUSION THERAPY | Age: 54
Discharge: HOME | End: 2024-12-27
Payer: COMMERCIAL

## 2024-12-27 VITALS
HEART RATE: 88 BPM | OXYGEN SATURATION: 98 % | DIASTOLIC BLOOD PRESSURE: 73 MMHG | RESPIRATION RATE: 18 BRPM | TEMPERATURE: 97.8 F | SYSTOLIC BLOOD PRESSURE: 104 MMHG

## 2024-12-27 DIAGNOSIS — C25.2 MALIGNANT NEOPLASM OF TAIL OF PANCREAS (HCC): Primary | ICD-10-CM

## 2024-12-27 PROCEDURE — 2500000003 HC RX 250 WO HCPCS: Performed by: INTERNAL MEDICINE

## 2024-12-27 PROCEDURE — 6360000002 HC RX W HCPCS: Performed by: INTERNAL MEDICINE

## 2024-12-27 PROCEDURE — 96523 IRRIG DRUG DELIVERY DEVICE: CPT

## 2024-12-27 RX ORDER — ONDANSETRON 2 MG/ML
8 INJECTION INTRAMUSCULAR; INTRAVENOUS
OUTPATIENT
Start: 2024-12-27

## 2024-12-27 RX ORDER — ACETAMINOPHEN 325 MG/1
650 TABLET ORAL
OUTPATIENT
Start: 2024-12-27

## 2024-12-27 RX ORDER — SODIUM CHLORIDE 9 MG/ML
INJECTION, SOLUTION INTRAVENOUS CONTINUOUS
OUTPATIENT
Start: 2024-12-27

## 2024-12-27 RX ORDER — DIPHENHYDRAMINE HYDROCHLORIDE 50 MG/ML
50 INJECTION INTRAMUSCULAR; INTRAVENOUS
OUTPATIENT
Start: 2024-12-27

## 2024-12-27 RX ORDER — HEPARIN 100 UNIT/ML
500 SYRINGE INTRAVENOUS PRN
Status: DISCONTINUED | OUTPATIENT
Start: 2024-12-27 | End: 2024-12-28 | Stop reason: HOSPADM

## 2024-12-27 RX ORDER — ALBUTEROL SULFATE 90 UG/1
4 INHALANT RESPIRATORY (INHALATION) PRN
OUTPATIENT
Start: 2024-12-27

## 2024-12-27 RX ORDER — SODIUM CHLORIDE 9 MG/ML
25 INJECTION, SOLUTION INTRAVENOUS PRN
OUTPATIENT
Start: 2024-12-27

## 2024-12-27 RX ORDER — SODIUM CHLORIDE 0.9 % (FLUSH) 0.9 %
5-40 SYRINGE (ML) INJECTION PRN
Status: DISCONTINUED | OUTPATIENT
Start: 2024-12-27 | End: 2024-12-28 | Stop reason: HOSPADM

## 2024-12-27 RX ORDER — EPINEPHRINE 1 MG/ML
0.3 INJECTION, SOLUTION INTRAMUSCULAR; SUBCUTANEOUS PRN
OUTPATIENT
Start: 2024-12-27

## 2024-12-27 RX ORDER — SODIUM CHLORIDE 0.9 % (FLUSH) 0.9 %
5-40 SYRINGE (ML) INJECTION PRN
OUTPATIENT
Start: 2024-12-27

## 2024-12-27 RX ORDER — HYDROCORTISONE SODIUM SUCCINATE 100 MG/2ML
100 INJECTION INTRAMUSCULAR; INTRAVENOUS
OUTPATIENT
Start: 2024-12-27

## 2024-12-27 RX ORDER — HEPARIN 100 UNIT/ML
500 SYRINGE INTRAVENOUS PRN
OUTPATIENT
Start: 2024-12-27

## 2024-12-27 RX ADMIN — Medication 500 UNITS: at 15:37

## 2024-12-27 RX ADMIN — SODIUM CHLORIDE, PRESERVATIVE FREE 10 ML: 5 INJECTION INTRAVENOUS at 15:37

## 2024-12-27 NOTE — PROGRESS NOTES
Patient tolerated 5FU CADD pump removal without any complications.    Denies dizziness, lightheadedness, acute nausea or vomiting, headache, heart palpitations, rash/itching or increased SOB.     Last vital signs:   /73   Pulse 88   Temp 97.8 °F (36.6 °C) (Oral)   Resp 18   SpO2 98%     Patient instructed if they experience any of the above symptoms following today's visit, he/she is to notify the physician immediately or go to the Emergency Department.    Patient educated on monitoring temperatures at home daily and to call physician or go to the Emergency Department for temperatures of 100.4 F or greater.    Discharge instructions given to patient. Verbalizes understanding. Ambulated off unit per self in stable condition with belongings.

## 2024-12-27 NOTE — PLAN OF CARE
Problem: Discharge Planning  Goal: Discharge to home or other facility with appropriate resources  Outcome: Adequate for Discharge  Flowsheets (Taken 12/27/2024 1530)  Discharge to home or other facility with appropriate resources:   Identify discharge learning needs (meds, wound care, etc)   Identify barriers to discharge with patient and caregiver  Note: Verbalized understanding of discharge instructions, follow ups and when to call doctor     Problem: Safety - Adult  Goal: Free from fall injury  Outcome: Adequate for Discharge  Flowsheets (Taken 12/27/2024 1530)  Free From Fall Injury:   Based on caregiver fall risk screen, instruct family/caregiver to ask for assistance with transferring infant if caregiver noted to have fall risk factors   Instruct family/caregiver on patient safety  Note: Free from falls while in infusion center. Verbalized understanding of fall prevention and to ask for assistance with ambulation     Problem: Chronic Conditions and Co-morbidities  Goal: Patient's chronic conditions and co-morbidity symptoms are monitored and maintained or improved  Outcome: Adequate for Discharge  Flowsheets (Taken 12/27/2024 1530)  Care Plan - Patient's Chronic Conditions and Co-Morbidity Symptoms are Monitored and Maintained or Improved:   Monitor and assess patient's chronic conditions and comorbid symptoms for stability, deterioration, or improvement   Collaborate with multidisciplinary team to address chronic and comorbid conditions and prevent exacerbation or deterioration  Note: Patient verbalizes understanding to verbal information given on 5 FU,action and possible side effects. Aware to call MD if develop complications.      Problem: Infection - Adult  Goal: Absence of infection at discharge  Outcome: Adequate for Discharge  Flowsheets (Taken 12/27/2024 1530)  Absence of infection at discharge:   Assess and monitor for signs and symptoms of infection   Monitor lab/diagnostic results   Monitor all

## 2025-01-01 ENCOUNTER — HOSPITAL ENCOUNTER (OUTPATIENT)
Dept: INFUSION THERAPY | Age: 55
Discharge: HOME | End: 2025-02-18
Payer: COMMERCIAL

## 2025-01-01 VITALS
RESPIRATION RATE: 16 BRPM | TEMPERATURE: 97.8 F | HEIGHT: 72 IN | OXYGEN SATURATION: 93 % | SYSTOLIC BLOOD PRESSURE: 83 MMHG | WEIGHT: 156 LBS | HEART RATE: 127 BPM | BODY MASS INDEX: 21.13 KG/M2 | DIASTOLIC BLOOD PRESSURE: 57 MMHG

## 2025-01-01 DIAGNOSIS — C25.2 MALIGNANT NEOPLASM OF TAIL OF PANCREAS (HCC): Primary | ICD-10-CM

## 2025-01-01 LAB
ALBUMIN SERPL BCG-MCNC: 2.1 G/DL (ref 3.5–5.1)
ALP SERPL-CCNC: 225 U/L (ref 38–126)
ALT SERPL W/O P-5'-P-CCNC: 9 U/L (ref 11–66)
AST SERPL-CCNC: 27 U/L (ref 5–40)
BASOPHILS ABSOLUTE: 0.1 THOU/MM3 (ref 0–0.1)
BASOPHILS NFR BLD AUTO: 0 % (ref 0–3)
BILIRUB CONJ SERPL-MCNC: 0.2 MG/DL (ref 0–0.2)
BILIRUB SERPL-MCNC: 0.5 MG/DL (ref 0.3–1.2)
BUN BLDP-MCNC: 20 MG/DL (ref 8–26)
CHLORIDE BLD-SCNC: 98 MEQ/L (ref 98–109)
CREAT BLD-MCNC: 0.7 MG/DL (ref 0.5–1.2)
EOSINOPHIL NFR BLD AUTO: 0 % (ref 0–4)
EOSINOPHILS ABSOLUTE: 0 THOU/MM3 (ref 0–0.4)
ERYTHROCYTE [DISTWIDTH] IN BLOOD BY AUTOMATED COUNT: 15.6 % (ref 11.5–14.5)
GFR SERPL CREATININE-BSD FRML MDRD: > 90 ML/MIN/1.73M2
GLUCOSE BLD-MCNC: 104 MG/DL (ref 70–108)
HCT VFR BLD AUTO: 28.9 % (ref 42–52)
HGB BLD-MCNC: 9 GM/DL (ref 14–18)
IMMATURE GRANULOCYTES %: 1 %
IMMATURE GRANULOCYTES ABSOLUTE: 0.26 THOU/MM3 (ref 0–0.07)
IONIZED CALCIUM, WHOLE BLOOD: 1.24 MMOL/L (ref 1.12–1.32)
LYMPHOCYTES ABSOLUTE: 0.9 THOU/MM3 (ref 1–4.8)
LYMPHOCYTES NFR BLD AUTO: 3 % (ref 15–47)
MCH RBC QN AUTO: 31.1 PG (ref 26–33)
MCHC RBC AUTO-ENTMCNC: 31.1 GM/DL (ref 32.2–35.5)
MCV RBC AUTO: 100 FL (ref 80–94)
MONOCYTES ABSOLUTE: 2.9 THOU/MM3 (ref 0.4–1.3)
MONOCYTES NFR BLD AUTO: 8 % (ref 0–12)
NEUTROPHILS ABSOLUTE: 32.4 THOU/MM3 (ref 1.8–7.7)
NEUTROPHILS NFR BLD AUTO: 89 % (ref 43–75)
PATHOLOGIST REVIEW: ABNORMAL
PLATELET # BLD AUTO: 316 THOU/MM3 (ref 130–400)
PMV BLD AUTO: 9.3 FL (ref 9.4–12.4)
POTASSIUM BLD-SCNC: 4.8 MEQ/L (ref 3.5–4.9)
PROT SERPL-MCNC: 5.8 G/DL (ref 6.1–8)
RBC # BLD AUTO: 2.89 MILL/MM3 (ref 4.7–6.1)
SODIUM BLD-SCNC: 131 MEQ/L (ref 138–146)
TOTAL CO2, WHOLE BLOOD: 26 MEQ/L (ref 23–33)
WBC # BLD AUTO: 36.6 THOU/MM3 (ref 4.8–10.8)

## 2025-01-01 PROCEDURE — 85025 COMPLETE CBC W/AUTO DIFF WBC: CPT

## 2025-01-01 PROCEDURE — 80047 BASIC METABLC PNL IONIZED CA: CPT

## 2025-01-01 PROCEDURE — 6360000002 HC RX W HCPCS: Performed by: INTERNAL MEDICINE

## 2025-01-01 PROCEDURE — 99211 OFF/OP EST MAY X REQ PHY/QHP: CPT

## 2025-01-01 PROCEDURE — 80076 HEPATIC FUNCTION PANEL: CPT

## 2025-01-01 PROCEDURE — 2500000003 HC RX 250 WO HCPCS: Performed by: INTERNAL MEDICINE

## 2025-01-01 PROCEDURE — 36591 DRAW BLOOD OFF VENOUS DEVICE: CPT

## 2025-01-01 RX ORDER — EPINEPHRINE 1 MG/ML
0.3 INJECTION, SOLUTION INTRAMUSCULAR; SUBCUTANEOUS PRN
Status: CANCELLED | OUTPATIENT
Start: 2025-01-01

## 2025-01-01 RX ORDER — ALBUTEROL SULFATE 90 UG/1
4 INHALANT RESPIRATORY (INHALATION) PRN
Status: CANCELLED | OUTPATIENT
Start: 2025-01-01

## 2025-01-01 RX ORDER — ONDANSETRON 2 MG/ML
8 INJECTION INTRAMUSCULAR; INTRAVENOUS
Status: CANCELLED | OUTPATIENT
Start: 2025-01-01

## 2025-01-01 RX ORDER — HYDROCORTISONE SODIUM SUCCINATE 100 MG/2ML
100 INJECTION INTRAMUSCULAR; INTRAVENOUS
Status: CANCELLED | OUTPATIENT
Start: 2025-01-01

## 2025-01-01 RX ORDER — DIPHENHYDRAMINE HYDROCHLORIDE 50 MG/ML
50 INJECTION INTRAMUSCULAR; INTRAVENOUS
Status: CANCELLED | OUTPATIENT
Start: 2025-01-01

## 2025-01-01 RX ORDER — HEPARIN 100 UNIT/ML
500 SYRINGE INTRAVENOUS PRN
Status: CANCELLED | OUTPATIENT
Start: 2025-01-01

## 2025-01-01 RX ORDER — SODIUM CHLORIDE 0.9 % (FLUSH) 0.9 %
5-40 SYRINGE (ML) INJECTION PRN
Status: DISCONTINUED | OUTPATIENT
Start: 2025-01-01 | End: 2025-01-01 | Stop reason: HOSPADM

## 2025-01-01 RX ORDER — SODIUM CHLORIDE 9 MG/ML
INJECTION, SOLUTION INTRAVENOUS CONTINUOUS
Status: CANCELLED | OUTPATIENT
Start: 2025-01-01

## 2025-01-01 RX ORDER — ACETAMINOPHEN 325 MG/1
650 TABLET ORAL
Status: CANCELLED | OUTPATIENT
Start: 2025-01-01

## 2025-01-01 RX ORDER — SODIUM CHLORIDE 9 MG/ML
25 INJECTION, SOLUTION INTRAVENOUS PRN
Status: CANCELLED | OUTPATIENT
Start: 2025-01-01

## 2025-01-01 RX ORDER — HEPARIN 100 UNIT/ML
500 SYRINGE INTRAVENOUS PRN
Status: DISCONTINUED | OUTPATIENT
Start: 2025-01-01 | End: 2025-01-01 | Stop reason: HOSPADM

## 2025-01-01 RX ORDER — SODIUM CHLORIDE 0.9 % (FLUSH) 0.9 %
5-40 SYRINGE (ML) INJECTION PRN
Status: CANCELLED | OUTPATIENT
Start: 2025-01-01

## 2025-01-01 RX ADMIN — HEPARIN 500 UNITS: 100 SYRINGE at 09:49

## 2025-01-01 RX ADMIN — SODIUM CHLORIDE, PRESERVATIVE FREE 30 ML: 5 INJECTION INTRAVENOUS at 09:20

## 2025-01-02 ENCOUNTER — CASE MANAGEMENT (OUTPATIENT)
Dept: CASE MANAGEMENT | Age: 55
End: 2025-01-02

## 2025-01-02 NOTE — PROGRESS NOTES
Name: Gustabo Machado  : 1970  MRN: [id number]    Oncology Navigation     Contact Type:  Telephone    Notes: oliverio received call from spouse-believes pt has \"flu bug\"-vomiting and diarrhea on 24.   Has taken several doses of Zofran vomiting every 4-6 hours. Had diarrhea -took one dose of imodium with relief.  States diarrhea started in again. Feels drinking fluids water & gaterade.  Oliverio instructed to continue with Imodium. Asked regarding compazine- jut gave him one.   If feels he needs IV fluids need to take to ED - she doesn't believe at this time. Instructed to alter Compazine & Zofran /continue Imodium/ push fluids water/gaterade. Call back tomorrow if not feeling any better- may need to give IV fluids. Voiced understanding.    Electronically signed by Shawna Box RN on 2025 at 2:29 PM

## 2025-01-04 ENCOUNTER — APPOINTMENT (OUTPATIENT)
Dept: GENERAL RADIOLOGY | Age: 55
End: 2025-01-04
Payer: COMMERCIAL

## 2025-01-04 ENCOUNTER — APPOINTMENT (OUTPATIENT)
Dept: CT IMAGING | Age: 55
End: 2025-01-04
Payer: COMMERCIAL

## 2025-01-04 ENCOUNTER — HOSPITAL ENCOUNTER (INPATIENT)
Age: 55
LOS: 7 days | Discharge: HOME HEALTH | End: 2025-01-11
Attending: EMERGENCY MEDICINE
Payer: COMMERCIAL

## 2025-01-04 DIAGNOSIS — E87.1 HYPONATREMIA: ICD-10-CM

## 2025-01-04 DIAGNOSIS — K56.609 SMALL BOWEL OBSTRUCTION (HCC): Primary | ICD-10-CM

## 2025-01-04 DIAGNOSIS — E83.39 HYPOPHOSPHATEMIA: ICD-10-CM

## 2025-01-04 DIAGNOSIS — C25.9 MALIGNANT NEOPLASM OF PANCREAS, UNSPECIFIED LOCATION OF MALIGNANCY (HCC): ICD-10-CM

## 2025-01-04 DIAGNOSIS — R06.02 SHORTNESS OF BREATH: ICD-10-CM

## 2025-01-04 DIAGNOSIS — J90 PLEURAL EFFUSION: ICD-10-CM

## 2025-01-04 LAB
ALBUMIN SERPL BCG-MCNC: 3.4 G/DL (ref 3.5–5.1)
ALP SERPL-CCNC: 153 U/L (ref 38–126)
ALT SERPL W/O P-5'-P-CCNC: 9 U/L (ref 11–66)
ANION GAP SERPL CALC-SCNC: 17 MEQ/L (ref 8–16)
AST SERPL-CCNC: 15 U/L (ref 5–40)
BASOPHILS ABSOLUTE: 0 THOU/MM3 (ref 0–0.1)
BASOPHILS NFR BLD AUTO: 0.3 %
BILIRUB CONJ SERPL-MCNC: 0.4 MG/DL (ref 0.1–13.8)
BILIRUB SERPL-MCNC: 0.7 MG/DL (ref 0.3–1.2)
BUN SERPL-MCNC: 48 MG/DL (ref 7–22)
CALCIUM SERPL-MCNC: 10 MG/DL (ref 8.5–10.5)
CHLORIDE SERPL-SCNC: 88 MEQ/L (ref 98–111)
CO2 SERPL-SCNC: 25 MEQ/L (ref 23–33)
CREAT SERPL-MCNC: 1.3 MG/DL (ref 0.4–1.2)
DEPRECATED RDW RBC AUTO: 55 FL (ref 35–45)
EKG ATRIAL RATE: 109 BPM
EKG P AXIS: 29 DEGREES
EKG P-R INTERVAL: 128 MS
EKG Q-T INTERVAL: 326 MS
EKG QRS DURATION: 92 MS
EKG QTC CALCULATION (BAZETT): 439 MS
EKG R AXIS: 27 DEGREES
EKG T AXIS: 71 DEGREES
EKG VENTRICULAR RATE: 109 BPM
EOSINOPHIL NFR BLD AUTO: 0.2 %
EOSINOPHILS ABSOLUTE: 0 THOU/MM3 (ref 0–0.4)
ERYTHROCYTE [DISTWIDTH] IN BLOOD BY AUTOMATED COUNT: 16.9 % (ref 11.5–14.5)
GFR SERPL CREATININE-BSD FRML MDRD: 65 ML/MIN/1.73M2
GLUCOSE SERPL-MCNC: 107 MG/DL (ref 70–108)
HCT VFR BLD AUTO: 37.2 % (ref 42–52)
HGB BLD-MCNC: 12 GM/DL (ref 14–18)
IMM GRANULOCYTES # BLD AUTO: 0.13 THOU/MM3 (ref 0–0.07)
IMM GRANULOCYTES NFR BLD AUTO: 1.1 %
LACTIC ACID, SEPSIS: 1.3 MMOL/L (ref 0.5–1.9)
LACTIC ACID, SEPSIS: 3.3 MMOL/L (ref 0.5–1.9)
LIPASE SERPL-CCNC: 63.7 U/L (ref 5.6–51.3)
LYMPHOCYTES ABSOLUTE: 1.4 THOU/MM3 (ref 1–4.8)
LYMPHOCYTES NFR BLD AUTO: 12.2 %
MAGNESIUM SERPL-MCNC: 2.8 MG/DL (ref 1.6–2.4)
MCH RBC QN AUTO: 29.3 PG (ref 26–33)
MCHC RBC AUTO-ENTMCNC: 32.3 GM/DL (ref 32.2–35.5)
MCV RBC AUTO: 91 FL (ref 80–94)
MONOCYTES ABSOLUTE: 1.5 THOU/MM3 (ref 0.4–1.3)
MONOCYTES NFR BLD AUTO: 12.6 %
NEUTROPHILS ABSOLUTE: 8.5 THOU/MM3 (ref 1.8–7.7)
NEUTROPHILS NFR BLD AUTO: 73.6 %
NRBC BLD AUTO-RTO: 0 /100 WBC
OSMOLALITY SERPL CALC.SUM OF ELEC: 273.9 MOSMOL/KG (ref 275–300)
PLATELET # BLD AUTO: 314 THOU/MM3 (ref 130–400)
PMV BLD AUTO: 9.4 FL (ref 9.4–12.4)
POTASSIUM SERPL-SCNC: 3.6 MEQ/L (ref 3.5–5.2)
PROT SERPL-MCNC: 7.7 G/DL (ref 6.1–8)
RBC # BLD AUTO: 4.09 MILL/MM3 (ref 4.7–6.1)
SODIUM SERPL-SCNC: 130 MEQ/L (ref 135–145)
WBC # BLD AUTO: 11.6 THOU/MM3 (ref 4.8–10.8)

## 2025-01-04 PROCEDURE — 83690 ASSAY OF LIPASE: CPT

## 2025-01-04 PROCEDURE — 85025 COMPLETE CBC W/AUTO DIFF WBC: CPT

## 2025-01-04 PROCEDURE — 1200000003 HC TELEMETRY R&B

## 2025-01-04 PROCEDURE — 99285 EMERGENCY DEPT VISIT HI MDM: CPT

## 2025-01-04 PROCEDURE — 74177 CT ABD & PELVIS W/CONTRAST: CPT

## 2025-01-04 PROCEDURE — 36415 COLL VENOUS BLD VENIPUNCTURE: CPT

## 2025-01-04 PROCEDURE — 82248 BILIRUBIN DIRECT: CPT

## 2025-01-04 PROCEDURE — 6360000002 HC RX W HCPCS: Performed by: EMERGENCY MEDICINE

## 2025-01-04 PROCEDURE — 93010 ELECTROCARDIOGRAM REPORT: CPT | Performed by: INTERNAL MEDICINE

## 2025-01-04 PROCEDURE — 6360000004 HC RX CONTRAST MEDICATION: Performed by: EMERGENCY MEDICINE

## 2025-01-04 PROCEDURE — 96361 HYDRATE IV INFUSION ADD-ON: CPT

## 2025-01-04 PROCEDURE — 71045 X-RAY EXAM CHEST 1 VIEW: CPT

## 2025-01-04 PROCEDURE — 74018 RADEX ABDOMEN 1 VIEW: CPT

## 2025-01-04 PROCEDURE — 2580000003 HC RX 258

## 2025-01-04 PROCEDURE — 80053 COMPREHEN METABOLIC PANEL: CPT

## 2025-01-04 PROCEDURE — 83605 ASSAY OF LACTIC ACID: CPT

## 2025-01-04 PROCEDURE — 96374 THER/PROPH/DIAG INJ IV PUSH: CPT

## 2025-01-04 PROCEDURE — 83735 ASSAY OF MAGNESIUM: CPT

## 2025-01-04 PROCEDURE — 93005 ELECTROCARDIOGRAM TRACING: CPT

## 2025-01-04 RX ORDER — ACETAMINOPHEN 650 MG/1
650 SUPPOSITORY RECTAL EVERY 6 HOURS PRN
Status: DISCONTINUED | OUTPATIENT
Start: 2025-01-04 | End: 2025-01-11 | Stop reason: HOSPADM

## 2025-01-04 RX ORDER — SODIUM CHLORIDE 9 MG/ML
INJECTION, SOLUTION INTRAVENOUS PRN
Status: DISCONTINUED | OUTPATIENT
Start: 2025-01-04 | End: 2025-01-11 | Stop reason: HOSPADM

## 2025-01-04 RX ORDER — ONDANSETRON 2 MG/ML
4 INJECTION INTRAMUSCULAR; INTRAVENOUS ONCE
Status: COMPLETED | OUTPATIENT
Start: 2025-01-04 | End: 2025-01-04

## 2025-01-04 RX ORDER — 0.9 % SODIUM CHLORIDE 0.9 %
1000 INTRAVENOUS SOLUTION INTRAVENOUS ONCE
Status: COMPLETED | OUTPATIENT
Start: 2025-01-04 | End: 2025-01-04

## 2025-01-04 RX ORDER — IOPAMIDOL 755 MG/ML
80 INJECTION, SOLUTION INTRAVASCULAR
Status: COMPLETED | OUTPATIENT
Start: 2025-01-04 | End: 2025-01-04

## 2025-01-04 RX ORDER — ACETAMINOPHEN 325 MG/1
650 TABLET ORAL EVERY 6 HOURS PRN
Status: DISCONTINUED | OUTPATIENT
Start: 2025-01-04 | End: 2025-01-11 | Stop reason: HOSPADM

## 2025-01-04 RX ORDER — MAGNESIUM SULFATE IN WATER 40 MG/ML
2000 INJECTION, SOLUTION INTRAVENOUS PRN
Status: DISCONTINUED | OUTPATIENT
Start: 2025-01-04 | End: 2025-01-11 | Stop reason: HOSPADM

## 2025-01-04 RX ORDER — LIDOCAINE HYDROCHLORIDE 40 MG/ML
4 SOLUTION TOPICAL ONCE
Status: DISCONTINUED | OUTPATIENT
Start: 2025-01-04 | End: 2025-01-11 | Stop reason: HOSPADM

## 2025-01-04 RX ORDER — SODIUM CHLORIDE 0.9 % (FLUSH) 0.9 %
5-40 SYRINGE (ML) INJECTION PRN
Status: DISCONTINUED | OUTPATIENT
Start: 2025-01-04 | End: 2025-01-11 | Stop reason: HOSPADM

## 2025-01-04 RX ORDER — ONDANSETRON 4 MG/1
4 TABLET, ORALLY DISINTEGRATING ORAL EVERY 8 HOURS PRN
Status: DISCONTINUED | OUTPATIENT
Start: 2025-01-04 | End: 2025-01-11 | Stop reason: HOSPADM

## 2025-01-04 RX ORDER — POTASSIUM CHLORIDE 1500 MG/1
40 TABLET, EXTENDED RELEASE ORAL PRN
Status: DISCONTINUED | OUTPATIENT
Start: 2025-01-04 | End: 2025-01-11 | Stop reason: HOSPADM

## 2025-01-04 RX ORDER — POTASSIUM CHLORIDE 7.45 MG/ML
10 INJECTION INTRAVENOUS PRN
Status: DISCONTINUED | OUTPATIENT
Start: 2025-01-04 | End: 2025-01-11 | Stop reason: HOSPADM

## 2025-01-04 RX ORDER — SODIUM CHLORIDE 0.9 % (FLUSH) 0.9 %
5-40 SYRINGE (ML) INJECTION EVERY 12 HOURS SCHEDULED
Status: DISCONTINUED | OUTPATIENT
Start: 2025-01-04 | End: 2025-01-11 | Stop reason: HOSPADM

## 2025-01-04 RX ORDER — ONDANSETRON 2 MG/ML
4 INJECTION INTRAMUSCULAR; INTRAVENOUS EVERY 6 HOURS PRN
Status: DISCONTINUED | OUTPATIENT
Start: 2025-01-04 | End: 2025-01-11 | Stop reason: HOSPADM

## 2025-01-04 RX ORDER — POLYETHYLENE GLYCOL 3350 17 G/17G
17 POWDER, FOR SOLUTION ORAL DAILY PRN
Status: DISCONTINUED | OUTPATIENT
Start: 2025-01-04 | End: 2025-01-05

## 2025-01-04 RX ADMIN — SODIUM CHLORIDE 1000 ML: 9 INJECTION, SOLUTION INTRAVENOUS at 21:01

## 2025-01-04 RX ADMIN — ONDANSETRON 4 MG: 2 INJECTION INTRAMUSCULAR; INTRAVENOUS at 22:29

## 2025-01-04 RX ADMIN — IOPAMIDOL 80 ML: 755 INJECTION, SOLUTION INTRAVENOUS at 22:13

## 2025-01-04 ASSESSMENT — PAIN - FUNCTIONAL ASSESSMENT: PAIN_FUNCTIONAL_ASSESSMENT: NONE - DENIES PAIN

## 2025-01-05 ENCOUNTER — APPOINTMENT (OUTPATIENT)
Dept: GENERAL RADIOLOGY | Age: 55
End: 2025-01-05
Payer: COMMERCIAL

## 2025-01-05 LAB
ANION GAP SERPL CALC-SCNC: 13 MEQ/L (ref 8–16)
BACTERIA URNS QL MICRO: ABNORMAL /HPF
BASOPHILS ABSOLUTE: 0 THOU/MM3 (ref 0–0.1)
BASOPHILS NFR BLD AUTO: 0.1 %
BILIRUB UR QL STRIP.AUTO: NEGATIVE
BUN SERPL-MCNC: 41 MG/DL (ref 7–22)
CALCIUM SERPL-MCNC: 9.1 MG/DL (ref 8.5–10.5)
CASTS #/AREA URNS LPF: ABNORMAL /LPF
CASTS 2: ABNORMAL /LPF
CHARACTER UR: CLEAR
CHLORIDE SERPL-SCNC: 93 MEQ/L (ref 98–111)
CO2 SERPL-SCNC: 28 MEQ/L (ref 23–33)
COLOR, UA: YELLOW
CREAT SERPL-MCNC: 1 MG/DL (ref 0.4–1.2)
CRYSTALS URNS MICRO: ABNORMAL
DEPRECATED RDW RBC AUTO: 53.6 FL (ref 35–45)
EOSINOPHIL NFR BLD AUTO: 0.4 %
EOSINOPHILS ABSOLUTE: 0 THOU/MM3 (ref 0–0.4)
EPITHELIAL CELLS, UA: ABNORMAL /HPF
ERYTHROCYTE [DISTWIDTH] IN BLOOD BY AUTOMATED COUNT: 16.9 % (ref 11.5–14.5)
GFR SERPL CREATININE-BSD FRML MDRD: 89 ML/MIN/1.73M2
GLUCOSE SERPL-MCNC: 78 MG/DL (ref 70–108)
GLUCOSE UR QL STRIP.AUTO: NEGATIVE MG/DL
HCT VFR BLD AUTO: 32 % (ref 42–52)
HGB BLD-MCNC: 10.6 GM/DL (ref 14–18)
HGB UR QL STRIP.AUTO: NEGATIVE
IMM GRANULOCYTES # BLD AUTO: 0.1 THOU/MM3 (ref 0–0.07)
IMM GRANULOCYTES NFR BLD AUTO: 1.5 %
KETONES UR QL STRIP.AUTO: ABNORMAL
LYMPHOCYTES ABSOLUTE: 0.9 THOU/MM3 (ref 1–4.8)
LYMPHOCYTES NFR BLD AUTO: 12.7 %
MAGNESIUM SERPL-MCNC: 2.6 MG/DL (ref 1.6–2.4)
MCH RBC QN AUTO: 29.7 PG (ref 26–33)
MCHC RBC AUTO-ENTMCNC: 33.1 GM/DL (ref 32.2–35.5)
MCV RBC AUTO: 89.6 FL (ref 80–94)
MISCELLANEOUS 2: ABNORMAL
MONOCYTES ABSOLUTE: 0.8 THOU/MM3 (ref 0.4–1.3)
MONOCYTES NFR BLD AUTO: 11.4 %
NEUTROPHILS ABSOLUTE: 5.1 THOU/MM3 (ref 1.8–7.7)
NEUTROPHILS NFR BLD AUTO: 73.9 %
NITRITE UR QL STRIP: NEGATIVE
NRBC BLD AUTO-RTO: 0 /100 WBC
OSMOLALITY SERPL CALC.SUM OF ELEC: 277.2 MOSMOL/KG (ref 275–300)
PH UR STRIP.AUTO: 5 [PH] (ref 5–9)
PLATELET # BLD AUTO: 183 THOU/MM3 (ref 130–400)
PMV BLD AUTO: 9.1 FL (ref 9.4–12.4)
POTASSIUM SERPL-SCNC: 3.5 MEQ/L (ref 3.5–5.2)
PROT UR STRIP.AUTO-MCNC: 30 MG/DL
RBC # BLD AUTO: 3.57 MILL/MM3 (ref 4.7–6.1)
RBC URINE: ABNORMAL /HPF
RENAL EPI CELLS #/AREA URNS HPF: ABNORMAL /[HPF]
SODIUM SERPL-SCNC: 134 MEQ/L (ref 135–145)
SP GR UR REFRACT.AUTO: > 1.03 (ref 1–1.03)
UROBILINOGEN, URINE: 0.2 EU/DL (ref 0–1)
WBC # BLD AUTO: 6.9 THOU/MM3 (ref 4.8–10.8)
WBC #/AREA URNS HPF: ABNORMAL /HPF
WBC #/AREA URNS HPF: NEGATIVE /[HPF]
YEAST LIKE FUNGI URNS QL MICRO: ABNORMAL

## 2025-01-05 PROCEDURE — 99223 1ST HOSP IP/OBS HIGH 75: CPT

## 2025-01-05 PROCEDURE — 6360000002 HC RX W HCPCS

## 2025-01-05 PROCEDURE — 99222 1ST HOSP IP/OBS MODERATE 55: CPT | Performed by: SURGERY

## 2025-01-05 PROCEDURE — 74018 RADEX ABDOMEN 1 VIEW: CPT

## 2025-01-05 PROCEDURE — 6360000002 HC RX W HCPCS: Performed by: NURSE PRACTITIONER

## 2025-01-05 PROCEDURE — 2580000003 HC RX 258

## 2025-01-05 PROCEDURE — 80048 BASIC METABOLIC PNL TOTAL CA: CPT

## 2025-01-05 PROCEDURE — 6360000002 HC RX W HCPCS: Performed by: EMERGENCY MEDICINE

## 2025-01-05 PROCEDURE — 83735 ASSAY OF MAGNESIUM: CPT

## 2025-01-05 PROCEDURE — 81001 URINALYSIS AUTO W/SCOPE: CPT

## 2025-01-05 PROCEDURE — 1200000003 HC TELEMETRY R&B

## 2025-01-05 PROCEDURE — 36415 COLL VENOUS BLD VENIPUNCTURE: CPT

## 2025-01-05 PROCEDURE — 2500000003 HC RX 250 WO HCPCS

## 2025-01-05 PROCEDURE — APPSS45 APP SPLIT SHARED TIME 31-45 MINUTES: Performed by: NURSE PRACTITIONER

## 2025-01-05 PROCEDURE — 85025 COMPLETE CBC W/AUTO DIFF WBC: CPT

## 2025-01-05 RX ORDER — LIDOCAINE 40 MG/G
CREAM TOPICAL PRN
Status: DISCONTINUED | OUTPATIENT
Start: 2025-01-05 | End: 2025-01-11 | Stop reason: HOSPADM

## 2025-01-05 RX ORDER — FAMOTIDINE 20 MG/1
20 TABLET, FILM COATED ORAL DAILY
Status: DISCONTINUED | OUTPATIENT
Start: 2025-01-05 | End: 2025-01-11 | Stop reason: HOSPADM

## 2025-01-05 RX ORDER — PANTOPRAZOLE SODIUM 40 MG/10ML
40 INJECTION, POWDER, LYOPHILIZED, FOR SOLUTION INTRAVENOUS DAILY
Status: DISCONTINUED | OUTPATIENT
Start: 2025-01-05 | End: 2025-01-11 | Stop reason: HOSPADM

## 2025-01-05 RX ORDER — SODIUM CHLORIDE, SODIUM LACTATE, POTASSIUM CHLORIDE, CALCIUM CHLORIDE 600; 310; 30; 20 MG/100ML; MG/100ML; MG/100ML; MG/100ML
INJECTION, SOLUTION INTRAVENOUS CONTINUOUS
Status: ACTIVE | OUTPATIENT
Start: 2025-01-05 | End: 2025-01-07

## 2025-01-05 RX ORDER — ENOXAPARIN SODIUM 100 MG/ML
1 INJECTION SUBCUTANEOUS EVERY 12 HOURS
Status: DISCONTINUED | OUTPATIENT
Start: 2025-01-05 | End: 2025-01-11 | Stop reason: HOSPADM

## 2025-01-05 RX ADMIN — ENOXAPARIN SODIUM 80 MG: 100 INJECTION SUBCUTANEOUS at 08:24

## 2025-01-05 RX ADMIN — HYDROMORPHONE HYDROCHLORIDE 0.25 MG: 1 INJECTION, SOLUTION INTRAMUSCULAR; INTRAVENOUS; SUBCUTANEOUS at 20:14

## 2025-01-05 RX ADMIN — ENOXAPARIN SODIUM 80 MG: 100 INJECTION SUBCUTANEOUS at 20:14

## 2025-01-05 RX ADMIN — PANTOPRAZOLE SODIUM 40 MG: 40 INJECTION, POWDER, FOR SOLUTION INTRAVENOUS at 13:45

## 2025-01-05 RX ADMIN — SODIUM CHLORIDE, POTASSIUM CHLORIDE, SODIUM LACTATE AND CALCIUM CHLORIDE: 600; 310; 30; 20 INJECTION, SOLUTION INTRAVENOUS at 21:30

## 2025-01-05 RX ADMIN — SODIUM CHLORIDE, POTASSIUM CHLORIDE, SODIUM LACTATE AND CALCIUM CHLORIDE: 600; 310; 30; 20 INJECTION, SOLUTION INTRAVENOUS at 11:55

## 2025-01-05 RX ADMIN — HYDROMORPHONE HYDROCHLORIDE 1 MG: 1 INJECTION, SOLUTION INTRAMUSCULAR; INTRAVENOUS; SUBCUTANEOUS at 00:33

## 2025-01-05 RX ADMIN — HYDROMORPHONE HYDROCHLORIDE 0.25 MG: 1 INJECTION, SOLUTION INTRAMUSCULAR; INTRAVENOUS; SUBCUTANEOUS at 08:24

## 2025-01-05 RX ADMIN — SODIUM CHLORIDE, PRESERVATIVE FREE 10 ML: 5 INJECTION INTRAVENOUS at 08:27

## 2025-01-05 RX ADMIN — HYDROMORPHONE HYDROCHLORIDE 0.25 MG: 1 INJECTION, SOLUTION INTRAMUSCULAR; INTRAVENOUS; SUBCUTANEOUS at 15:51

## 2025-01-05 RX ADMIN — SODIUM CHLORIDE, PRESERVATIVE FREE 10 ML: 5 INJECTION INTRAVENOUS at 01:49

## 2025-01-05 RX ADMIN — SODIUM CHLORIDE, POTASSIUM CHLORIDE, SODIUM LACTATE AND CALCIUM CHLORIDE: 600; 310; 30; 20 INJECTION, SOLUTION INTRAVENOUS at 02:31

## 2025-01-05 RX ADMIN — ENOXAPARIN SODIUM 80 MG: 100 INJECTION SUBCUTANEOUS at 02:34

## 2025-01-05 ASSESSMENT — PAIN SCALES - GENERAL
PAINLEVEL_OUTOF10: 8
PAINLEVEL_OUTOF10: 6
PAINLEVEL_OUTOF10: 7
PAINLEVEL_OUTOF10: 7
PAINLEVEL_OUTOF10: 2
PAINLEVEL_OUTOF10: 5

## 2025-01-05 ASSESSMENT — PAIN DESCRIPTION - DESCRIPTORS
DESCRIPTORS: ACHING
DESCRIPTORS: DISCOMFORT
DESCRIPTORS: ACHING
DESCRIPTORS: ACHING

## 2025-01-05 ASSESSMENT — PAIN DESCRIPTION - LOCATION
LOCATION: COCCYX;THROAT
LOCATION: COCCYX;THROAT
LOCATION: NOSE

## 2025-01-05 ASSESSMENT — PAIN - FUNCTIONAL ASSESSMENT
PAIN_FUNCTIONAL_ASSESSMENT: PREVENTS OR INTERFERES SOME ACTIVE ACTIVITIES AND ADLS
PAIN_FUNCTIONAL_ASSESSMENT: PREVENTS OR INTERFERES SOME ACTIVE ACTIVITIES AND ADLS

## 2025-01-05 ASSESSMENT — PAIN DESCRIPTION - ORIENTATION
ORIENTATION: MID
ORIENTATION: MID

## 2025-01-05 NOTE — PLAN OF CARE
Problem: Discharge Planning  Goal: Discharge to home or other facility with appropriate resources  1/5/2025 1010 by Charlie Perez RN  Outcome: Progressing  Flowsheets (Taken 1/5/2025 0815)  Discharge to home or other facility with appropriate resources: Identify barriers to discharge with patient and caregiver  1/5/2025 0649 by Amy Yin RN  Outcome: Progressing  Flowsheets (Taken 1/5/2025 0649)  Discharge to home or other facility with appropriate resources:   Identify barriers to discharge with patient and caregiver   Arrange for needed discharge resources and transportation as appropriate   Identify discharge learning needs (meds, wound care, etc)  1/5/2025 0649 by Amy Yin RN  Outcome: Progressing  Flowsheets (Taken 1/5/2025 0649)  Discharge to home or other facility with appropriate resources:   Identify barriers to discharge with patient and caregiver   Arrange for needed discharge resources and transportation as appropriate   Identify discharge learning needs (meds, wound care, etc)     Problem: Safety - Adult  Goal: Free from fall injury  1/5/2025 1010 by Charlie Perez RN  Outcome: Progressing  Flowsheets (Taken 1/5/2025 1007)  Free From Fall Injury: Instruct family/caregiver on patient safety  1/5/2025 0649 by Amy Yin RN  Outcome: Progressing  Flowsheets (Taken 1/5/2025 0649)  Free From Fall Injury: Instruct family/caregiver on patient safety  1/5/2025 0649 by Amy Yin RN  Outcome: Progressing  Flowsheets (Taken 1/5/2025 0649)  Free From Fall Injury: Instruct family/caregiver on patient safety     Problem: ABCDS Injury Assessment  Goal: Absence of physical injury  1/5/2025 1010 by Charlie Perez RN  Outcome: Progressing  Flowsheets (Taken 1/5/2025 1007)  Absence of Physical Injury: Implement safety measures based on patient assessment  1/5/2025 0649 by Amy Yin RN  Outcome: Progressing  Flowsheets (Taken 1/5/2025 0649)  Absence of Physical Injury:  Every 4-6 hours minimum:  Change oxygen saturation probe site  4.  Every 4-6 hours:  If on nasal continuous positive airway pressure, respiratory therapy assess nares and determine need for appliance change or resting period.  Outcome: Progressing

## 2025-01-05 NOTE — PROGRESS NOTES
Spiritual Health History and Assessment/Progress Note  [facility]    (P) Spiritual/Emotional Needs,  ,  ,      Name: Gustabo Machado MRN: [id number]    Age: 54 y.o.     Sex: male   Language: English   Samaritan: Latter day   Small bowel obstruction (HCC)     Date: 1/5/2025            Total Time Calculated: (P) 20 min              Spiritual Assessment began in [facility] ORTHOPEDICS 7K        Referral/Consult From: (P) Nurse   Encounter Overview/Reason: (P) Spiritual/Emotional Needs  Service Provided For: (P) Patient and family together    Isabel, Belief, Meaning:   Patient unable to assess at this time  Family/Friends identify as spiritual      Importance and Influence:  Patient has spiritual/personal beliefs that influence decisions regarding their health  Family/Friends have spiritual/personal beliefs that influence decisions regarding the patient's health    Community:  Patient feels well-supported. Support system includes: Spouse/Partner  Family/Friends Other: Believes in the power of prayer that prompted him to request prayer.    Assessment and Plan of Care:     Patient Interventions include: Facilitated expression of thoughts and feelings  Family/Friends Interventions include: Facilitated expression of thoughts and feelings    Patient Plan of Care: Spiritual Care available upon further referral  Family/Friends Plan of Care: No spiritual needs identified for follow-up    Electronically signed by Chaplain Kasie on 1/5/2025 at 2:21 PM

## 2025-01-05 NOTE — CONSULTS
ABDOMEN: distended, No and Laparoscopic scar(s) present. Hypoactive BS , tenderness noted. Drainage cath RQ  RECTAL: deferred, not clinically indicated  NEUROLOGIC: There are no focalizing motor or sensory deficits. CN II-XII are grossly intact..   EXTREMITIES: no clubbing and no edema.  LABS:     Recent Labs     01/04/25 2020 01/05/25  0215 01/05/25  0740   WBC 11.6*  --  6.9   HGB 12.0*  --  10.6*   HCT 37.2*  --  32.0*     --  183   *  --  134*   K 3.6  --  3.5   CL 88*  --  93*   CO2 25  --  28   BUN 48*  --  41*   CREATININE 1.3*  --  1.0   MG 2.8*  --  2.6*   CALCIUM 10.0  --  9.1   AST 15  --   --    ALT 9*  --   --    BILITOT 0.7  --   --    BILIDIR 0.4  --   --    LIPASE 63.7*  --   --    NITRU  --  NEGATIVE  --    COLORU  --  YELLOW  --    BACTERIA  --  NONE SEEN  --      RADIOLOGY:     XR ABDOMEN (KUB) (SINGLE AP VIEW)   Final Result   Persistent air-filled dilated small bowel loops in the center of the   abdomen.               **This report has been created using voice recognition software. It may contain   minor errors which are inherent in voice recognition technology.**      Electronically signed by Dr. Vicky Dumont      XR ABDOMEN FOR NG/OG/NE TUBE PLACEMENT   Final Result   Impression:   1. Enteric tube with tip in the mid stomach.   2. Other findings similar to earlier CT abdomen/pelvis.      This document has been electronically signed by: Marvin Mcclellan MD on    01/05/2025 01:38 AM      CT ABDOMEN PELVIS W IV CONTRAST Additional Contrast? None   Final Result   Impression:   1. Partial small bowel obstruction versus ileus.   2. Hepatic metastasis and omental caking, similar to previous study.    Decreased abdominal and retroperitoneal lymphadenopathy. Decreased bulbous    configuration of the pancreatic tail compared to previous study. New T11    sclerotic lesion   3. Small right and moderate left pleural effusions. Bibasilar infiltrates.      This document has been electronically  signed by: Marvin Mcclellan MD on    01/04/2025 11:32 PM      All CTs at this facility use dose modulation techniques and iterative    reconstructions, and/or weight-based dosing   when appropriate to reduce radiation to a low as reasonably achievable.      XR CHEST PORTABLE   Final Result   Impression:   Small left pleural effusion and atelectasis decreased compared to prior    study.      This document has been electronically signed by: Marvin Mcclellan MD on    01/04/2025 09:04 PM          Thank you for the evaluation. Further recommendations to follow.    Total time spent in care of patient: 15 minutes collectively between subjective/objective examination, chart review, documentation, clinical reasoning and discussion with attending regarding plan/interval changes.      Electronically signed by JESSI Thomas CNP on 1/5/2025 at 1:34 PM

## 2025-01-05 NOTE — ED PROVIDER NOTES
[facility] ORTHOPEDICS 7K  EMERGENCY DEPARTMENT ENCOUNTER          Pt Name: Gustabo Machado  MRN: [id number]  Birthdate 1970  Date of evaluation: 1/4/2025  Physician: Dario Wise MD  Supervising Attending Physician: Meagan Epps PA       CHIEF COMPLAINT       Chief Complaint   Patient presents with    Nausea    Vomiting         HISTORY OF PRESENT ILLNESS    HPI  Gustabo Machado is a 54 y.o. male who presents to the emergency department from home for evaluation of nausea, abdominal distention, diarrhea and vomiting starting New Year's Day.  Patient has pancreatic cancer metastatic with lesions on his liver and has been receiving chemotherapy and has had his fourth round, last session on 12/27/2024, next round scheduled for Tuesday.  Patient wife states usually start with the flu and has been trying to ensure that he drinks Ensure nutritional supplements.  Symptoms have worsened over the last evening wife thought that he should get it checked out, he has a paracentesis drain in place.    Denies fever, chills, headache, lightheadedness, dizziness, vision changes, tinnitus, cough, congestion, sore throat, neck pain/stiffness, chest pain, shortness of breath, constipation, dysuria, blood in the urine or stool, numbness/tingling/weakness in extremities.    The patient has no other acute complaints at this time.      PAST MEDICAL AND SURGICAL HISTORY     Past Medical History:   Diagnosis Date    Cancer (HCC)     Pancreatic cancer (HCC)     Pulmonary emboli (HCC) 11/2024     Past Surgical History:   Procedure Laterality Date    COLONOSCOPY  10/31/2022    CT NEEDLE BIOPSY LIVER PERCUTANEOUS  10/22/2024    CT NEEDLE BIOPSY LIVER PERCUTANEOUS 10/22/2024 [facility] CT SCAN    IR GUIDED THROMB MECH VEIN  11/17/2024    IR THROMB MECH VEIN 11/17/2024 [facility] SPECIAL PROCEDURES    IR INSERT TUNNELED PLEURA CATH W CUFF  11/19/2024    IR INSERT TUNNELED PLEURA CATH W CUFF 11/19/2024 [facility] SPECIAL PROCEDURES    PORT    sodium chloride flush 0.9 % injection 5-40 mL (10 mLs IntraVENous Given 1/5/25 0827)   sodium chloride flush 0.9 % injection 5-40 mL (has no administration in time range)   0.9 % sodium chloride infusion (has no administration in time range)   potassium chloride (KLOR-CON M) extended release tablet 40 mEq (has no administration in time range)     Or   potassium bicarb-citric acid (EFFER-K) effervescent tablet 40 mEq (has no administration in time range)     Or   potassium chloride 10 mEq/100 mL IVPB (Peripheral Line) (has no administration in time range)   magnesium sulfate 2000 mg in 50 mL IVPB premix (has no administration in time range)   ondansetron (ZOFRAN-ODT) disintegrating tablet 4 mg (has no administration in time range)     Or   ondansetron (ZOFRAN) injection 4 mg (has no administration in time range)   acetaminophen (TYLENOL) tablet 650 mg (has no administration in time range)     Or   acetaminophen (TYLENOL) suppository 650 mg (has no administration in time range)   lactated ringers infusion ( IntraVENous New Bag 1/5/25 1155)   apixaban (ELIQUIS) tablet 5 mg ( Oral Automatically Held 1/8/25 2100)   famotidine (PEPCID) tablet 20 mg ( Oral Automatically Held 1/8/25 0900)   lidocaine (LMX) 4 % cream (has no administration in time range)   enoxaparin (LOVENOX) injection 80 mg (80 mg SubCUTAneous Given 1/5/25 0824)   HYDROmorphone (DILAUDID) injection 0.25 mg (0.25 mg IntraVENous Given 1/5/25 1551)   pantoprazole (PROTONIX) injection 40 mg (40 mg IntraVENous Given 1/5/25 1345)   sodium chloride 0.9 % bolus 1,000 mL (0 mLs IntraVENous Stopped 1/4/25 2302)   ondansetron (ZOFRAN) injection 4 mg (4 mg IntraVENous Given 1/4/25 2229)   iopamidol (ISOVUE-370) 76 % injection 80 mL (80 mLs IntraVENous Given 1/4/25 2213)   HYDROmorphone (DILAUDID) injection 1 mg (1 mg IntraVENous Given 1/5/25 0033)       ED Course as of 01/05/25 1637   Sat Jan 04, 2025   6431 CT ABDOMEN PELVIS W IV CONTRAST Additional Contrast?

## 2025-01-05 NOTE — CARE COORDINATION
01/05/25 1014   Service Assessment   Patient Orientation Alert and Oriented   Cognition Alert   History Provided By Patient   Primary Caregiver Self   Support Systems Spouse/Significant Other;Children;Family Members   Patient's Healthcare Decision Maker is: Patient Declined (Legal Next of Kin Remains as Decision Maker)   PCP Verified by CM Yes   Last Visit to PCP Within last 3 months   Prior Functional Level Independent in ADLs/IADLs   Current Functional Level Assistance with the following:;Housework;Shopping;Cooking   Can patient return to prior living arrangement Yes   Ability to make needs known: Good   Family able to assist with home care needs: Yes   Would you like for me to discuss the discharge plan with any other family members/significant others, and if so, who? No   Financial Resources Other (Comment)  (Med mutual)   Community Resources None   Social/Functional History   Active  Yes   Discharge Planning   Type of Residence House   Living Arrangements Spouse/Significant Other;Children   Current Services Prior To Admission Home Care;Other (Comment)  (pleurx)   Potential Assistance Needed N/A   DME Ordered? No   Potential Assistance Purchasing Medications No   Type of Home Care Services None   Patient expects to be discharged to: House   Services At/After Discharge   Transition of Care Consult (CM Consult) Discharge Planning   Services At/After Discharge None   Mode of Transport at Discharge Other (see comment)  (family)   Confirm Follow Up Transport Family   Condition of Participation: Discharge Planning   The Plan for Transition of Care is related to the following treatment goals: get NG out, bowel obstruction improve     Patient Goals/Plan/Treatment Preferences: Spoke with Gustabo, he plans home with his wife and children. He is current with [facility] for pleurx care. He does not anticipate further needs.     If patient is discharged prior to next notation, then this note serves as note for  discharge by case management.

## 2025-01-05 NOTE — ED NOTES
Patient transported to  [facility] by cart in stable condition.   IV line / port is patent.    Pt with NG tube in place

## 2025-01-05 NOTE — ED TRIAGE NOTES
Patient presents to the ed with c/o nausea, vomiting and diarrhea that started on new years day. Pt denies any pain at this time. Pt states that he has had intermittent abd pain. Pt took immodium at home.

## 2025-01-05 NOTE — PROGRESS NOTES
Pt admitted to  7K20 from ED.     Complaints: Nausea/vomiting.      IV none infusing into the port. Port site free of s/s of infection or infiltration. Vital signs obtained. Assessment and data collection initiated.     Two nurse skin assessment performed by Kailyn RN and Taryn RN. Oriented to room.     Explained patients right to have family, representative or physician notified of their admission.     Policies and procedures for 7K explained. All questions answered with no further questions at this time. Fall prevention and safety brochure discussed with patient.  Bed alarm on. Call light in reach.

## 2025-01-05 NOTE — ED NOTES
ED to inpatient nurses report    Chief Complaint   Patient presents with    Nausea    Vomiting      Present to ED from home   LOC: alert and orientated to name, place, date  Vital signs   Vitals:    01/04/25 2005 01/04/25 2102 01/04/25 2231 01/05/25 0045   BP: (!) 116/91 116/85 117/85 120/87   Pulse: (!) 108 (!) 104 97 (!) 107   Resp: 18 19 23 18   Temp: 97.8 °F (36.6 °C)      SpO2: 98% 97% 97% 97%   Weight: 77.1 kg (170 lb)      Height: 1.829 m (6')         Oxygen Baseline RA    Current needs required RA Bipap/Cpap No  LDAs:    Mobility: Requires assistance * 1  Pending ED orders: none   Present condition: stable     C-SSRS Risk of Suicide: No Risk  Swallow Screening    Preferred Language: English     Electronically signed by Shruthi Flowers RN on 1/5/2025 at 12:57 AM

## 2025-01-05 NOTE — ED NOTES
Pt medicated per MAR. Pt resting on cot with wife at bedside. Will monitor. Call light within reach.

## 2025-01-05 NOTE — PLAN OF CARE
Patient was admitted early this a.m. (1/5) due to nausea & vomiting.  Patient has a history of pancreatic cancer with metastatic disease to the liver.  Patient also follows with [facility] oncology.  Patient is supposed to have his next chemo treatment on 1/7.    Case discussed with general surgery.  This is likely due to progressing disease.  NG tube to suction.  During my evaluation patient had 400 cc NG output.     When discussing possible options with patient and patient's wife, they would like to continue aggressive treatment.  When the topic of hospice was broached, they did not believe that they were at that point.  It was explained to patient that he would not be leaving the hospital until we had a solid nutritional source for him if he was full code.    Spoke with pharmacy about PPN/TPN. They state he needs to be NPO for 7 days, these days do not have to be in the hospital. They recommended Palliative Care & Dieitician consult.     General Surgery following. Palliative care & Oncology will be consulted on Monday.     Electronically signed by DAMIAN Fernandez on 1/5/2025 at 4:00 PM

## 2025-01-05 NOTE — PLAN OF CARE
Problem: Discharge Planning  Goal: Discharge to home or other facility with appropriate resources  1/5/2025 0649 by Amy Yin RN  Outcome: Progressing  Flowsheets (Taken 1/5/2025 0649)  Discharge to home or other facility with appropriate resources:   Identify barriers to discharge with patient and caregiver   Arrange for needed discharge resources and transportation as appropriate   Identify discharge learning needs (meds, wound care, etc)     Problem: Safety - Adult  Goal: Free from fall injury  1/5/2025 0649 by Amy Yin RN  Outcome: Progressing  Flowsheets (Taken 1/5/2025 0649)  Free From Fall Injury: Instruct family/caregiver on patient safety     Problem: ABCDS Injury Assessment  Goal: Absence of physical injury  1/5/2025 0649 by Amy Yin RN  Outcome: Progressing  Flowsheets (Taken 1/5/2025 0649)  Absence of Physical Injury: Implement safety measures based on patient assessment     Problem: Gastrointestinal - Adult  Goal: Minimal or absence of nausea and vomiting  Outcome: Progressing  Flowsheets (Taken 1/5/2025 0649)  Minimal or absence of nausea and vomiting:   Provide nonpharmacologic comfort measures as appropriate   Administer IV fluids as ordered to ensure adequate hydration   Nasogastric tube to low intermittent suction as ordered   Administer ordered antiemetic medications as needed     Problem: Gastrointestinal - Adult  Goal: Maintains or returns to baseline bowel function  Outcome: Progressing  Flowsheets (Taken 1/5/2025 0649)  Maintains or returns to baseline bowel function:   Assess bowel function   Administer IV fluids as ordered to ensure adequate hydration   Administer ordered medications as needed     Care plan reviewed with patient.  Patient verbalizes understanding of the plan of care and contributes to goal setting.

## 2025-01-05 NOTE — H&P
Hospitalist History & Physical    Patient:  Gustabo Machado    Unit/Bed:-20/020-A  YOB: 1970  MRN: [id number]   Acct: [id number]   PCP: Daniel Agrawal MD  Code Status: Full Code    Date of Service: Pt seen/examined on 01/05/25 and admitted to Inpatient with expected LOS greater than two midnights due to medical therapy.     Chief Complaint: Nausea vomiting.    Assessment/Plan:    Partial SBO vs illeus: (+) nausea, vomiting, diarrhea. Onset of symptoms 1/1/25.  No history of SBO.  No metastatic lesions in the colon that he is aware of.  Opioid therapy for cancer related pain.  NG placed in ED. CT AP shows partial bowel obstruction vs. illeus  NPO  NG to LIWS  IVF hydration.   Antiemetics as needed.   Pain control.   Consult general surgery in AM.     EMILY: Likely prerenal from GI loss.  BUN 48 and Creatinine 1.3. Baseline creatinine near 0.8.     Continue IVF.   Daily BMP.    Avoid nephrotoxic agents as able.     Acute on chronic hyponatremia: Likely due to GI loss and poor PO intake.  Na 130. Has followed with Dr. Montoya in the past.   Monitor.  BMP daily.     HAGMA: Likely starvation related. Intermittent nausea and vomiting X 4 days.   IVF hydration.    Repeat daily labs.     Hyperlactatemia: Resolved.  3.3 on admission.  Improved to 1.3 after IVF.     Bilateral pleural effusion: No SOB. CTAP show small right and moderate left pleural effusions. CXR shows that these have decreased since prior studies. On room air.  Monitor.  Can consider intervention should condition worsen.     Pancreatic cancer with mets to the liver: Follows with Dr. Irving.  Currently undergoing palliative treatment with Folfirinox.   Next treatment scheduled for 1/7/25    Hx of PE: On Eliquis.   Hold Eliquis while NPO.    Start therapeutic Lovenox.         LDA: []CVC / []PICC / []Midline / []Chavez / [x]Drains / []Mediport / []None  Antibiotics: No  Steroids: No  Labs: [x]Yes / []No  IVF: [x]Yes / []No    Level of

## 2025-01-06 ENCOUNTER — APPOINTMENT (OUTPATIENT)
Dept: GENERAL RADIOLOGY | Age: 55
End: 2025-01-06
Payer: COMMERCIAL

## 2025-01-06 PROCEDURE — 6360000002 HC RX W HCPCS: Performed by: NURSE PRACTITIONER

## 2025-01-06 PROCEDURE — 6360000002 HC RX W HCPCS

## 2025-01-06 PROCEDURE — 2580000003 HC RX 258

## 2025-01-06 PROCEDURE — 74018 RADEX ABDOMEN 1 VIEW: CPT

## 2025-01-06 PROCEDURE — 99231 SBSQ HOSP IP/OBS SF/LOW 25: CPT | Performed by: SURGERY

## 2025-01-06 PROCEDURE — 99254 IP/OBS CNSLTJ NEW/EST MOD 60: CPT | Performed by: NURSE PRACTITIONER

## 2025-01-06 PROCEDURE — 99239 HOSP IP/OBS DSCHRG MGMT >30: CPT | Performed by: PHYSICIAN ASSISTANT

## 2025-01-06 PROCEDURE — 51798 US URINE CAPACITY MEASURE: CPT

## 2025-01-06 PROCEDURE — 6360000004 HC RX CONTRAST MEDICATION: Performed by: NURSE PRACTITIONER

## 2025-01-06 PROCEDURE — 2580000003 HC RX 258: Performed by: NURSE PRACTITIONER

## 2025-01-06 PROCEDURE — 1200000003 HC TELEMETRY R&B

## 2025-01-06 RX ORDER — SODIUM CHLORIDE, SODIUM LACTATE, POTASSIUM CHLORIDE, AND CALCIUM CHLORIDE .6; .31; .03; .02 G/100ML; G/100ML; G/100ML; G/100ML
500 INJECTION, SOLUTION INTRAVENOUS ONCE
Status: COMPLETED | OUTPATIENT
Start: 2025-01-06 | End: 2025-01-06

## 2025-01-06 RX ORDER — DIATRIZOATE MEGLUMINE AND DIATRIZOATE SODIUM 660; 100 MG/ML; MG/ML
90 SOLUTION ORAL; RECTAL ONCE
Status: COMPLETED | OUTPATIENT
Start: 2025-01-06 | End: 2025-01-06

## 2025-01-06 RX ORDER — PROCHLORPERAZINE EDISYLATE 5 MG/ML
10 INJECTION INTRAMUSCULAR; INTRAVENOUS EVERY 6 HOURS PRN
Status: DISCONTINUED | OUTPATIENT
Start: 2025-01-06 | End: 2025-01-11 | Stop reason: HOSPADM

## 2025-01-06 RX ADMIN — HYDROMORPHONE HYDROCHLORIDE 0.25 MG: 1 INJECTION, SOLUTION INTRAMUSCULAR; INTRAVENOUS; SUBCUTANEOUS at 00:24

## 2025-01-06 RX ADMIN — PROCHLORPERAZINE EDISYLATE 10 MG: 5 INJECTION INTRAMUSCULAR; INTRAVENOUS at 15:28

## 2025-01-06 RX ADMIN — HYDROMORPHONE HYDROCHLORIDE 0.25 MG: 1 INJECTION, SOLUTION INTRAMUSCULAR; INTRAVENOUS; SUBCUTANEOUS at 20:33

## 2025-01-06 RX ADMIN — HYDROMORPHONE HYDROCHLORIDE 0.25 MG: 1 INJECTION, SOLUTION INTRAMUSCULAR; INTRAVENOUS; SUBCUTANEOUS at 15:20

## 2025-01-06 RX ADMIN — SODIUM CHLORIDE, POTASSIUM CHLORIDE, SODIUM LACTATE AND CALCIUM CHLORIDE: 600; 310; 30; 20 INJECTION, SOLUTION INTRAVENOUS at 23:25

## 2025-01-06 RX ADMIN — SODIUM CHLORIDE, POTASSIUM CHLORIDE, SODIUM LACTATE AND CALCIUM CHLORIDE: 600; 310; 30; 20 INJECTION, SOLUTION INTRAVENOUS at 11:57

## 2025-01-06 RX ADMIN — DIATRIZOATE MEGLUMINE AND DIATRIZOATE SODIUM 90 ML: 660; 100 LIQUID ORAL; RECTAL at 11:55

## 2025-01-06 RX ADMIN — ONDANSETRON 4 MG: 2 INJECTION INTRAMUSCULAR; INTRAVENOUS at 13:29

## 2025-01-06 RX ADMIN — ENOXAPARIN SODIUM 80 MG: 100 INJECTION SUBCUTANEOUS at 20:33

## 2025-01-06 RX ADMIN — SODIUM CHLORIDE, POTASSIUM CHLORIDE, SODIUM LACTATE AND CALCIUM CHLORIDE 500 ML: 600; 310; 30; 20 INJECTION, SOLUTION INTRAVENOUS at 04:09

## 2025-01-06 RX ADMIN — ENOXAPARIN SODIUM 80 MG: 100 INJECTION SUBCUTANEOUS at 09:39

## 2025-01-06 RX ADMIN — PANTOPRAZOLE SODIUM 40 MG: 40 INJECTION, POWDER, FOR SOLUTION INTRAVENOUS at 09:39

## 2025-01-06 ASSESSMENT — PAIN DESCRIPTION - ORIENTATION
ORIENTATION: MID
ORIENTATION: MID

## 2025-01-06 ASSESSMENT — PAIN DESCRIPTION - LOCATION
LOCATION: BACK;THROAT
LOCATION: COCCYX;THROAT

## 2025-01-06 ASSESSMENT — PAIN SCALES - GENERAL
PAINLEVEL_OUTOF10: 7
PAINLEVEL_OUTOF10: 7
PAINLEVEL_OUTOF10: 8
PAINLEVEL_OUTOF10: 0
PAINLEVEL_OUTOF10: 0
PAINLEVEL_OUTOF10: 3

## 2025-01-06 ASSESSMENT — PAIN DESCRIPTION - DESCRIPTORS
DESCRIPTORS: ACHING
DESCRIPTORS: ACHING

## 2025-01-06 ASSESSMENT — PAIN - FUNCTIONAL ASSESSMENT: PAIN_FUNCTIONAL_ASSESSMENT: PREVENTS OR INTERFERES SOME ACTIVE ACTIVITIES AND ADLS

## 2025-01-06 NOTE — PLAN OF CARE
Problem: Discharge Planning  Goal: Discharge to home or other facility with appropriate resources  1/5/2025 2217 by Hoa Martinez RN  Outcome: Progressing  1/5/2025 1010 by Charlie Perez RN  Outcome: Progressing  Flowsheets (Taken 1/5/2025 0815)  Discharge to home or other facility with appropriate resources: Identify barriers to discharge with patient and caregiver     Problem: Safety - Adult  Goal: Free from fall injury  1/5/2025 2217 by Hoa Martinez RN  Outcome: Progressing  1/5/2025 1010 by Charlie Perez RN  Outcome: Progressing  Flowsheets (Taken 1/5/2025 1007)  Free From Fall Injury: Instruct family/caregiver on patient safety     Problem: ABCDS Injury Assessment  Goal: Absence of physical injury  1/5/2025 2217 by Hoa Martinez RN  Outcome: Progressing  1/5/2025 1010 by Charlie Perez RN  Outcome: Progressing  Flowsheets (Taken 1/5/2025 1007)  Absence of Physical Injury: Implement safety measures based on patient assessment     Problem: Gastrointestinal - Adult  Goal: Minimal or absence of nausea and vomiting  1/5/2025 2217 by Hoa Martinez RN  Outcome: Progressing  1/5/2025 1010 by Charlie Perez RN  Outcome: Progressing  Flowsheets (Taken 1/5/2025 0815)  Minimal or absence of nausea and vomiting:   Administer ordered antiemetic medications as needed   Provide nonpharmacologic comfort measures as appropriate  Goal: Maintains or returns to baseline bowel function  1/5/2025 2217 by Hoa Martinez RN  Outcome: Progressing  1/5/2025 1010 by Charlie Perez RN  Outcome: Progressing  Flowsheets (Taken 1/5/2025 0815)  Maintains or returns to baseline bowel function: Assess bowel function     Problem: Pain  Goal: Verbalizes/displays adequate comfort level or baseline comfort level  1/5/2025 2217 by Hoa Martinez RN  Outcome: Progressing  1/5/2025 1010 by Charlie Perez RN  Outcome: Progressing  Flowsheets (Taken 1/5/2025 0815)  Verbalizes/displays adequate comfort level or baseline comfort level:   Encourage

## 2025-01-06 NOTE — PROGRESS NOTES
Patient received sacramental anointing by a . If you are in need of  support, please call [phone]. If you are in need of a  after 6:30pm, please call the house supervisor for the on-call .      Christina Stevenson  [facility] Health Coordinator  [phone]

## 2025-01-06 NOTE — CARE COORDINATION
1/6/25, 9:21 AM EST    DISCHARGE PLANNING EVALUATION     Patient is current with [facility] for nursing, is mostly independent with care at home with support from wife.   Confirmed services with [facility], agency plans to continue to follow.

## 2025-01-06 NOTE — PROGRESS NOTES
[facility]– [place][note 2]  JESSI Campbell-FRANCISCO for Dr. Alexander Colon  General Surgery Daily Progress Note    Pt Name: Gustabo Machado  Medical Record Number: [id number]  Date of Birth 1970   Today's Date: 1/6/2025    Hospital day # 2     ASSESSMENT   Small bowel obstruction vs ileus  Pancreatic cancer with liver metastasis - currently on chemotherapy  Nausea & vomiting  Ascites - has pleurx drain in place  History of PE - on Eliquis  Dysphagia  Hyponatremia  EMILY  Anemia   Severe malnutrition   has a past medical history of Cancer (HCC), Pancreatic cancer (HCC), and Pulmonary emboli (HCC).  PLAN   NG tube clamped at this time. Ok for ice chips, sips and popsicles as tolerated.  Gastrografin challenge today  Ok to leave NG clamped unless worsening nausea/vomiting or abdominal distention   IV fluids  Oncology referral in process. Next treatment was supposed to be tomorrow. Patient and wife understand this is on hold for now.    Pain & nausea control  Eliquis on hold. Lovenox therapeutic on board.  Serial abdominal exams  Labs reviewed. Continue to monitor.   Patient feels better today. Denies pain, nausea or vomiting. KUB results discussed with patient and wife. Imaging shows slightly worsened picture, still demonstrates ileus vs mechanical obstruction. Concern is that this could be related to disease. Will await gastrografin challenge. No plans for anything surgically at this time unless emergent. Discussed the possible need for PEG tube if unable to keep up on nutrition or TPN while in house.   Palliative care following  SUBJECTIVE   Chief complaint: \"I would like this tube out.\"    Patient was stable overnight. Sitting up in chair. Just got back from radiology. NG tube is clamped. He states he feels better. Denies any abdominal pain, nausea, vomiting or bloating. Chart reviewed. Updated by nursing staff. Denies chest discomfort or dyspnea. (+) belching, flatus. No BM since Saturday. Tolerating Diet NPO  atelectasis/pneumonia. Kidneys are somewhat obscured.. No  definite renal or ureteral calculi are seen..     IMPRESSION:  1. Moderate paralytic ileus versus partial mechanical small bowel obstruction..  2. Appearance slightly worse than yesterday.           **This report has been created using voice recognition software.  It may contain  minor errors which are inherent in voice recognition technology.**              Electronically signed by Dr. Glen Stephen           Exam Ended: 01/06/25 08:15 EST Last Resulted: 01/06/25 08:45 EST          Total time spent in care of patient:  20 minutes collectively between subjective/objective examination, chart review, documentation, clinical reasoning and discussion with attending regarding plan/interval changes. More than 50% of the time involved with counseling, education and coordinating care.      Electronically signed by JESSI HALEY CNP on 1/6/2025 at 7:28 AM      Patient seen and examined independently by me 1/6/2025  Total time personally spent on this patient encounter was 28 minutes which includes :  Preparing to see the patient( reviewing tests and chart)  Obtaining and reviewing separately obtained history  Performing a medically appropriate examination and evaluation  Ordering medications, tests, or procedures  Counseling and educating the patient/family/caregiver  Care coordination  Referring and communicating with other healthcare professionals  Documenting clinical information in the EHR  Independent interpretation of results and communicating the results to patient and care team  This includes a direct physical exam as well as all the other encounter activities described above.   Time may be discontiguous.   Time does not include procedures.    Please see our orders that were directed and approved by me if there are any new ones for the updated patient care plan.    Above discussed and I agree with documentation and orders placed by Isaiah

## 2025-01-06 NOTE — CONSULTS
auscultation all lung fields.  Cardiovascular:  RRR, S1/S2  Abdomen: Firm/tight.  Non-distended with active BS  Musculoskeletal: No clubbing, cyanosis or edema bilaterally.  Resting in bed.  Skin: Skin color, texture, turgor normal.  No visible rashes or lesions.  Neurologic:  Neurovascularly intact without any focal sensory/motor deficits. Cranial nerves: II-XII intact, grossly non-focal.  Psychiatric: Alert and oriented x 3, thought content appropriate, normal insight      Labs   CBC  Recent Labs     01/04/25 2020 01/05/25  0740   WBC 11.6* 6.9   RBC 4.09* 3.57*   HGB 12.0* 10.6*   HCT 37.2* 32.0*   MCV 91.0 89.6   MCH 29.3 29.7   MCHC 32.3 33.1    183   MPV 9.4 9.1*      BMP  Recent Labs     01/04/25 2020 01/05/25  0740   * 134*   K 3.6 3.5   CL 88* 93*   CO2 25 28   BUN 48* 41*   CREATININE 1.3* 1.0   GLUCOSE 107 78   MG 2.8* 2.6*   CALCIUM 10.0 9.1     LFT  Recent Labs     01/04/25 2020   AST 15   ALT 9*   BILITOT 0.7   ALKPHOS 153*   LIPASE 63.7*     INR  No results for input(s): \"INR\", \"PROTIME\" in the last 72 hours.  PTT  No results for input(s): \"APTT\" in the last 72 hours.    Radiology        XR ABDOMEN (KUB) (SINGLE AP VIEW)    Result Date: 1/6/2025  PROCEDURE: XR ABDOMEN (KUB) (SINGLE AP VIEW) CLINICAL INFORMATION: follow up imaging COMPARISON: 1/5/2025 TECHNIQUE: 2 supine images of the abdomen were obtained. FINDINGS: There is moderate gas distention of multiple small bowel loops in the mid abdomen, consistent with paralytic ileus versus partial mechanical small bowel obstruction. Scattered gas is seen in the colon. Pleurx catheter in the abdomen right side. NG tube passes into stomach. Small pleural effusion left side of chest. Left basilar atelectasis/pneumonia. Kidneys are somewhat obscured.. No definite renal or ureteral calculi are seen..     1. Moderate paralytic ileus versus partial mechanical small bowel obstruction.. 2. Appearance slightly worse than yesterday. **This report has  retroperitoneal lymph nodes, omental caking with small ascites/peritoneal carcinomatosis.  S/p CT guided liver biopsy (+) metastatic adenocarcinoma with findings supportive of pancreatic cancer.  CA 19-9 elevated at 5483.  CT Abd/Pelvis 1/4/2025 (+) partial SBO vs ileus, hepatic metastases and omental caking similar to prior, decreased abdominal and retroperitoneal lymphadenopathy, decreased bulbous configuration of pancreatic tail compared to prior, New T11 sclerotic lesion, small right and moderate left pleural effusions, bibasilar infiltrates.  NG placed.  General Surgery consulted with recommendations of conservative management.  Mixed picture of progression vs improvement.  Will discuss further treatment recommendations at outpatient follow up appnt as pt has only received 4 cycles of treatment with mixed picture of improvement vs progression.      2.    SBO  CT Abd/Pelvis (+) partial SBO vs ileus, hepatic metastases and omental caking similar to prior, decreased abdominal and retroperitoneal lymphadenopathy, decreased bulbous configuration of pancreatic tail compared to prior, New T11 sclerotic lesion, small right and moderate left pleural effusions, bibasilar infiltrates.  NG placed.  General Surgery consulted with recommendations of conservative management.  (+) BS.  Pt denies N/V.  Small bowel follow through scheduled for today.  Hospitalist to order ST evaluation for swallow eval d/t reports of food \"getting stuck\".  He has had this in the past but resolved after thrush treatment.  He is reporting it again and no s/s thrush.  Meagan Epps PA-C present during conversation.    3.    Normocytic anemia  H/H 10.6/32.  He denies s/s bleeding.  Trend.    \"Thank you for asking us to see this interesting patient\"    Case discussed with nurse and patient/family, Meagan Epps PA-C.  Questions and concerns addressed.  Plan made in collaboration with Dr. Colvin, covering for Dr. Irving.    Electronically signed by

## 2025-01-06 NOTE — PLAN OF CARE
Problem: Discharge Planning  Goal: Discharge to home or other facility with appropriate resources  1/6/2025 1112 by Neyda Torre RN  Outcome: Progressing  Flowsheets (Taken 1/6/2025 1112)  Discharge to home or other facility with appropriate resources:   Identify barriers to discharge with patient and caregiver   Identify discharge learning needs (meds, wound care, etc)   Refer to discharge planning if patient needs post-hospital services based on physician order or complex needs related to functional status, cognitive ability or social support system   Arrange for needed discharge resources and transportation as appropriate  1/6/2025 0921 by Chrissie Gallo LSW  Outcome: Progressing  1/5/2025 2217 by Hoa Martinez RN  Outcome: Progressing     Problem: Safety - Adult  Goal: Free from fall injury  1/6/2025 1112 by Neyda Torre RN  Outcome: Progressing  Flowsheets (Taken 1/6/2025 1112)  Free From Fall Injury: Instruct family/caregiver on patient safety  1/5/2025 2217 by Hoa Martinez RN  Outcome: Progressing     Problem: ABCDS Injury Assessment  Goal: Absence of physical injury  1/6/2025 1112 by Neyda Torre RN  Outcome: Progressing  Flowsheets (Taken 1/6/2025 1112)  Absence of Physical Injury: Implement safety measures based on patient assessment  1/5/2025 2217 by Hoa Martinez RN  Outcome: Progressing     Problem: Gastrointestinal - Adult  Goal: Minimal or absence of nausea and vomiting  1/6/2025 1112 by Neyda Torre RN  Outcome: Progressing  Flowsheets (Taken 1/6/2025 1112)  Minimal or absence of nausea and vomiting:   Administer IV fluids as ordered to ensure adequate hydration   Maintain NPO status until nausea and vomiting are resolved   Administer ordered antiemetic medications as needed   Nasogastric tube to low intermittent suction as ordered   Provide nonpharmacologic comfort measures as appropriate   Nutrition consult to assist patient with adequate nutrition and appropriate food

## 2025-01-06 NOTE — PROGRESS NOTES
Noted patient to have blood clots in his NGT. Assessed and informed provider and CN about it. Also patient's last urinated is at 8pm. Did bladder scan and only got 9ml. Informed provider about as well. Gave 500ml bolus in 121minutes as per order.     At 0521H Patient was able to void.

## 2025-01-06 NOTE — PROGRESS NOTES
Hospitalist Progress Note      Patient:  Gustabo Machado 54 y.o. male     Unit/Bed:[facility]/020-A    Date of Admission: 1/4/2025      ASSESSMENT AND PLAN    Active Problems  SBO vs Ileus   General Surgery following, appreciate recommedations   Gastrografin challenge, last KUB 1/7 AM.   NPO, sips of liquids, popsicles, ice chips   NGT clamped. Pain control. Zofran.   Metastatic pancreatic cancer   Case discussed with Oncology PA   CT A/P shows mixed picture of new T11 sclerotic lesion but improving KARAN in the retroperitoneal space.   Pt will need more cycles of chemo to really understand if it is improving his disease.   Pt was educated on the importance of stable nutrition & not losing weight during treatment.   General Surgery spoke with patient about PEG tube.   Palliative Care consulted.   Dysphagia   SLP consulted, pt is having issues with \"food getting stuck\"   Hyponatremia, hypovolemic   Sodium 130, 134. BMP in the AM.   Anemia, acute on chronic   Hgb 12 on admission, 10.6. CBC in the AM. Likely dilutional.     Resolved Problems  EMILY   Resolved. Creatinine 1.0. BMP in the AM.   Chronic Conditions (reviewed and stable unless otherwise stated)  History of PE   Holding Eliquis due to NPO & SBO.   Start therapeutic Lovenox.       LDA: []CVC / []PICC / []Midline / []Chavez / []Drains / []Mediport / [x]None  Antibiotics: None   Steroids: None   Labs (still needed?): [x]Yes / []No  IVF (still needed?): [x]Yes / []No    Level of care: []Step Down / [x]Med-Surg  Bed Status: [x]Inpatient / []Observation  Telemetry: []Yes / [x]No  PT/OT: []Yes / [x]No    DVT Prophylaxis: [x] Lovenox / [] Heparin / [] SCDs / [] Already on Systemic Anticoagulation / [] None   Code status: Full Code     Expected discharge date:  2-3 days   Disposition: Home      ===================================================================    Chief Complaint: Nausea vomiting   Subjective (past 24 hours): No acute events overnight. Pt resting with NGT

## 2025-01-06 NOTE — PALLIATIVE CARE
Follow Up / Progress Note        Patient:   Gustabo Machado  YOB: 1970  Age:  54 y.o.  Room:  [facility]/020  MRN:  [id number]           Consult received. Oncology also consulted. Palliative will allow patient and family to be updated by oncology. Plan to evaluate patient tomorrow 1/7.       Electronically signed by Chantelle Begum RN on 1/6/2025 at 10:54 AM             Palliative Care Office: [phone]

## 2025-01-07 ENCOUNTER — HOSPITAL ENCOUNTER (OUTPATIENT)
Dept: INFUSION THERAPY | Age: 55
Discharge: HOME | End: 2025-01-07

## 2025-01-07 PROBLEM — C25.9 MALIGNANT NEOPLASM OF PANCREAS (HCC): Status: ACTIVE | Noted: 2025-01-07

## 2025-01-07 PROBLEM — E43 SEVERE MALNUTRITION (HCC): Status: ACTIVE | Noted: 2025-01-07

## 2025-01-07 LAB
ANION GAP SERPL CALC-SCNC: 13 MEQ/L (ref 8–16)
BUN SERPL-MCNC: 20 MG/DL (ref 7–22)
CA-I BLD ISE-SCNC: 1.14 MMOL/L (ref 1.12–1.32)
CALCIUM SERPL-MCNC: 8.7 MG/DL (ref 8.5–10.5)
CHLORIDE SERPL-SCNC: 97 MEQ/L (ref 98–111)
CO2 SERPL-SCNC: 27 MEQ/L (ref 23–33)
CREAT SERPL-MCNC: 0.8 MG/DL (ref 0.4–1.2)
DEPRECATED RDW RBC AUTO: 58.4 FL (ref 35–45)
ERYTHROCYTE [DISTWIDTH] IN BLOOD BY AUTOMATED COUNT: 17.3 % (ref 11.5–14.5)
GFR SERPL CREATININE-BSD FRML MDRD: > 90 ML/MIN/1.73M2
GLUCOSE SERPL-MCNC: 75 MG/DL (ref 70–108)
HCT VFR BLD AUTO: 33.8 % (ref 42–52)
HGB BLD-MCNC: 10.5 GM/DL (ref 14–18)
MAGNESIUM SERPL-MCNC: 2.2 MG/DL (ref 1.6–2.4)
MCH RBC QN AUTO: 29.2 PG (ref 26–33)
MCHC RBC AUTO-ENTMCNC: 31.1 GM/DL (ref 32.2–35.5)
MCV RBC AUTO: 94.2 FL (ref 80–94)
PHOSPHATE SERPL-MCNC: 2.3 MG/DL (ref 2.4–4.7)
PLATELET # BLD AUTO: 159 THOU/MM3 (ref 130–400)
PMV BLD AUTO: 9 FL (ref 9.4–12.4)
POTASSIUM SERPL-SCNC: 4.4 MEQ/L (ref 3.5–5.2)
RBC # BLD AUTO: 3.59 MILL/MM3 (ref 4.7–6.1)
SODIUM SERPL-SCNC: 137 MEQ/L (ref 135–145)
WBC # BLD AUTO: 6.3 THOU/MM3 (ref 4.8–10.8)

## 2025-01-07 PROCEDURE — 6360000002 HC RX W HCPCS

## 2025-01-07 PROCEDURE — 6360000002 HC RX W HCPCS: Performed by: NURSE PRACTITIONER

## 2025-01-07 PROCEDURE — 1200000000 HC SEMI PRIVATE

## 2025-01-07 PROCEDURE — 2580000003 HC RX 258: Performed by: NURSE PRACTITIONER

## 2025-01-07 PROCEDURE — 84100 ASSAY OF PHOSPHORUS: CPT

## 2025-01-07 PROCEDURE — 2500000003 HC RX 250 WO HCPCS

## 2025-01-07 PROCEDURE — 2500000003 HC RX 250 WO HCPCS: Performed by: NURSE PRACTITIONER

## 2025-01-07 PROCEDURE — 83735 ASSAY OF MAGNESIUM: CPT

## 2025-01-07 PROCEDURE — 36415 COLL VENOUS BLD VENIPUNCTURE: CPT

## 2025-01-07 PROCEDURE — APPSS30 APP SPLIT SHARED TIME 16-30 MINUTES: Performed by: NURSE PRACTITIONER

## 2025-01-07 PROCEDURE — 82330 ASSAY OF CALCIUM: CPT

## 2025-01-07 PROCEDURE — 92610 EVALUATE SWALLOWING FUNCTION: CPT

## 2025-01-07 PROCEDURE — 80048 BASIC METABOLIC PNL TOTAL CA: CPT

## 2025-01-07 PROCEDURE — 99223 1ST HOSP IP/OBS HIGH 75: CPT

## 2025-01-07 PROCEDURE — 99231 SBSQ HOSP IP/OBS SF/LOW 25: CPT | Performed by: SURGERY

## 2025-01-07 PROCEDURE — 85027 COMPLETE CBC AUTOMATED: CPT

## 2025-01-07 PROCEDURE — 2580000003 HC RX 258

## 2025-01-07 PROCEDURE — 1200000003 HC TELEMETRY R&B

## 2025-01-07 PROCEDURE — 99233 SBSQ HOSP IP/OBS HIGH 50: CPT | Performed by: PHYSICIAN ASSISTANT

## 2025-01-07 RX ORDER — INSULIN LISPRO 100 [IU]/ML
0-8 INJECTION, SOLUTION INTRAVENOUS; SUBCUTANEOUS EVERY 6 HOURS
Status: DISCONTINUED | OUTPATIENT
Start: 2025-01-08 | End: 2025-01-10

## 2025-01-07 RX ORDER — SODIUM CHLORIDE, SODIUM LACTATE, POTASSIUM CHLORIDE, CALCIUM CHLORIDE 600; 310; 30; 20 MG/100ML; MG/100ML; MG/100ML; MG/100ML
INJECTION, SOLUTION INTRAVENOUS CONTINUOUS
Status: DISCONTINUED | OUTPATIENT
Start: 2025-01-07 | End: 2025-01-10

## 2025-01-07 RX ORDER — GLUCAGON 1 MG/ML
1 KIT INJECTION PRN
Status: DISCONTINUED | OUTPATIENT
Start: 2025-01-07 | End: 2025-01-11 | Stop reason: HOSPADM

## 2025-01-07 RX ORDER — DEXTROSE MONOHYDRATE 100 MG/ML
INJECTION, SOLUTION INTRAVENOUS CONTINUOUS PRN
Status: DISCONTINUED | OUTPATIENT
Start: 2025-01-07 | End: 2025-01-11 | Stop reason: HOSPADM

## 2025-01-07 RX ADMIN — SODIUM CHLORIDE, POTASSIUM CHLORIDE, SODIUM LACTATE AND CALCIUM CHLORIDE: 600; 310; 30; 20 INJECTION, SOLUTION INTRAVENOUS at 16:50

## 2025-01-07 RX ADMIN — SODIUM CHLORIDE, PRESERVATIVE FREE 5 ML: 5 INJECTION INTRAVENOUS at 19:53

## 2025-01-07 RX ADMIN — ENOXAPARIN SODIUM 80 MG: 100 INJECTION SUBCUTANEOUS at 10:09

## 2025-01-07 RX ADMIN — ONDANSETRON 4 MG: 2 INJECTION INTRAMUSCULAR; INTRAVENOUS at 11:50

## 2025-01-07 RX ADMIN — HYDROMORPHONE HYDROCHLORIDE 0.25 MG: 1 INJECTION, SOLUTION INTRAMUSCULAR; INTRAVENOUS; SUBCUTANEOUS at 03:35

## 2025-01-07 RX ADMIN — CALCIUM GLUCONATE: 98 INJECTION, SOLUTION INTRAVENOUS at 18:45

## 2025-01-07 RX ADMIN — ENOXAPARIN SODIUM 80 MG: 100 INJECTION SUBCUTANEOUS at 20:01

## 2025-01-07 RX ADMIN — HYDROMORPHONE HYDROCHLORIDE 0.25 MG: 1 INJECTION, SOLUTION INTRAMUSCULAR; INTRAVENOUS; SUBCUTANEOUS at 19:53

## 2025-01-07 RX ADMIN — HYDROMORPHONE HYDROCHLORIDE 0.25 MG: 1 INJECTION, SOLUTION INTRAMUSCULAR; INTRAVENOUS; SUBCUTANEOUS at 13:52

## 2025-01-07 RX ADMIN — SODIUM CHLORIDE, PRESERVATIVE FREE 10 ML: 5 INJECTION INTRAVENOUS at 10:10

## 2025-01-07 RX ADMIN — PANTOPRAZOLE SODIUM 40 MG: 40 INJECTION, POWDER, FOR SOLUTION INTRAVENOUS at 10:10

## 2025-01-07 RX ADMIN — SODIUM PHOSPHATE, MONOBASIC, MONOHYDRATE AND SODIUM PHOSPHATE, DIBASIC, ANHYDROUS 25 MMOL: 142; 276 INJECTION, SOLUTION INTRAVENOUS at 12:31

## 2025-01-07 RX ADMIN — SODIUM CHLORIDE, POTASSIUM CHLORIDE, SODIUM LACTATE AND CALCIUM CHLORIDE: 600; 310; 30; 20 INJECTION, SOLUTION INTRAVENOUS at 10:07

## 2025-01-07 ASSESSMENT — PAIN DESCRIPTION - LOCATION
LOCATION: BACK
LOCATION: BACK
LOCATION: BACK;THROAT
LOCATION: BACK

## 2025-01-07 ASSESSMENT — PAIN DESCRIPTION - PAIN TYPE: TYPE: ACUTE PAIN

## 2025-01-07 ASSESSMENT — PAIN DESCRIPTION - DESCRIPTORS
DESCRIPTORS: ACHING

## 2025-01-07 ASSESSMENT — PAIN SCALES - GENERAL
PAINLEVEL_OUTOF10: 7
PAINLEVEL_OUTOF10: 7
PAINLEVEL_OUTOF10: 0
PAINLEVEL_OUTOF10: 7

## 2025-01-07 ASSESSMENT — PAIN DESCRIPTION - ORIENTATION
ORIENTATION: MID
ORIENTATION: LOWER
ORIENTATION: LOWER

## 2025-01-07 NOTE — PROGRESS NOTES
Hospitalist Progress Note      Patient:  Gustabo Machado 54 y.o. male     Unit/Bed:[facility]/020-A    Date of Admission: 1/4/2025      ASSESSMENT AND PLAN    Active Problems  SBO vs Ileus   Case discussed with General Surgery NP, appreciate recommendations   Gastrografin challenge, last KUB 1/7 AM.   Unsuccessful. There was no contrast seen on imaging.   NPO, sips of liquids, popsicles, ice chips   TPN ordered per General Surgery   NGT clamped. Pain control. Zofran.   Per GS note \"Attempted clamping NG tube yesterday but had some nausea. Hooked back to suction around 2pm but then nursing still allowed patient to have liquids throughout the evening. So 1,200 mls documented from NG but unsure how much was liquids vs stomach contents.\"   Metastatic pancreatic cancer   Case discussed with Oncology PA on 1/6. Case discussed with Palliative Care NP on 1/7.   CT A/P shows mixed picture of new T11 sclerotic lesion but improving KARAN in the retroperitoneal space.   Pt will need more cycles of chemo to really understand if it is improving his disease.   Pt was educated on the importance of stable nutrition & not losing weight during treatment.   General Surgery spoke with patient about PEG tube. TPN being started.   Palliative Care consulted.   Dysphagia   SLP consulted, pt is having issues with \"food getting stuck\"   Hyponatremia, hypovolemic   Sodium 130, 134, 137. BMP in the AM.   Anemia, acute on chronic   Hgb 12 on admission, Stable ~10. CBC in the AM. Likely dilutional.     Resolved Problems  EMILY   Resolved. Creatinine 0.8. BMP in the AM.   Chronic Conditions (reviewed and stable unless otherwise stated)  History of PE   Holding Eliquis due to NPO & SBO.   Start therapeutic Lovenox.       LDA: []CVC / []PICC / []Midline / []Chavez / []Drains / []Mediport / [x]None  Antibiotics: None   Steroids: None   Labs (still needed?): [x]Yes / []No  IVF (still needed?): [x]Yes / []No    Level of care: []Step Down / [x]Med-Surg  Bed  Status: [x]Inpatient / []Observation  Telemetry: []Yes / [x]No  PT/OT: []Yes / [x]No    DVT Prophylaxis: [x] Lovenox / [] Heparin / [] SCDs / [] Already on Systemic Anticoagulation / [] None   Code status: Full Code     Expected discharge date:  2-3 days   Disposition: Home      ===================================================================    Chief Complaint: Nausea vomiting   Subjective (past 24 hours): No acute events overnight. Pt states that he is nausea after drinking Ensure. NGT is clamped. Pt is passing gas. Wife drained pleurx and had 350 cc out.       HPI / Hospital Course:  Initial H&P \"Gustabo Machado is a 54 y.o. male with PMHx of metastatic pancreatic adenocarcinoma, PE who presented to [facility] with chief complaint of nausea, vomiting, and diarrhea. His symptoms began on New Years Day.  He and his wife thought he had the stomach flu.  His symptoms improved over the next several days.  Tonight, however, he re-developed the same symptoms.  In addition, he had some abdominal pain.  He felt as though \"my stomach was twisted\".  The patient does have metastatic pancreatic cancer with liver mets.  He has a paracentesis drain.  His wife states they only drain as needed-roughly every 4 days or so.  He is currently undergoing chemotherapy treatments.\"     1/6: No acute events overnight. Pt resting with NGT clamped. Pt states that abd pain has resolved. Pt wants NGT out of his nose. Pt and wife understand that he needs a stable nutrition source prior to DC & prior to getting another round of treatment.      Medications:    Infusion Medications    PN-Adult 3 IN 1 Peripheral Line (Custom)      lactated ringers      dextrose      lactated ringers 100 mL/hr at 01/07/25 1007    sodium chloride      Scheduled Medications    sodium phosphate IVPB (PERIPHERAL line)  25 mmol IntraVENous Once    [START ON 1/8/2025] insulin lispro  0-8 Units SubCUTAneous Q6H    [Held by provider] apixaban  5 mg Oral BID    [Held by

## 2025-01-07 NOTE — PROGRESS NOTES
Pharmacy Consult for TPN/PPN Initiation    Indication for TPN: ileus vs SBO, NPO  Ordering Provider: Isaiah Sutton          IV Access: peripheral (for tonight)  Dosing weight: 77 kg  Current insulin regimen: medium sliding scale  Maintenance fluid: LR at 20 mL/hr    Assessment/ Plan:  Begin 3-in-1 TPN today with the ingredients listed below:    Date 1/7/25   Total kcal/kg 10   Total kcal 770   Protein g/kg 1.1   Protein g 84.7   Protein kcal (4 kcal/g) 338.8   Lipid % 20   Lipid g 15.4   Lipid kcal (10 kcal/g) 154   Dextrose g 81.5   Dextrose kcal (3.4 kcal/g) 277.2   Infusion Rate (mL/hr) 80   Na Acetate (mEq) 50   Na Chloride (mEq) 120   Na Phos (mmol)  (3 mmol = 4 mEq Na) 18   Total Na (mEq) 194   K Acetate (mEq) 20   K Chloride (mEq) -   K Phos (mmol)  (15 mmol = 22 mEq K) -   Total K (mEq) 20   Ca Gluconate (mEq)  (1 g = 4.65 mEq) 4.65   Mag Sulfate (mEq)  (1 g = 8.12 mEq) 8.12   Multivitamin (mL) 10   Trace Elements (mL) 1     Electrolyte Replacement:   Sodium phosphate: 25 mmol    Monitoring:  BMP, Mag, iCal, & Phos ordered for tomorrow with AM labs.    Pharmacy will continue to follow daily. Thank you for the consult.  Carolina Silva, PharmD, BCPS, BCCCP  1/7/2025 12:50 PM

## 2025-01-07 NOTE — PLAN OF CARE
Problem: Discharge Planning  Goal: Discharge to home or other facility with appropriate resources  Outcome: Progressing  Flowsheets (Taken 1/6/2025 2030)  Discharge to home or other facility with appropriate resources: Identify barriers to discharge with patient and caregiver     Problem: Safety - Adult  Goal: Free from fall injury  Outcome: Progressing     Problem: ABCDS Injury Assessment  Goal: Absence of physical injury  Outcome: Progressing     Problem: Gastrointestinal - Adult  Goal: Minimal or absence of nausea and vomiting  Outcome: Progressing  Flowsheets (Taken 1/6/2025 2030)  Minimal or absence of nausea and vomiting:   Administer IV fluids as ordered to ensure adequate hydration   Maintain NPO status until nausea and vomiting are resolved   Nasogastric tube to low intermittent suction as ordered   Administer ordered antiemetic medications as needed     Problem: Gastrointestinal - Adult  Goal: Maintains or returns to baseline bowel function  Outcome: Progressing  Flowsheets (Taken 1/6/2025 2030)  Maintains or returns to baseline bowel function: Assess bowel function     Problem: Pain  Goal: Verbalizes/displays adequate comfort level or baseline comfort level  Outcome: Progressing  Flowsheets (Taken 1/6/2025 2030)  Verbalizes/displays adequate comfort level or baseline comfort level:   Encourage patient to monitor pain and request assistance   Assess pain using appropriate pain scale   Administer analgesics based on type and severity of pain and evaluate response     Problem: Skin/Tissue Integrity  Goal: Absence of new skin breakdown  Description: 1.  Monitor for areas of redness and/or skin breakdown  2.  Assess vascular access sites hourly  3.  Every 4-6 hours minimum:  Change oxygen saturation probe site  4.  Every 4-6 hours:  If on nasal continuous positive airway pressure, respiratory therapy assess nares and determine need for appliance change or resting period.  Outcome: Progressing  Care plan

## 2025-01-07 NOTE — CARE COORDINATION
1/7/25, 10:26 AM EST    DISCHARGE PLANNING EVALUATION    plans home with wife, current with ELI[facility] Pierce, agency will continue to follow at discharge.     yes

## 2025-01-07 NOTE — PROGRESS NOTES
[facility]  JESSI Campbell-FRANCISCO for Dr. Alexander Colon  General Surgery Daily Progress Note    Pt Name: Gustabo Machado  Medical Record Number: [id number]  Date of Birth 1970   Today's Date: 1/7/2025    Hospital day # 3     ASSESSMENT   Small bowel obstruction vs ileus  Pancreatic cancer with liver metastasis - currently on chemotherapy  Nausea & vomiting  Ascites - has pleurx drain in place  History of PE - on Eliquis  Dysphagia  Hyponatremia  EMILY  Anemia   Severe malnutrition   has a past medical history of Cancer (HCC), Pancreatic cancer (HCC), and Pulmonary emboli (HCC).  PLAN   Attempting clamping NG tube yesterday but had some nausea. Hooked back to suction around 2pm but then nursing still allowed patient to have liquids throughout the evening. So 1,200 mls documented from NG but unsure how much was liquids vs stomach contents.  Attempted gastrografin challenge yesterday. Contrast given at noon and NG remained clamped until 2pm but no contrast was seen on imaging.   Will trial clamping again since patient feels better and passing gas. If fails again today will proceed with CT enterography.   Start TPN - pharm to dose  Reaccess mediport today. No signs of bleeding this morning.   IV fluids per medicine  Oncology saw patient yesterday. Chemo on hold.  Palliative care to see today   Pain & nausea control  Ok to drain pleurX catheter per home regimen. Per wife daily as needed.   Eliquis on hold. Lovenox therapeutic on board.  Labs reviewed. Continue to monitor.   Gastrografin challenge unsuccessful yesterday. Patient would like to try and clamp NG and trial liquids again today. If fails today will proceed with CT enterography to look at everything. Discussed the possibility of bowel obstruction secondary to tumor. No plans on anything surgically unless it is palliative. Will start TPN unless we know more of what he can tolerate orally.   SUBJECTIVE   Chief complaint: Right sided

## 2025-01-07 NOTE — PROGRESS NOTES
Pt admitted to  5K8 by wheelchair from 7.  IV lactated Ringer's infusing into the forearm right, condition patent and no redness at a rate of 0 mls/ hour with aboutN/A mls in the bag still. IV site free of s/s of infection or infiltration. Vital signs obtained. Assessment complete. Oriented to room. All questions answered with no further questions at this time.   Two nurse skin assessment performed by MARC Quick and MARC Evangelista. Oriented to room. Policies and procedures for  explained. A Fall prevention and safety brochure discussed with patient.  Bed alarm on. Call light in reach.

## 2025-01-07 NOTE — PLAN OF CARE
Problem: Discharge Planning  Goal: Discharge to home or other facility with appropriate resources  1/7/2025 1757 by Abdelrahman Kwok RN  Outcome: Progressing     Problem: Safety - Adult  Goal: Free from fall injury  1/7/2025 1757 by Abdelrahman Kwok RN  Outcome: Progressing     Problem: ABCDS Injury Assessment  Goal: Absence of physical injury  1/7/2025 1757 by Abdelrahman Kwok RN  Outcome: Progressing     Problem: Gastrointestinal - Adult  Goal: Minimal or absence of nausea and vomiting  1/7/2025 1757 by Abdelrahman Kwok RN  Outcome: Progressing     Problem: Gastrointestinal - Adult  Goal: Maintains or returns to baseline bowel function  1/7/2025 1757 by Abdelrahman Kwok RN  Outcome: Progressing     Problem: Pain  Goal: Verbalizes/displays adequate comfort level or baseline comfort level  1/7/2025 1757 by Abdelrahman Kwok RN  Outcome: Progressing     Problem: Skin/Tissue Integrity  Goal: Absence of new skin breakdown  Description: 1.  Monitor for areas of redness and/or skin breakdown  2.  Assess vascular access sites hourly  3.  Every 4-6 hours minimum:  Change oxygen saturation probe site  4.  Every 4-6 hours:  If on nasal continuous positive airway pressure, respiratory therapy assess nares and determine need for appliance change or resting period.  1/7/2025 1757 by Abdelrahman Kwok RN  Outcome: Progressing     Problem: Nutrition Deficit:  Goal: Optimize nutritional status  1/7/2025 1757 by Abdelrahman Kwok RN  Outcome: Progressing     Care plan reviewed with patient , patient  verbalized understanding of plan of care and contributed to goal setting.

## 2025-01-07 NOTE — PROGRESS NOTES
[facility]  SPEECH THERAPY  STR ORTHOPEDICS 7K  Clinical Swallow Evaluation    Discharge Recommendations: Continue to Assess Pending Progress  DIET ORDER RECOMMENDATIONS AFTER EVALUATION: Clear liquids - (per general surgery - will look to physician to place)  Strategies:  Full Upright Position and Small Bite/Sip     SLP Individual Minutes  Time In: 0956  Time Out: 1005  Minutes: 9  Timed Code Treatment Minutes: 0 Minutes       Date: 2025  Patient Name: Gustabo Machado      CSN: [id number]   : 1970  (54 y.o.)  Gender: male   Referring Physician:   Meagan Epps PA   Diagnosis: Small bowel obstruction (HCC)    History of Present Illness/Injury:  No acute events overnight. Pt resting with NGT clamped. Pt states that abd pain has resolved. Pt wants NGT out of his nose. Pt and wife understand that he needs a stable nutrition source prior to DC & prior to getting another round of treatment.          HPI / Hospital Course:  Initial H&P \"Gustabo Machado is a 54 y.o. male with PMHx of metastatic pancreatic adenocarcinoma, PE who presented to [facility] with chief complaint of nausea, vomiting, and diarrhea. His symptoms began on New Years Day.  He and his wife thought he had the stomach flu.  His symptoms improved over the next several days.  Tonight, however, he re-developed the same symptoms.  In addition, he had some abdominal pain.  He felt as though \"my stomach was twisted\".  The patient does have metastatic pancreatic cancer with liver mets.  He has a paracentesis drain.  His wife states they only drain as needed-roughly every 4 days or so.  He is currently undergoing chemotherapy treatments.\"      Past Medical History:   Diagnosis Date    Cancer (HCC)     Pancreatic cancer (HCC)     Pulmonary emboli (HCC) 2024       SUBJECTIVE:  Patient seen with MARC Lyle's approval. RN reports patient approved for clear liquids per general surgery. Upon arrival, patient resting upright in bed. Wife

## 2025-01-07 NOTE — PLAN OF CARE
Problem: Discharge Planning  Goal: Discharge to home or other facility with appropriate resources  1/7/2025 1540 by Dariela Rogel LPN  Outcome: Progressing  Flowsheets (Taken 1/7/2025 1540)  Discharge to home or other facility with appropriate resources:   Identify barriers to discharge with patient and caregiver   Arrange for needed discharge resources and transportation as appropriate   Identify discharge learning needs (meds, wound care, etc)     Problem: Safety - Adult  Goal: Free from fall injury  1/7/2025 1540 by Dariela Rogel LPN  Outcome: Progressing  Flowsheets (Taken 1/7/2025 1540)  Free From Fall Injury: Instruct family/caregiver on patient safety     Problem: ABCDS Injury Assessment  Goal: Absence of physical injury  1/7/2025 1540 by Dariela Rogel LPN  Outcome: Progressing  Flowsheets (Taken 1/7/2025 1540)  Absence of Physical Injury: Implement safety measures based on patient assessment     Problem: Gastrointestinal - Adult  Goal: Minimal or absence of nausea and vomiting  1/7/2025 1540 by Dariela Rogel LPN  Outcome: Progressing  Flowsheets (Taken 1/7/2025 1540)  Minimal or absence of nausea and vomiting:   Administer IV fluids as ordered to ensure adequate hydration   Maintain NPO status until nausea and vomiting are resolved     Problem: Gastrointestinal - Adult  Goal: Maintains or returns to baseline bowel function  1/7/2025 1540 by Dariela Rogel LPN  Outcome: Progressing  Flowsheets (Taken 1/7/2025 1540)  Maintains or returns to baseline bowel function:   Assess bowel function   Administer ordered medications as needed   Administer IV fluids as ordered to ensure adequate hydration     Problem: Pain  Goal: Verbalizes/displays adequate comfort level or baseline comfort level  1/7/2025 1540 by Dariela Rogel LPN  Outcome: Progressing  Flowsheets (Taken 1/7/2025 1540)  Verbalizes/displays adequate comfort level or baseline comfort level:   Encourage patient to monitor pain and  request assistance   Assess pain using appropriate pain scale   Administer analgesics based on type and severity of pain and evaluate response     Problem: Skin/Tissue Integrity  Goal: Absence of new skin breakdown  Description: 1.  Monitor for areas of redness and/or skin breakdown  2.  Assess vascular access sites hourly  3.  Every 4-6 hours minimum:  Change oxygen saturation probe site  4.  Every 4-6 hours:  If on nasal continuous positive airway pressure, respiratory therapy assess nares and determine need for appliance change or resting period.  1/7/2025 1540 by Dariela Rogel LPN  Outcome: Progressing  Note: No new skin breakdown noted at this time this shift.     Problem: Nutrition Deficit:  Goal: Optimize nutritional status  Outcome: Progressing  Flowsheets (Taken 1/7/2025 1540)  Nutrient intake appropriate for improving, restoring, or maintaining nutritional needs:   Assess nutritional status and recommend course of action   Monitor oral intake, labs, and treatment plans   Care plan reviewed with patient and wife.  Patient and wife verbalize understanding of the plan of care and contribute to goal setting.

## 2025-01-07 NOTE — CONSULTS
Provider consult Note        Patient:   Gustabo Machado  YOB: 1970  Age:  54 y.o.  Room:  [facility]20/020-A  MRN:  [id number]   Acct: [id number]  PCP: Daniel Agrawal MD    Date of Admission:  1/4/2025  7:57 PM  Date of Service:  1/7/2025    Reason for Consult: Goals of Care             Subjective   Chief Complaint:-  Nausea and Vomiting       History Obtained From:-  Patient, Spouse, Electronic Medical Record, and Patient's primary nurse    History of Present Illness:-            Gustabo Machado is a 54 y.o. male who  has a past medical history of Cancer (HCC), Pancreatic cancer (HCC), and Pulmonary emboli (HCC). They present to the hospital with Nausea and Vomiting   and are admitted for Small bowel obstruction (HCC).  Patient is current with the outpatient palliative care clinic.  He initially presented to the emergency department on October 21, 2024 with right flank pain that had been ongoing for approximately 2 months.  It was initially thought to be kidney stones.  During this hospitalization a CT abdomen/pelvis on October 21, 2024 showing liver lesions with pancreatic mass concerning for malignancy, ascites, omental caking and numerous liver masses with intra-abdominal adenopathy all consistent with metastatic disease.  He underwent an MRI of the abdomen on October 21, 2024 revealing a 5 x 2 cm enhancement in the distal body and tail of the pancreas which was suspicious for malignancy, multiple hepatic lesions, enlarged pancreatic portacaval and retroperitoneal adenopathy.  He underwent a CT needle guided biopsy of the liver on October 22, 2024.  Pathology from the liver biopsy positive for metastatic adenocarcinoma.  He was positive for CK7, negative for CK20, negative for TTF, positive for CDX2.  CT of the lumbar spine was performed on October 21, 2024 showing no evidence of metastatic disease.  He was evaluated by oncology as a new patient on October 29, 2024.  He  the palliative care clinic on discharge.         Principal Problem:    Small bowel obstruction (HCC)  Resolved Problems:    * No resolved hospital problems. *       Continue current plan of care for acute and chronic conditions per primary and specialist teams  Continue Hydromorphone IV 0.25mg every 4 hours as needed for uncontrolled pain  Continue Compazine 10mg every 6 hours as needed for stable nausea and vomiting  Continue Zofran IV and PO every 6 hours as needed for nausea and vomiting  Continue Tylenol 650 mg oral and suppository every 6 hours as needed for fevers  Will plan to initiate oral pain medication once patient is tolerating orals  Will likely need a good bowel regimen once tolerating PO to prevent opioid induced constipation  Palliative Care will continue to follow the patient while they are hospitalized   Please PerfectServe or call the Palliative Care team with any questions or concerns    CURRENT CODE STATUS:  Full Code No additional code details         Parts of this note may have been dictated by use of voice recognition software and electronically transcribed. The note may contain errors not detected in proofreading    Electronically signed by JESSI Loya CNP on 1/7/2025 at 10:38 AM           Palliative Care Office: [phone]

## 2025-01-07 NOTE — PROGRESS NOTES
Comprehensive Nutrition Assessment    Type and Reason for Visit:  Initial, Consult, Positive nutrition screen (Weight Loss and Decreased Appetite; Parenteral Nutrition Start) RD consult indicated on note from attending on 1/5 - no consult was placed until today.     Nutrition Recommendations/Plan:   Continue NPO - tested PO intake tolerance by drinking Ensure Enlive this morning - so far tolerating but to monitor for changes.  To start parenteral nutrition - plan per pharmacy to start PPN at this time pending re-access of port.  Dosing weight: 77 kg  If Custom 3-in-1 PPN initiated, would recommend: 10 kcals/kg, 1.1 grams amino acids/kg, 20% lipid kcals; to provide 770 kcals, 85 grams amino acids, 15.4 grams lipids, 81 grams dextrose per 24 hours  If Clinimix 4.25/5 PPN initiated, would recommend rate of 83 mL/hr for 24 hours to provide: 680 kcals, 85 grams protein, 100 grams dextrose per 24 hour infusion  Pt at risk for refeeding syndrome - closely monitor s/s; electrolyte replacements per pharmacy  If pt able to tolerate PO diet; recommend diet per surgery - will add ONS as appropriate per surgery recommendations  Mention of potential PEG placement in notes; pt not appropriate at this time d/t SBO; will monitor and give recommendations pending SBO resolution and long term supplemental nutrition.      Malnutrition Assessment:  Malnutrition Status:  Severe malnutrition (01/07/25 1121)    Context:  Acute Illness (Acute on underlying chronic)     Findings of the 6 clinical characteristics of malnutrition:  Energy Intake:  50% or less of estimated energy requirements for 5 or more days (starting 1/1/25)  Weight Loss:   (only stated weight this admit; pt down 18.8% in 2 months (Oct to last wt in Dec); note has had paracentesis procedures)     Body Fat Loss:  Moderate body fat loss Orbital, Buccal region, Triceps   Muscle Mass Loss:  Moderate muscle mass loss Temples (temporalis), Scapula (trapezius), Clavicles  kcals, 85 grams protein, 100 grams dextrose per 24 hour infusion  If Custom 3-in-1 PPN initiated: 10 kcals/kg, 1.1 grams amino acids/kg, 20% lipid kcals; to provide 770 kcals, 85 grams amino acids, 15.4 grams lipids, 81 grams dextrose per 24 hours    Anthropometric Measures:  Height: 182.9 cm (6')  Ideal Body Weight (IBW): 178 lbs (81 kg)    Admission Body Weight: 77.1 kg (170 lb) (1/4: stated)  Current Body Weight: 77.1 kg (170 lb) (1/4: stated)  Current BMI (kg/m2): 23.1  Usual Body Weight:  (per pt: ~216#; per EMR: 9/25/24: 213# 5 oz, 10/31/24: 213# 6 oz, 12/24/24: 173# 10 oz)        Weight Adjustment For: No Adjustment                 BMI Categories: Normal Weight (BMI 18.5-24.9)    Estimated Daily Nutrient Needs:  Energy Requirements Based On: Kcal/kg  Weight Used for Energy Requirements: Admission (77 kg)  Energy (kcal/day): 7637-8997+ kcals (25-30 kcals/kg)  Weight Used for Protein Requirements: Admission (77 kg)  Protein (g/day): + grams (1.2-1.5 grams/kg)       Nutrition Diagnosis:   Severe malnutrition, in context of acute illness or injury related to catabolic illness, altered GI function as evidenced by criteria as identified in malnutrition assessment    Nutrition Interventions:   Food and/or Nutrient Delivery: Continue NPO, Start Oral Diet, Start Parenteral Nutrition  Nutrition Education/Counseling: Education/Counseling initiated  Coordination of Nutrition Care: Continue to monitor while inpatient       Goals:  Goals: Initiate PO diet, Initiate nutrition support, by next RD assessment  Type of Goal: New goal       Nutrition Monitoring and Evaluation:      Food/Nutrient Intake Outcomes: Progression of Nutrition, Diet Advancement/Tolerance, Parenteral Nutrition Intake/Tolerance  Physical Signs/Symptoms Outcomes: Biochemical Data, GI Status, Fluid Status or Edema, Nutrition Focused Physical Findings, Skin, Weight, Nausea or Vomiting    Discharge Planning:    Too soon to determine     Torey Rojas

## 2025-01-08 ENCOUNTER — APPOINTMENT (OUTPATIENT)
Dept: GENERAL RADIOLOGY | Age: 55
End: 2025-01-08
Payer: COMMERCIAL

## 2025-01-08 LAB
ANION GAP SERPL CALC-SCNC: 11 MEQ/L (ref 8–16)
BUN SERPL-MCNC: 17 MG/DL (ref 7–22)
CA-I BLD ISE-SCNC: 1.12 MMOL/L (ref 1.12–1.32)
CALCIUM SERPL-MCNC: 8 MG/DL (ref 8.5–10.5)
CHLORIDE SERPL-SCNC: 95 MEQ/L (ref 98–111)
CO2 SERPL-SCNC: 29 MEQ/L (ref 23–33)
CREAT SERPL-MCNC: 0.6 MG/DL (ref 0.4–1.2)
DEPRECATED RDW RBC AUTO: 57.1 FL (ref 35–45)
ERYTHROCYTE [DISTWIDTH] IN BLOOD BY AUTOMATED COUNT: 17.2 % (ref 11.5–14.5)
GFR SERPL CREATININE-BSD FRML MDRD: > 90 ML/MIN/1.73M2
GLUCOSE BLD STRIP.AUTO-MCNC: 100 MG/DL (ref 70–108)
GLUCOSE BLD STRIP.AUTO-MCNC: 113 MG/DL (ref 70–108)
GLUCOSE BLD STRIP.AUTO-MCNC: 116 MG/DL (ref 70–108)
GLUCOSE BLD STRIP.AUTO-MCNC: 124 MG/DL (ref 70–108)
GLUCOSE BLD STRIP.AUTO-MCNC: 143 MG/DL (ref 70–108)
GLUCOSE SERPL-MCNC: 107 MG/DL (ref 70–108)
HCT VFR BLD AUTO: 30.4 % (ref 42–52)
HGB BLD-MCNC: 9.8 GM/DL (ref 14–18)
MAGNESIUM SERPL-MCNC: 1.9 MG/DL (ref 1.6–2.4)
MCH RBC QN AUTO: 29.7 PG (ref 26–33)
MCHC RBC AUTO-ENTMCNC: 32.2 GM/DL (ref 32.2–35.5)
MCV RBC AUTO: 92.1 FL (ref 80–94)
NT-PROBNP SERPL IA-MCNC: 342.8 PG/ML (ref 0–124)
PHOSPHATE SERPL-MCNC: 1.6 MG/DL (ref 2.4–4.7)
PHOSPHATE SERPL-MCNC: 1.9 MG/DL (ref 2.4–4.7)
PLATELET # BLD AUTO: 159 THOU/MM3 (ref 130–400)
PMV BLD AUTO: 9.6 FL (ref 9.4–12.4)
POTASSIUM SERPL-SCNC: 3.1 MEQ/L (ref 3.5–5.2)
POTASSIUM SERPL-SCNC: 3.2 MEQ/L (ref 3.5–5.2)
PROCALCITONIN SERPL IA-MCNC: 0.41 NG/ML (ref 0.01–0.09)
RBC # BLD AUTO: 3.3 MILL/MM3 (ref 4.7–6.1)
SODIUM SERPL-SCNC: 135 MEQ/L (ref 135–145)
WBC # BLD AUTO: 7.7 THOU/MM3 (ref 4.8–10.8)

## 2025-01-08 PROCEDURE — 6360000002 HC RX W HCPCS: Performed by: PHYSICIAN ASSISTANT

## 2025-01-08 PROCEDURE — 99233 SBSQ HOSP IP/OBS HIGH 50: CPT

## 2025-01-08 PROCEDURE — 1200000003 HC TELEMETRY R&B

## 2025-01-08 PROCEDURE — 84132 ASSAY OF SERUM POTASSIUM: CPT

## 2025-01-08 PROCEDURE — 84145 PROCALCITONIN (PCT): CPT

## 2025-01-08 PROCEDURE — 6370000000 HC RX 637 (ALT 250 FOR IP): Performed by: NURSE PRACTITIONER

## 2025-01-08 PROCEDURE — 2500000003 HC RX 250 WO HCPCS

## 2025-01-08 PROCEDURE — 6360000002 HC RX W HCPCS

## 2025-01-08 PROCEDURE — 83735 ASSAY OF MAGNESIUM: CPT

## 2025-01-08 PROCEDURE — 85027 COMPLETE CBC AUTOMATED: CPT

## 2025-01-08 PROCEDURE — 80048 BASIC METABOLIC PNL TOTAL CA: CPT

## 2025-01-08 PROCEDURE — 36591 DRAW BLOOD OFF VENOUS DEVICE: CPT

## 2025-01-08 PROCEDURE — 36415 COLL VENOUS BLD VENIPUNCTURE: CPT

## 2025-01-08 PROCEDURE — 6360000002 HC RX W HCPCS: Performed by: NURSE PRACTITIONER

## 2025-01-08 PROCEDURE — APPSS30 APP SPLIT SHARED TIME 16-30 MINUTES: Performed by: NURSE PRACTITIONER

## 2025-01-08 PROCEDURE — 6370000000 HC RX 637 (ALT 250 FOR IP)

## 2025-01-08 PROCEDURE — 71045 X-RAY EXAM CHEST 1 VIEW: CPT

## 2025-01-08 PROCEDURE — 2580000003 HC RX 258: Performed by: SURGERY

## 2025-01-08 PROCEDURE — 82948 REAGENT STRIP/BLOOD GLUCOSE: CPT

## 2025-01-08 PROCEDURE — 6360000002 HC RX W HCPCS: Performed by: SURGERY

## 2025-01-08 PROCEDURE — 83880 ASSAY OF NATRIURETIC PEPTIDE: CPT

## 2025-01-08 PROCEDURE — 2580000003 HC RX 258: Performed by: NURSE PRACTITIONER

## 2025-01-08 PROCEDURE — 82330 ASSAY OF CALCIUM: CPT

## 2025-01-08 PROCEDURE — 2580000003 HC RX 258: Performed by: PHYSICIAN ASSISTANT

## 2025-01-08 PROCEDURE — 2500000003 HC RX 250 WO HCPCS: Performed by: PHYSICIAN ASSISTANT

## 2025-01-08 PROCEDURE — 99231 SBSQ HOSP IP/OBS SF/LOW 25: CPT | Performed by: SURGERY

## 2025-01-08 PROCEDURE — 84100 ASSAY OF PHOSPHORUS: CPT

## 2025-01-08 PROCEDURE — 2500000003 HC RX 250 WO HCPCS: Performed by: SURGERY

## 2025-01-08 RX ORDER — POTASSIUM CHLORIDE 7.45 MG/ML
10 INJECTION INTRAVENOUS
Status: DISCONTINUED | OUTPATIENT
Start: 2025-01-08 | End: 2025-01-08

## 2025-01-08 RX ORDER — BUMETANIDE 0.25 MG/ML
1 INJECTION, SOLUTION INTRAMUSCULAR; INTRAVENOUS ONCE
Status: COMPLETED | OUTPATIENT
Start: 2025-01-08 | End: 2025-01-08

## 2025-01-08 RX ORDER — POTASSIUM CHLORIDE 7.45 MG/ML
10 INJECTION INTRAVENOUS
Status: COMPLETED | OUTPATIENT
Start: 2025-01-08 | End: 2025-01-08

## 2025-01-08 RX ORDER — BISACODYL 10 MG
10 SUPPOSITORY, RECTAL RECTAL ONCE
Status: COMPLETED | OUTPATIENT
Start: 2025-01-08 | End: 2025-01-08

## 2025-01-08 RX ADMIN — LIDOCAINE 4%: 4 CREAM TOPICAL at 03:27

## 2025-01-08 RX ADMIN — POTASSIUM BICARBONATE 40 MEQ: 782 TABLET, EFFERVESCENT ORAL at 22:11

## 2025-01-08 RX ADMIN — POTASSIUM PHOSPHATE, MONOBASIC POTASSIUM PHOSPHATE, DIBASIC 15 MMOL: 224; 236 INJECTION, SOLUTION, CONCENTRATE INTRAVENOUS at 09:25

## 2025-01-08 RX ADMIN — ENOXAPARIN SODIUM 80 MG: 100 INJECTION SUBCUTANEOUS at 09:36

## 2025-01-08 RX ADMIN — POTASSIUM CHLORIDE 10 MEQ: 7.45 INJECTION INTRAVENOUS at 16:45

## 2025-01-08 RX ADMIN — POTASSIUM CHLORIDE 10 MEQ: 7.45 INJECTION INTRAVENOUS at 14:46

## 2025-01-08 RX ADMIN — HYDROMORPHONE HYDROCHLORIDE 0.5 MG: 1 INJECTION, SOLUTION INTRAMUSCULAR; INTRAVENOUS; SUBCUTANEOUS at 22:19

## 2025-01-08 RX ADMIN — PANTOPRAZOLE SODIUM 40 MG: 40 INJECTION, POWDER, FOR SOLUTION INTRAVENOUS at 09:37

## 2025-01-08 RX ADMIN — BISACODYL 10 MG: 10 SUPPOSITORY RECTAL at 09:37

## 2025-01-08 RX ADMIN — ONDANSETRON 4 MG: 2 INJECTION INTRAMUSCULAR; INTRAVENOUS at 07:51

## 2025-01-08 RX ADMIN — SODIUM CHLORIDE, PRESERVATIVE FREE 5 ML: 5 INJECTION INTRAVENOUS at 22:12

## 2025-01-08 RX ADMIN — SODIUM CHLORIDE, POTASSIUM CHLORIDE, SODIUM LACTATE AND CALCIUM CHLORIDE: 600; 310; 30; 20 INJECTION, SOLUTION INTRAVENOUS at 14:48

## 2025-01-08 RX ADMIN — POTASSIUM CHLORIDE 10 MEQ: 7.45 INJECTION INTRAVENOUS at 10:06

## 2025-01-08 RX ADMIN — CALCIUM GLUCONATE: 98 INJECTION, SOLUTION INTRAVENOUS at 17:57

## 2025-01-08 RX ADMIN — POTASSIUM CHLORIDE 10 MEQ: 7.45 INJECTION INTRAVENOUS at 15:46

## 2025-01-08 RX ADMIN — BUMETANIDE 1 MG: 0.25 INJECTION INTRAMUSCULAR; INTRAVENOUS at 15:52

## 2025-01-08 RX ADMIN — HYDROMORPHONE HYDROCHLORIDE 0.25 MG: 1 INJECTION, SOLUTION INTRAMUSCULAR; INTRAVENOUS; SUBCUTANEOUS at 01:42

## 2025-01-08 RX ADMIN — ENOXAPARIN SODIUM 80 MG: 100 INJECTION SUBCUTANEOUS at 22:11

## 2025-01-08 RX ADMIN — SODIUM CHLORIDE, PRESERVATIVE FREE 5 ML: 5 INJECTION INTRAVENOUS at 09:32

## 2025-01-08 RX ADMIN — SODIUM PHOSPHATE, MONOBASIC, MONOHYDRATE AND SODIUM PHOSPHATE, DIBASIC, ANHYDROUS 15 MMOL: 142; 276 INJECTION, SOLUTION INTRAVENOUS at 18:10

## 2025-01-08 ASSESSMENT — PAIN DESCRIPTION - ORIENTATION
ORIENTATION: RIGHT;OUTER
ORIENTATION: RIGHT;LOWER
ORIENTATION: RIGHT
ORIENTATION: RIGHT

## 2025-01-08 ASSESSMENT — PAIN DESCRIPTION - ONSET: ONSET: ON-GOING

## 2025-01-08 ASSESSMENT — PAIN DESCRIPTION - DESCRIPTORS
DESCRIPTORS: ACHING
DESCRIPTORS: SHARP

## 2025-01-08 ASSESSMENT — PAIN DESCRIPTION - LOCATION
LOCATION: BACK
LOCATION: ABDOMEN
LOCATION: BACK
LOCATION: BACK

## 2025-01-08 ASSESSMENT — PAIN DESCRIPTION - PAIN TYPE: TYPE: ACUTE PAIN

## 2025-01-08 ASSESSMENT — PAIN SCALES - GENERAL
PAINLEVEL_OUTOF10: 5
PAINLEVEL_OUTOF10: 8
PAINLEVEL_OUTOF10: 8
PAINLEVEL_OUTOF10: 7
PAINLEVEL_OUTOF10: 5
PAINLEVEL_OUTOF10: 5

## 2025-01-08 ASSESSMENT — PAIN DESCRIPTION - FREQUENCY: FREQUENCY: INTERMITTENT

## 2025-01-08 NOTE — PROGRESS NOTES
Comprehensive Nutrition Assessment    Type and Reason for Visit:  Reassess    Nutrition Recommendations/Plan:   Continue current PPN macronutrients.  Will increase kcals as labs, line access allow (refeeding risk).  Recommend advance diet as GI function allows.  Trial Ensure Clear TID.  Ordered daily weights to monitor trends.  Will monitor long term plan for nutrition - previous mention of PEG tube - not currently appropriate d/t SBO.     Malnutrition Assessment:  Malnutrition Status:  Severe malnutrition (01/07/25 1121)    Context:  Acute Illness (Acute on underlying chronic)     Findings of the 6 clinical characteristics of malnutrition:  Energy Intake:  50% or less of estimated energy requirements for 5 or more days (starting 1/1/25)  Weight Loss:   (only stated weight this admit; pt down 18.8% in 2 months (Oct to last wt in Dec); note has had paracentesis procedures)     Body Fat Loss:  Moderate body fat loss Orbital, Buccal region, Triceps   Muscle Mass Loss:  Moderate muscle mass loss Temples (temporalis), Scapula (trapezius), Clavicles (pectoralis & deltoids), Thigh (quadriceps), Calf (gastrocnemius)  Fluid Accumulation:  Unable to assess (pleurx drain) Ascites   Strength:  Not Performed    Nutrition Assessment:     Pt. severely malnourished AEB criteria listed above.  At risk for further nutritional compromise r/t admit with N/V - SBO vs Ileus; KUB suggesting SBO, Ascites - has pleurx drain in place, and underlying medical condition (PMHx: pancreatic cancer with liver mets - currently undergoing chemo, dysphagia, PE - s/p urgent thrombectomy, recent thrush, paracentesis 11/12/24 and 11/25/24, GERD, HTN, Moderate Malnutrition).     Nutrition Related Findings:    Pt. Report/Treatments/Miscellaneous:   1/8-pt. Seen w/ wife; PN infusing via peripheral line; +NGT; clamped; reports had some nausea after drinking an Ensure this am; CL diet started; agrees to trial Ensure Clear; per surgery-If fails liquids  today or fails to improve will need CT enterography to better look at bowel. Hard to say if obstruction is secondary to tumor or not. If it is than patient will unfortunately not improve without some type of palliative surgery for diversion. Do not want to operate though unless that is the case   1/7-Pt seen, NGT in place - clamped. Pt drank an entire ensure enlive and was tolerating upon visit. Wife states he has been experiencing N/V since New Years Day and has not been able to keep intake down and maintain weight. Last session of chemo was 12/27/24. He was drinking ensure shakes regularly at home prior to symptom onset. Was eating significantly less PTA - wife notes d/t SBO. Discussed PPN start - pt and spouse agreeable and understanding.   GI Status: BM this am per wife; passing gas  Pertinent Labs: 1/8: Glucose 107, BUN 17, Cr 0.6, Magnesium 1.9, Phosphorus 1.9, Potassium 3.1  Pertinent Meds: Insulin, Compazine, Zofran, Pepcid, Dilaudid      Wound Type: None       Current Nutrition Intake & Therapies:    Average Meal Intake:  (starting CL diet)  Average Supplements Intake:  (initiated)  PN-Adult 3 IN 1 Peripheral Line (Custom)  ADULT DIET; Clear Liquid  ADULT ORAL NUTRITION SUPPLEMENT; Breakfast, Dinner, Lunch; Clear Liquid Oral Supplement  Current Parenteral Nutrition Orders:  Type and Formula: 3-in-1 Peripheral (dosing wt: 77 kgm, 10 kcals/kgm, 1.1 gm protein/kgm, 20% kcals from lipids)   Lipids: Daily  Duration: Continuous  Rate/Volume: 80 ml/hr  Current PN Order Provides: 770 kcals, 84.7 gm protein, 81.5 gm CHO, 15.4 gm lipids/day    Anthropometric Measures:  Height: 182.9 cm (6')  Ideal Body Weight (IBW): 178 lbs (81 kg)    Admission Body Weight: 77.1 kg (170 lb) (1/4: stated)  Current Body Weight: 77.1 kg (170 lb) (1/4: stated),      Current BMI (kg/m2): 23.1  Usual Body Weight:  (per pt: ~216#; per EMR: 9/25/24: 213# 5 oz, 10/31/24: 213# 6 oz, 12/24/24: 173# 10 oz)        Weight Adjustment For: No

## 2025-01-08 NOTE — PLAN OF CARE
Problem: Discharge Planning  Goal: Discharge to home or other facility with appropriate resources  1/8/2025 0254 by Chantale Chao RN  Outcome: Progressing  Flowsheets (Taken 1/8/2025 0254)  Discharge to home or other facility with appropriate resources:   Identify barriers to discharge with patient and caregiver   Arrange for needed discharge resources and transportation as appropriate   Identify discharge learning needs (meds, wound care, etc)     Problem: Safety - Adult  Goal: Free from fall injury  1/8/2025 0254 by Chantale Chao RN  Outcome: Progressing  Flowsheets (Taken 1/8/2025 0254)  Free From Fall Injury: Instruct family/caregiver on patient safety     Problem: ABCDS Injury Assessment  Goal: Absence of physical injury  1/8/2025 0254 by Chantale Chao RN  Outcome: Progressing  Flowsheets (Taken 1/8/2025 0254)  Absence of Physical Injury: Implement safety measures based on patient assessment     Problem: Gastrointestinal - Adult  Goal: Minimal or absence of nausea and vomiting  1/8/2025 0254 by Chantale Chao RN  Outcome: Progressing  Flowsheets (Taken 1/8/2025 0254)  Minimal or absence of nausea and vomiting: Administer IV fluids as ordered to ensure adequate hydration     Problem: Gastrointestinal - Adult  Goal: Maintains or returns to baseline bowel function  1/8/2025 0254 by Chantale Chao RN  Outcome: Progressing     Problem: Pain  Goal: Verbalizes/displays adequate comfort level or baseline comfort level  1/8/2025 0254 by Chantale Chao RN  Outcome: Progressing  Flowsheets (Taken 1/8/2025 0254)  Verbalizes/displays adequate comfort level or baseline comfort level:   Encourage patient to monitor pain and request assistance   Assess pain using appropriate pain scale   Administer analgesics based on type and severity of pain and evaluate response   Implement non-pharmacological measures as appropriate and evaluate response     Problem: Skin/Tissue

## 2025-01-08 NOTE — PROGRESS NOTES
Behavior: Behavior is cooperative.         Cognition and Memory: Cognition normal.          Palliative Performance Scale   70%  Ambulation reduced; unable to perform normal job/work; significant  disease; able to do own self care; normal or reduced intake; fully conscious     Assessment and Plan   Gustabo Machado is a 54 y.o. who is admitted to [facility] for Small bowel obstruction (HCC). Palliative care is consulted for Goals of Care.  Patient was placed on clear liquid diet and NG tube remains clamped. He did experience mild nausea after drinking ensure, this subsided. He does have complaints of increasing right sided abdominal pain. General surgery increased freequency and dosage this AM to 0.25mg or 0.5mg every 3 hours as needed for pain, agree with this. We discussed initiating oral pain medication once tolerating PO medications.  Further goals of care discussions will depend on the clinical course. Palliative Care will continue to follow the patient while they are hospitalized. Patient will continue to follow with palliative care in the palliative care clinic on discharge.         Principal Problem:    Small bowel obstruction (HCC)  Active Problems:    Severe malnutrition (HCC)    Malignant neoplasm of pancreas (HCC)  Resolved Problems:    * No resolved hospital problems. *       Continue current plan of care for acute and chronic conditions per primary and specialist teams  Increase Hydromorphone IV to 0.25mg or 0.5mg every 3 hours as needed for uncontrolled pain  Continue Compazine 10mg every 6 hours as needed for stable nausea and vomiting  Continue Zofran IV and PO every 6 hours as needed for nausea and vomiting  Continue Tylenol 650 mg oral and suppository every 6 hours as needed for fevers  Will plan to initiate oral pain medication once patient is tolerating orals  Will likely need a good bowel regimen once tolerating PO to prevent opioid induced constipation  Palliative Care will

## 2025-01-08 NOTE — PROGRESS NOTES
Pharmacy Consult for TPN/PPN Monitoring & Adjustment  IV Access: peripheral      Dosing weight: 77 kg  Current insulin regimen: medium sliding scale  Diet (excluding TPN): NPO  Maintenance fluid: LR at 20 mL/hr    Plan:  See components of tonight's TPN bag in the table below:  Date 1/7/25 1/8/25   Total kcal/kg 10 10   Total kcal 770 770   Protein g/kg 1.1 1.1   Protein g 84.7 84.7   Protein kcal (4 kcal/g) 338.8 338.8   Lipid % 20 20   Lipid g 15.4 15.4   Lipid kcal (10 kcal/g) 154 154   Dextrose g 81.5 81.5   Dextrose kcal (3.4 kcal/g) 277.2 277.2   Infusion Rate (mL/hr) 80 80   Na Acetate (mEq) 50 50   Na Chloride (mEq) 120 120   Na Phos (mmol)  (3 mmol = 4 mEq Na) 18 18   Total Na (mEq) 194 194   K Acetate (mEq) 20 30   K Chloride (mEq) -    K Phos (mmol)  (15 mmol = 22 mEq K) - 9   Total K (mEq) 20 33.2   Ca Gluconate (mEq)  (1 g = 4.65 mEq) 4.65 4.65   Mag Sulfate (mEq)  (1 g = 8.12 mEq) 8.12 8.12   Multivitamin (mL) 10 10   Trace Elements (mL) 1 1     Summary of changes for tonight's TPN:   no macronutrient changes  Increase KAcetate to 30 mEq  Add Kphos 9 mmol    Electrolyte Replacement:   Potassium chloride: 40 mEq  K phosphate: 15 mmol    Monitoring:  BMP, Mag, iCal, & Phos ordered for tomorrow with AM labs.    Pharmacy will continue to follow daily. Thank you for the consult.  Chetan Arias [facility] 1/8/2025 12:43 PM

## 2025-01-08 NOTE — PROGRESS NOTES
Hospitalist Progress Note      Patient:  Gustabo Machado 54 y.o. male     : 1970  Unit/Bed:-008-A  Date of Admission: 2025        ASSESSMENT AND PLAN    Active Problems  SBO vs Ileus   General surgery following- no plans for CT enterography today due to concerns of contrast and improvement in bowel function. If any worsening of symptoms, will reconsider at that time.   NG tube clamped. Trial Clear Liquids  Continue with PPN   4 Bms in the last 24 hours   Pulmonary edema/ fluid overload   CXR ordered today due to adventitious lung sounds demonstrating marked bilateral pulmonary edema vs pneumonia. No clinical signs of pneumonia at this time. Check procal   Bumex 1mg IV once ordered. Check BNP. Further diuresis pending response to bumex  Strict I and Os  Last echo 2024 demonstrating EF of 60% with diastolic dysfunction.  RV strain noted- Dx with PE at that time   Consider repeat echocardiogram  Hypokalemia and hypophosphatemia  Secondary to poor PO intake, GI losses from NG   3.1 today  Replacement protocol. Recheck potassium after replacement today   Metastatic pancreatic cancer   Case discussed with Oncology PA on . Case discussed with Palliative Care NP on .   CT A/P shows mixed picture of new T11 sclerotic lesion but improving KARAN in the retroperitoneal space.   Pt will need more cycles of chemo to really understand if it is improving his disease.   Pt was educated on the importance of stable nutrition & not losing weight during treatment.   General Surgery spoke with patient about PEG tube. TPN has been intiated   Dietician following   Palliative Care following  Dysphagia   SLP consulted, pt is having issues with \"food getting stuck\" . Continue with clear liquids at this time   Hyponatremia, hypovolemic - improved   Sodium 130, 134, 137. BMP in the AM.   Anemia, acute on chronic   Hgb 12 on admission, Stable ~10. CBC in the AM. Likely dilutional.     Resolved Problems  EMILY  glucose, dextrose bolus **OR** dextrose bolus, glucagon (rDNA), dextrose, Benzocaine-Menthol, prochlorperazine, lidocaine, sodium chloride flush, sodium chloride, potassium chloride **OR** potassium alternative oral replacement **OR** potassium chloride, magnesium sulfate, ondansetron **OR** ondansetron, acetaminophen **OR** acetaminophen    Exam:  /76   Pulse (!) 106   Temp 99.5 °F (37.5 °C) (Oral)   Resp 20   Ht 1.829 m (6')   Wt 77.1 kg (170 lb)   SpO2 91%   BMI 23.06 kg/m²   General:  Acute on chronically ill-appearing.  Thin, frail  Eyes:  PERRL. Conjunctiva pallor noted  HENT: Head normal appearing. Nares normal. Oral mucosa moist.  Hearing intact.  NG tube noted-clamped  Neck: Supple, with full range of motion. Trachea midline.  No gross JVD appreciated.  Respiratory:  Normal effort.  Rales at bases.  Cardiovascular: Tachycardic, regular rhythm with normal S1/S2 without murmurs.    No lower extremity edema.   Abdomen: Soft, non-tender, non-distended with normal bowel sounds.  Musculoskeletal: No joint swelling or tenderness. Normal tone. No abnormal movements.   Skin: Warm and dry. No rashes or lesions.  Neurologic:  No focal sensory/motor deficits in the upper or lower extremities. Cranial nerves:  grossly non-focal 2-12.     Psychiatric: Alert and oriented, normal insight and thought content.   Capillary Refill: Brisk,< 3 seconds.  Peripheral Pulses: +2 palpable, equal bilaterally.       Labs/Radiology: See chart or assessment above.     Electronically signed by Ama Petersen PA-C on 1/8/2025 at 2:45 PM

## 2025-01-08 NOTE — PLAN OF CARE
Problem: Discharge Planning  Goal: Discharge to home or other facility with appropriate resources  Outcome: Progressing  Flowsheets (Taken 1/8/2025 1832)  Discharge to home or other facility with appropriate resources:   Identify barriers to discharge with patient and caregiver   Arrange for needed discharge resources and transportation as appropriate   Identify discharge learning needs (meds, wound care, etc)     Problem: Safety - Adult  Goal: Free from fall injury  Outcome: Progressing  Flowsheets (Taken 1/8/2025 1832)  Free From Fall Injury:   Instruct family/caregiver on patient safety   Based on caregiver fall risk screen, instruct family/caregiver to ask for assistance with transferring infant if caregiver noted to have fall risk factors     Problem: ABCDS Injury Assessment  Goal: Absence of physical injury  Outcome: Progressing  Flowsheets (Taken 1/8/2025 1832)  Absence of Physical Injury: Implement safety measures based on patient assessment     Problem: Gastrointestinal - Adult  Goal: Minimal or absence of nausea and vomiting  Outcome: Progressing  Flowsheets (Taken 1/8/2025 1832)  Minimal or absence of nausea and vomiting: Administer IV fluids as ordered to ensure adequate hydration     Problem: Pain  Goal: Verbalizes/displays adequate comfort level or baseline comfort level  Outcome: Progressing  Flowsheets (Taken 1/8/2025 1832)  Verbalizes/displays adequate comfort level or baseline comfort level: Encourage patient to monitor pain and request assistance     Problem: Skin/Tissue Integrity  Goal: Absence of new skin breakdown  Description: 1.  Monitor for areas of redness and/or skin breakdown  2.  Assess vascular access sites hourly  3.  Every 4-6 hours minimum:  Change oxygen saturation probe site  4.  Every 4-6 hours:  If on nasal continuous positive airway pressure, respiratory therapy assess nares and determine need for appliance change or resting period.  Outcome: Progressing     Problem: Nutrition

## 2025-01-08 NOTE — PROGRESS NOTES
[facility]  JESSI Campbell-FRANCISCO for Dr. Alexander Colon  General Surgery Daily Progress Note    Pt Name: Gustabo Machado  Medical Record Number: [id number]  Date of Birth 1970   Today's Date: 1/8/2025    Hospital day # 4     ASSESSMENT   Small bowel obstruction vs ileus  Pancreatic cancer with liver metastasis - currently on chemotherapy  Nausea & vomiting  Ascites - has pleurx drain in place  History of PE - on Eliquis  Dysphagia  Hyponatremia  EMILY  Anemia   Severe malnutrition   has a past medical history of Cancer (HCC), Pancreatic cancer (HCC), and Pulmonary emboli (HCC).  PLAN   NG tube in place. Has been clamped. Intermittent nausea and dry heaves. Attempting some liquids/ensure but not tolerating the best so far.   Supp this morning to help stimulate from below  Chest x-ray for evaluation  Start TPN - pharm to dose. Replace electrolytes as needed.   Oncology saw. Chemo on hold.  Palliative care following  Pain & nausea control  Ok to drain pleurX catheter per home regimen. Per wife daily as needed.  Drained 350 cc out yesterday.  Eliquis on hold. Lovenox therapeutic on board.  Labs reviewed. Continue to monitor.   PT/OT to eval  Long discussion with patient about plan of care. Agree with trial liquids and see if he may improve with conservative management. If fails liquids today or fails to improve will need CT enterography to better look at bowel. Hard to say if obstruction is secondary to tumor or not. If it is than patient will unfortunately not improve without some type of palliative surgery for diversion. Do not want to operate though unless that is the case. Support given. Wife present.  SUBJECTIVE   Chief complaint: Right flank pain and nausea    Patient lying in bed. Has emesis bag in hand. States he had an ok night but drank an ensure this morning and now has nausea/dry heaves. He did have two semi-formed BMs yesterday and passing gas this morning though. NG has been  examination, chart review, documentation, clinical reasoning and discussion with attending regarding plan/interval changes. More than 50% of the time involved with counseling, education and coordinating care.    Electronically signed by JESSI HALEY CNP on 1/8/2025 at 9:23 AM      Patient seen and examined independently by me 1/8/2025  Total time personally spent on this patient encounter was 27 minutes which includes :  Preparing to see the patient( reviewing tests and chart)  Obtaining and reviewing separately obtained history  Performing a medically appropriate examination and evaluation  Ordering medications, tests, or procedures  Counseling and educating the patient/family/caregiver  Care coordination  Referring and communicating with other healthcare professionals  Documenting clinical information in the EHR  Independent interpretation of results and communicating the results to patient and care team  This includes a direct physical exam as well as all the other encounter activities described above.   Time may be discontiguous.   Time does not include procedures.    Please see our orders that were directed and approved by me if there are any new ones for the updated patient care plan.    Above discussed and I agree with documentation and orders placed by Isaiah Sutton CNP    See any additional comments if needed below for any other updated orders and plans.     -- NG tube still in place and clamped.  Trialing liquids at this time.  TPN.  If no improvement then CT enterography.  Difficult to tell if obstruction secondary to chemotherapy side effects versus tumor burden with mechanical true obstruction.  CT may help differentiate this.  Abdomen overall benign.  Continue Lovenox.  Eliquis on hold.  Long discussion with patient and wife at bedside this morning.  All questions answered.  Agree with current plan.    Electronically signed by Alexander Colon MD on 1/8/25 at 11:54 AM EST

## 2025-01-08 NOTE — PROGRESS NOTES
Spiritual Health History and Assessment/Progress Note  [facility]    (P) Spiritual/Emotional Needs,  ,  ,      Name: Gustabo Machado MRN: [id number]    Age: 54 y.o.     Sex: male   Language: English   Sikhism: Quaker   Small bowel obstruction (HCC)     Date: 1/8/2025            Total Time Calculated: (P) 10 min              Spiritual Assessment continued in [facility] MED 5K        Referral/Consult From: (P) Multi-disciplinary team   Encounter Overview/Reason: (P) Spiritual/Emotional Needs  Service Provided For: (P) Patient and family together    Isabel, Belief, Meaning:   Patient identifies as spiritual  Family/Friends identify as spiritual      Importance and Influence:  Patient has spiritual/personal beliefs that influence decisions regarding their health  Family/Friends have spiritual/personal beliefs that influence decisions regarding the patient's health    Community:  Patient is connected with a spiritual community  Family/Friends feel well-supported. Support system includes: Spouse/Partner    Assessment and Plan of Care:   In my encounter with the 54 yr old Palliative Care patient the pt's spouse was supportively present. While rounding the unit 5K,  I provided spiritual care to patient and his family through conversation, I also came to assess their spiritual needs. The pt is coping with late stage pancreatic cancer and was admitted due to small bowel obstruction. The pt and his spouse expressed their thanks for the spiritual support.     Patient Interventions include: Facilitated expression of thoughts and feelings  Family/Friends Interventions include: Facilitated expression of thoughts and feelings    Patient Plan of Care: Spiritual Care available upon further referral  Family/Friends Plan of Care: Spiritual Care available upon further referral    Electronically signed by KELLY Castro on 1/8/2025 at 4:07 PM

## 2025-01-08 NOTE — CARE COORDINATION
1/8/25, 8:01 AM EST    DISCHARGE BARRIERS        Patient transferred to . Report given to unit SWSharon, regarding discharge plan for this patient.  Plans home with wife, current with ELIUD Pierce

## 2025-01-08 NOTE — CARE COORDINATION
1/8/25, 4:07 PM EST    DISCHARGE ON GOING EVALUATION    Gustabo RODRIGUEZ [facility] day: 4  Location: -08/008-A Reason for admit: Small bowel obstruction (HCC) [K56.609]  SBO (small bowel obstruction) (HCC) [K56.609]  Pleural effusion [J90]  Malignant neoplasm of pancreas, unspecified location of malignancy (HCC) [C25.9]       Imaging since last note:   1/8 Chest X-ray:  IMPRESSION:  1. Borderline heart size. NG tube passes into stomach. Mediport right side,  catheter tip in SVC.  2. Small bilateral pleural effusions. Moderate pneumonia/pulmonary edema both  mid and lower lung fields.  3. Overall appearance of chest has markedly worsened since prior study.    Barriers to Discharge: Hospitalist, Oncology, General Surgery, and Palliative Care following, TPN per pharmacy dosing, Dietician, Lovenox, IV Protonix, ISS, IV Protonix, prn Tylenol, Dilaudid, Compazine, and Zofran, electrolyte replacement protocol, BMP, CBC, and Phosphorus in a.m., clear liquid diet, PT/OT, SCD's, strict I & O, telemetry, up with assistance.     PCP: Daniel Agrawal MD  Readmission Risk Score: 26.9    Patient Goals/Plan/Treatment Preferences: poke with Gustabo, he plans home with his wife and children. He is current with ELIUD Pierce for pleurx care. He does not anticipate further needs.

## 2025-01-09 LAB
ANION GAP SERPL CALC-SCNC: 12 MEQ/L (ref 8–16)
BUN SERPL-MCNC: 16 MG/DL (ref 7–22)
CA-I BLD ISE-SCNC: 1.09 MMOL/L (ref 1.12–1.32)
CALCIUM SERPL-MCNC: 7.9 MG/DL (ref 8.5–10.5)
CHLORIDE SERPL-SCNC: 95 MEQ/L (ref 98–111)
CO2 SERPL-SCNC: 29 MEQ/L (ref 23–33)
CREAT SERPL-MCNC: 0.6 MG/DL (ref 0.4–1.2)
DEPRECATED RDW RBC AUTO: 58 FL (ref 35–45)
ERYTHROCYTE [DISTWIDTH] IN BLOOD BY AUTOMATED COUNT: 17.4 % (ref 11.5–14.5)
GFR SERPL CREATININE-BSD FRML MDRD: > 90 ML/MIN/1.73M2
GLUCOSE BLD STRIP.AUTO-MCNC: 106 MG/DL (ref 70–108)
GLUCOSE BLD STRIP.AUTO-MCNC: 118 MG/DL (ref 70–108)
GLUCOSE BLD STRIP.AUTO-MCNC: 124 MG/DL (ref 70–108)
GLUCOSE SERPL-MCNC: 110 MG/DL (ref 70–108)
HCT VFR BLD AUTO: 30.1 % (ref 42–52)
HGB BLD-MCNC: 9.8 GM/DL (ref 14–18)
MAGNESIUM SERPL-MCNC: 1.9 MG/DL (ref 1.6–2.4)
MCH RBC QN AUTO: 29.8 PG (ref 26–33)
MCHC RBC AUTO-ENTMCNC: 32.6 GM/DL (ref 32.2–35.5)
MCV RBC AUTO: 91.5 FL (ref 80–94)
PHOSPHATE SERPL-MCNC: 2.3 MG/DL (ref 2.4–4.7)
PLATELET # BLD AUTO: 152 THOU/MM3 (ref 130–400)
PMV BLD AUTO: 9.6 FL (ref 9.4–12.4)
POTASSIUM SERPL-SCNC: 3.4 MEQ/L (ref 3.5–5.2)
RBC # BLD AUTO: 3.29 MILL/MM3 (ref 4.7–6.1)
SODIUM SERPL-SCNC: 136 MEQ/L (ref 135–145)
WBC # BLD AUTO: 9.4 THOU/MM3 (ref 4.8–10.8)

## 2025-01-09 PROCEDURE — 6360000002 HC RX W HCPCS: Performed by: NURSE PRACTITIONER

## 2025-01-09 PROCEDURE — 83735 ASSAY OF MAGNESIUM: CPT

## 2025-01-09 PROCEDURE — 6370000000 HC RX 637 (ALT 250 FOR IP): Performed by: PHYSICIAN ASSISTANT

## 2025-01-09 PROCEDURE — 87070 CULTURE OTHR SPECIMN AEROBIC: CPT

## 2025-01-09 PROCEDURE — 99233 SBSQ HOSP IP/OBS HIGH 50: CPT | Performed by: PHYSICIAN ASSISTANT

## 2025-01-09 PROCEDURE — 87205 SMEAR GRAM STAIN: CPT

## 2025-01-09 PROCEDURE — 82330 ASSAY OF CALCIUM: CPT

## 2025-01-09 PROCEDURE — 80048 BASIC METABOLIC PNL TOTAL CA: CPT

## 2025-01-09 PROCEDURE — 85027 COMPLETE CBC AUTOMATED: CPT

## 2025-01-09 PROCEDURE — 97530 THERAPEUTIC ACTIVITIES: CPT

## 2025-01-09 PROCEDURE — 82948 REAGENT STRIP/BLOOD GLUCOSE: CPT

## 2025-01-09 PROCEDURE — 36591 DRAW BLOOD OFF VENOUS DEVICE: CPT

## 2025-01-09 PROCEDURE — 6360000002 HC RX W HCPCS: Performed by: PHYSICIAN ASSISTANT

## 2025-01-09 PROCEDURE — 99231 SBSQ HOSP IP/OBS SF/LOW 25: CPT | Performed by: SURGERY

## 2025-01-09 PROCEDURE — 97165 OT EVAL LOW COMPLEX 30 MIN: CPT

## 2025-01-09 PROCEDURE — 6360000002 HC RX W HCPCS

## 2025-01-09 PROCEDURE — 84100 ASSAY OF PHOSPHORUS: CPT

## 2025-01-09 PROCEDURE — 2580000003 HC RX 258: Performed by: PHYSICIAN ASSISTANT

## 2025-01-09 PROCEDURE — 1200000003 HC TELEMETRY R&B

## 2025-01-09 PROCEDURE — 6360000002 HC RX W HCPCS: Performed by: SURGERY

## 2025-01-09 PROCEDURE — 2500000003 HC RX 250 WO HCPCS: Performed by: SURGERY

## 2025-01-09 PROCEDURE — 2580000003 HC RX 258: Performed by: SURGERY

## 2025-01-09 PROCEDURE — 2500000003 HC RX 250 WO HCPCS: Performed by: PHYSICIAN ASSISTANT

## 2025-01-09 RX ORDER — POTASSIUM CHLORIDE 1500 MG/1
40 TABLET, EXTENDED RELEASE ORAL ONCE
Status: DISCONTINUED | OUTPATIENT
Start: 2025-01-09 | End: 2025-01-10

## 2025-01-09 RX ORDER — BUMETANIDE 0.25 MG/ML
1 INJECTION, SOLUTION INTRAMUSCULAR; INTRAVENOUS ONCE
Status: COMPLETED | OUTPATIENT
Start: 2025-01-09 | End: 2025-01-09

## 2025-01-09 RX ADMIN — DOXYCYCLINE 100 MG: 100 INJECTION, POWDER, LYOPHILIZED, FOR SOLUTION INTRAVENOUS at 10:13

## 2025-01-09 RX ADMIN — POTASSIUM PHOSPHATE, MONOBASIC POTASSIUM PHOSPHATE, DIBASIC 15 MMOL: 224; 236 INJECTION, SOLUTION, CONCENTRATE INTRAVENOUS at 14:43

## 2025-01-09 RX ADMIN — DOXYCYCLINE 100 MG: 100 INJECTION, POWDER, LYOPHILIZED, FOR SOLUTION INTRAVENOUS at 21:12

## 2025-01-09 RX ADMIN — PANTOPRAZOLE SODIUM 40 MG: 40 INJECTION, POWDER, FOR SOLUTION INTRAVENOUS at 08:41

## 2025-01-09 RX ADMIN — CALCIUM GLUCONATE: 98 INJECTION, SOLUTION INTRAVENOUS at 18:22

## 2025-01-09 RX ADMIN — POTASSIUM BICARBONATE 40 MEQ: 782 TABLET, EFFERVESCENT ORAL at 10:18

## 2025-01-09 RX ADMIN — ENOXAPARIN SODIUM 80 MG: 100 INJECTION SUBCUTANEOUS at 21:09

## 2025-01-09 RX ADMIN — HYDROMORPHONE HYDROCHLORIDE 0.5 MG: 1 INJECTION, SOLUTION INTRAMUSCULAR; INTRAVENOUS; SUBCUTANEOUS at 08:41

## 2025-01-09 RX ADMIN — BUMETANIDE 1 MG: 0.25 INJECTION INTRAMUSCULAR; INTRAVENOUS at 09:13

## 2025-01-09 RX ADMIN — ENOXAPARIN SODIUM 80 MG: 100 INJECTION SUBCUTANEOUS at 08:41

## 2025-01-09 RX ADMIN — WATER 1000 MG: 1 INJECTION INTRAMUSCULAR; INTRAVENOUS; SUBCUTANEOUS at 09:14

## 2025-01-09 ASSESSMENT — PAIN SCALES - GENERAL: PAINLEVEL_OUTOF10: 8

## 2025-01-09 NOTE — PROGRESS NOTES
Hospitalist Progress Note      Patient:  Gustabo Machado 54 y.o. male     : 1970  Unit/Bed:5K-008-A  Date of Admission: 2025        ASSESSMENT AND PLAN    Active Problems  SBO vs Ileus - resolving  General surgery following- no plans for CT enterography today due to concerns of contrast and improvement in bowel function. If any worsening of symptoms, will reconsider at that time.   NG tube removed.  Advance to full liquid  Continue with PPN.  If able to tolerate soft diet tomorrow will taper PPN  Continue to have bowel function  Pulmonary edema/ fluid overload -improved  CXR ordered today due to adventitious lung sounds demonstrating marked bilateral pulmonary edema vs pneumonia. No clinical signs of pneumonia at this time. Check procal   Bumex 1mg IV once ordered -repeat dose today.  BNP is mildly elevated in 300s.   Strict I and Os  Last echo 2024 demonstrating EF of 60% with diastolic dysfunction.  RV strain noted- Dx with PE at that time   Consider repeat echocardiogram  Acute hypoxic respiratory failure  Nursing reports oxygen dropped into the upper 80s and was placed on 2 L nasal cannula  Continue with diuresis as discussed above  Due to immunocompromise state, procalcitonin increased, chest imaging-will treat empirically for pneumonia  Start Rocephin and doxycycline   Respiratory cultures ordered  Pulmonary hygiene  Hypokalemia and hypophosphatemia- improved   Secondary to poor PO intake, GI losses from NG   3.4 today- given KcL 40meq this AM. BMP daily   Replacement protocol  Metastatic pancreatic cancer   Case discussed with Oncology PA on . Case discussed with Palliative Care NP on .   CT A/P shows mixed picture of new T11 sclerotic lesion but improving KARAN in the retroperitoneal space.   Pt will need more cycles of chemo to really understand if it is improving his disease.   Pt was educated on the importance of stable nutrition & not losing weight during treatment.  Medications    potassium chloride  40 mEq Oral Once    cefTRIAXone (ROCEPHIN) IV  1,000 mg IntraVENous Q24H    doxycycline (VIBRAMYCIN) IV  100 mg IntraVENous Q12H    potassium phosphate IVPB (CENTRAL LINE)  15 mmol IntraVENous Once    insulin lispro  0-8 Units SubCUTAneous Q6H    [Held by provider] apixaban  5 mg Oral BID    [Held by provider] famotidine  20 mg Oral Daily    enoxaparin  1 mg/kg SubCUTAneous Q12H    pantoprazole  40 mg IntraVENous Daily    lidocaine  4 mL Endotracheal Once    sodium chloride flush  5-40 mL IntraVENous 2 times per day    PRN Meds: HYDROmorphone **OR** HYDROmorphone, sodium phosphate 15 mmol in sodium chloride 0.9 % 250 mL IVPB, glucose, dextrose bolus **OR** dextrose bolus, glucagon (rDNA), dextrose, Benzocaine-Menthol, prochlorperazine, lidocaine, sodium chloride flush, sodium chloride, potassium chloride **OR** potassium alternative oral replacement **OR** potassium chloride, magnesium sulfate, ondansetron **OR** ondansetron, acetaminophen **OR** acetaminophen    Exam:  /71   Pulse 100   Temp 98.8 °F (37.1 °C) (Oral)   Resp 18   Ht 1.829 m (6')   Wt 80.9 kg (178 lb 5.6 oz)   SpO2 90%   BMI 24.19 kg/m²   General:  Acute on chronically ill-appearing.  Thin, frail  Eyes:  PERRL. Conjunctiva pallor noted  HENT: Head normal appearing. Nares normal. Oral mucosa moist.  Hearing intact.    Neck: Supple, with full range of motion. Trachea midline.  No gross JVD appreciated.  Respiratory:  Normal effort.  Diminished lung sounds at bases.  Faint rhonchi upper loves   Cardiovascular: Tachycardic, regular rhythm with normal S1/S2 without murmurs.    No lower extremity edema.   Abdomen: Soft, non-tender, non-distended with normal bowel sounds.  Musculoskeletal: No joint swelling or tenderness. Normal tone. No abnormal movements.   Skin: Warm and dry. No rashes or lesions.  Neurologic:  No focal sensory/motor deficits in the upper or lower extremities. Cranial nerves:  grossly

## 2025-01-09 NOTE — PROGRESS NOTES
CREATININE 0.8 0.6  --  0.6   MG 2.2 1.9  --  1.9   PHOS 2.3* 1.9* 1.6* 2.3*   CALCIUM 8.7 8.0*  --  7.9*      No results for input(s): \"INR\" in the last 72 hours.    Invalid input(s): \"PT\", \"PTT\"  No results for input(s): \"AST\", \"ALT\", \"BILITOT\", \"BILIDIR\", \"AMYLASE\", \"LIPASE\", \"LDH\", \"LACTA\" in the last 72 hours.    No results for input(s): \"TROPONINT\" in the last 72 hours.    RADIOLOGY   No new imaging today  XR CHEST PORTABLE  Order: [id number]  Status: Final result       Visible to patient: Yes (seen)       Next appt: 01/14/2025 at 09:00 AM in Radiology (STR [facility] CT G)    0 Result Notes  Details    Reading Physician Reading Date Result Priority   Glen Stephen MD  [phone] 1/8/2025      Narrative & Impression  PROCEDURE: XR CHEST PORTABLE     CLINICAL INFORMATION: Productive cough, SOB     COMPARISON: 1/4/2025     TECHNIQUE: A single mobile view of the chest was obtained.        IMPRESSION:  1. Borderline heart size. NG tube passes into stomach. Mediport right side,  catheter tip in SVC.  2. Small bilateral pleural effusions. Moderate pneumonia/pulmonary edema both  mid and lower lung fields.  3. Overall appearance of chest has markedly worsened since prior study.           **This report has been created using voice recognition software.  It may contain  minor errors which are inherent in voice recognition technology.**     Electronically signed by Dr. Glen Stephen           Exam Ended: 01/08/25 10:16 EST Last Resulted: 01/08/25 14:05 EST            XR ABDOMEN (KUB) (SINGLE AP VIEW)  Order: [id number]  Status: Final result       Visible to patient: Yes (seen)       Next appt: 01/14/2025 at 09:00 AM in Radiology (STR [facility] CT G)    0 Result Notes  Details    Reading Physician Reading Date Result Priority   Sal Madrigal MD  [phone] 1/6/2025      Narrative & Impression  Abdomen X-ray, 1 View     COMPARISON: CR/SR - XR ABDOMEN (KUB) (SINGLE AP VIEW) - 1/6/25 08:12 EST     FINDINGS:  Enteric tube  in place, with tip projecting below the diaphragm in the   region of the stomach.  Tubing projects over the right lateral abdomen.  Persistent dilated small bowel measuring up to 3.8 cm in diameter   (previously 3.4 cm). No visible enteric contrast.  No visible free air.  No acute fracture.     IMPRESSION:  Persistent dilated small bowel, which could be due to small-bowel   obstruction.  Enteric tube as above.     This document has been electronically signed by: Sal Madrigal MD on   01/06/2025 11:22 PM           Exam Ended: 01/06/25 20:48 EST Last Resulted: 01/06/25 23:22 EST        XR ABDOMEN (KUB) (SINGLE AP VIEW)  Order: [id number]  Status: Final result       Visible to patient: Yes (seen)       Next appt: 01/07/2025 at 08:30 AM in Infusion Therapy (STR OUT PT ONC INJ RM 15)    0 Result Notes  Details    Reading Physician Reading Date Result Priority   Glen Stephen MD  [phone] 1/6/2025      Narrative & Impression  PROCEDURE: XR ABDOMEN (KUB) (SINGLE AP VIEW)     CLINICAL INFORMATION: follow up imaging     COMPARISON: 1/5/2025     TECHNIQUE: 2 supine images of the abdomen were obtained.     FINDINGS:  There is moderate gas distention of multiple small bowel loops in the mid  abdomen, consistent with paralytic ileus versus partial mechanical small bowel  obstruction. Scattered gas is seen in the colon. Pleurx catheter in the abdomen  right side. NG tube passes into stomach. Small pleural effusion left side of  chest. Left basilar atelectasis/pneumonia. Kidneys are somewhat obscured.. No  definite renal or ureteral calculi are seen..     IMPRESSION:  1. Moderate paralytic ileus versus partial mechanical small bowel obstruction..  2. Appearance slightly worse than yesterday.           **This report has been created using voice recognition software.  It may contain  minor errors which are inherent in voice recognition technology.**              Electronically signed by Dr. Glen Stephen           Exam Ended:

## 2025-01-09 NOTE — PLAN OF CARE
Problem: Discharge Planning  Goal: Discharge to home or other facility with appropriate resources  Outcome: Progressing  Flowsheets (Taken 1/9/2025 0838)  Discharge to home or other facility with appropriate resources:   Identify barriers to discharge with patient and caregiver   Arrange for needed discharge resources and transportation as appropriate   Identify discharge learning needs (meds, wound care, etc)   Discharge plan is home with wife.    Problem: Safety - Adult  Goal: Free from fall injury  Outcome: Progressing   Fall assessment completed. Patient using call light appropriately to call for assistance. Independent with ambulation to bathroom. Personal items within reach. Patient is also compliant with use of non-skid slippers.     Problem: ABCDS Injury Assessment  Goal: Absence of physical injury  Outcome: Progressing     Problem: Gastrointestinal - Adult  Goal: Minimal or absence of nausea and vomiting  Outcome: Progressing  Flowsheets (Taken 1/9/2025 0838)  Minimal or absence of nausea and vomiting:   Administer IV fluids as ordered to ensure adequate hydration   Administer ordered antiemetic medications as needed   Provide nonpharmacologic comfort measures as appropriate   Patient denies nausea.     Problem: Gastrointestinal - Adult  Goal: Maintains or returns to baseline bowel function  Outcome: Progressing  Flowsheets (Taken 1/9/2025 0838)  Maintains or returns to baseline bowel function:   Assess bowel function   Encourage oral fluids to ensure adequate hydration   Administer IV fluids as ordered to ensure adequate hydration   Administer ordered medications as needed   Encourage mobilization and activity   Nutrition consult to assist patient with appropriate food choices   Patient bowel sounds active. Passing flatus. Patient taking prescribed medication to assist with BM.    Problem: Pain  Goal: Verbalizes/displays adequate comfort level or baseline comfort level  Outcome: Progressing  Flowsheets

## 2025-01-09 NOTE — PROGRESS NOTES
Pharmacy Consult for TPN/PPN Monitoring & Adjustment  IV Access: central      Dosing weight: 77 kg  Current insulin regimen: medium sliding scale  Diet (excluding TPN): Full liquid diet  Maintenance fluid: LR at 20 mL/hr    Plan:  See components of tonight's TPN bag in the table below:  Date 1/7/25 1/8/25 1/9/25   Total kcal/kg 10 10 10   Total kcal 770 770 770   Protein g/kg 1.1 1.1 1.1   Protein g 84.7 84.7 84.7   Protein kcal (4 kcal/g) 338.8 338.8 338.8   Lipid % 20 20 20   Lipid g 15.4 15.4 15.4   Lipid kcal (10 kcal/g) 154 154 154   Dextrose g 81.5 81.5 81.5   Dextrose kcal (3.4 kcal/g) 277.2 277.2 277.2   Infusion Rate (mL/hr) 80 80 80   Na Acetate (mEq) 50 50 50   Na Chloride (mEq) 120 120 120   Na Phos (mmol)  (3 mmol = 4 mEq Na) 18 18 18   Total Na (mEq) 194 194 194   K Acetate (mEq) 20 30 30   K Chloride (mEq) -   20   K Phos (mmol)  (15 mmol = 22 mEq K) - 9 9   Total K (mEq) 20 33.2 53.2   Ca Gluconate (mEq)  (1 g = 4.65 mEq) 4.65 4.65 4.65   Mag Sulfate (mEq)  (1 g = 8.12 mEq) 8.12 8.12 8.12   Multivitamin (mL) 10 10 10   Trace Elements (mL) 1 1 1       Summary of changes for tonight's TPN: Add KCl 20 mEq    Electrolyte Replacement:   Potassium chloride: 40 mEq  Potassium phosphate: 15 mmol    Monitoring:  BMP, Mag, iCal, & Phos ordered for tomorrow with AM labs.    Pharmacy will continue to follow daily. Thank you for the consult.  Hazel ChungD, BCPS 1/9/2025 11:17 AM

## 2025-01-09 NOTE — PROGRESS NOTES
Comprehensive Nutrition Assessment    Type and Reason for Visit:  Reassess    Nutrition Recommendations/Plan:   Continue current PPN macronutrients.  Will increase kcals as labs, line access allow (refeeding risk).  Recommend advance diet as GI function allows.  Continue Ensure Clear TID - pt. Reports good acceptance.  Wife has ordered for home.  Ordered daily weights to monitor trends.  Will monitor long term plan for nutrition - previous mention of PEG tube - not currently appropriate d/t SBO.     Malnutrition Assessment:  Malnutrition Status:  Severe malnutrition (01/07/25 1121)    Context:  Acute Illness (Acute on underlying chronic)     Findings of the 6 clinical characteristics of malnutrition:  Energy Intake:  50% or less of estimated energy requirements for 5 or more days (starting 1/1/25)  Weight Loss:   (only stated weight this admit; pt down 18.8% in 2 months (Oct to last wt in Dec); note has had paracentesis procedures)     Body Fat Loss:  Moderate body fat loss Orbital, Buccal region, Triceps   Muscle Mass Loss:  Moderate muscle mass loss Temples (temporalis), Scapula (trapezius), Clavicles (pectoralis & deltoids), Thigh (quadriceps), Calf (gastrocnemius)  Fluid Accumulation:  Unable to assess (pleurx drain) Ascites   Strength:  Not Performed    Nutrition Assessment:     Pt. severely malnourished AEB criteria listed above.  At risk for further nutritional compromise r/t admit with N/V - SBO vs Ileus; KUB suggesting SBO, Ascites - has pleurx drain in place, and underlying medical condition (PMHx: pancreatic cancer with liver mets - currently undergoing chemo, dysphagia, PE - s/p urgent thrombectomy, recent thrush, paracentesis 11/12/24 and 11/25/24, GERD, HTN, Moderate Malnutrition).     Nutrition Related Findings:    Pt. Report/Treatments/Miscellaneous:   1/9-pt. Seen w/ wife; reports taking some of CL diet; reports tolerating; NGT clamped and pt. Hopeful to have removed today; reports good  [phone]

## 2025-01-09 NOTE — PROGRESS NOTES
Occupational Therapy  [facility]  INPATIENT OCCUPATIONAL THERAPY  STRZ ONC MED 5K  EVALUATION      Discharge Recommendations:    Equipment Recommendations:        Time In: 1418  Time Out: 1434  Timed Code Treatment Minutes: 8 Minutes  Minutes: 16          Date: 2025  Patient Name: Gustabo Machado,   Gender: male      MRN: [id number]  : 1970  (54 y.o.)  Referring Practitioner: Isaiah Sutton, APRN - CNP  Diagnosis: Small bowel obstruction (HCC)  Additional Pertinent Hx: 54 y.o. male with PMHx of metastatic pancreatic adenocarcinoma, PE who presented to [facility] with chief complaint of nausea, vomiting, and diarrhea. His symptoms began on New Years Day.  He and his wife thought he had the stomach flu.  His symptoms improved over the next several days.  Tonight, however, he re-developed the same symptoms.  In addition, he had some abdominal pain.  He felt as though \"my stomach was twisted\".  The patient does have metastatic pancreatic cancer with liver mets.  He has a paracentesis drain.  His wife states they only drain as needed-roughly every 4 days or so.  He is currently undergoing chemotherapy treatments.    Restrictions/Precautions:  Restrictions/Precautions: General Precautions, Fall Risk    Subjective  Chart Reviewed: Yes, Orders, Progress Notes, History and Physical  Patient assessed for rehabilitation services?: Yes  Family / Caregiver Present: Yes       Pain: 0/10:     Vitals: Oxygen: 95% after walking the halls on RA    Social/Functional History:  Lives With: Spouse  Type of Home: House  Home Layout: Two level  Home Access: Stairs to enter with rails  Entrance Stairs - Number of Steps: 5   Bathroom Shower/Tub: Tub/Shower unit, Walk-in shower  Bathroom Toilet: Standard       Prior Level of Assist for ADLs: Independent  Homemaking Responsibilities: No  Prior Level of Assist for Transfers: Independent  Prior Level of Assist for Ambulation: Independent household ambulator, with or  without device  Has the patient had two or more falls in the past year or any fall with injury in the past year?: No    Active : Yes          VISION:WNL    HEARING:  WNL    COGNITION: WNL    RANGE OF MOTION:  Bilateral Upper Extremity:  WNL    STRENGTH:  Bilateral Upper Extremity:  WNL    Hand Dominance: Right    SENSATION:   WFL    ADL:   Feeding: Independent.    .    IADL:   Not Tested    BALANCE:  Sitting Balance:  Independent.    Standing Balance: Independent.      BED MOBILITY:  Supine to Sit: Modified Independent, with head of bed raised      TRANSFERS:  Sit to Stand:  Independent.    Stand to Sit: Independent.      FUNCTIONAL MOBILITY:  Assistive Device: None  Assist Level:  Independent.   Distance: Household distances within unit  Pt demos functional mobility around unit to simulate community distances. Pt demos good balance and activity tolerance.      Activity Tolerance:  Patient tolerance of  treatment: Good treatment tolerance      Functional Outcome Measures:  -PAC Inpatient Daily Activity Raw Score: 24     Modified Jairon:  Premorbid Functional Status: Not Applicable  Current Functional Status:  Not Applicable    Assessment:  Assessment: 53 yo M admitted d/t SBO. Pt has been walking to bathroom and completing toileting IND. Pt demos IND with all mobility and ADLs. Pt denies concerns about d/c home. No further OT warranted at this time.   Prognosis: Good  REQUIRES OT FOLLOW-UP: No  No Skilled OT: Safe to return home, At baseline function  Decision Making: Low Complexity    Treatment Initiated: Treatment and education initiated within context of evaluation.  Evaluation time included review of current medical information, gathering information related to past medical, social and functional history, completion of standardized testing, formal and informal observation of tasks, assessment of data and development of plan of care and goals.  Treatment time included skilled education and facilitation of

## 2025-01-10 ENCOUNTER — APPOINTMENT (OUTPATIENT)
Age: 55
End: 2025-01-10
Attending: PHYSICIAN ASSISTANT
Payer: COMMERCIAL

## 2025-01-10 ENCOUNTER — APPOINTMENT (OUTPATIENT)
Dept: GENERAL RADIOLOGY | Age: 55
End: 2025-01-10
Payer: COMMERCIAL

## 2025-01-10 LAB
ANION GAP SERPL CALC-SCNC: 11 MEQ/L (ref 8–16)
BUN SERPL-MCNC: 15 MG/DL (ref 7–22)
CA-I BLD ISE-SCNC: 1.1 MMOL/L (ref 1.12–1.32)
CALCIUM SERPL-MCNC: 8 MG/DL (ref 8.5–10.5)
CHLORIDE SERPL-SCNC: 93 MEQ/L (ref 98–111)
CO2 SERPL-SCNC: 28 MEQ/L (ref 23–33)
CREAT SERPL-MCNC: 0.5 MG/DL (ref 0.4–1.2)
DEPRECATED RDW RBC AUTO: 58.8 FL (ref 35–45)
ECHO AO ASC DIAM: 3.6 CM
ECHO AO ASCENDING AORTA INDEX: 1.77 CM/M2
ECHO AV CUSP MM: 2.2 CM
ECHO BSA: 1.98 M2
ECHO LA AREA 2C: 12.5 CM2
ECHO LA AREA 4C: 8.7 CM2
ECHO LA DIAMETER INDEX: 1.43 CM/M2
ECHO LA DIAMETER: 2.9 CM
ECHO LA MAJOR AXIS: 4.1 CM
ECHO LA MINOR AXIS: 4.2 CM
ECHO LA VOL BP: 21 ML (ref 18–58)
ECHO LA VOL MOD A2C: 31 ML (ref 18–58)
ECHO LA VOL MOD A4C: 14 ML (ref 18–58)
ECHO LA VOL/BSA BIPLANE: 10 ML/M2 (ref 16–34)
ECHO LA VOLUME INDEX MOD A2C: 15 ML/M2 (ref 16–34)
ECHO LA VOLUME INDEX MOD A4C: 7 ML/M2 (ref 16–34)
ECHO LV EDV A2C: 98 ML
ECHO LV EDV A4C: 81 ML
ECHO LV EDV INDEX A4C: 40 ML/M2
ECHO LV EDV NDEX A2C: 48 ML/M2
ECHO LV EJECTION FRACTION A2C: 57 %
ECHO LV EJECTION FRACTION A4C: 55 %
ECHO LV EJECTION FRACTION BIPLANE: 56 % (ref 55–100)
ECHO LV ESV A2C: 42 ML
ECHO LV ESV A4C: 36 ML
ECHO LV ESV INDEX A2C: 21 ML/M2
ECHO LV ESV INDEX A4C: 18 ML/M2
ECHO LV FRACTIONAL SHORTENING: 32 % (ref 28–44)
ECHO LV INTERNAL DIMENSION DIASTOLE INDEX: 2.17 CM/M2
ECHO LV INTERNAL DIMENSION DIASTOLIC: 4.4 CM (ref 4.2–5.9)
ECHO LV INTERNAL DIMENSION SYSTOLIC INDEX: 1.48 CM/M2
ECHO LV INTERNAL DIMENSION SYSTOLIC: 3 CM
ECHO LV IVSD: 1 CM (ref 0.6–1)
ECHO LV MASS 2D: 147.8 G (ref 88–224)
ECHO LV MASS INDEX 2D: 72.8 G/M2 (ref 49–115)
ECHO LV POSTERIOR WALL DIASTOLIC: 1 CM (ref 0.6–1)
ECHO LV RELATIVE WALL THICKNESS RATIO: 0.45
ECHO RV INTERNAL DIMENSION: 2.4 CM
ECHO RV TAPSE: 2.1 CM (ref 1.7–?)
ERYTHROCYTE [DISTWIDTH] IN BLOOD BY AUTOMATED COUNT: 17.7 % (ref 11.5–14.5)
GFR SERPL CREATININE-BSD FRML MDRD: > 90 ML/MIN/1.73M2
GLUCOSE BLD STRIP.AUTO-MCNC: 100 MG/DL (ref 70–108)
GLUCOSE BLD STRIP.AUTO-MCNC: 103 MG/DL (ref 70–108)
GLUCOSE BLD STRIP.AUTO-MCNC: 118 MG/DL (ref 70–108)
GLUCOSE SERPL-MCNC: 115 MG/DL (ref 70–108)
HCT VFR BLD AUTO: 32.1 % (ref 42–52)
HGB BLD-MCNC: 10.2 GM/DL (ref 14–18)
MAGNESIUM SERPL-MCNC: 1.8 MG/DL (ref 1.6–2.4)
MCH RBC QN AUTO: 29.3 PG (ref 26–33)
MCHC RBC AUTO-ENTMCNC: 31.8 GM/DL (ref 32.2–35.5)
MCV RBC AUTO: 92.2 FL (ref 80–94)
PHOSPHATE SERPL-MCNC: 2.1 MG/DL (ref 2.4–4.7)
PLATELET # BLD AUTO: 187 THOU/MM3 (ref 130–400)
PMV BLD AUTO: 10 FL (ref 9.4–12.4)
POTASSIUM SERPL-SCNC: 3.5 MEQ/L (ref 3.5–5.2)
RBC # BLD AUTO: 3.48 MILL/MM3 (ref 4.7–6.1)
SODIUM SERPL-SCNC: 132 MEQ/L (ref 135–145)
WBC # BLD AUTO: 11.9 THOU/MM3 (ref 4.8–10.8)

## 2025-01-10 PROCEDURE — 82948 REAGENT STRIP/BLOOD GLUCOSE: CPT

## 2025-01-10 PROCEDURE — 6360000002 HC RX W HCPCS

## 2025-01-10 PROCEDURE — 82330 ASSAY OF CALCIUM: CPT

## 2025-01-10 PROCEDURE — 71045 X-RAY EXAM CHEST 1 VIEW: CPT

## 2025-01-10 PROCEDURE — 2500000003 HC RX 250 WO HCPCS: Performed by: PHYSICIAN ASSISTANT

## 2025-01-10 PROCEDURE — 93307 TTE W/O DOPPLER COMPLETE: CPT | Performed by: NUCLEAR MEDICINE

## 2025-01-10 PROCEDURE — 85027 COMPLETE CBC AUTOMATED: CPT

## 2025-01-10 PROCEDURE — 6360000002 HC RX W HCPCS: Performed by: PHYSICIAN ASSISTANT

## 2025-01-10 PROCEDURE — 84100 ASSAY OF PHOSPHORUS: CPT

## 2025-01-10 PROCEDURE — 1200000003 HC TELEMETRY R&B

## 2025-01-10 PROCEDURE — 99231 SBSQ HOSP IP/OBS SF/LOW 25: CPT | Performed by: SURGERY

## 2025-01-10 PROCEDURE — 83735 ASSAY OF MAGNESIUM: CPT

## 2025-01-10 PROCEDURE — APPNB30 APP NON BILLABLE TIME 0-30 MINS

## 2025-01-10 PROCEDURE — 2580000003 HC RX 258: Performed by: PHYSICIAN ASSISTANT

## 2025-01-10 PROCEDURE — 93307 TTE W/O DOPPLER COMPLETE: CPT

## 2025-01-10 PROCEDURE — 97116 GAIT TRAINING THERAPY: CPT

## 2025-01-10 PROCEDURE — 92610 EVALUATE SWALLOWING FUNCTION: CPT

## 2025-01-10 PROCEDURE — 6360000002 HC RX W HCPCS: Performed by: NURSE PRACTITIONER

## 2025-01-10 PROCEDURE — 97161 PT EVAL LOW COMPLEX 20 MIN: CPT

## 2025-01-10 PROCEDURE — 6370000000 HC RX 637 (ALT 250 FOR IP): Performed by: PHYSICIAN ASSISTANT

## 2025-01-10 PROCEDURE — 80048 BASIC METABOLIC PNL TOTAL CA: CPT

## 2025-01-10 RX ORDER — BUMETANIDE 0.25 MG/ML
0.5 INJECTION, SOLUTION INTRAMUSCULAR; INTRAVENOUS ONCE
Status: COMPLETED | OUTPATIENT
Start: 2025-01-10 | End: 2025-01-10

## 2025-01-10 RX ADMIN — ENOXAPARIN SODIUM 80 MG: 100 INJECTION SUBCUTANEOUS at 08:44

## 2025-01-10 RX ADMIN — SODIUM PHOSPHATE, MONOBASIC, MONOHYDRATE AND SODIUM PHOSPHATE, DIBASIC, ANHYDROUS 15 MMOL: 142; 276 INJECTION, SOLUTION INTRAVENOUS at 11:40

## 2025-01-10 RX ADMIN — PANTOPRAZOLE SODIUM 40 MG: 40 INJECTION, POWDER, FOR SOLUTION INTRAVENOUS at 08:44

## 2025-01-10 RX ADMIN — ENOXAPARIN SODIUM 80 MG: 100 INJECTION SUBCUTANEOUS at 20:46

## 2025-01-10 RX ADMIN — DOXYCYCLINE 100 MG: 100 INJECTION, POWDER, LYOPHILIZED, FOR SOLUTION INTRAVENOUS at 08:55

## 2025-01-10 RX ADMIN — DOXYCYCLINE 100 MG: 100 INJECTION, POWDER, LYOPHILIZED, FOR SOLUTION INTRAVENOUS at 20:47

## 2025-01-10 RX ADMIN — WATER 1000 MG: 1 INJECTION INTRAMUSCULAR; INTRAVENOUS; SUBCUTANEOUS at 08:45

## 2025-01-10 RX ADMIN — POTASSIUM BICARBONATE 40 MEQ: 782 TABLET, EFFERVESCENT ORAL at 14:59

## 2025-01-10 RX ADMIN — BUMETANIDE 0.5 MG: 0.25 INJECTION INTRAMUSCULAR; INTRAVENOUS at 14:58

## 2025-01-10 NOTE — PROGRESS NOTES
Hospitalist Progress Note      Patient:  Gustabo Machado 54 y.o. male     : 1970  Unit/Bed:5-008-A  Date of Admission: 2025        ASSESSMENT AND PLAN    Active Problems  SBO vs Ileus - resolved  General surgery following- no plans for CT enterography today due to concerns of contrast and improvement in bowel function. If any worsening of symptoms, will reconsider at that time.   NG tube removed.  Advance to full liquid  Further Luke to soft today pending SLP evaluation.  Nursing notified to reach out to SLP to expedite evaluation  Tapering off TPN today.  Will end at 1800 tonight  Continues to have bowel function  Pulmonary edema/ fluid overload -improved  Bumex 1 mg IV daily x 2 doses.  Additional Bumex 0.5 mg IV once today  Chest x-ray repeated this morning with improvement in fluid overload.  Strict I and Os  Last echo 2024 demonstrating EF of 60% with diastolic dysfunction.  RV strain noted- Dx with PE at that time   Order limited echo  Acute hypoxic respiratory failure- resolved  Nursing reports oxygen dropped into the upper 80s and was placed on 2 L nasal cannula- back on RA   Continue with diuresis as discussed above  Due to immunocompromise state, procalcitonin increased, chest imaging-will treat empirically for pneumonia  Start Rocephin and doxycycline - continue   Respiratory cultures ordered- pending   Pulmonary hygiene  Hypokalemia and hypophosphatemia- improved   Secondary to poor PO intake, GI losses from NG   Potassium 3.5 today.  Additional KCl 40 mEq ordered as giving additional dose of Bumex today  Hypophosphatemia improving.  Currently 2.1  Replacement protocol  Metastatic pancreatic cancer   Case discussed with Oncology PA on . Case discussed with Palliative Care NP on .   CT A/P shows mixed picture of new T11 sclerotic lesion but improving KARAN in the retroperitoneal space.   Pt will need more cycles of chemo to really understand if it is improving his disease.    Pt was educated on the importance of stable nutrition & not losing weight during treatment.   General Surgery spoke with patient about PEG tube. TPN has been intiated   Dietician following   Palliative Care following  Dysphagia   SLP consulted, pt is having issues with \"food getting stuck\" . Hopeful SLP can see now that NG removed.  Hyponatremia, hypovolemic - resolved  Monitor BMP  Anemia, acute on chronic   Hgb 12 on admission, Stable ~10. CBC in the AM. Likely dilutional.     Resolved Problems  EMILY   Resolved. Creatinine 0.8.   Chronic Conditions (reviewed and stable unless otherwise stated)  History of PE with RV strain   Dx 11/2024- IR performed thrombectomy that admission  therapeutic Lovenox. Resume eliquis on DC         LDA: []CVC / []PICC / []Midline / []Chavez / [x]Drains / [x]Mediport / []None  Antibiotics: none   Steroids: none   Labs (still needed?): [x]Yes / []No  IVF (still needed?): [x]Yes / []No    Level of care: []Step Down / [x]Med-Surg  Bed Status: []Inpatient / []Observation  Telemetry: [x]Yes / []No  PT/OT: []Yes / [x]No    DVT Prophylaxis: [x] Lovenox / [] Heparin / [] SCDs / [] Already on Systemic Anticoagulation / [] None     Expected discharge date:  TBD   Disposition: home      Code status: Full Code     ===================================================================    Chief Complaint: Abdominal pain     Subjective (past 24 hours):   Patient continues to have bowel movements.  Last this morning.  No worsening with clear liquids.  SLP to evaluate today and hopeful to transition to softs today.  Wean PPN.  1 additional dose of Bumex IV    Medications:    Infusion Medications    PN-Adult 3 IN 1 Peripheral Line (Custom) 80 mL/hr at 01/10/25 0410    dextrose      sodium chloride      Scheduled Medications    sodium phosphate IVPB (PERIPHERAL line)  15 mmol IntraVENous Once    bumetanide  0.5 mg IntraVENous Once    potassium chloride  40 mEq Oral Once    cefTRIAXone (ROCEPHIN) IV  1,000 mg

## 2025-01-10 NOTE — PROGRESS NOTES
[facility]  [address]  General Surgery Daily Progress Note    Pt Name: Gustabo Machado  Medical Record Number: [id number]  Date of Birth 1970   Today's Date: 1/10/2025    Hospital day # 6     ASSESSMENT   Small bowel obstruction vs ileus  Pancreatic cancer with liver metastasis - currently on chemotherapy  Nausea & vomiting  Ascites - has pleurx drain in place  History of PE - on Eliquis  Dysphagia  Hyponatremia  EMILY  Anemia   Severe malnutrition   has a past medical history of Cancer (HCC), Pancreatic cancer (HCC), and Pulmonary emboli (HCC).  PLAN   After suppository patient has had multiple regular bowel movements.  Continues to have bowel movements  NG tube removed yesterday.  On follow-up with diet.  SLP to evaluate as needed for difficulty swallowing.  Will advance diet after that.  Chest x-ray for evaluation  TPN - pharm to dose. Replace electrolytes as needed.  Dietitian following.  Appreciate assistance. Weaning down TPN as oral intake is picked up.  Oncology saw. Chemo on hold.  Palliative care following  Pain & nausea control  Ok to drain pleurX catheter per home regimen.  Wife continues to drain as needed per home regimen.  Eliquis on hold. Lovenox therapeutic on board.  Labs reviewed. Continue to monitor.   PT/OT to eval. he is cleared from a PT standpoint.  Patient wants to go home as soon as possible.  Continues to have bowel movements and passed gas.  Tolerating full liquid diet.  Awaiting SLP evaluation for further diet recommendations.  No surgical intervention at this time.  Will continue to monitor.   SUBJECTIVE   Chief complaint: Right flank pain and nausea    Sitting up in chair. Wife at bedside.  Patient continues to have bowel movements and passed gas.  He tolerated full liquid diet okay.  He is awaiting SLP evaluation for further diet recommendations.  Denies any significant nausea or vomiting. Pain is controlled.  He states has been up  and moving more.  No urinary complaints.  He feels he is ready to go home either today or tomorrow.  No new questions or concerns.  CURRENT MEDICATIONS   Scheduled Meds:   sodium phosphate IVPB (PERIPHERAL line)  15 mmol IntraVENous Once    potassium chloride  40 mEq Oral Once    cefTRIAXone (ROCEPHIN) IV  1,000 mg IntraVENous Q24H    doxycycline (VIBRAMYCIN) IV  100 mg IntraVENous Q12H    insulin lispro  0-8 Units SubCUTAneous Q6H    [Held by provider] apixaban  5 mg Oral BID    [Held by provider] famotidine  20 mg Oral Daily    enoxaparin  1 mg/kg SubCUTAneous Q12H    pantoprazole  40 mg IntraVENous Daily    lidocaine  4 mL Endotracheal Once    sodium chloride flush  5-40 mL IntraVENous 2 times per day     Continuous Infusions:   PN-Adult 3 IN 1 Peripheral Line (Custom) 80 mL/hr at 01/10/25 0410    lactated ringers 20 mL/hr at 01/10/25 0410    dextrose      sodium chloride       PRN Meds:.HYDROmorphone **OR** HYDROmorphone, sodium phosphate 15 mmol in sodium chloride 0.9 % 250 mL IVPB, glucose, dextrose bolus **OR** dextrose bolus, glucagon (rDNA), dextrose, Benzocaine-Menthol, prochlorperazine, lidocaine, sodium chloride flush, sodium chloride, potassium chloride **OR** potassium alternative oral replacement **OR** potassium chloride, magnesium sulfate, ondansetron **OR** ondansetron, acetaminophen **OR** acetaminophen  OBJECTIVE   CURRENT VITALS:  height is 1.829 m (6') and weight is 80.9 kg (178 lb 5.6 oz). His oral temperature is 97.3 °F (36.3 °C). His blood pressure is 109/70 and his pulse is 112 (abnormal). His respiration is 18 and oxygen saturation is 92%.   Temperature Range (24h):Temp: 97.3 °F (36.3 °C) Temp  Av.6 °F (37 °C)  Min: 97.3 °F (36.3 °C)  Max: 99.6 °F (37.6 °C)  BP Range (24h): Systolic (24hrs), Av , Min:108 , Max:117     Diastolic (24hrs), Av, Min:70, Max:80    Pulse Range (24h): Pulse  Av.5  Min: 101  Max: 112  Respiration Range (24h): Resp  Av  Min: 18  Max:

## 2025-01-10 NOTE — DISCHARGE INSTR - DIET
Good nutrition is important when healing from an illness, injury, or surgery.  Follow any nutrition recommendations given to you during your hospital stay.   If you were given an oral nutrition supplement while in the hospital, continue to take this supplement at home.  You can take it with meals, in-between meals, and/or before bedtime. These supplements can be purchased at most local grocery stores, pharmacies, and chain [employer]-stores.   If you have any questions about your diet or nutrition, call the hospital and ask for the dietitian.    Aim for 5-6 small meals (sipping on supplements between meals)

## 2025-01-10 NOTE — CARE COORDINATION
1/10/25, 2:01 PM EST    Patient goals/plan/ treatment preferences discussed by  and .  Patient goals/plan/ treatment preferences reviewed with patient/ family.  Patient/ family verbalize understanding of discharge plan and are in agreement with goal/plan/treatment preferences.  Understanding was demonstrated using the teach back method.  AVS provided by RN at time of discharge, which includes all necessary medical information pertaining to the patients current course of illness, treatment, post-discharge goals of care, and treatment preferences.     Services At/After Discharge: Home Health [facility]    Per provider anticipating weekend discharge. Sw did call [facility] and updated on this. They call that nursing call and fax AVS at discharge. JORGE did put these instructions on pt's chart.

## 2025-01-10 NOTE — PLAN OF CARE
Problem: Discharge Planning  Goal: Discharge to home or other facility with appropriate resources  1/10/2025 0117 by Sandie Kerr RN  Outcome: Progressing  Flowsheets (Taken 1/9/2025 2100)  Discharge to home or other facility with appropriate resources: Identify barriers to discharge with patient and caregiver     Problem: Safety - Adult  Goal: Free from fall injury  1/10/2025 0117 by Sandie Kerr, RN  Outcome: Progressing  All fall precautions in place. Bed in low position, alarm activated and appropriate use of call light.      Problem: Gastrointestinal - Adult  Goal: Minimal or absence of nausea and vomiting  1/10/2025 0117 by Sandie Kerr, RN  Outcome: Progressing  Flowsheets (Taken 1/9/2025 2100)  Minimal or absence of nausea and vomiting:   Administer IV fluids as ordered to ensure adequate hydration   Provide nonpharmacologic comfort measures as appropriate   Advance diet as tolerated, if ordered   Administer ordered antiemetic medications as needed     Problem: Gastrointestinal - Adult  Goal: Maintains or returns to baseline bowel function  1/10/2025 0117 by Sandie Kerr RN  Outcome: Progressing  Flowsheets (Taken 1/9/2025 2100)  Maintains or returns to baseline bowel function:   Assess bowel function   Encourage oral fluids to ensure adequate hydration   Administer IV fluids as ordered to ensure adequate hydration   Administer ordered medications as needed   Encourage mobilization and activity     Problem: Skin/Tissue Integrity - Adult  Goal: Skin integrity remains intact  Outcome: Progressing  Flowsheets (Taken 1/9/2025 2100)  Skin Integrity Remains Intact: Monitor for areas of redness and/or skin breakdown     Problem: Pain  Goal: Verbalizes/displays adequate comfort level or baseline comfort level  1/10/2025 0117 by Sandie Kerr, RN  Outcome: Progressing  Flowsheets (Taken 1/9/2025 2100)  Verbalizes/displays adequate comfort level or baseline comfort level:   Encourage patient to monitor pain  and request assistance   Assess pain using appropriate pain scale   Administer analgesics based on type and severity of pain and evaluate response   Implement non-pharmacological measures as appropriate and evaluate response     Problem: Skin/Tissue Integrity  Goal: Absence of new skin breakdown  Description: 1.  Monitor for areas of redness and/or skin breakdown  2.  Assess vascular access sites hourly  3.  Every 4-6 hours minimum:  Change oxygen saturation probe site  4.  Every 4-6 hours:  If on nasal continuous positive airway pressure, respiratory therapy assess nares and determine need for appliance change or resting period.  1/10/2025 0117 by Sandie Kerr, RN  Outcome: Progressing   No skin breakdown this shift. Patient being assisted with turning. Patients states understanding of repositioning every two hours.      Care plan reviewed with patient.  Patient verbalizes understanding of the plan of care and contributes to goal setting.

## 2025-01-10 NOTE — PLAN OF CARE
Problem: Discharge Planning  Goal: Discharge to home or other facility with appropriate resources  1/10/2025 1159 by Tammie Carroll RN  Outcome: Progressing  Flowsheets (Taken 1/10/2025 1159)  Discharge to home or other facility with appropriate resources: Identify barriers to discharge with patient and caregiver     Problem: Safety - Adult  Goal: Free from fall injury  1/10/2025 1159 by Tammie Carroll RN  Outcome: Progressing  Flowsheets (Taken 1/10/2025 1159)  Free From Fall Injury: Instruct family/caregiver on patient safety     Problem: ABCDS Injury Assessment  Goal: Absence of physical injury  1/10/2025 1159 by Tammie Carroll RN  Outcome: Progressing  Flowsheets (Taken 1/10/2025 1159)  Absence of Physical Injury: Implement safety measures based on patient assessment     Problem: Gastrointestinal - Adult  Goal: Minimal or absence of nausea and vomiting  1/10/2025 1159 by aTmmie Carroll RN  Outcome: Progressing  Flowsheets (Taken 1/10/2025 1159)  Minimal or absence of nausea and vomiting:   Administer IV fluids as ordered to ensure adequate hydration   Nutrition consult to assist patient with adequate nutrition and appropriate food choices   Advance diet as tolerated, if ordered     Problem: Gastrointestinal - Adult  Goal: Maintains or returns to baseline bowel function  1/10/2025 1159 by Tammie Carroll RN  Outcome: Progressing     Problem: Pain  Goal: Verbalizes/displays adequate comfort level or baseline comfort level  1/10/2025 1159 by Tammie Carroll RN  Outcome: Progressing  Flowsheets (Taken 1/10/2025 1159)  Verbalizes/displays adequate comfort level or baseline comfort level:   Encourage patient to monitor pain and request assistance   Assess pain using appropriate pain scale   Administer analgesics based on type and severity of pain and evaluate response     Problem: Skin/Tissue Integrity  Goal: Absence of new skin breakdown  Description: 1.  Monitor for areas of redness and/or  skin breakdown  2.  Assess vascular access sites hourly  3.  Every 4-6 hours minimum:  Change oxygen saturation probe site  4.  Every 4-6 hours:  If on nasal continuous positive airway pressure, respiratory therapy assess nares and determine need for appliance change or resting period.  1/10/2025 1159 by Tammie Carroll, RN  Outcome: Progressing     Problem: Nutrition Deficit:  Goal: Optimize nutritional status  1/10/2025 1159 by Tammie Carroll, RN  Outcome: Progressing  Flowsheets (Taken 1/10/2025 1159)  Nutrient intake appropriate for improving, restoring, or maintaining nutritional needs: Assess nutritional status and recommend course of action     Problem: Skin/Tissue Integrity - Adult  Goal: Skin integrity remains intact  1/10/2025 1159 by Tammie Carroll, RN  Outcome: Progressing  Flowsheets (Taken 1/10/2025 1159)  Skin Integrity Remains Intact: Monitor for areas of redness and/or skin breakdown   Care plan reviewed with patient.  Patient verbalizes understanding of the plan of care and contributes to goal setting.

## 2025-01-10 NOTE — PROGRESS NOTES
Daily  Duration: Continuous  Rate/Volume: 80 ml/hr  Current PN Order Provides: 770 kcals, 84.7 gm protein, 81.5 gm CHO, 15.4 gm lipids/day  Goal PN Orders Provides: Clinimix 4.25/5 at 83 mL/hr to provide: 680 kcals, 85 grams protein, 100 grams dextrose per 24 hour infusion    Anthropometric Measures:  Height: 182.9 cm (6')  Ideal Body Weight (IBW): 178 lbs (81 kg)    Admission Body Weight: 77.1 kg (170 lb) (1/4: stated)  Current Body Weight: 80.9 kg (178 lb 5.6 oz) (1/9 no edema),       Current BMI (kg/m2): 24.2  Usual Body Weight:  (per pt: ~216#; per EMR: 9/25/24: 213# 5 oz, 10/31/24: 213# 6 oz, 12/24/24: 173# 10 oz)        Weight Adjustment For: No Adjustment                 BMI Categories: Normal Weight (BMI 18.5-24.9)    Estimated Daily Nutrient Needs:  Energy Requirements Based On: Kcal/kg  Weight Used for Energy Requirements: Admission (77 kg)  Energy (kcal/day): 5595-0759+ kcals (25-30 kcals/kg)  Weight Used for Protein Requirements: Admission (77 kg)  Protein (g/day): + grams (1.2-1.5 grams/kg)       Nutrition Diagnosis:   Severe malnutrition, in context of acute illness or injury related to catabolic illness, altered GI function as evidenced by criteria as identified in malnutrition assessment    Nutrition Interventions:   Food and/or Nutrient Delivery: Continue Current Diet, Continue Oral Nutrition Supplement (Recommend begin TPN weaning)  Nutrition Education/Counseling: Education/Counseling initiated  Coordination of Nutrition Care: Continue to monitor while inpatient       Goals:  Goals: Meet at least 75% of estimated needs, by next RD assessment  Type of Goal: Continue current goal  Previous Goal Met: Progressing toward Goal(s)    Nutrition Monitoring and Evaluation:      Food/Nutrient Intake Outcomes: Diet Advancement/Tolerance, Food and Nutrient Intake, Supplement Intake, Parenteral Nutrition Intake/Tolerance  Physical Signs/Symptoms Outcomes: Biochemical Data, GI Status, Fluid Status or Edema,  Nutrition Focused Physical Findings, Skin, Weight, Nausea or Vomiting    Discharge Planning:    Too soon to determine     Nanci Valentino RD, LD  Contact: [phone]

## 2025-01-10 NOTE — PROGRESS NOTES
[facility]  SPEECH THERAPY  STRZ ONC MED 5K  Clinical Swallow Evaluation    Discharge Recommendations: No additional ST services recommended  DIET ORDER RECOMMENDATIONS AFTER EVALUATION: Regular diet and thin liquids   Strategies:  Full Upright Position, Small Bite/Sip, and Alternate Solids and Liquids     SLP Individual Minutes  Time In: 1235  Time Out: 1243  Minutes: 8  Timed Code Treatment Minutes: 0 Minutes       Date: 1/10/2025  Patient Name: Gustabo Machado      CSN: [id number]   : 1970  (54 y.o.)  Gender: male   Referring Physician:      Mercedes Vyas PA-C       Diagnosis: Small bowel obstruction (HCC)    History of Present Illness/Injury: Patient admit to [facility] with above medical dx. Please refer to physician H&P for full patient medical history.   Initial H&P \"Gustabo Machado is a 54 y.o. male with PMHx of metastatic pancreatic adenocarcinoma, PE who presented to [facility] with chief complaint of nausea, vomiting, and diarrhea. His symptoms began on New Years Day. He and his wife thought he had the stomach flu. His symptoms improved over the next several days. Tonight, however, he re-developed the same symptoms. In addition, he had some abdominal pain. He felt as though \"my stomach was twisted\". The patient does have metastatic pancreatic cancer with liver mets. He has a paracentesis drain. His wife states they only drain as needed-roughly every 4 days or so. He is currently undergoing chemotherapy treatments.\"   Past Medical History:   Diagnosis Date    Cancer (HCC)     Pancreatic cancer (HCC)     Pulmonary emboli (HCC) 2024       SUBJECTIVE:  Session approved by Tita TRAN. Patient seen upright in recliner with family present. Alert and cooperative.     OBJECTIVE:    Pain:  No pain reported.    Current Diet: Full liquids     Respiratory Status:  Room Air    Behavioral Observation:  Alert and cooperative    CRANIAL NERVE ASSESSMENT   CN V (Trigeminal) Closes and Opens

## 2025-01-10 NOTE — PROGRESS NOTES
[facility]  INPATIENT PHYSICAL THERAPY  EVALUATION  [facility] ONC MED 5K - [id number]    Discharge Recommendations: Home independently  Equipment Recommendations: No             Time In: 0837  Time Out: 0858  Timed Code Treatment Minutes: 13 Minutes  Minutes: 21      Date: 1/10/2025  Patient Name: Gustabo Machado,  Gender:  male        MRN: [id number]  : 1970  (54 y.o.)      Referring Practitioner: Isaiah Sutton, APRN - CNP  Diagnosis: Small bowel obstruction (HCC)  Additional Pertinent Hx: per chart review: 54 y.o. male with PMHx of metastatic pancreatic adenocarcinoma, PE who presented to [facility] with chief complaint of nausea, vomiting, and diarrhea. His symptoms began on New Years Day.  He and his wife thought he had the stomach flu.  His symptoms improved over the next several days.  Tonight, however, he re-developed the same symptoms.  In addition, he had some abdominal pain.  He felt as though \"my stomach was twisted\".  The patient does have metastatic pancreatic cancer with liver mets.  He has a paracentesis drain.  His wife states they only drain as needed-roughly every 4 days or so.  He is currently undergoing chemotherapy treatments.     Restrictions/Precautions:  Restrictions/Precautions: General Precautions     Subjective:  Chart Reviewed: Yes  Patient assessed for rehabilitation services?: Yes  Subjective: Nurse approved session.  Patient sitting up in chair and wife present.  Based on questions and performance, patient was functioning at his baseline physical performance.  Wife reports walking with patient in halls.  Denying continued physical therapy needs.    General:  Orientation Level: Oriented X4  Overall Cognitive Status: WFL     Hearing: Within functional limits     Pain: 0/10: denies    Vitals: Vitals not assessed per clinical judgement, see nursing flowsheet    Social/Functional History:    Lives With: Spouse  Type of Home: House  Home Layout: Bed/Bath upstairs, Two level  (bedroom upstairs but does have full bathroom downstairs.  has (R) rail.)  Home Access: Stairs to enter with rails  Entrance Stairs - Number of Steps: 5  Entrance Stairs - Rails: Right     Bathroom Shower/Tub: Tub/Shower unit, Walk-in shower  Bathroom Toilet: Standard       Prior Level of Assist for ADLs: Independent     Homemaking Responsibilities: No  Prior Level of Assist for Transfers: Independent  Prior Level of Assist for Ambulation: Independent household ambulator, with or without device  Has the patient had two or more falls in the past year or any fall with injury in the past year?: No    Active : Yes          OBJECTIVE:  Range of Motion:  Bilateral Lower Extremity: WFL    Strength:  Bilateral Lower Extremity: WFL    Balance:  Dynamic Standing Balance: Independent    Bed Mobility:  Supine to Sit: Independent  Sit to Supine: Independent     Transfers:  Sit to Stand: Independent  Stand to Sit:Independent    Ambulation:  Independent  Distance: 270 feet  Surface: Level Tile  Device: No Device  Gait Deviations: Slow Kasia     Stairs:  Not Tested    Exercise:  None    Functional Outcome Measures:  Tinetti Balance    Sitting Balance: Steady, safe  Arises: Able without using arms  Attempts to Arise: Able to arise, one attempt  Immediate Standing Balance (First 5 Seconds): Steady without walker or other support  Standing Balance: Narrow stance without support  Nudged: Steady without walker or other support  Eyes Closed: Steady  Turned 360 Degrees: Steadiness: Steady  Turned 360 Degrees: Continuity of Steps: Continuous  Sitting Down: Safe, smooth motion  Balance Score: 16/16 Initiation of Gait: No hesitancy  Step Height: R Swing Foot: Right foot complete clears floor  Step Length: R Swing Foot: Passes left stance foot  Step Height: L Swing Foot: Left foot complete clears floor  Step Length: L Swing Foot: Passes right stance foot  Step Symmetry: Right and left step appear equal  Step Continuity: Steps appear

## 2025-01-10 NOTE — PROGRESS NOTES
Patient is not having any abdominal discomfort at this time and does not feel a need to have his Pleurx cath drained. States he will let staff know when he would like drained.

## 2025-01-11 VITALS
TEMPERATURE: 98.1 F | BODY MASS INDEX: 24.16 KG/M2 | HEIGHT: 72 IN | DIASTOLIC BLOOD PRESSURE: 79 MMHG | RESPIRATION RATE: 18 BRPM | OXYGEN SATURATION: 96 % | WEIGHT: 178.35 LBS | SYSTOLIC BLOOD PRESSURE: 95 MMHG | HEART RATE: 108 BPM

## 2025-01-11 LAB
ANION GAP SERPL CALC-SCNC: 11 MEQ/L (ref 8–16)
ANION GAP SERPL CALC-SCNC: 11 MEQ/L (ref 8–16)
BUN SERPL-MCNC: 13 MG/DL (ref 7–22)
BUN SERPL-MCNC: 14 MG/DL (ref 7–22)
CALCIUM SERPL-MCNC: 7.8 MG/DL (ref 8.5–10.5)
CALCIUM SERPL-MCNC: 7.9 MG/DL (ref 8.5–10.5)
CHLORIDE SERPL-SCNC: 94 MEQ/L (ref 98–111)
CHLORIDE SERPL-SCNC: 94 MEQ/L (ref 98–111)
CO2 SERPL-SCNC: 27 MEQ/L (ref 23–33)
CO2 SERPL-SCNC: 28 MEQ/L (ref 23–33)
CREAT SERPL-MCNC: 0.5 MG/DL (ref 0.4–1.2)
CREAT SERPL-MCNC: 0.5 MG/DL (ref 0.4–1.2)
DEPRECATED RDW RBC AUTO: 59.3 FL (ref 35–45)
ERYTHROCYTE [DISTWIDTH] IN BLOOD BY AUTOMATED COUNT: 18.1 % (ref 11.5–14.5)
GFR SERPL CREATININE-BSD FRML MDRD: > 90 ML/MIN/1.73M2
GFR SERPL CREATININE-BSD FRML MDRD: > 90 ML/MIN/1.73M2
GLUCOSE BLD STRIP.AUTO-MCNC: 96 MG/DL (ref 70–108)
GLUCOSE SERPL-MCNC: 110 MG/DL (ref 70–108)
GLUCOSE SERPL-MCNC: 96 MG/DL (ref 70–108)
HCT VFR BLD AUTO: 30.2 % (ref 42–52)
HGB BLD-MCNC: 9.6 GM/DL (ref 14–18)
MAGNESIUM SERPL-MCNC: 1.4 MG/DL (ref 1.6–2.4)
MCH RBC QN AUTO: 29.3 PG (ref 26–33)
MCHC RBC AUTO-ENTMCNC: 31.8 GM/DL (ref 32.2–35.5)
MCV RBC AUTO: 92.1 FL (ref 80–94)
PHOSPHATE SERPL-MCNC: 2.2 MG/DL (ref 2.4–4.7)
PHOSPHATE SERPL-MCNC: 2.7 MG/DL (ref 2.4–4.7)
PLATELET # BLD AUTO: 162 THOU/MM3 (ref 130–400)
PMV BLD AUTO: 10.3 FL (ref 9.4–12.4)
POTASSIUM SERPL-SCNC: 3.3 MEQ/L (ref 3.5–5.2)
POTASSIUM SERPL-SCNC: 3.4 MEQ/L (ref 3.5–5.2)
RBC # BLD AUTO: 3.28 MILL/MM3 (ref 4.7–6.1)
SODIUM SERPL-SCNC: 132 MEQ/L (ref 135–145)
SODIUM SERPL-SCNC: 133 MEQ/L (ref 135–145)
WBC # BLD AUTO: 11.4 THOU/MM3 (ref 4.8–10.8)

## 2025-01-11 PROCEDURE — 2500000003 HC RX 250 WO HCPCS: Performed by: PHYSICIAN ASSISTANT

## 2025-01-11 PROCEDURE — 36591 DRAW BLOOD OFF VENOUS DEVICE: CPT

## 2025-01-11 PROCEDURE — 6370000000 HC RX 637 (ALT 250 FOR IP)

## 2025-01-11 PROCEDURE — 2580000003 HC RX 258: Performed by: PHYSICIAN ASSISTANT

## 2025-01-11 PROCEDURE — 85027 COMPLETE CBC AUTOMATED: CPT

## 2025-01-11 PROCEDURE — 83735 ASSAY OF MAGNESIUM: CPT

## 2025-01-11 PROCEDURE — 6360000002 HC RX W HCPCS: Performed by: NURSE PRACTITIONER

## 2025-01-11 PROCEDURE — 94761 N-INVAS EAR/PLS OXIMETRY MLT: CPT

## 2025-01-11 PROCEDURE — 80048 BASIC METABOLIC PNL TOTAL CA: CPT

## 2025-01-11 PROCEDURE — 84100 ASSAY OF PHOSPHORUS: CPT

## 2025-01-11 PROCEDURE — 6360000002 HC RX W HCPCS

## 2025-01-11 PROCEDURE — 99239 HOSP IP/OBS DSCHRG MGMT >30: CPT | Performed by: PHYSICIAN ASSISTANT

## 2025-01-11 PROCEDURE — 2500000003 HC RX 250 WO HCPCS

## 2025-01-11 PROCEDURE — 6360000002 HC RX W HCPCS: Performed by: PHYSICIAN ASSISTANT

## 2025-01-11 PROCEDURE — 82948 REAGENT STRIP/BLOOD GLUCOSE: CPT

## 2025-01-11 RX ORDER — POTASSIUM CHLORIDE 1500 MG/1
20 TABLET, EXTENDED RELEASE ORAL DAILY
Qty: 30 TABLET | Refills: 0 | Status: SHIPPED | OUTPATIENT
Start: 2025-01-11

## 2025-01-11 RX ORDER — HEPARIN 100 UNIT/ML
500 SYRINGE INTRAVENOUS
Status: COMPLETED | OUTPATIENT
Start: 2025-01-11 | End: 2025-01-11

## 2025-01-11 RX ORDER — BUMETANIDE 1 MG/1
1 TABLET ORAL
Qty: 15 TABLET | Refills: 0 | Status: SHIPPED | OUTPATIENT
Start: 2025-01-11

## 2025-01-11 RX ORDER — AMOXICILLIN 250 MG
1 CAPSULE ORAL 2 TIMES DAILY
Qty: 60 TABLET | Refills: 2 | Status: SHIPPED | OUTPATIENT
Start: 2025-01-11

## 2025-01-11 RX ORDER — DOXYCYCLINE HYCLATE 100 MG
100 TABLET ORAL 2 TIMES DAILY
Qty: 8 TABLET | Refills: 0 | Status: SHIPPED | OUTPATIENT
Start: 2025-01-11 | End: 2025-01-15

## 2025-01-11 RX ADMIN — ENOXAPARIN SODIUM 80 MG: 100 INJECTION SUBCUTANEOUS at 09:20

## 2025-01-11 RX ADMIN — DOXYCYCLINE 100 MG: 100 INJECTION, POWDER, LYOPHILIZED, FOR SOLUTION INTRAVENOUS at 09:20

## 2025-01-11 RX ADMIN — HEPARIN 500 UNITS: 100 SYRINGE at 17:02

## 2025-01-11 RX ADMIN — PANTOPRAZOLE SODIUM 40 MG: 40 INJECTION, POWDER, FOR SOLUTION INTRAVENOUS at 09:20

## 2025-01-11 RX ADMIN — SODIUM PHOSPHATE, MONOBASIC, MONOHYDRATE AND SODIUM PHOSPHATE, DIBASIC, ANHYDROUS 15 MMOL: 142; 276 INJECTION, SOLUTION INTRAVENOUS at 10:52

## 2025-01-11 RX ADMIN — WATER 1000 MG: 1 INJECTION INTRAMUSCULAR; INTRAVENOUS; SUBCUTANEOUS at 09:20

## 2025-01-11 RX ADMIN — SODIUM CHLORIDE, PRESERVATIVE FREE 5 ML: 5 INJECTION INTRAVENOUS at 10:53

## 2025-01-11 RX ADMIN — POTASSIUM BICARBONATE 40 MEQ: 782 TABLET, EFFERVESCENT ORAL at 06:41

## 2025-01-11 ASSESSMENT — PAIN SCALES - GENERAL: PAINLEVEL_OUTOF10: 0

## 2025-01-11 NOTE — DISCHARGE SUMMARY
Hospital Medicine Discharge Summary      Patient Identification:   Gustabo Machado   : 1970  MRN: [id number]   Account: [id number]      Patient's PCP: Daniel Agrawal MD    Admit Date: 2025     Discharge Date:   2025    Admitting Physician: Kirby Carreon DO     Discharging Physician Assistant: Ama Petersen PA-C     Discharge Diagnoses with Assessment/Plan:    SBO vs Ileus - resolved  General surgery consulted-no surgical intervention.  Treated with conservative management  Temporarily with NG tube which was ultimately removed and patient tolerating regular diet  Was placed on TPN due to prolonged n.p.o. state.  Discontinued prior to discharge  Continue Senokot daily on discharge  Pulmonary edema/ fluid overload -improved  Thought secondary to TPN, prolonged IV fluids  Bumex 1 mg IV daily x 2 doses.  Additional Bumex 0.5 mg IV once 1/10  Echocardiogram completed with stable LVEF of 55%  Repeat chest x-ray demonstrated improvement in fluid overload  DC with Bumex 2 mg as needed for weight gain, leg swelling, shortness of breath  Acute hypoxic respiratory failure- resolved  Temporarily required 2 L nasal cannula  Improved with antibiotics and diuresis  Treated empirically for pneumonia given immunocompromise status, mildly elevated procalcitonin  Given Rocephin and doxycycline  through .  Continue with an additional 4-day course of Augmentin and doxycycline on discharge  Respiratory cultures ordered- pending   Home O2 evaluation prior to discharge without any oxygen requirements  Hypokalemia and hypophosphatemia- improved   Secondary to poor PO intake, GI losses from NG   Discharged with KCl 20 mEq twice daily  Hypophosphatemia mild at this time.  Recheck BMP and phosphorus in 5 days outpatient  Metastatic pancreatic cancer   Case discussed with Oncology PA on . Case discussed with Palliative Care NP on .   CT A/P shows mixed picture of new T11 sclerotic lesion but  days. Max Daily Amount: 12.5 mg            STOP taking these medications      fluconazole 100 MG tablet  Commonly known as: DIFLUCAN     zinc 50 MG Tabs tablet               Where to Get Your Medications        These medications were sent to [facility] - [place], [place] - [address] - P [phone] - F [phone] [address] [place] [address]      Phone: [phone]   amoxicillin-clavulanate 875-125 MG per tablet  bumetanide 1 MG tablet  doxycycline hyclate 100 MG tablet  potassium chloride 20 MEQ extended release tablet  senna-docusate 8.6-50 MG per tablet         Time Spent on discharge is more than 45 minutes in the examination, evaluation, counseling and review of medications and discharge plan.        Signed:    Thank you Daniel Agrawal MD for the opportunity to be involved in this patient's care.    Electronically signed by Ama Petersen PA-C on 1/11/2025 at 12:50 PM

## 2025-01-11 NOTE — PLAN OF CARE
Problem: Discharge Planning  Goal: Discharge to home or other facility with appropriate resources  1/10/2025 0117 by Sandie Kerr RN  Outcome: Progressing  Flowsheets (Taken 1/9/2025 2100)  Discharge to home or other facility with appropriate resources: Identify barriers to discharge with patient and caregiver     Problem: Safety - Adult  Goal: Free from fall injury  1/10/2025 0117 by Sandie Kerr, RN  Outcome: Progressing  All fall precautions in place. Bed in low position, alarm activated and appropriate use of call light.      Problem: Gastrointestinal - Adult  Goal: Minimal or absence of nausea and vomiting  1/10/2025 0117 by Sandie Kerr, RN  Outcome: Progressing  Flowsheets (Taken 1/9/2025 2100)  Minimal or absence of nausea and vomiting:   Administer IV fluids as ordered to ensure adequate hydration   Provide nonpharmacologic comfort measures as appropriate   Advance diet as tolerated, if ordered   Administer ordered antiemetic medications as needed     Problem: Gastrointestinal - Adult  Goal: Maintains or returns to baseline bowel function  1/10/2025 0117 by Sandie Kerr RN  Outcome: Progressing  Flowsheets (Taken 1/9/2025 2100)  Maintains or returns to baseline bowel function:   Assess bowel function   Encourage oral fluids to ensure adequate hydration   Administer IV fluids as ordered to ensure adequate hydration   Administer ordered medications as needed   Encourage mobilization and activity     Problem: Skin/Tissue Integrity - Adult  Goal: Skin integrity remains intact  Outcome: Progressing  Flowsheets (Taken 1/9/2025 2100)  Skin Integrity Remains Intact: Monitor for areas of redness and/or skin breakdown     Problem: Pain  Goal: Verbalizes/displays adequate comfort level or baseline comfort level  1/10/2025 0117 by Sandie Kerr, RN  Outcome: Progressing  Flowsheets (Taken 1/9/2025 2100)  Verbalizes/displays adequate comfort level or baseline comfort level:   Encourage patient to monitor pain and

## 2025-01-11 NOTE — PROGRESS NOTES
Pt notified this RN of an occurrence of epistaxis. Bleeding stopped at this time. Small amount of blood noted on tissues. Will continue to monitor and assess.

## 2025-01-11 NOTE — PROGRESS NOTES
A home oxygen evaluation has been completed.     [x]Patient is an inpatient. It is expected that the patient will be discharged within the next 48 hours.  Qualified provider to write orders for possible sleep study or home oxygen prescription.Social service/care managers will arrange for home oxygen if ordered.  If patient is active, arrange for Home Medical supplier to assess for Oxygen Conserving Device per pulse oximetry.  []Patient is an outpatient. Results will be faxed to the ordering provider. Qualified provider to write orders for possible sleep study or home oxygen prescription.    Patient was placed on room air for >60 minutes. SpO2 was 90 % on room air at rest. Patients SpO2 was 89% or above and did not qualify for home oxygen. Patient was walked for 6 minutes. SpO2 was 93 % during walking. Patients SpO2 was 89% or above and did not qualify for home oxygen. Patient does not have a positive pressure airway device at home. Patient is not  diagnosed with Obstructive Sleep Apnea. Patient will not be set up/instructed on a nocturnal study.

## 2025-01-13 ENCOUNTER — CASE MANAGEMENT (OUTPATIENT)
Dept: CASE MANAGEMENT | Age: 55
End: 2025-01-13

## 2025-01-13 NOTE — PROGRESS NOTES
Name: Gustabo Machado  : 1970  MRN: [id number]    Oncology Navigation Follow-Up Note    Contact Type:  Telephone    Notes: shaquille spoke to Dr. Irving regarding apt tomorrow for f/u post hospital. Scheduled for CT abd tomorrow but had one done while in hospital-okay to cancel. Shaquille informed Nidia to cancel CT.   Shaquille called spouse informed of follow up apt tomorrow and cancelling Ct. Voiced understanding.        Electronically signed by Shawna Box RN on 2025 at 9:56 AM

## 2025-01-13 NOTE — ADT AUTH CERT
denies N/V.  Small bowel follow through scheduled for today.  Hospitalist to order ST evaluation for swallow eval d/t reports of food \"getting stuck\".  He has had this in the past but resolved after thrush treatment.  He is reporting it again and no s/s thrush.  Meagan Epps PA-C present during conversation.     3.    Normocytic anemia  H/H 10.6/32.  He denies s/s bleeding.  Trend.     \"Thank you for asking us to see this interesting patient\"     Case discussed with nurse and patient/family, Meagan Epps PA-C.  Questions and concerns addressed.  Plan made in collaboration with Dr. Colvin, covering for Dr. Irving.     Electronically signed by   JESSI Friend CNP on 1/6/2025 at 10:55 AM     NOTE:  This report was transcribed using voice recognition software. Every effort  was made to ensure accuracy; however, inadvertent computerized transcription  errors may be present.                              Consults by Racquel Durand MD at 1/5/2025  9:15 AM    Author: Racquel Durand MD Specialty: General Surgery Author Type: Physician   Filed: 1/5/2025  2:37 PM Date of Service: 1/5/2025  9:15 AM Status: Signed   : Racquel Durand MD (Physician)   Related Notes: Original Note by Margaret Guthrie APRN - CNP (Nurse Practitioner) filed at 1/5/2025  1:34 PM   Consult Orders   1. Inpatient consult to General Surgery [[id number]] ordered by Emani Deal APRN - CNP at 01/05/25 0532     Expand All Collapse All  I have independently performed an evaluation on Gustabo . I have reviewed the   documentation completed by the Margaret Guthrie APRN - CNP     I personally saw and examined the patient      Total time spent 20minutes.  Time could have been discontiguous.  Time does not include procedures.  Time does include my direct assessment of the patient and coordination of care.          This is an unfortunate 54-year-old gentleman with pancreatic cancer metastatic with omental caking and

## 2025-01-14 ENCOUNTER — HOSPITAL ENCOUNTER (OUTPATIENT)
Dept: INFUSION THERAPY | Age: 55
Discharge: HOME | End: 2025-01-14
Attending: INTERNAL MEDICINE
Payer: COMMERCIAL

## 2025-01-14 ENCOUNTER — OFFICE VISIT (OUTPATIENT)
Dept: ONCOLOGY | Age: 55
End: 2025-01-14
Payer: COMMERCIAL

## 2025-01-14 VITALS
RESPIRATION RATE: 20 BRPM | TEMPERATURE: 97.7 F | DIASTOLIC BLOOD PRESSURE: 75 MMHG | BODY MASS INDEX: 24.24 KG/M2 | WEIGHT: 179 LBS | SYSTOLIC BLOOD PRESSURE: 106 MMHG | HEART RATE: 124 BPM | HEIGHT: 72 IN | OXYGEN SATURATION: 96 %

## 2025-01-14 VITALS
TEMPERATURE: 97.7 F | DIASTOLIC BLOOD PRESSURE: 75 MMHG | SYSTOLIC BLOOD PRESSURE: 106 MMHG | BODY MASS INDEX: 24.24 KG/M2 | WEIGHT: 179 LBS | HEIGHT: 72 IN | RESPIRATION RATE: 20 BRPM | OXYGEN SATURATION: 96 % | HEART RATE: 124 BPM

## 2025-01-14 DIAGNOSIS — C25.9 MALIGNANT NEOPLASM OF PANCREAS, UNSPECIFIED LOCATION OF MALIGNANCY (HCC): Primary | ICD-10-CM

## 2025-01-14 PROCEDURE — 3017F COLORECTAL CA SCREEN DOC REV: CPT | Performed by: INTERNAL MEDICINE

## 2025-01-14 PROCEDURE — 99214 OFFICE O/P EST MOD 30 MIN: CPT | Performed by: INTERNAL MEDICINE

## 2025-01-14 PROCEDURE — 1111F DSCHRG MED/CURRENT MED MERGE: CPT | Performed by: INTERNAL MEDICINE

## 2025-01-14 PROCEDURE — G8427 DOCREV CUR MEDS BY ELIG CLIN: HCPCS | Performed by: INTERNAL MEDICINE

## 2025-01-14 PROCEDURE — 1036F TOBACCO NON-USER: CPT | Performed by: INTERNAL MEDICINE

## 2025-01-14 PROCEDURE — G8420 CALC BMI NORM PARAMETERS: HCPCS | Performed by: INTERNAL MEDICINE

## 2025-01-14 PROCEDURE — 99211 OFF/OP EST MAY X REQ PHY/QHP: CPT

## 2025-01-14 RX ORDER — HYDROCORTISONE SODIUM SUCCINATE 100 MG/2ML
100 INJECTION INTRAMUSCULAR; INTRAVENOUS
OUTPATIENT
Start: 2025-01-14

## 2025-01-14 RX ORDER — EPINEPHRINE 1 MG/ML
0.3 INJECTION, SOLUTION, CONCENTRATE INTRAVENOUS PRN
OUTPATIENT
Start: 2025-01-14

## 2025-01-14 RX ORDER — FAMOTIDINE 10 MG/ML
20 INJECTION, SOLUTION INTRAVENOUS
OUTPATIENT
Start: 2025-01-14

## 2025-01-14 RX ORDER — MEPERIDINE HYDROCHLORIDE 50 MG/ML
12.5 INJECTION INTRAMUSCULAR; INTRAVENOUS; SUBCUTANEOUS PRN
OUTPATIENT
Start: 2025-01-14

## 2025-01-14 RX ORDER — ALBUTEROL SULFATE 90 UG/1
4 INHALANT RESPIRATORY (INHALATION) PRN
OUTPATIENT
Start: 2025-01-14

## 2025-01-14 RX ORDER — DIPHENHYDRAMINE HYDROCHLORIDE 50 MG/ML
50 INJECTION INTRAMUSCULAR; INTRAVENOUS
OUTPATIENT
Start: 2025-01-14

## 2025-01-14 RX ORDER — PALONOSETRON 0.05 MG/ML
0.25 INJECTION, SOLUTION INTRAVENOUS ONCE
OUTPATIENT
Start: 2025-01-14 | End: 2025-01-14

## 2025-01-14 RX ORDER — SODIUM CHLORIDE 0.9 % (FLUSH) 0.9 %
5-40 SYRINGE (ML) INJECTION PRN
OUTPATIENT
Start: 2025-01-14

## 2025-01-14 RX ORDER — DEXTROSE MONOHYDRATE 50 MG/ML
5-250 INJECTION, SOLUTION INTRAVENOUS PRN
OUTPATIENT
Start: 2025-01-14

## 2025-01-14 RX ORDER — HEPARIN SODIUM (PORCINE) LOCK FLUSH IV SOLN 100 UNIT/ML 100 UNIT/ML
500 SOLUTION INTRAVENOUS PRN
OUTPATIENT
Start: 2025-01-14

## 2025-01-14 RX ORDER — ACETAMINOPHEN 325 MG/1
650 TABLET ORAL
OUTPATIENT
Start: 2025-01-14

## 2025-01-14 RX ORDER — ONDANSETRON 2 MG/ML
8 INJECTION INTRAMUSCULAR; INTRAVENOUS
OUTPATIENT
Start: 2025-01-14

## 2025-01-14 RX ORDER — SODIUM CHLORIDE 9 MG/ML
5-250 INJECTION, SOLUTION INTRAVENOUS PRN
OUTPATIENT
Start: 2025-01-14

## 2025-01-14 RX ORDER — SODIUM CHLORIDE 9 MG/ML
INJECTION, SOLUTION INTRAVENOUS CONTINUOUS
OUTPATIENT
Start: 2025-01-14

## 2025-01-14 RX ORDER — PROCHLORPERAZINE EDISYLATE 5 MG/ML
5 INJECTION INTRAMUSCULAR; INTRAVENOUS
OUTPATIENT
Start: 2025-01-14

## 2025-01-14 ASSESSMENT — ENCOUNTER SYMPTOMS
ABDOMINAL DISTENTION: 0
NAUSEA: 0
EYE REDNESS: 0
DIARRHEA: 0
COUGH: 0
PHOTOPHOBIA: 0
FACIAL SWELLING: 0
CHEST TIGHTNESS: 0
VOMITING: 0
BACK PAIN: 0
SORE THROAT: 0
COLOR CHANGE: 0
SHORTNESS OF BREATH: 0

## 2025-01-14 NOTE — PROGRESS NOTES
[facility] [place] [facility]  [facility]  [address]  [address]  [place] [id number]  Dept: [phone]  Loc: [phone]     Hematology/Oncology  Follow Up Office Visit        12/10/2024    Gustabo Ortizger   1970     No ref. provider found   Daniel Agrawal MD       Reason:   Chief Complaint   Patient presents with    Follow-up     Malignant neoplasm of tail of pancreas (HCC)          HPI: This is a 54 y.o.male  patient was complaining of abdominal pain abdominal bloating and constipation radiating to the right lower back, he was evaluated in the emergency room on October 21, 2024. he underwent CT scan of the abdomen and pelvis with IV contrast on October 21, 2024 which revealed a mass in the tail of the pancreas in addition to  ascites, omental caking,  and numerous liver masses with intra-abdominal adenopathy all consistent with metastatic disease.  He underwent further evaluation with MRI of the abdomen on October 21, 2024 which revealed a 5 x 2 cm enhancement in the distal body and tail of the pancreas suspicious for malignancy.  Multiple hepatic lesions suspicious for metastatic disease.  Enlarged.  Pancreatic portacaval and retroperitoneal adenopathy.  In addition to omental caking and small ascites.  He underwent a CT needle guided biopsy from the liver on October 22nd, 2024, pathology of the liver lesion came back consistent with metastatic adenocarcinoma.  Positive for CK7 and negative for CK20.  Negative for TTF. Patchy  positive for CDX2.  Findings support pancreatic cancer for upper GI.  CA 19-9 very high at 5483.  Total bilirubin was normal at 0.6 alkaline phosphatase elevated slight elevation of AST.  Due to radiation to the back CT lumbar was performed October 21, 2024 which showed no evidence for metastatic disease in the lumbar spine.    Patient was seen by palliative team on October 21, 2024, he is currently on Norco for pain every 4 hours as

## 2025-01-15 ENCOUNTER — HOSPITAL ENCOUNTER (OUTPATIENT)
Dept: CT IMAGING | Age: 55
Discharge: HOME | End: 2025-01-15
Attending: INTERNAL MEDICINE
Payer: COMMERCIAL

## 2025-01-15 DIAGNOSIS — C25.2 MALIGNANT NEOPLASM OF TAIL OF PANCREAS (HCC): ICD-10-CM

## 2025-01-15 PROCEDURE — 71260 CT THORAX DX C+: CPT

## 2025-01-15 PROCEDURE — 6360000004 HC RX CONTRAST MEDICATION: Performed by: INTERNAL MEDICINE

## 2025-01-15 RX ORDER — IOPAMIDOL 755 MG/ML
80 INJECTION, SOLUTION INTRAVASCULAR
Status: COMPLETED | OUTPATIENT
Start: 2025-01-15 | End: 2025-01-15

## 2025-01-15 RX ADMIN — IOPAMIDOL 80 ML: 755 INJECTION, SOLUTION INTRAVENOUS at 13:49

## 2025-01-21 ENCOUNTER — HOSPITAL ENCOUNTER (OUTPATIENT)
Dept: INFUSION THERAPY | Age: 55
Discharge: HOME | End: 2025-01-21
Attending: INTERNAL MEDICINE
Payer: COMMERCIAL

## 2025-01-21 ENCOUNTER — OFFICE VISIT (OUTPATIENT)
Dept: ONCOLOGY | Age: 55
End: 2025-01-21
Payer: COMMERCIAL

## 2025-01-21 VITALS
RESPIRATION RATE: 18 BRPM | DIASTOLIC BLOOD PRESSURE: 60 MMHG | TEMPERATURE: 97.5 F | HEART RATE: 117 BPM | BODY MASS INDEX: 23.46 KG/M2 | WEIGHT: 173.2 LBS | OXYGEN SATURATION: 95 % | HEIGHT: 72 IN | SYSTOLIC BLOOD PRESSURE: 103 MMHG

## 2025-01-21 VITALS
WEIGHT: 173.2 LBS | TEMPERATURE: 97.5 F | SYSTOLIC BLOOD PRESSURE: 103 MMHG | OXYGEN SATURATION: 92 % | DIASTOLIC BLOOD PRESSURE: 67 MMHG | HEIGHT: 72 IN | RESPIRATION RATE: 16 BRPM | BODY MASS INDEX: 23.46 KG/M2 | HEART RATE: 96 BPM

## 2025-01-21 DIAGNOSIS — C25.2 MALIGNANT NEOPLASM OF TAIL OF PANCREAS (HCC): Primary | ICD-10-CM

## 2025-01-21 DIAGNOSIS — C25.9 MALIGNANT NEOPLASM OF PANCREAS, UNSPECIFIED LOCATION OF MALIGNANCY (HCC): ICD-10-CM

## 2025-01-21 LAB
ALBUMIN SERPL BCG-MCNC: 2.3 G/DL (ref 3.5–5.1)
ALP SERPL-CCNC: 129 U/L (ref 38–126)
ALT SERPL W/O P-5'-P-CCNC: 24 U/L (ref 11–66)
AST SERPL-CCNC: 29 U/L (ref 5–40)
BASOPHILS ABSOLUTE: 0 THOU/MM3 (ref 0–0.1)
BASOPHILS NFR BLD AUTO: 0 % (ref 0–3)
BILIRUB CONJ SERPL-MCNC: 0.2 MG/DL (ref 0.1–13.8)
BILIRUB SERPL-MCNC: 0.4 MG/DL (ref 0.3–1.2)
BUN BLDP-MCNC: 10 MG/DL (ref 8–26)
CANCER AG19-9 SERPL-ACNC: 1568 U/ML (ref 0–35)
CHLORIDE BLD-SCNC: 104 MEQ/L (ref 98–109)
CREAT BLD-MCNC: 0.5 MG/DL (ref 0.5–1.2)
EOSINOPHIL NFR BLD AUTO: 1 % (ref 0–4)
EOSINOPHILS ABSOLUTE: 0.1 THOU/MM3 (ref 0–0.4)
ERYTHROCYTE [DISTWIDTH] IN BLOOD BY AUTOMATED COUNT: 18.1 % (ref 11.5–14.5)
GFR SERPL CREATININE-BSD FRML MDRD: > 90 ML/MIN/1.73M2
GLUCOSE BLD-MCNC: 117 MG/DL (ref 70–108)
HCT VFR BLD AUTO: 29.6 % (ref 42–52)
HGB BLD-MCNC: 9.1 GM/DL (ref 14–18)
IMMATURE GRANULOCYTES %: 1 %
IMMATURE GRANULOCYTES ABSOLUTE: 0.07 THOU/MM3 (ref 0–0.07)
IONIZED CALCIUM, WHOLE BLOOD: 1.27 MMOL/L (ref 1.12–1.32)
LYMPHOCYTES ABSOLUTE: 0.8 THOU/MM3 (ref 1–4.8)
LYMPHOCYTES NFR BLD AUTO: 10 % (ref 15–47)
MCH RBC QN AUTO: 30.5 PG (ref 26–33)
MCHC RBC AUTO-ENTMCNC: 30.7 GM/DL (ref 32.2–35.5)
MCV RBC AUTO: 99 FL (ref 80–94)
MONOCYTES ABSOLUTE: 1.2 THOU/MM3 (ref 0.4–1.3)
MONOCYTES NFR BLD AUTO: 16 % (ref 0–12)
NEUTROPHILS ABSOLUTE: 5.7 THOU/MM3 (ref 1.8–7.7)
NEUTROPHILS NFR BLD AUTO: 72 % (ref 43–75)
PLATELET # BLD AUTO: 318 THOU/MM3 (ref 130–400)
PMV BLD AUTO: 9.2 FL (ref 9.4–12.4)
POTASSIUM BLD-SCNC: 3.9 MEQ/L (ref 3.5–4.9)
PROT SERPL-MCNC: 6.2 G/DL (ref 6.1–8)
RBC # BLD AUTO: 2.98 MILL/MM3 (ref 4.7–6.1)
SODIUM BLD-SCNC: 136 MEQ/L (ref 138–146)
TOTAL CO2, WHOLE BLOOD: 25 MEQ/L (ref 23–33)
WBC # BLD AUTO: 7.8 THOU/MM3 (ref 4.8–10.8)

## 2025-01-21 PROCEDURE — 85025 COMPLETE CBC W/AUTO DIFF WBC: CPT

## 2025-01-21 PROCEDURE — 1111F DSCHRG MED/CURRENT MED MERGE: CPT | Performed by: INTERNAL MEDICINE

## 2025-01-21 PROCEDURE — 96367 TX/PROPH/DG ADDL SEQ IV INF: CPT

## 2025-01-21 PROCEDURE — 86301 IMMUNOASSAY TUMOR CA 19-9: CPT

## 2025-01-21 PROCEDURE — 96417 CHEMO IV INFUS EACH ADDL SEQ: CPT

## 2025-01-21 PROCEDURE — 2580000003 HC RX 258: Performed by: INTERNAL MEDICINE

## 2025-01-21 PROCEDURE — 99211 OFF/OP EST MAY X REQ PHY/QHP: CPT

## 2025-01-21 PROCEDURE — G8427 DOCREV CUR MEDS BY ELIG CLIN: HCPCS | Performed by: INTERNAL MEDICINE

## 2025-01-21 PROCEDURE — G8420 CALC BMI NORM PARAMETERS: HCPCS | Performed by: INTERNAL MEDICINE

## 2025-01-21 PROCEDURE — 96416 CHEMO PROLONG INFUSE W/PUMP: CPT

## 2025-01-21 PROCEDURE — 6360000002 HC RX W HCPCS: Performed by: INTERNAL MEDICINE

## 2025-01-21 PROCEDURE — 96368 THER/DIAG CONCURRENT INF: CPT

## 2025-01-21 PROCEDURE — 99213 OFFICE O/P EST LOW 20 MIN: CPT | Performed by: INTERNAL MEDICINE

## 2025-01-21 PROCEDURE — 1036F TOBACCO NON-USER: CPT | Performed by: INTERNAL MEDICINE

## 2025-01-21 PROCEDURE — 2500000003 HC RX 250 WO HCPCS: Performed by: INTERNAL MEDICINE

## 2025-01-21 PROCEDURE — 80047 BASIC METABLC PNL IONIZED CA: CPT

## 2025-01-21 PROCEDURE — 3017F COLORECTAL CA SCREEN DOC REV: CPT | Performed by: INTERNAL MEDICINE

## 2025-01-21 PROCEDURE — 96413 CHEMO IV INFUSION 1 HR: CPT

## 2025-01-21 PROCEDURE — 80076 HEPATIC FUNCTION PANEL: CPT

## 2025-01-21 PROCEDURE — 96375 TX/PRO/DX INJ NEW DRUG ADDON: CPT

## 2025-01-21 PROCEDURE — 96415 CHEMO IV INFUSION ADDL HR: CPT

## 2025-01-21 RX ORDER — HEPARIN 100 UNIT/ML
500 SYRINGE INTRAVENOUS PRN
OUTPATIENT
Start: 2025-01-21

## 2025-01-21 RX ORDER — ACETAMINOPHEN 325 MG/1
650 TABLET ORAL
OUTPATIENT
Start: 2025-01-21

## 2025-01-21 RX ORDER — ONDANSETRON 2 MG/ML
8 INJECTION INTRAMUSCULAR; INTRAVENOUS
OUTPATIENT
Start: 2025-01-21

## 2025-01-21 RX ORDER — HYDROCORTISONE SODIUM SUCCINATE 100 MG/2ML
100 INJECTION INTRAMUSCULAR; INTRAVENOUS
OUTPATIENT
Start: 2025-01-21

## 2025-01-21 RX ORDER — ALBUTEROL SULFATE 90 UG/1
4 INHALANT RESPIRATORY (INHALATION) PRN
OUTPATIENT
Start: 2025-01-21

## 2025-01-21 RX ORDER — EPINEPHRINE 1 MG/ML
0.3 INJECTION, SOLUTION INTRAMUSCULAR; SUBCUTANEOUS PRN
OUTPATIENT
Start: 2025-01-21

## 2025-01-21 RX ORDER — SODIUM CHLORIDE 0.9 % (FLUSH) 0.9 %
5-40 SYRINGE (ML) INJECTION PRN
OUTPATIENT
Start: 2025-01-21

## 2025-01-21 RX ORDER — PALONOSETRON 0.05 MG/ML
0.25 INJECTION, SOLUTION INTRAVENOUS ONCE
Status: COMPLETED | OUTPATIENT
Start: 2025-01-21 | End: 2025-01-21

## 2025-01-21 RX ORDER — SODIUM CHLORIDE 0.9 % (FLUSH) 0.9 %
5-40 SYRINGE (ML) INJECTION PRN
Status: DISCONTINUED | OUTPATIENT
Start: 2025-01-21 | End: 2025-01-22 | Stop reason: HOSPADM

## 2025-01-21 RX ORDER — SODIUM CHLORIDE 9 MG/ML
25 INJECTION, SOLUTION INTRAVENOUS PRN
OUTPATIENT
Start: 2025-01-21

## 2025-01-21 RX ORDER — HEPARIN 100 UNIT/ML
500 SYRINGE INTRAVENOUS PRN
Status: DISCONTINUED | OUTPATIENT
Start: 2025-01-21 | End: 2025-01-22 | Stop reason: HOSPADM

## 2025-01-21 RX ORDER — DIPHENHYDRAMINE HYDROCHLORIDE 50 MG/ML
50 INJECTION INTRAMUSCULAR; INTRAVENOUS
OUTPATIENT
Start: 2025-01-21

## 2025-01-21 RX ORDER — DEXTROSE MONOHYDRATE 50 MG/ML
5-250 INJECTION, SOLUTION INTRAVENOUS PRN
Status: DISCONTINUED | OUTPATIENT
Start: 2025-01-21 | End: 2025-01-22 | Stop reason: HOSPADM

## 2025-01-21 RX ORDER — SODIUM CHLORIDE 9 MG/ML
INJECTION, SOLUTION INTRAVENOUS CONTINUOUS
OUTPATIENT
Start: 2025-01-21

## 2025-01-21 RX ADMIN — DEXTROSE MONOHYDRATE 55 ML/HR: 50 INJECTION, SOLUTION INTRAVENOUS at 08:47

## 2025-01-21 RX ADMIN — FLUOROURACIL 4000 MG: 50 INJECTION, SOLUTION INTRAVENOUS at 14:12

## 2025-01-21 RX ADMIN — SODIUM CHLORIDE 150 MG: 9 INJECTION, SOLUTION INTRAVENOUS at 09:16

## 2025-01-21 RX ADMIN — LEUCOVORIN CALCIUM 650 MG: 200 INJECTION, POWDER, LYOPHILIZED, FOR SUSPENSION INTRAMUSCULAR; INTRAVENOUS at 12:28

## 2025-01-21 RX ADMIN — PALONOSETRON 0.25 MG: 0.05 INJECTION, SOLUTION INTRAVENOUS at 08:47

## 2025-01-21 RX ADMIN — SODIUM CHLORIDE, PRESERVATIVE FREE 30 ML: 5 INJECTION INTRAVENOUS at 08:08

## 2025-01-21 RX ADMIN — IRINOTECAN HYDROCHLORIDE 240 MG: 20 INJECTION, SOLUTION INTRAVENOUS at 12:29

## 2025-01-21 RX ADMIN — OXALIPLATIN 140 MG: 5 INJECTION, SOLUTION INTRAVENOUS at 10:06

## 2025-01-21 RX ADMIN — DEXAMETHASONE SODIUM PHOSPHATE 12 MG: 4 INJECTION, SOLUTION INTRAMUSCULAR; INTRAVENOUS at 08:49

## 2025-01-21 ASSESSMENT — ENCOUNTER SYMPTOMS
FACIAL SWELLING: 0
VOMITING: 0
EYE REDNESS: 0
ABDOMINAL DISTENTION: 0
SORE THROAT: 0
CHEST TIGHTNESS: 0
SHORTNESS OF BREATH: 0
NAUSEA: 0
DIARRHEA: 0
COLOR CHANGE: 0
BACK PAIN: 0
COUGH: 0
PHOTOPHOBIA: 0

## 2025-01-21 NOTE — PLAN OF CARE
Problem: Discharge Planning  Goal: Discharge to home or other facility with appropriate resources  Outcome: Adequate for Discharge  Flowsheets (Taken 1/21/2025 0851)  Discharge to home or other facility with appropriate resources: Identify barriers to discharge with patient and caregiver     Problem: Safety - Adult  Goal: Free from fall injury  Outcome: Adequate for Discharge  Flowsheets (Taken 1/21/2025 0851)  Free From Fall Injury: Instruct family/caregiver on patient safety     Problem: Chronic Conditions and Co-morbidities  Goal: Patient's chronic conditions and co-morbidity symptoms are monitored and maintained or improved  Outcome: Adequate for Discharge  Flowsheets (Taken 1/21/2025 0851)  Care Plan - Patient's Chronic Conditions and Co-Morbidity Symptoms are Monitored and Maintained or Improved: Monitor and assess patient's chronic conditions and comorbid symptoms for stability, deterioration, or improvement  Note:   Chemotherapy Teaching     What is Chemotherapy   Drug action [x]   Method of Administration [x]   Handouts given []     Side Effects  Nausea/vomiting [x]   Diarrhea [x]   Fatigue [x]   Signs / Symptoms of infection [x]   Neutropenia [x]   Thrombocytopenia [x]   Alopecia [x]   neuropathy [x]   [place] diet &  the importance of fluids [x]       Micellaneous  Importance of nutrition [x]   Importance of oral hygiene [x]   When to call the MD [x]   Monitoring labs [x]   Use of supportive services []     Explanation of Drug Regimen / Frequency  Oxaliplatin/Camptosar/Leuvocorin/5FU     Comments  Verbalized understanding to drug,action,side effects and when to call MD         Problem: Infection - Adult  Goal: Absence of infection at discharge  Outcome: Adequate for Discharge  Flowsheets (Taken 1/21/2025 0851)  Absence of infection at discharge: Monitor all insertion sites i.e., indwelling lines, tubes and drains  Note: Mediport site with no redness or warmth. Skin over port site intact with no signs of

## 2025-01-21 NOTE — PROGRESS NOTES
Patient assessed for the following post chemotherapy:    Dizziness   No  Lightheadedness  No      Acute nausea/vomiting No  Headache   No  Chest pain/pressure  No  Rash/itching   No  Shortness of breath  No    Patient kept for 20 minutes observation post infusion chemotherapy.    Patient tolerated chemotherapy treatment Oxaliplatin/Camptosar/Leucovorin/5FU without any complications.    Last vital signs:   /67   Pulse 96   Temp 97.5 °F (36.4 °C) (Oral)   Resp 16   Ht 1.829 m (6' 0.01\")   Wt 78.6 kg (173 lb 3.2 oz)   SpO2 92%   BMI 23.48 kg/m²     Physician's instructions:  Ok for chemo today.     Patient instructed if experience any of the above symptoms following today's infusion, he is to notify MD immediately or go to the emergency department.    Discharge instructions given to patient. Verbalizes understanding. Ambulated off unit per self, accompanied by spouse, with belongings and CADD infusing.

## 2025-01-21 NOTE — DISCHARGE INSTRUCTIONS
Please contact your Oncologist if you have any questions regarding the chemotherapy Oxaliplatin/Camptosar/Leucovorin/5FU that you received today.      Patient instructed if experience any of the symptoms following today's chemotherapy treatment with CADD to notify MD immediately or go to emergency department.    * dizziness/lightheadedness  *acute nausea/vomiting - not relieved with medication  *headache - not relieved from Tylenol/pain medication  *chest pain/pressure  *rash/itching  *shortness of breath        Drink fluids - 48oz fluids daily  Call if develop fever/ chills/ signs or symptoms of infection     Physician's instructions:  Instructions  Return in about 2 weeks (around 2/4/2025).  Ok for chemo today

## 2025-01-21 NOTE — PROGRESS NOTES
[facility] [place] [facility]  [facility]  [address]  [address]  [place] [id number]  Dept: [phone]  Loc: [phone]     Hematology/Oncology  Follow Up Office Visit        12/10/2024    Gustabo Ortizger   1970     No ref. provider found   Daniel Agrawal MD       Reason:   Chief Complaint   Patient presents with    Follow-up     Malignant neoplasm of pancreas, unspecified location of malignancy (HCC)          HPI: This is a 54 y.o.male  patient was complaining of abdominal pain abdominal bloating and constipation radiating to the right lower back, he was evaluated in the emergency room on October 21, 2024. he underwent CT scan of the abdomen and pelvis with IV contrast on October 21, 2024 which revealed a mass in the tail of the pancreas in addition to  ascites, omental caking,  and numerous liver masses with intra-abdominal adenopathy all consistent with metastatic disease.  He underwent further evaluation with MRI of the abdomen on October 21, 2024 which revealed a 5 x 2 cm enhancement in the distal body and tail of the pancreas suspicious for malignancy.  Multiple hepatic lesions suspicious for metastatic disease.  Enlarged.  Pancreatic portacaval and retroperitoneal adenopathy.  In addition to omental caking and small ascites.  He underwent a CT needle guided biopsy from the liver on October 22nd, 2024, pathology of the liver lesion came back consistent with metastatic adenocarcinoma.  Positive for CK7 and negative for CK20.  Negative for TTF. Patchy  positive for CDX2.  Findings support pancreatic cancer for upper GI.  CA 19-9 very high at 5483.  Total bilirubin was normal at 0.6 alkaline phosphatase elevated slight elevation of AST.  Due to radiation to the back CT lumbar was performed October 21, 2024 which showed no evidence for metastatic disease in the lumbar spine.    Patient was seen by palliative team on October 21, 2024, he is currently on [place] for

## 2025-01-23 ENCOUNTER — HOSPITAL ENCOUNTER (OUTPATIENT)
Dept: INFUSION THERAPY | Age: 55
Discharge: HOME | End: 2025-01-23
Attending: INTERNAL MEDICINE
Payer: COMMERCIAL

## 2025-01-23 VITALS
DIASTOLIC BLOOD PRESSURE: 71 MMHG | SYSTOLIC BLOOD PRESSURE: 101 MMHG | OXYGEN SATURATION: 93 % | HEART RATE: 103 BPM | TEMPERATURE: 97.8 F | RESPIRATION RATE: 16 BRPM

## 2025-01-23 DIAGNOSIS — C25.2 MALIGNANT NEOPLASM OF TAIL OF PANCREAS (HCC): Primary | ICD-10-CM

## 2025-01-23 PROCEDURE — 96523 IRRIG DRUG DELIVERY DEVICE: CPT

## 2025-01-23 PROCEDURE — 6360000002 HC RX W HCPCS: Performed by: INTERNAL MEDICINE

## 2025-01-23 PROCEDURE — 2500000003 HC RX 250 WO HCPCS: Performed by: INTERNAL MEDICINE

## 2025-01-23 RX ORDER — HEPARIN 100 UNIT/ML
500 SYRINGE INTRAVENOUS PRN
Status: DISCONTINUED | OUTPATIENT
Start: 2025-01-23 | End: 2025-01-24 | Stop reason: HOSPADM

## 2025-01-23 RX ORDER — SODIUM CHLORIDE 0.9 % (FLUSH) 0.9 %
5-40 SYRINGE (ML) INJECTION PRN
Status: DISCONTINUED | OUTPATIENT
Start: 2025-01-23 | End: 2025-01-24 | Stop reason: HOSPADM

## 2025-01-23 RX ADMIN — HEPARIN 500 UNITS: 100 SYRINGE at 12:47

## 2025-01-23 RX ADMIN — SODIUM CHLORIDE, PRESERVATIVE FREE 10 ML: 5 INJECTION INTRAVENOUS at 12:47

## 2025-01-27 ENCOUNTER — HOSPITAL ENCOUNTER (OUTPATIENT)
Age: 55
Discharge: HOME | End: 2025-01-27
Payer: COMMERCIAL

## 2025-01-27 ENCOUNTER — HOSPITAL ENCOUNTER (INPATIENT)
Age: 55
LOS: 11 days | Discharge: HOME | DRG: 919 | End: 2025-02-07
Attending: EMERGENCY MEDICINE
Payer: COMMERCIAL

## 2025-01-27 ENCOUNTER — APPOINTMENT (OUTPATIENT)
Dept: CT IMAGING | Age: 55
DRG: 919 | End: 2025-01-27
Payer: COMMERCIAL

## 2025-01-27 ENCOUNTER — HOSPITAL ENCOUNTER (OUTPATIENT)
Dept: GENERAL RADIOLOGY | Age: 55
Discharge: HOME | End: 2025-01-27
Payer: COMMERCIAL

## 2025-01-27 ENCOUNTER — APPOINTMENT (OUTPATIENT)
Dept: GENERAL RADIOLOGY | Age: 55
DRG: 919 | End: 2025-01-27
Payer: COMMERCIAL

## 2025-01-27 DIAGNOSIS — D64.9 ANEMIA, UNSPECIFIED TYPE: ICD-10-CM

## 2025-01-27 DIAGNOSIS — R06.02 SOB (SHORTNESS OF BREATH): ICD-10-CM

## 2025-01-27 DIAGNOSIS — M54.50 ACUTE RIGHT-SIDED LOW BACK PAIN WITHOUT SCIATICA: ICD-10-CM

## 2025-01-27 DIAGNOSIS — E87.70 HYPERVOLEMIA, UNSPECIFIED HYPERVOLEMIA TYPE: ICD-10-CM

## 2025-01-27 DIAGNOSIS — I31.39 PERICARDIAL EFFUSION: ICD-10-CM

## 2025-01-27 DIAGNOSIS — C25.9 PANCREATIC ADENOCARCINOMA (HCC): ICD-10-CM

## 2025-01-27 DIAGNOSIS — J90 PLEURAL EFFUSION, BILATERAL: ICD-10-CM

## 2025-01-27 DIAGNOSIS — C78.7 METASTATIC ADENOCARCINOMA TO LIVER (HCC): ICD-10-CM

## 2025-01-27 DIAGNOSIS — C25.2 MALIGNANT NEOPLASM OF TAIL OF PANCREAS (HCC): Primary | ICD-10-CM

## 2025-01-27 DIAGNOSIS — J90 PLEURAL EFFUSION: ICD-10-CM

## 2025-01-27 DIAGNOSIS — J91.0 MALIGNANT PLEURAL EFFUSION (HCC): ICD-10-CM

## 2025-01-27 PROBLEM — R06.09 DYSPNEA ON EXERTION: Status: ACTIVE | Noted: 2025-01-27

## 2025-01-27 LAB
ALBUMIN SERPL BCG-MCNC: 2.4 G/DL (ref 3.5–5.1)
ALBUMIN SERPL BCG-MCNC: 2.5 G/DL (ref 3.5–5.1)
ALP SERPL-CCNC: 132 U/L (ref 38–126)
ALT SERPL W/O P-5'-P-CCNC: 17 U/L (ref 11–66)
ANION GAP SERPL CALC-SCNC: 13 MEQ/L (ref 8–16)
APTT PPP: 36 SECONDS (ref 22–38)
AST SERPL-CCNC: 20 U/L (ref 5–40)
BASOPHILS ABSOLUTE: 0 THOU/MM3 (ref 0–0.1)
BASOPHILS NFR BLD AUTO: 0.5 %
BILIRUB SERPL-MCNC: 0.5 MG/DL (ref 0.3–1.2)
BUN SERPL-MCNC: 14 MG/DL (ref 7–22)
CALCIUM SERPL-MCNC: 8.6 MG/DL (ref 8.5–10.5)
CHLORIDE SERPL-SCNC: 102 MEQ/L (ref 98–111)
CO2 SERPL-SCNC: 24 MEQ/L (ref 23–33)
CREAT SERPL-MCNC: 0.5 MG/DL (ref 0.4–1.2)
DEPRECATED RDW RBC AUTO: 58.5 FL (ref 35–45)
EKG ATRIAL RATE: 106 BPM
EKG P AXIS: 68 DEGREES
EKG P-R INTERVAL: 144 MS
EKG Q-T INTERVAL: 328 MS
EKG QRS DURATION: 84 MS
EKG QTC CALCULATION (BAZETT): 435 MS
EKG R AXIS: 54 DEGREES
EKG T AXIS: 47 DEGREES
EKG VENTRICULAR RATE: 106 BPM
EOSINOPHIL NFR BLD AUTO: 2.1 %
EOSINOPHILS ABSOLUTE: 0.1 THOU/MM3 (ref 0–0.4)
ERYTHROCYTE [DISTWIDTH] IN BLOOD BY AUTOMATED COUNT: 16.5 % (ref 11.5–14.5)
FLUAV RNA RESP QL NAA+PROBE: NOT DETECTED
FLUBV RNA RESP QL NAA+PROBE: NOT DETECTED
GFR SERPL CREATININE-BSD FRML MDRD: > 90 ML/MIN/1.73M2
GLUCOSE SERPL-MCNC: 95 MG/DL (ref 70–108)
HCT VFR BLD AUTO: 23.7 % (ref 42–52)
HEPARIN UNFRACTIONATED: 0.96 U/ML (ref 0.3–0.7)
HGB BLD-MCNC: 7.4 GM/DL (ref 14–18)
IMM GRANULOCYTES # BLD AUTO: 0.02 THOU/MM3 (ref 0–0.07)
IMM GRANULOCYTES NFR BLD AUTO: 0.5 %
INR PPP: 1.64 (ref 0.85–1.13)
LACTIC ACID, SEPSIS: 1.4 MMOL/L (ref 0.5–1.9)
LACTIC ACID, SEPSIS: 1.5 MMOL/L (ref 0.5–1.9)
LYMPHOCYTES ABSOLUTE: 0.8 THOU/MM3 (ref 1–4.8)
LYMPHOCYTES NFR BLD AUTO: 18.6 %
MCH RBC QN AUTO: 30.5 PG (ref 26–33)
MCHC RBC AUTO-ENTMCNC: 31.2 GM/DL (ref 32.2–35.5)
MCV RBC AUTO: 97.5 FL (ref 80–94)
MONOCYTES ABSOLUTE: 0.4 THOU/MM3 (ref 0.4–1.3)
MONOCYTES NFR BLD AUTO: 9.2 %
NEUTROPHILS ABSOLUTE: 3 THOU/MM3 (ref 1.8–7.7)
NEUTROPHILS NFR BLD AUTO: 69.1 %
NRBC BLD AUTO-RTO: 0 /100 WBC
OSMOLALITY SERPL CALC.SUM OF ELEC: 277.8 MOSMOL/KG (ref 275–300)
PLATELET # BLD AUTO: 225 THOU/MM3 (ref 130–400)
PMV BLD AUTO: 9.4 FL (ref 9.4–12.4)
POTASSIUM SERPL-SCNC: 4.1 MEQ/L (ref 3.5–5.2)
PROCALCITONIN SERPL IA-MCNC: 0.33 NG/ML (ref 0.01–0.09)
PROT SERPL-MCNC: 6.2 G/DL (ref 6.1–8)
RBC # BLD AUTO: 2.43 MILL/MM3 (ref 4.7–6.1)
SARS-COV-2 RNA RESP QL NAA+PROBE: NOT DETECTED
SODIUM SERPL-SCNC: 139 MEQ/L (ref 135–145)
TROPONIN, HIGH SENSITIVITY: 14 NG/L (ref 0–12)
WBC # BLD AUTO: 4.4 THOU/MM3 (ref 4.8–10.8)

## 2025-01-27 PROCEDURE — 85730 THROMBOPLASTIN TIME PARTIAL: CPT

## 2025-01-27 PROCEDURE — 84145 PROCALCITONIN (PCT): CPT

## 2025-01-27 PROCEDURE — 6360000002 HC RX W HCPCS

## 2025-01-27 PROCEDURE — 85025 COMPLETE CBC W/AUTO DIFF WBC: CPT

## 2025-01-27 PROCEDURE — 85610 PROTHROMBIN TIME: CPT

## 2025-01-27 PROCEDURE — 87636 SARSCOV2 & INF A&B AMP PRB: CPT

## 2025-01-27 PROCEDURE — 82040 ASSAY OF SERUM ALBUMIN: CPT

## 2025-01-27 PROCEDURE — 85520 HEPARIN ASSAY: CPT

## 2025-01-27 PROCEDURE — 82607 VITAMIN B-12: CPT

## 2025-01-27 PROCEDURE — 99223 1ST HOSP IP/OBS HIGH 75: CPT | Performed by: HOSPITALIST

## 2025-01-27 PROCEDURE — 84484 ASSAY OF TROPONIN QUANT: CPT

## 2025-01-27 PROCEDURE — 80053 COMPREHEN METABOLIC PANEL: CPT

## 2025-01-27 PROCEDURE — 82746 ASSAY OF FOLIC ACID SERUM: CPT

## 2025-01-27 PROCEDURE — 36415 COLL VENOUS BLD VENIPUNCTURE: CPT

## 2025-01-27 PROCEDURE — 1200000000 HC SEMI PRIVATE

## 2025-01-27 PROCEDURE — 83605 ASSAY OF LACTIC ACID: CPT

## 2025-01-27 PROCEDURE — 71046 X-RAY EXAM CHEST 2 VIEWS: CPT

## 2025-01-27 PROCEDURE — 99223 1ST HOSP IP/OBS HIGH 75: CPT | Performed by: INTERNAL MEDICINE

## 2025-01-27 PROCEDURE — 87205 SMEAR GRAM STAIN: CPT

## 2025-01-27 PROCEDURE — 93005 ELECTROCARDIOGRAM TRACING: CPT | Performed by: EMERGENCY MEDICINE

## 2025-01-27 PROCEDURE — 87040 BLOOD CULTURE FOR BACTERIA: CPT

## 2025-01-27 PROCEDURE — 99285 EMERGENCY DEPT VISIT HI MDM: CPT

## 2025-01-27 PROCEDURE — 71250 CT THORAX DX C-: CPT

## 2025-01-27 RX ORDER — HEPARIN SODIUM 1000 [USP'U]/ML
80 INJECTION, SOLUTION INTRAVENOUS; SUBCUTANEOUS ONCE
Status: DISCONTINUED | OUTPATIENT
Start: 2025-01-27 | End: 2025-01-27

## 2025-01-27 RX ORDER — POLYETHYLENE GLYCOL 3350 17 G/17G
17 POWDER, FOR SOLUTION ORAL DAILY PRN
Status: DISCONTINUED | OUTPATIENT
Start: 2025-01-27 | End: 2025-02-07 | Stop reason: HOSPADM

## 2025-01-27 RX ORDER — HEPARIN SODIUM 1000 [USP'U]/ML
40 INJECTION, SOLUTION INTRAVENOUS; SUBCUTANEOUS PRN
Status: DISCONTINUED | OUTPATIENT
Start: 2025-01-27 | End: 2025-02-06

## 2025-01-27 RX ORDER — POTASSIUM CHLORIDE 750 MG/1
10 TABLET, EXTENDED RELEASE ORAL DAILY
Status: DISCONTINUED | OUTPATIENT
Start: 2025-01-28 | End: 2025-02-07 | Stop reason: HOSPADM

## 2025-01-27 RX ORDER — HEPARIN SODIUM 1000 [USP'U]/ML
80 INJECTION, SOLUTION INTRAVENOUS; SUBCUTANEOUS PRN
Status: DISCONTINUED | OUTPATIENT
Start: 2025-01-27 | End: 2025-02-06

## 2025-01-27 RX ORDER — HEPARIN SODIUM 10000 [USP'U]/100ML
5-30 INJECTION, SOLUTION INTRAVENOUS CONTINUOUS
Status: DISCONTINUED | OUTPATIENT
Start: 2025-01-27 | End: 2025-02-06

## 2025-01-27 RX ORDER — ZOLPIDEM TARTRATE 5 MG/1
5 TABLET ORAL NIGHTLY PRN
Status: DISCONTINUED | OUTPATIENT
Start: 2025-01-27 | End: 2025-02-04

## 2025-01-27 RX ORDER — ONDANSETRON 4 MG/1
4 TABLET, ORALLY DISINTEGRATING ORAL EVERY 8 HOURS PRN
Status: DISCONTINUED | OUTPATIENT
Start: 2025-01-27 | End: 2025-02-07 | Stop reason: HOSPADM

## 2025-01-27 RX ORDER — BUMETANIDE 1 MG/1
1 TABLET ORAL DAILY
Status: DISCONTINUED | OUTPATIENT
Start: 2025-01-27 | End: 2025-02-07 | Stop reason: HOSPADM

## 2025-01-27 RX ORDER — FAMOTIDINE 20 MG/1
20 TABLET, FILM COATED ORAL DAILY
Status: DISCONTINUED | OUTPATIENT
Start: 2025-01-28 | End: 2025-02-07 | Stop reason: HOSPADM

## 2025-01-27 RX ORDER — BUMETANIDE 1 MG/1
1 TABLET ORAL
Status: DISCONTINUED | OUTPATIENT
Start: 2025-01-27 | End: 2025-02-07 | Stop reason: HOSPADM

## 2025-01-27 RX ORDER — HEPARIN SODIUM 1000 [USP'U]/ML
40 INJECTION, SOLUTION INTRAVENOUS; SUBCUTANEOUS PRN
Status: DISCONTINUED | OUTPATIENT
Start: 2025-01-27 | End: 2025-01-27

## 2025-01-27 RX ADMIN — HEPARIN SODIUM 18 UNITS/KG/HR: 10000 INJECTION, SOLUTION INTRAVENOUS at 22:38

## 2025-01-27 ASSESSMENT — PAIN SCALES - GENERAL: PAINLEVEL_OUTOF10: 0

## 2025-01-27 NOTE — ED NOTES
ED to inpatient nurses report      Chief Complaint:  Chief Complaint   Patient presents with    Shortness of Breath     Present to ED from: Home    MOA:     LOC: alert and orientated to name, place, date  Mobility: Requires assistance * 1  Oxygen Baseline: RA    Current needs required: RA     Code Status:   Prior    What abnormal results were found and what did you give/do to treat them? Pleural effusion  Any procedures or intervention occur? Line and labs; See imagining     Mental Status:       Psych Assessment:        Vitals:  Patient Vitals for the past 24 hrs:   BP Temp Pulse Resp SpO2 Height Weight   01/27/25 1515 105/79 -- (!) 105 30 94 % -- --   01/27/25 1415 115/85 -- (!) 101 28 94 % -- --   01/27/25 1315 114/89 97.2 °F (36.2 °C) (!) 105 20 97 % -- --   01/27/25 1306 -- -- -- -- -- 1.829 m (6') 71.2 kg (157 lb)        LDAs:      Ambulatory Status:  No data recorded    Diagnosis:  DISPOSITION Admitted 01/27/2025 04:02:32 PM   Final diagnoses:   Pleural effusion, bilateral   Hypervolemia, unspecified hypervolemia type   Pancreatic adenocarcinoma (HCC)        Consults:  IP CONSULT TO PULMONOLOGY     Pain Score:  Pain Assessment  Pain Assessment: None - Denies Pain    C-SSRS:   Risk of Suicide: No Risk    Sepsis Screening:       Boles Fall Risk:       Swallow Screening        Preferred Language:   English      ALLERGIES     Patient has no known allergies.    SURGICAL HISTORY       Past Surgical History:   Procedure Laterality Date    COLONOSCOPY  10/31/2022    CT NEEDLE BIOPSY LIVER PERCUTANEOUS  10/22/2024    CT NEEDLE BIOPSY LIVER PERCUTANEOUS 10/22/2024 [facility] CT SCAN    IR GUIDED THROMB MECH VEIN  11/17/2024    IR THROMB MECH VEIN 11/17/2024 [facility]    IR INSERT TUNNELED PLEURA CATH W CUFF  11/19/2024    IR INSERT TUNNELED PLEURA CATH W CUFF 11/19/2024 [facility]    PORT SURGERY N/A 10/31/2024    Single lumen Smartport insertion performed by Alexander Colon MD at [facility] OR

## 2025-01-27 NOTE — CONSULTS
[facility]      Patient - Gustabo Machado   MRN -  [id number]   [facility] # - [id number]   - 1970      Date of Admission -  2025  1:05 PM  Date of evaluation -  2025  Room - [facility]HRA /SAHRA   Hospital Day - 0  Consulting - Kishore Degroot MD Primary Care Physician - Daniel Agrawal MD     Problem List      Active Hospital Problems    Diagnosis Date Noted    Pleural effusion [J90] 2025     Reason for Consult    Bilateral pleural effusion.  HPI   History Obtained From: Patient, his daughter and electronic medical record.    Gustabo Machado is a 54 y.o. male with past medical history of stage IV pancreatic cancer, PE, presented to the ER on 2025 for worsening shortness of breath.  Patient reports onset of shortness of breath for the past 5 days worsen with ambulation, mild improvement with rest.  Denies any shortness of breath at rest also complains of orthopnea.  Associated bilateral leg swelling.  Has recently visited PCP for leg swelling, chest x-ray was done which reported pleural effusion and was sent to the ER for further management. Denies fever/chills, chest pain, chest palpitation, abdominal pain.  Of note patient is being treated for pancreatic cancer with chemotherapy, currently on the fifth cycle the last cycle was on Tuesday, 2025.  Patient is currently continuing the chemotherapy.  Patient has a peritoneal, which is drained by his daughter at home every 3 to 5 days and is also managed by nurse for the dressing.   In ED patient was saturating 97% on room air.  EKG showed sinus tachycardia.  Chest x-ray showed worsening right-sided pleural effusion compared to chest x-ray on 1/10/2025.  CT scan showed bilateral worsening pleural effusion.     He is having shortness of breath: Yes  Onset: gradual   Duration:5day.  Diurnal variation:  None.  Worse in the morning, in the middle of the day, and at nighttime  Functional status prior to  beginning of symptoms: 4 block/s on level ground.  Current functional capacity on level ground: 2 block/s on level ground.  He can climb steps: No  He admits to orthopnea.  He denies paroxysmal nocturnal dyspnea.      He is having cough: No      He is having chest pain:No        PMHx   Past Medical History      Diagnosis Date    Cancer (HCC)     Pancreatic cancer (HCC)     Pulmonary emboli (HCC) 11/2024      Past Surgical History        Procedure Laterality Date    COLONOSCOPY  10/31/2022    CT NEEDLE BIOPSY LIVER PERCUTANEOUS  10/22/2024    CT NEEDLE BIOPSY LIVER PERCUTANEOUS 10/22/2024 [facility] CT SCAN    IR GUIDED THROMB MECH VEIN  11/17/2024    IR THROMB MECH VEIN 11/17/2024 [facility] SPECIAL PROCEDURES    IR INSERT TUNNELED PLEURA CATH W CUFF  11/19/2024    IR INSERT TUNNELED PLEURA CATH W CUFF 11/19/2024 [facility] SPECIAL PROCEDURES    PORT SURGERY N/A 10/31/2024    Single lumen Smartport insertion performed by Alexander Colon MD at [facility] OR    SKIN CANCER EXCISION  2011    BCC_ nose     Meds    Current Medications     heparin (porcine), heparin (porcine)  IV Drips/Infusions   heparin (PORCINE) Infusion       Home Medications  Not in a hospital admission.  Diet    No diet orders on file  Allergies    Patient has no known allergies.  Social History     Social History     Socioeconomic History    Marital status:      Spouse name: Not on file    Number of children: Not on file    Years of education: Not on file    Highest education level: Not on file   Occupational History    Not on file   Tobacco Use    Smoking status: Never    Smokeless tobacco: Never   Vaping Use    Vaping status: Never Used   Substance and Sexual Activity    Alcohol use: Not Currently    Drug use: Never    Sexual activity: Not on file   Other Topics Concern    Not on file   Social History Narrative    Not on file     Social Determinants of Health     Financial Resource Strain: Not on file   Food Insecurity: No Food Insecurity (1/10/2025)     questions were answered.    Electronically signed by   Maggie Duran MD on 1/27/2025 at 6:39 PM

## 2025-01-27 NOTE — H&P
History & Physical    Patient:  Gustabo Machado  YOB: 1970  Date of Service: 1/27/2025  MRN: [id number]   Acct:  [id number]   Primary Care Physician: Daniel Agrawal MD    Chief Complaint: Shortness of breath    History of Present Illness:   History obtained from the patient.    The patient is a 54 y.o. male with history of stage IV metastatic pancreatic cancer with metastasis to the liver, carcinomatosis and now a new sclerotic lesion on T11 presented with complaints of dyspnea.  Patient is reporting shortness of breath and dyspnea on exertion for the last 5 days.  Patient denies any fevers or chills, nausea or vomiting.  He is on active chemo.  Patient was recently discharged 2 weeks ago where he was being treated for recurrent ascites and SBO.  Wife reports that he has been doing very well, they have been draining his peritoneal fluid every 5 days, the last day was Friday with about 375 mL out.  Patient denies any abdominal pain, nausea or vomiting.  He is having regular bowel movements and passing gas.  No further issues with SBO    In the ED patient had chest x-ray which showed complete whiteout of his right lung.  CT chest confirmed bilateral pleural effusions right worse than left and again confirmed the T11 sclerotic lesion.      Past Medical History:        Diagnosis Date    Cancer (HCC)     Pancreatic cancer (HCC)     Pulmonary emboli (HCC) 11/2024       Past Surgical History:        Procedure Laterality Date    COLONOSCOPY  10/31/2022    CT NEEDLE BIOPSY LIVER PERCUTANEOUS  10/22/2024    CT NEEDLE BIOPSY LIVER PERCUTANEOUS 10/22/2024 [facility] CT SCAN    IR GUIDED THROMB MECH VEIN  11/17/2024    IR THROMB MECH VEIN 11/17/2024 [facility] PROCED[facility]    IR INSERT TUNNELED PLEURA CATH W CUFF  11/19/2024    IR INSERT TUNNELED PLEURA CATH W CUFF 11/19/2024 [facility] PROCEDURES    PORT SURGERY N/A 10/31/2024    Single lumen Smartport insertion performed by Alexander Colon MD at [facility] OR

## 2025-01-27 NOTE — ED PROVIDER NOTES
[facility]  EMERGENCY DEPARTMENT ENCOUNTER          Pt Name: Gustabo Machado  MRN: [id number]  Birthdate 1970  Date of evaluation: 1/27/2025  Treating Resident Physician: Horacio Santiago DO  Supervising Physician: Colin Espana    History obtained from spouse and the patient.    CHIEF COMPLAINT       Chief Complaint   Patient presents with    Shortness of Breath           HISTORY OF PRESENT ILLNESS    HPI  Gustabo Machado is a 54 y.o. male who presents to the emergency department for evaluation of shortness of breath. Went to ambulatory care in [place], did a CXR which showed left basilar consolidative opacity along with a moderate left pleural effusion. Opacity on the right likely represents a combination of consolidation, possibly related to postobstructive consolidation as well as a large right pleural effusion. Last time took Bumex 1mg was yesterday.    Diagnosed with metastatic pancreatic adenocarcinoma Oct 2024. Undergoing aggressive chemo.    Does self paracenteses for ascites. Pt also with BLE pitting edema +1.    Recently diagnosed with PE on Eliquis s/p thrombectomy.      REVIEW OF SYSTEMS   Review of Systems   Constitutional:  Negative for chills and fever.   Respiratory:  Positive for shortness of breath. Negative for cough.    Cardiovascular:  Negative for chest pain and palpitations.   Gastrointestinal:  Positive for abdominal distention. Negative for abdominal pain, diarrhea, nausea and vomiting.   Genitourinary:  Negative for dysuria and hematuria.   Skin:  Negative for rash.   Neurological:  Negative for dizziness, light-headedness and headaches.   All other systems reviewed and are negative.        PAST MEDICAL AND SURGICAL HISTORY     Past Medical History:   Diagnosis Date    Cancer (HCC)     Pancreatic cancer (HCC)     Pulmonary emboli (HCC) 11/2024     Past Surgical History:   Procedure Laterality Date    COLONOSCOPY  10/31/2022    CT NEEDLE BIOPSY LIVER  0    ondansetron (ZOFRAN-ODT) 4 MG disintegrating tablet, Take 1 tablet by mouth every 8 hours as needed for Nausea or Vomiting, Disp: 30 tablet, Rfl: 0      SOCIAL HISTORY     Social History     Social History Narrative    Not on file     Social History     Tobacco Use    Smoking status: Never    Smokeless tobacco: Never   Vaping Use    Vaping status: Never Used   Substance Use Topics    Alcohol use: Not Currently    Drug use: Never         ALLERGIES   No Known Allergies      FAMILY HISTORY     Family History   Problem Relation Age of Onset    Diabetes Mother     Stroke Mother          PREVIOUS RECORDS   Previous records reviewed: previous hospital admissions      PHYSICAL EXAM     ED Triage Vitals   BP Systolic BP Percentile Diastolic BP Percentile Temp Temp src Pulse Respirations SpO2   01/27/25 1315 -- -- 01/27/25 1315 -- 01/27/25 1315 01/27/25 1315 01/27/25 1315   114/89   97.2 °F (36.2 °C)  (!) 105 20 97 %      Height Weight - Scale         01/27/25 1306 01/27/25 1306         1.829 m (6') 71.2 kg (157 lb)           Initial vital signs and nursing assessment reviewed and normal. Body mass index is 21.29 kg/m². Pulsoximetry is normal per my interpretation.    Additional Vital Signs:  Vitals:    01/27/25 1515   BP: 105/79   Pulse: (!) 105   Resp: 30   Temp:    SpO2: 94%       Physical Exam  Constitutional:       Appearance: Normal appearance. He is ill-appearing (acute on chronically).   HENT:      Head: Normocephalic and atraumatic.   Cardiovascular:      Rate and Rhythm: Normal rate and regular rhythm.      Pulses: Normal pulses.      Heart sounds: No murmur heard.     No friction rub. No gallop.   Pulmonary:      Effort: Pulmonary effort is normal. No respiratory distress.      Breath sounds: Rales present. No wheezing.   Abdominal:      Palpations: Abdomen is soft.      Tenderness: There is no abdominal tenderness.      Comments: With tunneled cath R lateral abdomen   Musculoskeletal:      Cervical back:  x 2, lactate, procal  CT Chest WO - pending        MEDICATION CHANGES     New Prescriptions    No medications on file         FINAL DISPOSITION   MDM     This patient has metastatic pancreatic adenocarcinoma presented with SOB since being discharged 1/11/25 from [facility]. Noted patient has had stable pleural effusions noted as far back as November 2024, but are now large in size. Patient is in no respiratory distress and is stable on RA with SpO2 94%. Patient with mildly elevated HR. CT Chest currently pending along with infectious workup. Decision to admit patient to medical floor due to BL pleural effusions causing SOB - eval if need for thoracentesis, likely needs pleural studies to eval for Chylothorax/ exudative vs transudative. Near total opacification of the R lung. Pt also with tunneled cath for self- guided paracentesis. Pt fluid overloaded on exam. Pt undergoing aggressive chemo, also follows with palliative care. Pulmonology consulted.    Final diagnoses:   Pleural effusion, bilateral   Hypervolemia, unspecified hypervolemia type   Pancreatic adenocarcinoma (HCC)     Condition: condition: good  Dispo: Admit to med/surg floor      This transcription was electronically signed. Parts of this transcriptions may have been dictated by use of voice recognition software and electronically transcribed, and parts may have been transcribed with the assistance of an ED scribe. The transcription may contain errors not detected in proofreading.  Please refer to my supervising physician's documentation if my documentation differs.    Electronically Signed: Horacio Santiago DO, 01/27/25, 3:49 PM

## 2025-01-27 NOTE — ED NOTES
Pt transported to [facility] in stable condition. Spoke to [facility] prior to arrival.

## 2025-01-27 NOTE — ED NOTES
Patient sent to ER By PCP for further evaluation of SOB with fluid accumulation in his lungs. Patient currently receiving chemo for pancreatic CA. Patient has felt more SOB over the past 5 days with last two causing dyspnea with exertion. Patient alert and oriented family at bedside

## 2025-01-28 ENCOUNTER — APPOINTMENT (OUTPATIENT)
Dept: ULTRASOUND IMAGING | Age: 55
DRG: 919 | End: 2025-01-28
Payer: COMMERCIAL

## 2025-01-28 ENCOUNTER — APPOINTMENT (OUTPATIENT)
Age: 55
DRG: 919 | End: 2025-01-28
Payer: COMMERCIAL

## 2025-01-28 ENCOUNTER — APPOINTMENT (OUTPATIENT)
Dept: GENERAL RADIOLOGY | Age: 55
DRG: 919 | End: 2025-01-28
Payer: COMMERCIAL

## 2025-01-28 LAB
ANION GAP SERPL CALC-SCNC: 11 MEQ/L (ref 8–16)
APTT PPP: 60.4 SECONDS (ref 22–38)
APTT PPP: 62.5 SECONDS (ref 22–38)
APTT PPP: 86.7 SECONDS (ref 22–38)
BASOPHILS ABSOLUTE: 0 THOU/MM3 (ref 0–0.1)
BASOPHILS NFR BLD AUTO: 0.4 %
BUN SERPL-MCNC: 14 MG/DL (ref 7–22)
CALCIUM SERPL-MCNC: 8.8 MG/DL (ref 8.5–10.5)
CHLORIDE SERPL-SCNC: 102 MEQ/L (ref 98–111)
CO2 SERPL-SCNC: 26 MEQ/L (ref 23–33)
CREAT SERPL-MCNC: 0.6 MG/DL (ref 0.4–1.2)
DEPRECATED MEAN GLUCOSE BLD GHB EST-ACNC: 90 MG/DL (ref 70–126)
DEPRECATED RDW RBC AUTO: 58.4 FL (ref 35–45)
ECHO BSA: 1.9 M2
ECHO LV EF PHYSICIAN: 65 %
ECHO LV EJECTION FRACTION BIPLANE: 63 % (ref 55–100)
ECHO LV FRACTIONAL SHORTENING: 33 % (ref 28–44)
ECHO LV INTERNAL DIMENSION DIASTOLE INDEX: 2.03 CM/M2
ECHO LV INTERNAL DIMENSION DIASTOLIC: 3.9 CM (ref 4.2–5.9)
ECHO LV INTERNAL DIMENSION SYSTOLIC INDEX: 1.35 CM/M2
ECHO LV INTERNAL DIMENSION SYSTOLIC: 2.6 CM
ECHO LV IVSD: 1.2 CM (ref 0.6–1)
ECHO LV MASS 2D: 149.5 G (ref 88–224)
ECHO LV MASS INDEX 2D: 77.9 G/M2 (ref 49–115)
ECHO LV POSTERIOR WALL DIASTOLIC: 1.1 CM (ref 0.6–1)
ECHO LV RELATIVE WALL THICKNESS RATIO: 0.56
ECHO RV INTERNAL DIMENSION: 2.3 CM
EOSINOPHIL NFR BLD AUTO: 2.1 %
EOSINOPHILS ABSOLUTE: 0.1 THOU/MM3 (ref 0–0.4)
ERYTHROCYTE [DISTWIDTH] IN BLOOD BY AUTOMATED COUNT: 16.4 % (ref 11.5–14.5)
FERRITIN SERPL IA-MCNC: 1128 NG/ML (ref 22–322)
GFR SERPL CREATININE-BSD FRML MDRD: > 90 ML/MIN/1.73M2
GLUCOSE FLD-MCNC: 49 MG/DL
GLUCOSE SERPL-MCNC: 97 MG/DL (ref 70–108)
HBA1C MFR BLD HPLC: 5 % (ref 4.4–6.4)
HCT VFR BLD AUTO: 24.6 % (ref 42–52)
HGB BLD-MCNC: 7.7 GM/DL (ref 14–18)
IMM GRANULOCYTES # BLD AUTO: 0.04 THOU/MM3 (ref 0–0.07)
IMM GRANULOCYTES NFR BLD AUTO: 0.8 %
IRON SATN MFR SERPL: 17 % (ref 20–50)
IRON SERPL-MCNC: 19 UG/DL (ref 65–195)
LDH FLD L TO P-CCNC: 720 U/L
LYMPHOCYTES ABSOLUTE: 0.7 THOU/MM3 (ref 1–4.8)
LYMPHOCYTES NFR BLD AUTO: 15.2 %
MAGNESIUM SERPL-MCNC: 2 MG/DL (ref 1.6–2.4)
MCH RBC QN AUTO: 30.6 PG (ref 26–33)
MCHC RBC AUTO-ENTMCNC: 31.3 GM/DL (ref 32.2–35.5)
MCV RBC AUTO: 97.6 FL (ref 80–94)
MONOCYTES ABSOLUTE: 0.5 THOU/MM3 (ref 0.4–1.3)
MONOCYTES NFR BLD AUTO: 10.4 %
NEUTROPHILS ABSOLUTE: 3.4 THOU/MM3 (ref 1.8–7.7)
NEUTROPHILS NFR BLD AUTO: 71.1 %
NRBC BLD AUTO-RTO: 0 /100 WBC
PH BIFL: 7.5 [PH]
PLATELET # BLD AUTO: 172 THOU/MM3 (ref 130–400)
PMV BLD AUTO: 9.1 FL (ref 9.4–12.4)
POTASSIUM SERPL-SCNC: 4.6 MEQ/L (ref 3.5–5.2)
PROT FLD-MCNC: 4 GM/DL
RBC # BLD AUTO: 2.52 MILL/MM3 (ref 4.7–6.1)
SODIUM SERPL-SCNC: 139 MEQ/L (ref 135–145)
TIBC SERPL-MCNC: 113 UG/DL (ref 171–450)
WBC # BLD AUTO: 4.8 THOU/MM3 (ref 4.8–10.8)

## 2025-01-28 PROCEDURE — 88112 CYTOPATH CELL ENHANCE TECH: CPT

## 2025-01-28 PROCEDURE — 83550 IRON BINDING TEST: CPT

## 2025-01-28 PROCEDURE — 88305 TISSUE EXAM BY PATHOLOGIST: CPT

## 2025-01-28 PROCEDURE — 6360000002 HC RX W HCPCS

## 2025-01-28 PROCEDURE — 71045 X-RAY EXAM CHEST 1 VIEW: CPT

## 2025-01-28 PROCEDURE — 32555 ASPIRATE PLEURA W/ IMAGING: CPT

## 2025-01-28 PROCEDURE — 93325 DOPPLER ECHO COLOR FLOW MAPG: CPT | Performed by: INTERNAL MEDICINE

## 2025-01-28 PROCEDURE — 83735 ASSAY OF MAGNESIUM: CPT

## 2025-01-28 PROCEDURE — 80048 BASIC METABOLIC PNL TOTAL CA: CPT

## 2025-01-28 PROCEDURE — 85025 COMPLETE CBC W/AUTO DIFF WBC: CPT

## 2025-01-28 PROCEDURE — 88184 FLOWCYTOMETRY/ TC 1 MARKER: CPT

## 2025-01-28 PROCEDURE — 87205 SMEAR GRAM STAIN: CPT

## 2025-01-28 PROCEDURE — 87116 MYCOBACTERIA CULTURE: CPT

## 2025-01-28 PROCEDURE — 87070 CULTURE OTHR SPECIMN AEROBIC: CPT

## 2025-01-28 PROCEDURE — 82945 GLUCOSE OTHER FLUID: CPT

## 2025-01-28 PROCEDURE — 93321 DOPPLER ECHO F-UP/LMTD STD: CPT

## 2025-01-28 PROCEDURE — 93308 TTE F-UP OR LMTD: CPT | Performed by: INTERNAL MEDICINE

## 2025-01-28 PROCEDURE — 85730 THROMBOPLASTIN TIME PARTIAL: CPT

## 2025-01-28 PROCEDURE — 89051 BODY FLUID CELL COUNT: CPT

## 2025-01-28 PROCEDURE — 36415 COLL VENOUS BLD VENIPUNCTURE: CPT

## 2025-01-28 PROCEDURE — 99232 SBSQ HOSP IP/OBS MODERATE 35: CPT | Performed by: STUDENT IN AN ORGANIZED HEALTH CARE EDUCATION/TRAINING PROGRAM

## 2025-01-28 PROCEDURE — 83615 LACTATE (LD) (LDH) ENZYME: CPT

## 2025-01-28 PROCEDURE — 6370000000 HC RX 637 (ALT 250 FOR IP): Performed by: HOSPITALIST

## 2025-01-28 PROCEDURE — 88185 FLOWCYTOMETRY/TC ADD-ON: CPT

## 2025-01-28 PROCEDURE — 87075 CULTR BACTERIA EXCEPT BLOOD: CPT

## 2025-01-28 PROCEDURE — 99233 SBSQ HOSP IP/OBS HIGH 50: CPT | Performed by: INTERNAL MEDICINE

## 2025-01-28 PROCEDURE — 88108 CYTOPATH CONCENTRATE TECH: CPT

## 2025-01-28 PROCEDURE — 83036 HEMOGLOBIN GLYCOSYLATED A1C: CPT

## 2025-01-28 PROCEDURE — 83986 ASSAY PH BODY FLUID NOS: CPT

## 2025-01-28 PROCEDURE — 93321 DOPPLER ECHO F-UP/LMTD STD: CPT | Performed by: INTERNAL MEDICINE

## 2025-01-28 PROCEDURE — 87102 FUNGUS ISOLATION CULTURE: CPT

## 2025-01-28 PROCEDURE — 1200000000 HC SEMI PRIVATE

## 2025-01-28 PROCEDURE — 83540 ASSAY OF IRON: CPT

## 2025-01-28 PROCEDURE — 82728 ASSAY OF FERRITIN: CPT

## 2025-01-28 PROCEDURE — 84157 ASSAY OF PROTEIN OTHER: CPT

## 2025-01-28 RX ADMIN — POTASSIUM CHLORIDE 10 MEQ: 750 TABLET, FILM COATED, EXTENDED RELEASE ORAL at 09:01

## 2025-01-28 RX ADMIN — FAMOTIDINE 20 MG: 20 TABLET, FILM COATED ORAL at 09:00

## 2025-01-28 RX ADMIN — HEPARIN SODIUM 18 UNITS/KG/HR: 10000 INJECTION, SOLUTION INTRAVENOUS at 23:47

## 2025-01-28 RX ADMIN — ZOLPIDEM TARTRATE 5 MG: 5 TABLET, FILM COATED ORAL at 20:54

## 2025-01-28 ASSESSMENT — PAIN SCALES - GENERAL: PAINLEVEL_OUTOF10: 0

## 2025-01-28 NOTE — PROGRESS NOTES
Patient ambulated 100' w/ tech, patient was on 3LNC. Per tech, patient got SOB needed to return to room.  This RN at bedside, patient resting in bed, placed on 4LNC. -120.  Kalie Lara MD

## 2025-01-28 NOTE — CONSULTS
[facility]      Patient - Gustabo Machado   MRN -  [id number]   [facility] # - [id number]   - 1970      Date of Admission -  2025  1:05 PM  Date of evaluation -  2025  Room - [facility]   Hospital Day - 1  Consulting - Mike Ellis MD Primary Care Physician - Daniel Agrawal MD     Problem List      Active Hospital Problems    Diagnosis Date Noted    Pleural effusion, bilateral [J90] 2025    Hypervolemia [E87.70] 2025    Pericardial effusion [I31.39] 2025    Dyspnea on exertion [R06.09] 2025    Pancreatic adenocarcinoma (HCC) [C25.9] 2025     Reason for Consult    Bilateral pleural effusion.  HPI   History Obtained From: Patient, his daughter and electronic medical record.    Gustabo Machado is a 54 y.o. male with past medical history of stage IV pancreatic cancer, PE, presented to the ER on 2025 for worsening shortness of breath.  Patient reports onset of shortness of breath for the past 5 days worsen with ambulation, mild improvement with rest.  Denies any shortness of breath at rest also complains of orthopnea.  Associated bilateral leg swelling.  Has recently visited PCP for leg swelling, chest x-ray was done which reported pleural effusion and was sent to the ER for further management. Denies fever/chills, chest pain, chest palpitation, abdominal pain.  Of note patient is being treated for pancreatic cancer with chemotherapy, currently on the fifth cycle the last cycle was on Tuesday, 2025.  Patient is currently continuing the chemotherapy.  Patient has a peritoneal, which is drained by his daughter at home every 3 to 5 days and is also managed by nurse for the dressing.   In ED patient was saturating 97% on room air.  EKG showed sinus tachycardia.  Chest x-ray showed worsening right-sided pleural effusion compared to chest x-ray on 1/10/2025.  CT scan showed bilateral worsening pleural effusion.     He is  Prior to Admission: potassium chloride (K-TAB) 10 MEQ extended release tablet, Take 1 tablet by mouth daily  bumetanide (BUMEX) 1 MG tablet, Take 1 tablet by mouth daily  senna-docusate (SENOKOT S) 8.6-50 MG per tablet, Take 1 tablet by mouth 2 times daily  potassium chloride (KLOR-CON M) 20 MEQ extended release tablet, Take 1 tablet by mouth daily  famotidine (PEPCID) 20 MG tablet, Take 1 tablet by mouth daily  apixaban (ELIQUIS) 5 MG TABS tablet, Take 1 tablet by mouth 2 times daily  Magic Mouthwash (MIRACLE MOUTHWASH), Swish and spit 5 mLs 4 times daily as needed for Irritation Prepare mixture 1:1:1 DIPHENHYDRAMINE HCL,NYSTATIN,LIDOCAINE HCL  zolpidem (AMBIEN CR) 12.5 MG extended release tablet, Take 1 tablet by mouth nightly as needed for Sleep for up to 180 days. Max Daily Amount: 12.5 mg  lidocaine-prilocaine (EMLA) 2.5-2.5 % cream, Apply topically as needed.  ondansetron (ZOFRAN-ODT) 4 MG disintegrating tablet, Take 1 tablet by mouth every 8 hours as needed for Nausea or Vomiting  bumetanide (BUMEX) 1 MG tablet, Take 1 tablet by mouth every 72 hours as needed (leg swelling, weight gain, SOB) (Patient not taking: Reported on 1/27/2025)  polyethylene glycol (GLYCOLAX) 17 g packet, Take 1 packet by mouth daily as needed for Constipation  Diet    ADULT DIET; Regular  ADULT ORAL NUTRITION SUPPLEMENT; Lunch, Dinner, Breakfast; Standard High Calorie/High Protein Oral Supplement  Allergies    Patient has no known allergies.    Vitals     height is 1.829 m (6') and weight is 71.2 kg (157 lb). His oral temperature is 98 °F (36.7 °C). His blood pressure is 121/81 and his pulse is 105 (abnormal). His respiration is 16 and oxygen saturation is 92%.   Body mass index is 21.29 kg/m².    SUPPLEMENTAL O2: O2 Flow Rate (L/min): 2.5 L/min     I/O      Intake/Output Summary (Last 24 hours) at 1/28/2025 1313  Last data filed at 1/28/2025 1248  Gross per 24 hour   Intake 780 ml   Output --   Net 780 ml     I/O last 3 completed

## 2025-01-28 NOTE — CARE COORDINATION
01/28/25 0910   Readmission Assessment   Number of Days since last admission? 8-30 days   Previous Disposition Home with Home Health   Who is being Interviewed Patient;Caregiver   What was the patient's/caregiver's perception as to why they think they needed to return back to the hospital? Other (Comment)  (shortness of breath.)   Did you visit your Primary Care Physician after you left the hospital, before you returned this time? No   Why weren't you able to visit your PCP? Did not have an appointment   Did you see a specialist, such as Cardiac, Pulmonary, Orthopedic Physician, etc. after you left the hospital? Yes   Who advised the patient to return to the hospital? Self-referral   Does the patient report anything that got in the way of taking their medications? No   In our efforts to provide the best possible care to you and others like you, can you think of anything that we could have done to help you after you left the hospital the first time, so that you might not have needed to return so soon? Other (Comment)  (Gustabo and his wife felt he was ready for discharge. Gustabo plans to return home at discharge and resume his services through [facility].)

## 2025-01-28 NOTE — PROGRESS NOTES
Hospitalist Progress Note  Internal Medicine Resident      Patient: Gustabo Machado 54 y.o. male      Unit/Bed: -10/010-A    Admit Date: 1/27/2025      ASSESSMENT AND PLAN  Active Problems  #pancreatic adenocarcinoma  #Bilateral pleural effusions  #Shortness of breath  -on IV chemotherapy includes Oxaliplatin/Camptosar/Leucovorin/5FU, had 5 rounds with last occurrence on 1/24/24  -CT chest: Large bilateral pleural effusions with adjacent atelectasis, small pericardial effusion, sclerotic lesion T11 vertebral suspicious for metastatic disease and hypotonic lesions throughout the liver  -Requiring supplemental oxygen 2 L via nasal cannula  -Pulmonology consulting  -IR for thoracentesis  -fluid analysis pending   -Palliative care consulted  -Continue 1 mg Bumex every 72 hours    # Pericardial effusion  -Echo pending  -ECHO during previous admission on 1/10/2025 EF 60 to 65%    #Recurrent ascites  -Port of cath in place constrained every 5 days    #anemia   -H&H 7.7/24.6 stable when compared to previous  -no overt bleeding  -retic count, iron panel, b12, folate ordered   -monitor CBC     # Pulmonary embolism  -Hold Eliquis 5 mg twice daily  -Continue heparin    LDA: []CVC / []PICC / []Midline / []Chavez / []Drains / []Mediport / [x]None  Antibiotics: N/A  Steroids: N/A  Labs (still needed?): [x]Yes / []No  IVF (still needed?): []Yes / [x]No    Level of care: []Step Down / [x]Med-Surg  Bed Status: [x]Inpatient / []Observation  Telemetry: []Yes / []No  PT/OT: []Yes / [x]No    DVT Prophylaxis: [] Lovenox / [x] Heparin / [] SCDs / [] Already on Systemic Anticoagulation / [] None     Expected discharge date: TBD  Disposition: Home  Code status: Full code    ===================================================================    Chief Complaint: SOB   Subjective (past 24 hours): Patient seen and evaluated at bedside. No acute events overnight. Patient reports having some SOB with some improvement on the oxygen. Has had a  decreased appetite. Denies fevers, chills, nausea, vomiting or diarrhea. Discussed code status with patient and his spouse. He would like to remain full code at this time.  Per the patient's wife he would like to be able to go back to work part-time if possible.    HPI / Hospital Course:  54-year-old male past medical history of stage IV pancreatic adenocarcinoma currently undergoing chemotherapy presented with 4 to 5 days of shortness of breath.  Shortness of breath worsened when he was up and moving.  Prior to being seen in the ED here he was seen by his primary care provider who took an x-ray and relayed to him that his lung was full of fluid.  He reports having some decreased appetite but denies nausea, vomiting or diarrhea.  He states he does have intermittent abdominal pain that he describes as a stretching sensation since the diagnosis of the cancer.  His last chemotherapy treatment was Tuesday.  He relates that he has been tolerating the chemotherapy okay.  Denies lightheadedness, dizziness, chest pain, headache, fevers, chills, numbness or tingling.  Per patient's wife the Port-A-Cath in the patient's belly has been draining appropriately every 5 days.  Patient had recent admission for small bowel obstruction and recurrent ascites.    ED course: Chest x-ray revealed complete whiteout of the right lung.  CT chest was obtained revealing bilateral pleural effusions right greater than left and a T11 sclerotic lesion.  Lab workup in the ED was stable.    Hospital course: Patient admitted for shortness of breath and bilateral pleural effusions.  IR performed thoracentesis.  Fluid evaluation ordered.  Palliative care and pulmonology consulting.      Medications:    Infusion Medications    heparin (PORCINE) Infusion 18 Units/kg/hr (01/28/25 0727)    Scheduled Medications    [Held by provider] apixaban  5 mg Oral BID    [Held by provider] bumetanide  1 mg Oral Daily    famotidine  20 mg Oral Daily    potassium  chloride  10 mEq Oral Daily    PRN Meds: [Held by provider] bumetanide, magic (miracle) mouthwash, ondansetron, polyethylene glycol, zolpidem, heparin (porcine), heparin (porcine)    Exam:  /79   Pulse (!) 106   Temp 98.4 °F (36.9 °C) (Oral)   Resp 19   Ht 1.829 m (6')   Wt 71.2 kg (157 lb)   SpO2 92%   BMI 21.29 kg/m²   General: No distress, appears older than stated age. Appears chronically ill.   Eyes:  PERRL. Conjunctivae/corneas clear.  HENT: Head normal appearing. Nares normal. Oral mucosa moist.  Hearing intact.   Neck: Supple, with full range of motion. Trachea midline.  No gross JVD appreciated.  Respiratory:  No respiratory distress. Decreased breath sounds in R lung fields. Decreased breath sounds in L lung base. No wheezes, rhonchi or rales.   Cardiovascular: Normal rate, regular rhythm with normal S1/S2 without murmurs.    1+ pitting edema  Abdomen: Soft, non-distended with normal bowel sounds. Diffuse abd TTP. Port-a-cath present in RLQ. No surrounding erythema, warmth or drainage.   Musculoskeletal: No joint swelling or tenderness. Normal tone. No abnormal movements.   Skin: Warm and dry. No rashes or lesions. Pale.   Neurologic:  No focal sensory/motor deficits in the upper or lower extremities. Cranial nerves:  grossly non-focal 2-12.     Psychiatric: Alert and oriented, normal insight and thought content.   Capillary Refill: Brisk,< 3 seconds.  Peripheral Pulses: +2 palpable, equal bilaterally.       Labs/Radiology: See chart or assessment above.     Electronically signed by Kalie Guerrero MD on 1/28/2025 at 7:45 AM  Case was discussed with Attending, Dr. Ellis

## 2025-01-28 NOTE — CARE COORDINATION
01/28/25 0907   Service Assessment   Patient Orientation Alert and Oriented;Person;Place;Situation;Self   Cognition Alert   History Provided By Patient;Spouse;Medical Record   Accompanied By/Relationship Wife, Nanci   Support Systems Spouse/Significant Other;Children;Family Members   Patient's Healthcare Decision Maker is: Named in Scanned ACP Document   PCP Verified by CM Yes   Prior Functional Level Assistance with the following:;Cooking;Housework;Shopping   Current Functional Level Cooking;Housework;Shopping;Assistance with the following:   Can patient return to prior living arrangement Yes   Ability to make needs known: Good   Family able to assist with home care needs: Yes   Would you like for me to discuss the discharge plan with any other family members/significant others, and if so, who? No   Financial Resources Other (Comment)  ([facility])   Community Resources ECF/Home Care  ([facility]   CM/SW Referral ADLs/IADLs   Social/Functional History   Active  Yes   Occupation On disability   Discharge Planning   Type of Residence House   Living Arrangements Spouse/Significant Other;Children   Current Services Prior To Admission Home Care  [facility]   Potential Assistance Needed Home Care   DME Ordered? No   Potential Assistance Purchasing Medications No   Type of Home Care Services Nursing Services   Patient expects to be discharged to: House   History of falls? 0   Services At/After Discharge   Transition of Care Consult (CM Consult) Home Health;Discharge Planning   Internal Home Health No   Reason Outside Agency Chosen Patient already serviced by other home care/hospice agency   Services At/After Discharge Home Health;Nursing services   Confirm Follow Up Transport Family   Condition of Participation: Discharge Planning   The Plan for Transition of Care is related to the following treatment goals: get thoracentesis today   Freedom of Choice list was provided with basic dialogue that

## 2025-01-28 NOTE — PROGRESS NOTES
Unable to drain from patient peritoneal drain.  Wife to bring supplies in tomorrow to try again.

## 2025-01-28 NOTE — PROGRESS NOTES
Patient received sacramental anointing by a . If you are in need of  support, please call [phone]. If you are in need of a  after 6:30pm, please call the house supervisor for the on-call .      Christina Stevenson  \[facility]\"" Health Coordinator  [phone]

## 2025-01-28 NOTE — PALLIATIVE CARE
Initial Evaluation        Patient:   Gustabo Machado  YOB: 1970  Age:  54 y.o.  Room:  [address] [place] [address]-  MRN:  [id number]   Acct: [id number]    Date of Admission:  1/27/2025  1:05 PM  Date of Service:  1/28/2025  Completed By:  Rhona Pratt RN        Reason for Palliative Care Evaluation:-   Goals of Care     Current Concerns   shortness of breath     Palliative Performance Scale   60%  Ambulation reduced; Significant disease; Can't do hobbies/housework; intake normal or reduced; occasional assist; LOC full/confusion     History    Pancreatic CA with liver mets, PE, SBO. Per chart review, patient was recently admitted at the beginning of the month for recurrent ascites and SBO. Patient was sent home with a pleurX drain with .      Goals of Care Discussions and Plan         Advance Care Planning   Goals of Care/Advance Care Planning (ACP) Conversation    Date of Conversation: 01/28/25    Individuals present for the conversation: Patient with decision making capacity and Legal healthcare agent named below     ACP documents on file prior to discussion:  -Power of  for Healthcare  -Living Will    Previously completed document/s not on file: Not discussed.    Healthcare Power of /Healthcare Surrogate Decision Makers:  Nanci Machado (wife)    Conversation Summary: Patient resting in bed. Wife, Nanci, at bedside. Patient explains that things were going well at home until he became short of breath. He went to his PCP who did a chest xray and found fluid on his lungs and advised the patient to go to the ED.     Gustabo has no complaints of pain at this time, and wife endorses that he never complains of any pain at home. Gustabo is currently receiving chemotherapy outpatient. Gustabo receives care from his family and home health and feels he has all the support he needs.     Goals of care discussion had with patient and wife. Patient plans to continue chemotherapy. Patient and wife have

## 2025-01-28 NOTE — PROGRESS NOTES
Comprehensive Nutrition Assessment    Type and Reason for Visit: Initial, Positive nutrition screen (MST score 4)    Nutrition Recommendations/Plan:   Continue diet as ordered.   Continue Ensure Plus TID.   Initiate Ensure Clear with breakfast.   Consider appetite stimulant.      Malnutrition Assessment:  Malnutrition Status: Severe malnutrition  Context: Chronic Illness       Findings of the 6 clinical characteristics of malnutrition:  Energy Intake:  75% or less estimated energy requirements for 1 month or longer  Weight Loss:  Greater than 10% over 6 months (26.7% (57 lb) in the last 5 months which is significant)     Body Fat Loss:  Severe body fat loss Orbital, Triceps, Buccal region   Muscle Mass Loss:  Severe muscle mass loss Temples (temporalis), Clavicles (pectoralis & deltoids), Calf (gastrocnemius), Hand (interosseous)  Fluid Accumulation:  No fluid accumulation     Strength:  Not Performed    Nutrition Assessment:    Pt. severely malnourished AEB criteria listed above. At risk for further nutritional compromise r/t admitted d/t SOB, CXR at [facility] showed left basilar consolidative opactiy with moderate left pleural effusion. Noted recent admission in January 2024 d/t SBO with ascites. Noted underlying medical condition (PMHx pancreatic cancer dx 10/2024- undergoing aggressive chemo and pulmonary emboli in 2024 s/p thrombectomy, GERD, HTN).    Nutrition Related Findings:    Pt. Report/Treatments/Miscellaneous: Patient seen, sitting up in bed with daughter at bedside. Per daughter, patient got thrush with his chemo and his appetite significantly decreased. His appetite was coming back a little bit and then he had a SBO earlier this month which set back his appetite again. Daughter reports that she has been counting his calories at home and has been encouraging adequate PO intakes via meals and ONS. He normally eats small amounts of food at each meal, 1 Ensure Clear, and 1 Ensure Plus per day at home  (~1000 calories per dtr). Daughter reports that patient does better with foods like soup that are easier on his stomach. She brought in soup from home. Per patient since his SBO earlier this month his bowel movements have been regular.   GI Status: Last BM 1/28 per pt  Wound: None   Edema:  +1 pitting b/l LE edema, per flowsheet   Pertinent Labs:   Lab Results   Component Value Date    LABA1C 5.2 10/02/2021     Recent Labs     01/27/25  1440 01/28/25  0545    139   K 4.1 4.6    102   GLUCOSE 95 97   BUN 14 14   CREATININE 0.5 0.6   MG  --  2.0     Pertinent Medications: eliquis (held), bumex (held), pepcid, heparin     Current Nutrition Intake & Therapies:    Recent PO intake: %  Recent Supplement Intake:  (drinks some of them)    - Current intake likely meets estimated needs.   ADULT DIET; Regular  ADULT ORAL NUTRITION SUPPLEMENT; Lunch, Dinner, Breakfast; Standard High Calorie/High Protein Oral Supplement  ADULT ORAL NUTRITION SUPPLEMENT; Breakfast; Clear Liquid Oral Supplement    Anthropometric Measures:  Height: 182.9 cm (6')  Ideal Body Weight (IBW): 178 lbs  Admission Body Emeterio: 71.2 kg (157 lb) (1/27, +1 pitting edema)  Current Body Weight: 71.2 kg (157 lb) (1/27, +1 pitting edema)  Current BMI: Body mass index is 21.29 kg/m².  Usual Body Weight:  (12/24: 173 lb; 11/14: 201 lb; 10/21: 210 lb; 8/9: 214 lb)  Weight Adjustment for: No Adjustment  BMI Categories: Normal (18.5-24.9)    Estimated Daily Nutrient Needs:  Energy (kcal/day): 7548-4629 kcal (25-30 kcal/kg)  Weight Used for energy calculation:  Admission (71 kg)  Protein (g/day):  g (1.2-1.5 g/kg)    Weight Used for protein calculation: Admission (71 kg)  Fluid (ml/day): per provider (Defer to provider)    Nutrition Diagnosis:   Severe malnutrition, in context of chronic illness related to inadequate protein-energy intake, early satiety, decreased appetite as evidenced by criteria as identified in malnutrition  Continue amlodipine 2.5mg po bid  Monitor BP

## 2025-01-29 ENCOUNTER — APPOINTMENT (OUTPATIENT)
Dept: GENERAL RADIOLOGY | Age: 55
DRG: 919 | End: 2025-01-29
Payer: COMMERCIAL

## 2025-01-29 ENCOUNTER — APPOINTMENT (OUTPATIENT)
Dept: CT IMAGING | Age: 55
DRG: 919 | End: 2025-01-29
Payer: COMMERCIAL

## 2025-01-29 LAB
ANION GAP SERPL CALC-SCNC: 11 MEQ/L (ref 8–16)
APTT PPP: 85.7 SECONDS (ref 22–38)
BASOPHILS ABSOLUTE: 0 THOU/MM3 (ref 0–0.1)
BASOPHILS NFR BLD AUTO: 0.2 %
BUN SERPL-MCNC: 13 MG/DL (ref 7–22)
CALCIUM SERPL-MCNC: 8.5 MG/DL (ref 8.5–10.5)
CHARACTER, BODY FLUID: ABNORMAL
CHARACTER, BODY FLUID: ABNORMAL
CHLORIDE SERPL-SCNC: 103 MEQ/L (ref 98–111)
CO2 SERPL-SCNC: 24 MEQ/L (ref 23–33)
COLOR FLD: ABNORMAL
COLOR FLD: ABNORMAL
CREAT SERPL-MCNC: 0.7 MG/DL (ref 0.4–1.2)
DEPRECATED RDW RBC AUTO: 59.8 FL (ref 35–45)
EOSINOPHIL NFR BLD AUTO: 1 %
EOSINOPHILS ABSOLUTE: 0.1 THOU/MM3 (ref 0–0.4)
ERYTHROCYTE [DISTWIDTH] IN BLOOD BY AUTOMATED COUNT: 16.9 % (ref 11.5–14.5)
FOLATE SERPL-MCNC: > 20 NG/ML (ref 4.8–24.2)
GFR SERPL CREATININE-BSD FRML MDRD: > 90 ML/MIN/1.73M2
GLUCOSE FLD-MCNC: < 2 MG/DL
GLUCOSE SERPL-MCNC: 96 MG/DL (ref 70–108)
GRANULOCYTES NFR FLD AUTO: 0 %
GRANULOCYTES NFR FLD AUTO: 0 %
HCT VFR BLD AUTO: 23.5 % (ref 42–52)
HGB BLD-MCNC: 7.4 GM/DL (ref 14–18)
HGB RETIC QN AUTO: 34.9 PG (ref 28.2–35.7)
IMM GRANULOCYTES # BLD AUTO: 0.04 THOU/MM3 (ref 0–0.07)
IMM GRANULOCYTES NFR BLD AUTO: 0.7 %
IMM RETICS NFR: 43.8 % (ref 2.3–13.4)
LDH FLD L TO P-CCNC: > 2500 U/L
LDH SERPL L TO P-CCNC: 244 U/L (ref 100–190)
LYMPHOCYTES ABSOLUTE: 1.1 THOU/MM3 (ref 1–4.8)
LYMPHOCYTES NFR BLD AUTO: 18.3 %
LYMPHOCYTES NFR FLD MANUAL: 1 %
LYMPHOCYTES NFR FLD MANUAL: 34 %
MCH RBC QN AUTO: 31.1 PG (ref 26–33)
MCHC RBC AUTO-ENTMCNC: 31.5 GM/DL (ref 32.2–35.5)
MCV RBC AUTO: 98.7 FL (ref 80–94)
MESOTHELIAL CELLS BODY FLUID: ABNORMAL
MONOCYTES ABSOLUTE: 0.6 THOU/MM3 (ref 0.4–1.3)
MONOCYTES NFR BLD AUTO: 9.9 %
MONOCYTES NFR FLD MANUAL: 1 %
MONOCYTES NFR FLD MANUAL: 26 %
MONONUC CELLS NFR FLD AUTO: 0 %
MONONUC CELLS NFR FLD AUTO: 0 %
NEUTROPHILS ABSOLUTE: 4.3 THOU/MM3 (ref 1.8–7.7)
NEUTROPHILS NFR BLD AUTO: 69.9 %
NEUTS SEG NFR FLD MANUAL: 40 %
NEUTS SEG NFR FLD MANUAL: 98 %
NRBC BLD AUTO-RTO: 0 /100 WBC
NUC CELL # FLD AUTO: 616 /CUMM (ref 0–500)
NUC CELL # FLD AUTO: ABNORMAL /CUMM (ref 0–500)
PATHOLOGIST REVIEW: ABNORMAL
PATHOLOGIST REVIEW: ABNORMAL
PLATELET # BLD AUTO: 158 THOU/MM3 (ref 130–400)
PMV BLD AUTO: 9.3 FL (ref 9.4–12.4)
POTASSIUM SERPL-SCNC: 4.6 MEQ/L (ref 3.5–5.2)
RBC # BLD AUTO: 2.38 MILL/MM3 (ref 4.7–6.1)
RBC # FLD AUTO: ABNORMAL /CUMM
RBC # FLD AUTO: ABNORMAL /CUMM
RETICS # AUTO: 52 THOU/MM3 (ref 20–115)
RETICS/RBC NFR AUTO: 2.2 % (ref 0.5–2)
SODIUM SERPL-SCNC: 138 MEQ/L (ref 135–145)
SPECIMEN: ABNORMAL
SPECIMEN: ABNORMAL
TOTAL VOLUME RECEIVED BODY FLUID: 10 ML
TOTAL VOLUME RECEIVED BODY FLUID: 70 ML
VIT B12 SERPL-MCNC: 1073 PG/ML (ref 211–911)
WBC # BLD AUTO: 6.1 THOU/MM3 (ref 4.8–10.8)

## 2025-01-29 PROCEDURE — 94669 MECHANICAL CHEST WALL OSCILL: CPT

## 2025-01-29 PROCEDURE — 87070 CULTURE OTHR SPECIMN AEROBIC: CPT

## 2025-01-29 PROCEDURE — 88305 TISSUE EXAM BY PATHOLOGIST: CPT

## 2025-01-29 PROCEDURE — 74176 CT ABD & PELVIS W/O CONTRAST: CPT

## 2025-01-29 PROCEDURE — 71045 X-RAY EXAM CHEST 1 VIEW: CPT

## 2025-01-29 PROCEDURE — 85730 THROMBOPLASTIN TIME PARTIAL: CPT

## 2025-01-29 PROCEDURE — 6360000002 HC RX W HCPCS

## 2025-01-29 PROCEDURE — 6360000002 HC RX W HCPCS: Performed by: RADIOLOGY

## 2025-01-29 PROCEDURE — 2500000003 HC RX 250 WO HCPCS

## 2025-01-29 PROCEDURE — 85014 HEMATOCRIT: CPT

## 2025-01-29 PROCEDURE — 83615 LACTATE (LD) (LDH) ENZYME: CPT

## 2025-01-29 PROCEDURE — 99223 1ST HOSP IP/OBS HIGH 75: CPT | Performed by: STUDENT IN AN ORGANIZED HEALTH CARE EDUCATION/TRAINING PROGRAM

## 2025-01-29 PROCEDURE — 36591 DRAW BLOOD OFF VENOUS DEVICE: CPT

## 2025-01-29 PROCEDURE — 87205 SMEAR GRAM STAIN: CPT

## 2025-01-29 PROCEDURE — 87077 CULTURE AEROBIC IDENTIFY: CPT

## 2025-01-29 PROCEDURE — 85025 COMPLETE CBC W/AUTO DIFF WBC: CPT

## 2025-01-29 PROCEDURE — 99233 SBSQ HOSP IP/OBS HIGH 50: CPT | Performed by: STUDENT IN AN ORGANIZED HEALTH CARE EDUCATION/TRAINING PROGRAM

## 2025-01-29 PROCEDURE — P9047 ALBUMIN (HUMAN), 25%, 50ML: HCPCS

## 2025-01-29 PROCEDURE — 0W993ZZ DRAINAGE OF RIGHT PLEURAL CAVITY, PERCUTANEOUS APPROACH: ICD-10-PCS | Performed by: FAMILY MEDICINE

## 2025-01-29 PROCEDURE — 82945 GLUCOSE OTHER FLUID: CPT

## 2025-01-29 PROCEDURE — 85046 RETICYTE/HGB CONCENTRATE: CPT

## 2025-01-29 PROCEDURE — 85018 HEMOGLOBIN: CPT

## 2025-01-29 PROCEDURE — 6370000000 HC RX 637 (ALT 250 FOR IP): Performed by: HOSPITALIST

## 2025-01-29 PROCEDURE — 1200000000 HC SEMI PRIVATE

## 2025-01-29 PROCEDURE — 99233 SBSQ HOSP IP/OBS HIGH 50: CPT | Performed by: INTERNAL MEDICINE

## 2025-01-29 PROCEDURE — 36415 COLL VENOUS BLD VENIPUNCTURE: CPT

## 2025-01-29 PROCEDURE — 89051 BODY FLUID CELL COUNT: CPT

## 2025-01-29 PROCEDURE — 88112 CYTOPATH CELL ENHANCE TECH: CPT

## 2025-01-29 PROCEDURE — 6370000000 HC RX 637 (ALT 250 FOR IP)

## 2025-01-29 PROCEDURE — 88108 CYTOPATH CONCENTRATE TECH: CPT

## 2025-01-29 PROCEDURE — 87075 CULTR BACTERIA EXCEPT BLOOD: CPT

## 2025-01-29 PROCEDURE — 32551 INSERTION OF CHEST TUBE: CPT

## 2025-01-29 PROCEDURE — 80048 BASIC METABOLIC PNL TOTAL CA: CPT

## 2025-01-29 RX ORDER — OXYCODONE HYDROCHLORIDE 5 MG/1
5 TABLET ORAL EVERY 4 HOURS PRN
Status: DISCONTINUED | OUTPATIENT
Start: 2025-01-29 | End: 2025-02-07 | Stop reason: HOSPADM

## 2025-01-29 RX ORDER — ALBUMIN (HUMAN) 12.5 G/50ML
25 SOLUTION INTRAVENOUS 2 TIMES DAILY
Status: COMPLETED | OUTPATIENT
Start: 2025-01-29 | End: 2025-01-30

## 2025-01-29 RX ORDER — MIDAZOLAM HYDROCHLORIDE 1 MG/ML
INJECTION, SOLUTION INTRAMUSCULAR; INTRAVENOUS PRN
Status: COMPLETED | OUTPATIENT
Start: 2025-01-29 | End: 2025-01-29

## 2025-01-29 RX ORDER — MORPHINE SULFATE 2 MG/ML
2 INJECTION, SOLUTION INTRAMUSCULAR; INTRAVENOUS EVERY 4 HOURS PRN
Status: DISCONTINUED | OUTPATIENT
Start: 2025-01-29 | End: 2025-02-07 | Stop reason: HOSPADM

## 2025-01-29 RX ORDER — FENTANYL CITRATE 50 UG/ML
INJECTION, SOLUTION INTRAMUSCULAR; INTRAVENOUS PRN
Status: COMPLETED | OUTPATIENT
Start: 2025-01-29 | End: 2025-01-29

## 2025-01-29 RX ADMIN — OXYCODONE HYDROCHLORIDE 5 MG: 5 TABLET ORAL at 20:10

## 2025-01-29 RX ADMIN — ALBUMIN (HUMAN) 25 G: 0.25 INJECTION, SOLUTION INTRAVENOUS at 20:22

## 2025-01-29 RX ADMIN — FENTANYL CITRATE 25 MCG: 50 INJECTION, SOLUTION INTRAMUSCULAR; INTRAVENOUS at 16:00

## 2025-01-29 RX ADMIN — ZOLPIDEM TARTRATE 5 MG: 5 TABLET, FILM COATED ORAL at 22:01

## 2025-01-29 RX ADMIN — POTASSIUM CHLORIDE 10 MEQ: 750 TABLET, FILM COATED, EXTENDED RELEASE ORAL at 09:22

## 2025-01-29 RX ADMIN — WATER 2000 MG: 1 INJECTION INTRAMUSCULAR; INTRAVENOUS; SUBCUTANEOUS at 20:06

## 2025-01-29 RX ADMIN — FAMOTIDINE 20 MG: 20 TABLET, FILM COATED ORAL at 09:22

## 2025-01-29 RX ADMIN — MIDAZOLAM 0.5 MG: 1 INJECTION INTRAMUSCULAR; INTRAVENOUS at 16:00

## 2025-01-29 RX ADMIN — HEPARIN SODIUM 18 UNITS/KG/HR: 10000 INJECTION, SOLUTION INTRAVENOUS at 22:05

## 2025-01-29 ASSESSMENT — PAIN SCALES - GENERAL: PAINLEVEL_OUTOF10: 7

## 2025-01-29 ASSESSMENT — PAIN DESCRIPTION - ORIENTATION: ORIENTATION: RIGHT

## 2025-01-29 ASSESSMENT — PAIN DESCRIPTION - LOCATION: LOCATION: ABDOMEN;CHEST

## 2025-01-29 NOTE — PLAN OF CARE
Problem: ABCDS Injury Assessment  Goal: Absence of physical injury  1/29/2025 1201 by Gail Huggins RN  Outcome: Progressing  Flowsheets (Taken 1/29/2025 1201)  Absence of Physical Injury: Implement safety measures based on patient assessment     Problem: Safety - Adult  Goal: Free from fall injury  1/29/2025 1201 by Gail Huggins RN  Outcome: Progressing  Flowsheets (Taken 1/29/2025 1201)  Free From Fall Injury:   Instruct family/caregiver on patient safety   Based on caregiver fall risk screen, instruct family/caregiver to ask for assistance with transferring infant if caregiver noted to have fall risk factors     Problem: Nutrition Deficit:  Goal: Optimize nutritional status  1/29/2025 1201 by Gail Huggins RN  Outcome: Progressing  Flowsheets (Taken 1/29/2025 1201)  Nutrient intake appropriate for improving, restoring, or maintaining nutritional needs:   Assess nutritional status and recommend course of action   Monitor oral intake, labs, and treatment plans   Recommend appropriate diets, oral nutritional supplements, and vitamin/mineral supplements     Problem: Discharge Planning  Goal: Discharge to home or other facility with appropriate resources  1/29/2025 1201 by Gail Huggins RN  Outcome: Progressing  Flowsheets (Taken 1/29/2025 1201)  Discharge to home or other facility with appropriate resources:   Identify barriers to discharge with patient and caregiver   Arrange for needed discharge resources and transportation as appropriate     Problem: Pain  Goal: Verbalizes/displays adequate comfort level or baseline comfort level  1/29/2025 1201 by Gail Huggins RN  Outcome: Progressing  Flowsheets (Taken 1/29/2025 1201)  Verbalizes/displays adequate comfort level or baseline comfort level:   Encourage patient to monitor pain and request assistance   Assess pain using appropriate pain scale   Administer analgesics based on type and severity of pain and evaluate response   Implement non-pharmacological

## 2025-01-29 NOTE — PROGRESS NOTES
1544 Pt in specials radiology for right pleural chest tube insertion. Discussed procedure with pt and pt verbalizes understanding.  1548 Dr Ley to speak to pt.  1555 Pt positioned on scanner and attached to monitor. Pre scans taken.  1614 14 fr APD inserted in right lateral chest per Dr Ley. Pt tolerated well.  1618 Catheter sutured to right lateral chest.  1622 Split gauze and 4 x 4 with op-site dressing applied to catheter site on right lateral chest. Site without redness, swelling or hemaotma. Connected to [facility] chest drain. Draining bloody fluid.  1626 Pt positioned on bed for comfort. Offers no complaints.  1629 Report called to Tani TRAN.  1640  Transferred to  per bed.

## 2025-01-29 NOTE — PLAN OF CARE
Problem: ABCDS Injury Assessment  Goal: Absence of physical injury  Outcome: Progressing     Problem: Safety - Adult  Goal: Free from fall injury  Outcome: Progressing     Problem: Nutrition Deficit:  Goal: Optimize nutritional status  Outcome: Progressing     Problem: Discharge Planning  Goal: Discharge to home or other facility with appropriate resources  Outcome: Progressing  Flowsheets (Taken 1/28/2025 1950)  Discharge to home or other facility with appropriate resources:   Identify barriers to discharge with patient and caregiver   Arrange for needed discharge resources and transportation as appropriate   Identify discharge learning needs (meds, wound care, etc)     Problem: Pain  Goal: Verbalizes/displays adequate comfort level or baseline comfort level  Outcome: Progressing     Problem: Chronic Conditions and Co-morbidities  Goal: Patient's chronic conditions and co-morbidity symptoms are monitored and maintained or improved  Outcome: Progressing  Flowsheets (Taken 1/28/2025 1950)  Care Plan - Patient's Chronic Conditions and Co-Morbidity Symptoms are Monitored and Maintained or Improved:   Monitor and assess patient's chronic conditions and comorbid symptoms for stability, deterioration, or improvement   Collaborate with multidisciplinary team to address chronic and comorbid conditions and prevent exacerbation or deterioration   Update acute care plan with appropriate goals if chronic or comorbid symptoms are exacerbated and prevent overall improvement and discharge     Problem: Skin/Tissue Integrity  Goal: Skin integrity remains intact  Description: 1.  Monitor for areas of redness and/or skin breakdown  2.  Assess vascular access sites hourly  3.  Every 4-6 hours minimum:  Change oxygen saturation probe site  4.  Every 4-6 hours:  If on nasal continuous positive airway pressure, respiratory therapy assess nares and determine need for appliance change or resting period  Outcome: Progressing  Flowsheets

## 2025-01-29 NOTE — H&P
[facility]  Sedation/Analgesia History & Physical    Pt Name: Gustabo Machado  MRN: [id number]  YOB: 1970  Provider Performing Procedure: Hermelindo Ley MD, MD  Primary Care Physician: Daniel Agrawal MD    Formulation and discussion of sedation / procedure plans, risks, benefits, side effects and alternatives with patient and/or responsible adult completed.    PRE-PROCEDURE   DNR-CCA/DNR-CC []Yes [x]No  Brief History/Pre-Procedure Diagnosis: Large right pleural effusion          MEDICAL HISTORY  []CAD/Valve  []Liver Disease  []Lung Disease []Diabetes  []Hypertension []Renal Disease  []Additional information:       has a past medical history of Cancer (HCC), Pancreatic cancer (HCC), and Pulmonary emboli (HCC).    SURGICAL HISTORY   has a past surgical history that includes Colonoscopy (10/31/2022); CT NEEDLE BIOPSY LIVER PERCUTANEOUS (10/22/2024); Skin cancer excision (2011); Port Surgery (N/A, 10/31/2024); IR GUIDED THROMB MECH VEIN (11/17/2024); and IR INSERT TUNNELED PLEURA CATH W CUFF (11/19/2024).  Additional information:       ALLERGIES   Allergies as of 01/27/2025    (No Known Allergies)     Additional information:       MEDICATIONS   Coumadin Use Last 5 Days [x]No []Yes  Antiplatelet drug therapy use last 5 days  [x]No []Yes  Other anticoagulant use last 5 days  []No [x]Yes    Current Facility-Administered Medications:     morphine (PF) injection 2 mg, 2 mg, IntraVENous, Q4H PRN, Maria L Stephen MD    oxyCODONE (ROXICODONE) immediate release tablet 5 mg, 5 mg, Oral, Q4H PRN, Maria L Stephen MD    [Held by provider] apixaban (ELIQUIS) tablet 5 mg, 5 mg, Oral, BID, Kishore Degroot MD    [Held by provider] bumetanide (BUMEX) tablet 1 mg, 1 mg, Oral, Q72H PRN, Kishore Degroot MD    [Held by provider] bumetanide (BUMEX) tablet 1 mg, 1 mg, Oral, Daily, Kishore Degroot MD    famotidine (PEPCID) tablet 20 mg, 20 mg, Oral, Daily, Kishore Degroot MD, 20 mg at 01/29/25 0978

## 2025-01-29 NOTE — PROGRESS NOTES
[place] for Pulmonary, Sleep and Critical Care Medicine      Patient - Gustabo Machado   MRN -  [id number]   [facility] # - [id number]   - 1970      Date of Admission -  2025  1:05 PM  Date of evaluation -  2025  Room - -10/[facility]-A   Hospital Day - 2  Consulting - Vick Cardenas MD Primary Care Physician - Daniel Agrawal MD     Problem List      Active Hospital Problems    Diagnosis Date Noted    Pleural effusion, bilateral [J90] 2025    Hypervolemia [E87.70] 2025    Pericardial effusion [I31.39] 2025    Dyspnea on exertion [R06.09] 2025    Pancreatic adenocarcinoma (HCC) [C25.9] 2025     Reason for Consult    Bilateral pleural effusion   HPI   History Obtained From: Patient, his daughter and electronic medical record.    Gustabo Machado is a 54 y.o. male with past medical history of stage IV pancreatic cancer, PE, presented to the ER on 2025 for worsening shortness of breath.  Patient reports onset of shortness of breath for the past 5 days worsen with ambulation, mild improvement with rest.  Denies any shortness of breath at rest also complains of orthopnea.  Associated bilateral leg swelling.  Has recently visited PCP for leg swelling, chest x-ray was done which reported pleural effusion and was sent to the ER for further management. Denies fever/chills, chest pain, chest palpitation, abdominal pain.  Of note patient is being treated for pancreatic cancer with chemotherapy, currently on the fifth cycle the last cycle was on Tuesday, 2025.  Patient is currently continuing the chemotherapy.  Patient has a peritoneal, which is drained by his daughter at home every 3 to 5 days and is also managed by nurse for the dressing.   In ED patient was saturating 97% on room air.  EKG showed sinus tachycardia.  Chest x-ray showed worsening right-sided pleural effusion compared to chest x-ray on 1/10/2025.  CT scan showed bilateral worsening pleural effusion.     He is  metastatic  Disease    Assessment   -Bilateral pleural effusion R>L secondary to ? pancreatic cancer vs ADHF   -Acute hypoxic respiratory failure, patient is currently on 2 L of oxygen saturating at 94%, overnight patient has a drop in oxygen at 88%.   -Small pericardial effusion  -Possible mets to T11 vertebral, as noted on the CT 1/27/2025, also noted on 1/15/2025.   -Stage IV metastatic pancreatic cancer with metastasis in the abdomen including liver, intra-aortic abdominal adenopathy and carcinomatosis. (Dx on October 22, 2024, with pathological biopsy reported metastatic adenocarcinoma) Currently on chemotherapy, 5th cycle.  Not on any radiation.  Chemotherapy with FOLFIRINOX.  Patient also has a peritoneal port, which is drained by his daughter every 5 days and is followed by nursing aide at home for dressing.     Plan   -Monitor SpO2 wean supplemental O2 to maintain SpO2 >90%  -Pleural fluid exudative cytology pending, culture prelim no growth   -Discussed with patient due to persistent pleural effusion on right side will plan for chest tube placement by IR risks and benefits reviewed including bleeding, infection and PTX   -Pleural fluid studies consistent with exudative effusion suspect 2/2 known malignancy in abdomen will await cytology to see if able to confirm presence of malignant cells   -Incentive spirometry Q4 hourly as tolerated  -ECHO Negative for significant pericardial effusion  -Diuresis on hold due to hypotension   -DVT prophylaxis on heparin gtt       Questions and concerns addressed.    Electronically signed by   JESSI Adams - CNP on 1/29/2025 at 3:11 PM    Addendum by Dr. Roger MD:  Patient seen by me independently including key components of medical care. Face to face evaluation and examination was performed. Case discussed with Mr. Patrick Bianchi CNP. Agree with Certified nurse practitioner's findings and plan as documented in the Certified nurse practitioner's note.  Italicized font, if present,  represents changes to the note made by me. More than 50% of the encounter time involved with patient care by the Pulmonary & Critical care service team spent by me.    Electronically signed by   Maggie Duran MD on 1/29/2025 at 8:14 PM

## 2025-01-29 NOTE — PROGRESS NOTES
[place] for Pulmonary, Sleep and Critical Care Medicine      Patient - Gustabo Machado   MRN -  [id number]   [facility] # - [id number]   - 1970      Date of Admission -  2025  1:05 PM  Date of evaluation -  2025  Room - [facility]-A   Hospital Day - 1  Consulting - Mike Ellis MD Primary Care Physician - Daniel Agrawal MD     Problem List      Active Hospital Problems    Diagnosis Date Noted    Pleural effusion, bilateral [J90] 2025    Hypervolemia [E87.70] 2025    Pericardial effusion [I31.39] 2025    Dyspnea on exertion [R06.09] 2025    Pancreatic adenocarcinoma (HCC) [C25.9] 2025     Reason for Consult    Bilateral pleural effusion.  HPI   History Obtained From: Patient, his daughter and electronic medical record.    Gustabo Machado is a 54 y.o. male with past medical history of stage IV pancreatic cancer, PE, presented to the ER on 2025 for worsening shortness of breath.  Patient reports onset of shortness of breath for the past 5 days worsen with ambulation, mild improvement with rest.  Denies any shortness of breath at rest also complains of orthopnea.  Associated bilateral leg swelling.  Has recently visited PCP for leg swelling, chest x-ray was done which reported pleural effusion and was sent to the ER for further management. Denies fever/chills, chest pain, chest palpitation, abdominal pain.  Of note patient is being treated for pancreatic cancer with chemotherapy, currently on the fifth cycle the last cycle was on Tuesday, 2025.  Patient is currently continuing the chemotherapy.  Patient has a peritoneal, which is drained by his daughter at home every 3 to 5 days and is also managed by nurse for the dressing.   In ED patient was saturating 97% on room air.  EKG showed sinus tachycardia.  Chest x-ray showed worsening right-sided pleural effusion compared to chest x-ray on 1/10/2025.  CT scan showed bilateral worsening pleural effusion.     He is  Prior to Admission: potassium chloride (K-TAB) 10 MEQ extended release tablet, Take 1 tablet by mouth daily  bumetanide (BUMEX) 1 MG tablet, Take 1 tablet by mouth daily  senna-docusate (SENOKOT S) 8.6-50 MG per tablet, Take 1 tablet by mouth 2 times daily  potassium chloride (KLOR-CON M) 20 MEQ extended release tablet, Take 1 tablet by mouth daily  famotidine (PEPCID) 20 MG tablet, Take 1 tablet by mouth daily  apixaban (ELIQUIS) 5 MG TABS tablet, Take 1 tablet by mouth 2 times daily  Magic Mouthwash (MIRACLE MOUTHWASH), Swish and spit 5 mLs 4 times daily as needed for Irritation Prepare mixture 1:1:1 DIPHENHYDRAMINE HCL,NYSTATIN,LIDOCAINE HCL  zolpidem (AMBIEN CR) 12.5 MG extended release tablet, Take 1 tablet by mouth nightly as needed for Sleep for up to 180 days. Max Daily Amount: 12.5 mg  lidocaine-prilocaine (EMLA) 2.5-2.5 % cream, Apply topically as needed.  ondansetron (ZOFRAN-ODT) 4 MG disintegrating tablet, Take 1 tablet by mouth every 8 hours as needed for Nausea or Vomiting  bumetanide (BUMEX) 1 MG tablet, Take 1 tablet by mouth every 72 hours as needed (leg swelling, weight gain, SOB) (Patient not taking: Reported on 1/27/2025)  polyethylene glycol (GLYCOLAX) 17 g packet, Take 1 packet by mouth daily as needed for Constipation  Diet    ADULT DIET; Regular  ADULT ORAL NUTRITION SUPPLEMENT; Lunch, Dinner, Breakfast; Standard High Calorie/High Protein Oral Supplement  ADULT ORAL NUTRITION SUPPLEMENT; Breakfast; Clear Liquid Oral Supplement  Allergies    Patient has no known allergies.    Vitals     height is 1.829 m (6') and weight is 71.2 kg (157 lb). His oral temperature is 97.4 °F (36.3 °C). His blood pressure is 105/70 and his pulse is 122 (abnormal). His respiration is 16 and oxygen saturation is 93%.   Body mass index is 21.29 kg/m².    SUPPLEMENTAL O2: O2 Flow Rate (L/min): 3.5 L/min     I/O      Intake/Output Summary (Last 24 hours) at 1/28/2025 1902  Last data filed at 1/28/2025 1735  Gross per

## 2025-01-29 NOTE — PROGRESS NOTES
Formulation and discussion of sedation / procedure plans, risks, benefits, side effects and alternatives with patient and/or responsible adult completed.    History and Physical reviewed and unchanged.    Electronically signed by Hermelindo Ley MD on 1/29/25 at 3:55 PM EST

## 2025-01-29 NOTE — CONSULTS
Physician Consult Note        Patient:   Gustabo Machado  YOB: 1970  Age:  54 y.o.  Room:  [facility]10/010-A  MRN:  [id number]   Acct: [id number]  PCP: Daniel Agrawal MD    Date of Admission:  1/27/2025  1:05 PM  Date of Service:  1/29/2025    Reason for Consult: Goals of Care and Symptom Management             Subjective   Chief Complaint:-  Shortness of Breath       History Obtained From:-  Patient and Electronic Medical Record    History of Present Illness:-            Gustabo Machado is a 54 year old male who has a past medical history of pancreatic cancer and Pulmonary Emboli. Currently has stage IV pancreatic cancer and started chemotherapy on October 30th, 2024 with Folfirinox. He presented to the hospital for SOB and was admitted for bilateral pleural effusions. Has a history of recurrent ascites managed with Port-A-Cath which is drained every 5 days. Pulmonary team is following and patient had US guided therapeutic and diagnostic thoracentesis done on 01/28/25. Pleural cytology still pending. Had recent admission in beginning of January for SBO which was conservatively managed with NG. Since admission notes appetite has improved. Takes Ambien for sleep. Denies any constipation or diarrhea. Has not had any pain at home requiring any analgesia. No cough or dry mouth noted. Palliative care was consulted for goals of care and symptom management.    Symptoms:  Pain: They deny pain.  Shortness of breath: They report shortness of breath.  Sleep: Patient reports they are not having issues with sleep. Takes Ambien nightly for sleep.  Fatigue/Drowsiness: Patient denies fatigue.  Patient denies drowsiness.   Appetite: Patient reports their appetite is good. Their appetite is improving.  Wt. Loss: Patient's weight is stable.   Dry Mouth: Patient denies dry mouth.  Nausea/Vomiting: Patient denies nausea and vomiting.  Constipation/Diarrhea: Patient denies constipation or

## 2025-01-29 NOTE — PROGRESS NOTES
Spiritual Health History and Assessment/Progress Note  [facility]    Spiritual/Emotional Needs,  ,  ,      Name: Gustabo Machado MRN: [id number]    Age: 54 y.o.     Sex: male   Language: English   Sabianist: Latter day   Pleural effusion, bilateral     Date: 1/29/2025            Total Time Calculated: (P) 10 min              Spiritual Assessment continued in [facility] 5K        Referral/Consult From: Rounding   Encounter Overview/Reason: Spiritual/Emotional Needs  Service Provided For: Patient and family together    Isabel, Belief, Meaning:   Patient identifies as spiritual  Family/Friends identify as spiritual      Importance and Influence:  Patient has spiritual/personal beliefs that influence decisions regarding their health  Family/Friends have spiritual/personal beliefs that influence decisions regarding the patient's health    Community:  Patient is connected with a spiritual community  Family/Friends are connected with a spiritual community:    Assessment and Plan of Care:   In my follow up encounter with the 54 yr old Palliative Care patient the pt's family was supportively present. While rounding the unit 5K,  I provided spiritual care to patient and their family through conversation, I also came to assess their spiritual needs. The pt was admitted due to pleural effusion bilateral.     Patient Interventions include: Facilitated expression of thoughts and feelings  Family/Friends Interventions include: Facilitated expression of thoughts and feelings    Patient Plan of Care: Spiritual Care available upon further referral  Family/Friends Plan of Care: Spiritual Care available upon further referral    Electronically signed by KELLY Castro on 1/29/2025 at 3:33 PM

## 2025-01-30 ENCOUNTER — APPOINTMENT (OUTPATIENT)
Dept: GENERAL RADIOLOGY | Age: 55
DRG: 919 | End: 2025-01-30
Payer: COMMERCIAL

## 2025-01-30 LAB
ABO GROUP BLD: NORMAL
ALBUMIN SERPL BCG-MCNC: 2.7 G/DL (ref 3.5–5.1)
ALP SERPL-CCNC: 85 U/L (ref 38–126)
ALT SERPL W/O P-5'-P-CCNC: 8 U/L (ref 11–66)
ANION GAP SERPL CALC-SCNC: 9 MEQ/L (ref 8–16)
ANTI-COMPLEMENT: NORMAL
APTT PPP: 36.9 SECONDS (ref 22–38)
APTT PPP: 74 SECONDS (ref 22–38)
AST SERPL-CCNC: 15 U/L (ref 5–40)
BASOPHILS ABSOLUTE: 0 THOU/MM3 (ref 0–0.1)
BASOPHILS NFR BLD AUTO: 0.2 %
BILIRUB CONJ SERPL-MCNC: < 0.1 MG/DL (ref 0.1–13.8)
BILIRUB SERPL-MCNC: 0.3 MG/DL (ref 0.3–1.2)
BUN SERPL-MCNC: 16 MG/DL (ref 7–22)
CA-I BLD ISE-SCNC: 1.2 MMOL/L (ref 1.12–1.32)
CALCIUM SERPL-MCNC: 8.5 MG/DL (ref 8.5–10.5)
CHLORIDE SERPL-SCNC: 103 MEQ/L (ref 98–111)
CO2 SERPL-SCNC: 27 MEQ/L (ref 23–33)
CREAT SERPL-MCNC: 0.7 MG/DL (ref 0.4–1.2)
D DIMER PPP IA.FEU-MCNC: 2081 NG/ML FEU (ref 0–500)
DAT IGG: NORMAL
DAT POLY-SP REAG RBC QL: NORMAL
DEPRECATED RDW RBC AUTO: 61.6 FL (ref 35–45)
EKG ATRIAL RATE: 112 BPM
EKG P AXIS: 46 DEGREES
EKG P-R INTERVAL: 134 MS
EKG Q-T INTERVAL: 338 MS
EKG QRS DURATION: 90 MS
EKG QTC CALCULATION (BAZETT): 461 MS
EKG R AXIS: 41 DEGREES
EKG T AXIS: 46 DEGREES
EKG VENTRICULAR RATE: 112 BPM
EOSINOPHIL NFR BLD AUTO: 0.8 %
EOSINOPHILS ABSOLUTE: 0 THOU/MM3 (ref 0–0.4)
ERYTHROCYTE [DISTWIDTH] IN BLOOD BY AUTOMATED COUNT: 17.3 % (ref 11.5–14.5)
FIBRINOGEN PPP-MCNC: 433 MG/100ML (ref 155–475)
GFR SERPL CREATININE-BSD FRML MDRD: > 90 ML/MIN/1.73M2
GLUCOSE SERPL-MCNC: 90 MG/DL (ref 70–108)
HCT VFR BLD AUTO: 20.1 % (ref 42–52)
HCT VFR BLD AUTO: 21.4 % (ref 42–52)
HCT VFR BLD AUTO: 22.5 % (ref 42–52)
HCT VFR BLD AUTO: 23.7 % (ref 42–52)
HCT VFR BLD AUTO: 24 % (ref 42–52)
HEPARIN UNFRACTIONATED: 0.11 U/ML (ref 0.3–0.7)
HGB BLD-MCNC: 6.2 GM/DL (ref 14–18)
HGB BLD-MCNC: 6.4 GM/DL (ref 14–18)
HGB BLD-MCNC: 6.7 GM/DL (ref 14–18)
HGB BLD-MCNC: 7.1 GM/DL (ref 14–18)
HGB BLD-MCNC: 7.5 GM/DL (ref 14–18)
HGB RETIC QN AUTO: 33.6 PG (ref 28.2–35.7)
IAT IGG-SP REAG SERPL QL: NORMAL
IMM GRANULOCYTES # BLD AUTO: 0.02 THOU/MM3 (ref 0–0.07)
IMM GRANULOCYTES NFR BLD AUTO: 0.4 %
IMM RETICS NFR: 42 % (ref 2.3–13.4)
INR PPP: 1.32 (ref 0.85–1.13)
LDH FLD L TO P-CCNC: > 2500 U/L
LDH SERPL L TO P-CCNC: 177 U/L (ref 100–190)
LEUKEMIA/LYMPHOMA PHENOTYPING MISC: NORMAL
LYMPHOCYTES ABSOLUTE: 0.9 THOU/MM3 (ref 1–4.8)
LYMPHOCYTES NFR BLD AUTO: 18.6 %
MAGNESIUM SERPL-MCNC: 2.1 MG/DL (ref 1.6–2.4)
MCH RBC QN AUTO: 30.3 PG (ref 26–33)
MCHC RBC AUTO-ENTMCNC: 30 GM/DL (ref 32.2–35.5)
MCV RBC AUTO: 101.3 FL (ref 80–94)
MONOCYTES ABSOLUTE: 0.7 THOU/MM3 (ref 0.4–1.3)
MONOCYTES NFR BLD AUTO: 14.4 %
NEUTROPHILS ABSOLUTE: 3.2 THOU/MM3 (ref 1.8–7.7)
NEUTROPHILS NFR BLD AUTO: 65.6 %
NRBC BLD AUTO-RTO: 0 /100 WBC
PLATELET # BLD AUTO: 142 THOU/MM3 (ref 130–400)
PMV BLD AUTO: 9.9 FL (ref 9.4–12.4)
POTASSIUM SERPL-SCNC: 4.9 MEQ/L (ref 3.5–5.2)
PROT FLD-MCNC: 2.9 GM/DL
PROT SERPL-MCNC: 5.2 G/DL (ref 6.1–8)
PROT SERPL-MCNC: 5.2 G/DL (ref 6.1–8)
RBC # BLD AUTO: 2.34 MILL/MM3 (ref 4.7–6.1)
RETICS # AUTO: 104 THOU/MM3 (ref 20–115)
RETICS/RBC NFR AUTO: 5.2 % (ref 0.5–2)
RH BLD: NORMAL
SODIUM SERPL-SCNC: 139 MEQ/L (ref 135–145)
WBC # BLD AUTO: 4.9 THOU/MM3 (ref 4.8–10.8)

## 2025-01-30 PROCEDURE — P9047 ALBUMIN (HUMAN), 25%, 50ML: HCPCS

## 2025-01-30 PROCEDURE — 82248 BILIRUBIN DIRECT: CPT

## 2025-01-30 PROCEDURE — 85046 RETICYTE/HGB CONCENTRATE: CPT

## 2025-01-30 PROCEDURE — 99233 SBSQ HOSP IP/OBS HIGH 50: CPT | Performed by: STUDENT IN AN ORGANIZED HEALTH CARE EDUCATION/TRAINING PROGRAM

## 2025-01-30 PROCEDURE — 85025 COMPLETE CBC W/AUTO DIFF WBC: CPT

## 2025-01-30 PROCEDURE — 86923 COMPATIBILITY TEST ELECTRIC: CPT

## 2025-01-30 PROCEDURE — 84157 ASSAY OF PROTEIN OTHER: CPT

## 2025-01-30 PROCEDURE — 83735 ASSAY OF MAGNESIUM: CPT

## 2025-01-30 PROCEDURE — 86901 BLOOD TYPING SEROLOGIC RH(D): CPT

## 2025-01-30 PROCEDURE — 99223 1ST HOSP IP/OBS HIGH 75: CPT | Performed by: STUDENT IN AN ORGANIZED HEALTH CARE EDUCATION/TRAINING PROGRAM

## 2025-01-30 PROCEDURE — 87205 SMEAR GRAM STAIN: CPT

## 2025-01-30 PROCEDURE — 0W9930Z DRAINAGE OF RIGHT PLEURAL CAVITY WITH DRAINAGE DEVICE, PERCUTANEOUS APPROACH: ICD-10-PCS | Performed by: FAMILY MEDICINE

## 2025-01-30 PROCEDURE — 85520 HEPARIN ASSAY: CPT

## 2025-01-30 PROCEDURE — 85730 THROMBOPLASTIN TIME PARTIAL: CPT

## 2025-01-30 PROCEDURE — 71045 X-RAY EXAM CHEST 1 VIEW: CPT

## 2025-01-30 PROCEDURE — 99233 SBSQ HOSP IP/OBS HIGH 50: CPT | Performed by: INTERNAL MEDICINE

## 2025-01-30 PROCEDURE — 85610 PROTHROMBIN TIME: CPT

## 2025-01-30 PROCEDURE — 6370000000 HC RX 637 (ALT 250 FOR IP): Performed by: HOSPITALIST

## 2025-01-30 PROCEDURE — 85014 HEMATOCRIT: CPT

## 2025-01-30 PROCEDURE — 2140000000 HC CCU INTERMEDIATE R&B

## 2025-01-30 PROCEDURE — 36415 COLL VENOUS BLD VENIPUNCTURE: CPT

## 2025-01-30 PROCEDURE — 84155 ASSAY OF PROTEIN SERUM: CPT

## 2025-01-30 PROCEDURE — APPSS60 APP SPLIT SHARED TIME 46-60 MINUTES: Performed by: PHYSICIAN ASSISTANT

## 2025-01-30 PROCEDURE — 6360000002 HC RX W HCPCS

## 2025-01-30 PROCEDURE — 80053 COMPREHEN METABOLIC PANEL: CPT

## 2025-01-30 PROCEDURE — 85384 FIBRINOGEN ACTIVITY: CPT

## 2025-01-30 PROCEDURE — 89051 BODY FLUID CELL COUNT: CPT

## 2025-01-30 PROCEDURE — 85379 FIBRIN DEGRADATION QUANT: CPT

## 2025-01-30 PROCEDURE — P9016 RBC LEUKOCYTES REDUCED: HCPCS

## 2025-01-30 PROCEDURE — 82330 ASSAY OF CALCIUM: CPT

## 2025-01-30 PROCEDURE — 2500000003 HC RX 250 WO HCPCS

## 2025-01-30 PROCEDURE — 83010 ASSAY OF HAPTOGLOBIN QUANT: CPT

## 2025-01-30 PROCEDURE — 93005 ELECTROCARDIOGRAM TRACING: CPT

## 2025-01-30 PROCEDURE — 87186 SC STD MICRODIL/AGAR DIL: CPT

## 2025-01-30 PROCEDURE — 86885 COOMBS TEST INDIRECT QUAL: CPT

## 2025-01-30 PROCEDURE — 87070 CULTURE OTHR SPECIMN AEROBIC: CPT

## 2025-01-30 PROCEDURE — 30233N1 TRANSFUSION OF NONAUTOLOGOUS RED BLOOD CELLS INTO PERIPHERAL VEIN, PERCUTANEOUS APPROACH: ICD-10-PCS | Performed by: FAMILY MEDICINE

## 2025-01-30 PROCEDURE — 88108 CYTOPATH CONCENTRATE TECH: CPT

## 2025-01-30 PROCEDURE — 86900 BLOOD TYPING SEROLOGIC ABO: CPT

## 2025-01-30 PROCEDURE — 83615 LACTATE (LD) (LDH) ENZYME: CPT

## 2025-01-30 PROCEDURE — 36430 TRANSFUSION BLD/BLD COMPNT: CPT

## 2025-01-30 PROCEDURE — 87075 CULTR BACTERIA EXCEPT BLOOD: CPT

## 2025-01-30 PROCEDURE — 87077 CULTURE AEROBIC IDENTIFY: CPT

## 2025-01-30 PROCEDURE — 86880 COOMBS TEST DIRECT: CPT

## 2025-01-30 PROCEDURE — 6370000000 HC RX 637 (ALT 250 FOR IP)

## 2025-01-30 PROCEDURE — 85018 HEMOGLOBIN: CPT

## 2025-01-30 RX ORDER — SODIUM CHLORIDE 9 MG/ML
INJECTION, SOLUTION INTRAVENOUS PRN
Status: COMPLETED | OUTPATIENT
Start: 2025-01-30 | End: 2025-01-31

## 2025-01-30 RX ORDER — SENNOSIDES A AND B 8.6 MG/1
1 TABLET, FILM COATED ORAL 2 TIMES DAILY
Status: DISCONTINUED | OUTPATIENT
Start: 2025-01-30 | End: 2025-02-07 | Stop reason: HOSPADM

## 2025-01-30 RX ORDER — SODIUM CHLORIDE 9 MG/ML
INJECTION, SOLUTION INTRAVENOUS PRN
Status: COMPLETED | OUTPATIENT
Start: 2025-01-30 | End: 2025-02-02

## 2025-01-30 RX ADMIN — ZOLPIDEM TARTRATE 5 MG: 5 TABLET, FILM COATED ORAL at 21:16

## 2025-01-30 RX ADMIN — WATER 2000 MG: 1 INJECTION INTRAMUSCULAR; INTRAVENOUS; SUBCUTANEOUS at 21:16

## 2025-01-30 RX ADMIN — POTASSIUM CHLORIDE 10 MEQ: 750 TABLET, FILM COATED, EXTENDED RELEASE ORAL at 09:57

## 2025-01-30 RX ADMIN — FAMOTIDINE 20 MG: 20 TABLET, FILM COATED ORAL at 09:57

## 2025-01-30 RX ADMIN — SENNOSIDES 8.6 MG: 8.6 TABLET, COATED ORAL at 19:48

## 2025-01-30 RX ADMIN — ALBUMIN (HUMAN) 25 G: 0.25 INJECTION, SOLUTION INTRAVENOUS at 10:05

## 2025-01-30 RX ADMIN — HEPARIN SODIUM 18 UNITS/KG/HR: 10000 INJECTION, SOLUTION INTRAVENOUS at 05:30

## 2025-01-30 ASSESSMENT — PAIN SCALES - GENERAL: PAINLEVEL_OUTOF10: 0

## 2025-01-30 NOTE — PROGRESS NOTES
Hospitalist Progress Note  Internal Medicine Resident      Patient: Gustabo Machado 54 y.o. male      Unit/Bed: [facility]10/010-A    Admit Date: 1/27/2025      ASSESSMENT AND PLAN  Active Problems  #pancreatic adenocarcinoma  #Bilateral pleural effusions  #Shortness of breath  -on IV chemotherapy includes Oxaliplatin/Camptosar/Leucovorin/5FU, had 5 rounds with last occurrence on 1/24/24  -CT chest: Large bilateral pleural effusions with adjacent atelectasis, small pericardial effusion, sclerotic lesion T11 vertebral suspicious for metastatic disease and hypotonic lesions throughout the liver  -Requiring supplemental oxygen 2 L via nasal cannula  -Pulmonology consulting  -Palliative care consulting  -IR placed chest tube   -held 1 mg Bumex every 72 hours due to hypotension   -lengthy discussion had with patient and family regarding poor prognosis and code status    #hemothorax  -chest tube placed by  ml output of bloody drainage   -clamped by pulmonology   -CT surgery consulted   -D-dimer, fibrinogen and direct antiglobulin pending     #anemia   -H&H 6.4/22.5 overnight given 1 unit PRBCs; H&H 7.1/23.7 s/p transfusion  -s/p bloody output from chest tube repeat H&H 6.2/20.1  -consented for blood transfusion   -iron panel consistent with KWAN  -B12 1073  -folate WNL   -monitor CBC       #bacterial peritonitis   #Recurrent ascites  -Port of cath in place drained q5 days at home  -drained 250 ml here fluid sent for analysis   -PMN 58931   -ceftriaxone 2g q24 hr  -LDH fluid, protein, cell count w/ diff and culture pending   -serum LDH and protein pending   -if improvement of cell count will hold addition of vancomycin   -ID consulting      # Pericardial effusion  -ECHO during previous admission on 1/10/2025 EF 60 to 65%  -ECHO EF 60-65%, small pericardial effusion; stable when compared to previous         # Pulmonary embolism  -Hold Eliquis 5 mg twice daily  -held heparin given hemothorax      Resolved

## 2025-01-30 NOTE — CONSENT
Informed Consent for Blood Component Transfusion Note    I have discussed with the patient the rationale for blood component transfusion; its benefits in treating or preventing fatigue, organ damage, or death; and its risk which includes mild transfusion reactions, rare risk of blood borne infection, or more serious but rare reactions. I have discussed the alternatives to transfusion, including the risk and consequences of not receiving transfusion. The patient had an opportunity to ask questions and had agreed to proceed with transfusion of blood components.    Electronically signed by Will Al III, DO on 1/30/25 at 3:02 AM EST

## 2025-01-30 NOTE — CONSULTS
CT/CV Surgery Consult Note    1/30/2025 12:55 PM  Surgeon: Dr. Guardado    Reason for Consult: Hemothorax    HPI:   Mr. Machado  is a 54 year old with hx of stage IV metastatic pancreatic cancer with metastasis to the liver, carcinomatosis, PE with RV strain leading to Eliquis 11/2024, and now a new sclerotic lesion on T11 presented with complaints of dyspnea.  Patient is reporting shortness of breath and dyspnea on exertion for 5 days prior to admission on 1/27/25.  He is on active chemo.  Patient was recently discharged 2 weeks ago where he was being treated for recurrent ascites and SBO.  Patient denies any abdominal pain, nausea or vomiting on presentation to the hospital.  He is having regular bowel movements and passing gas.     In the ED, patient had chest x-ray which showed complete whiteout of his right lung.  CT chest confirmed bilateral pleural effusions right worse than left and again confirmed the T11 sclerotic lesion.  His Eliquis was discontinued and he was started on heparin drip with recent PE, but due to drop in Hgb the heparin was discontinued also today around 12:30pm.  Since pigtail catheter was placed he has felt much better, but this morning he had a 250 cc dump of fluid that appeared to be bloody.  This is why the heparin was stopped.    1/29/25 US Thoracentesis: 1.2 L of red pleural fluid drained from right chest    1/29/25 CT Abd/Pelvis WO Contrast  IMPRESSION:  1. Large bilateral pleural effusions have increased in volume with worsening  consolidation/compressive atelectasis noted.     2. The peritoneal catheter terminating along the dome of the right hepatic lobe  is not significantly changed. The innumerable multifocal hypoattenuating hepatic  metastatic lesions are not significantly changed accounting for lack of IV  contrast. The small amount of perihepatic ascites as well as the small amount of  ascites in the dependent portion of the pelvis appear similar. The previously  seen dilated  Cancer (HCC), Pancreatic cancer (HCC), and Pulmonary emboli (HCC).    Past Surgical History:  The patient  has a past surgical history that includes Colonoscopy (10/31/2022); CT NEEDLE BIOPSY LIVER PERCUTANEOUS (10/22/2024); Skin cancer excision (2011); Port Surgery (N/A, 10/31/2024); IR GUIDED THROMB MECH VEIN (11/17/2024); IR INSERT TUNNELED PLEURA CATH W CUFF (11/19/2024); and CT GUIDED CHEST TUBE (1/29/2025).    Allergies:  The patient has No Known Allergies.    Family History:  This patient's family history includes Diabetes in his mother; Stroke in his mother.    Social History:  Gustabo  reports that he has never smoked. He has never used smokeless tobacco. He reports that he does not currently use alcohol. He reports that he does not use drugs.    ROS:  Constitutional: Negative for activity change, chills, or fever.   HENT: Negative for congestion, facial swelling, sore throat, and changes in voice.    Eyes: Negative for photophobia, redness, itching and visual disturbance.   Respiratory: Negative for apnea, choking, wheezing and stridor.  POSITIVE FOR SOB.  Cardiovascular: Negative for chest pain, palpitations and leg swelling.   Gastrointestinal: Negative for abdominal distention, constipation, nausea and vomiting.   Endocrine: Negative for cold intolerance, heat intolerance, polyphagia and polyuria.   Musculoskeletal: Negative for arthralgias, gait problem, and myalgias.  Skin: Negative for color change, rash and wound.   Allergic/Immunologic: Negative for food allergies and immunocompromised state.   Neurological: Negative for dizziness, tremors, speech difficulty, weakness, numbness and headaches.   Hematological: Negative for adenopathy. Does not bruise/bleed easily.   Psychiatric/Behavioral: Negative for agitation, confusion, and dysphoric mood.     Physical Exam:   General appearance:  No apparent distress, appears stated age and cooperative.  HEENT:  Normal cephalic, atraumatic without obvious

## 2025-01-30 NOTE — CONSULTS
Hx and P/E :Infectious D.       Patient - Gustabo Machado,  Age - 54 y.o.    - 1970      Room Number - 5K-10/010-A   N -  [id number]   [facility] # - [id number]  Date of Admission -  2025  1:05 PM  Patient's PCP: Daniel Agrawal MD     Requesting Physician: Vick Cardenas MD    CHIEF COMPLAINT:     Peritonitis    ASSESSMENT AND PLAN     Patient is a 54-year-old male with stage IV pancreatic cancer and liver metastasis who I am consulted for peritonitis.    In the setting of an elevated total nucleated cell count of 18,000 and neutrophils of 98%, this is concerning for peritonitis.  This patient has an indwelling Pleurx drain due to recurrent ascites in the setting of metastatic malignancy.      On review of ISPD guidelines for peritonitis, this cannot be extrapolated to this patient with a Pleurx catheter for recurrent ascites.  Unfortunately, I suspect that use of intraperitoneal antibiotics will be fairly toxic as it is not given with dialysate solution and cycled out.  I would recommend continuing systemic therapy with ceftriaxone 2 g daily.  This patient requires removal of the peritoneal catheter.    Recommend systemic therapy with ceftriaxone   I would continue this is a parenteral agent, do not recommend intraperitoneal administration  Do not recommend antifungal prophylaxis  I have discussed with hospitalist service and we will repeat cell counts from peritoneal fluid today, if there has been improvement in cell counts, then we will continue therapy with ceftriaxone alone but if there is worsening cell counts we will initiate vancomycin in addition to ceftriaxone  Blood cultures so far with no evidence of growth      HISTORY OF PRESENT ILLNESS       This is a very pleasant 54 y.o. male who was admitted to the hospital with a chief complaints of peritoneal infection.  Infectious disease consulted for antibiotic recommendations.    This patient has a known history of  polyethylene glycol, zolpidem, heparin (porcine), heparin (porcine)  Allergies:   ALLERGIES: Patient has no known allergies.           SOCIAL HISTORY:     TOBACCO:   reports that he has never smoked. He has never used smokeless tobacco.     ETOH:   reports that he does not currently use alcohol.        FAMILY HISTORY:         Problem Relation Age of Onset    Diabetes Mother     Stroke Mother        REVIEW OF SYSTEMS:     Constitutional: no fever, no night sweats, no fatigue.  Head: no head ache , no head injury, no migranes.  Eye: no blurring of vision, no double vision.  Ears: no hearing difficulty, no tinnitus  Mouth/throat: no ulceration, dental caries , dysphagia  Lungs: no cough, no shortness of breath, no wheeze  CVS: no palpitation, no chest pain, no shortness of breath  GI: no abdominal pain, no nausea , no vomiting, no constipation  MAXIME: no dysuria, frequency and urgency, no hematuria, no kidney stones  Musculoskeletal: no joint pain, swelling , stiffness,  Endocrine: no polyuria, polydipsia, no cold or heat intolerance  Hematology: no anemia, no easy brusing or bleeding, no hx of clotting disorder  Dermatology: no skin rash, no eczema, no pruritis,  Psychiatry: no depression, no anxiety,no panic attacks, no suicide ideation    PHYSICAL EXAM:     /61   Pulse (!) 102   Temp 97.9 °F (36.6 °C) (Oral)   Resp 19   Ht 1.829 m (6')   Wt 71.2 kg (157 lb)   SpO2 95%   BMI 21.29 kg/m²   General:  Awake, alert, not in distress.  HEENT: pink conjunctiva, unicteric sclera, moist oral mucosa.  Chest:  bilateral air entry, Clear to auscultation,   Cardiovascular:  RRR ,S1S2, no murmur or gallop.  Abdomen:  Soft, non tender to palpation.  Extremities: no edema  Skin:  Warm and dry.  CNS: aox3        LABS:     CBC:   Recent Labs     01/28/25  0545 01/29/25  0219 01/29/25  2332 01/30/25  0153 01/30/25  0343   WBC 4.8 6.1  --   --  4.9   HGB 7.7* 7.4* 6.4* 6.7* 7.1*    158  --   --  142     BMP:    Recent

## 2025-01-30 NOTE — PLAN OF CARE
I was notified by primary service regarding chest tube output change to sanguinous, repeat Hgb showed decline from 7.1 this AM 0343 to 6.2 at 1131 today 260 ml appx noted out per RN Godwin. I arrived at bedside and evaluated pt. Reports no increased SOB or chest pain dizziness or vision changes.     Chest tube noted to have sanguinous output clamped at bedside order for Cardiothroacic surgery consult placed. Discussed with patient due to decrease in HGB, mild tachycardia agree with administration of PRBC. Will monitor with repeat HGB after PRBC and CXR 2 hrs after clamping chest tube. Reviewed code status with patient explained Full Code and DNR CCA limited (explained risks benefits of intubation mech vent, CPR, shock and meds) at this time he wishes to remain full code and take time to discuss different options with family. Advised patient to take time and notify staff of any questions or wishes to change current POC. He verbalized understanding and agreed Wife Nanci on speaker phone during most of conversation as well.     Electronically signed by JESSI Adams CNP on 1/30/2025 at 1:04 PM

## 2025-01-30 NOTE — PLAN OF CARE
Problem: ABCDS Injury Assessment  Goal: Absence of physical injury  1/30/2025 0149 by Chantale Chao, RN  Outcome: Progressing  Flowsheets (Taken 1/29/2025 1201 by Gail Huggins RN)  Absence of Physical Injury: Implement safety measures based on patient assessment     Problem: Safety - Adult  Goal: Free from fall injury  1/30/2025 0149 by Chantale Chao RN  Outcome: Progressing  Flowsheets (Taken 1/30/2025 0149)  Free From Fall Injury: Instruct family/caregiver on patient safety     Problem: Nutrition Deficit:  Goal: Optimize nutritional status  1/30/2025 0149 by Chantale Chao RN  Outcome: Progressing  Flowsheets (Taken 1/30/2025 0149)  Nutrient intake appropriate for improving, restoring, or maintaining nutritional needs:   Assess nutritional status and recommend course of action   Monitor oral intake, labs, and treatment plans     Problem: Pain  Goal: Verbalizes/displays adequate comfort level or baseline comfort level  1/30/2025 0149 by Chantale Chao RN  Outcome: Progressing  Flowsheets (Taken 1/30/2025 0149)  Verbalizes/displays adequate comfort level or baseline comfort level:   Encourage patient to monitor pain and request assistance   Assess pain using appropriate pain scale   Administer analgesics based on type and severity of pain and evaluate response   Implement non-pharmacological measures as appropriate and evaluate response     Problem: Skin/Tissue Integrity  Goal: Skin integrity remains intact  Description: 1.  Monitor for areas of redness and/or skin breakdown  2.  Assess vascular access sites hourly  3.  Every 4-6 hours minimum:  Change oxygen saturation probe site  4.  Every 4-6 hours:  If on nasal continuous positive airway pressure, respiratory therapy assess nares and determine need for appliance change or resting period  1/30/2025 0149 by Chantale Chao, RN  Outcome: Progressing  Flowsheets (Taken 1/30/2025 0149)  Skin Integrity Remains Intact:

## 2025-01-30 NOTE — PROGRESS NOTES
Hospitalist Progress Note  Internal Medicine Resident      Patient: Gustabo Machado 54 y.o. male      Unit/Bed: -10/010-A    Admit Date: 1/27/2025      ASSESSMENT AND PLAN  Active Problems    Bilateral large pleural effusions R>L s/p thoracentesis 1/28 and chest tube placement 1/29  Malignancy versus fluid overload. Patient has dyspnea. On CT chest on arrival. Chest x-ray showed complete whiteout of right lung. Thoracentesis done with 1.2 L drained. Pleural fluid exudative by lights criteria. Follow culture and cytology. Chest tube was placed 1/29. Bumex held due to hypotension. Resume diuretics 1/30 if blood pressure tolerates.  Pulmonary following.    Bacterial peritonitis  Recurrent ascites  Has peritoneal port for drainage of ascites. Drained fluid tested with PMN count above 250.  Patient was started on ceftriaxone. Follow culture. Suspect patient's peritoneal cath is infected and needs removed. ID consulted. Patient started on 25 g albumin twice daily for 2 days.    Stage IV pancreatic adenocarcinoma with metastasis to liver, adenopathy, carcinomatosis, and suspicion for mets to T11 vertebra  Diagnosed 10/22/2024. IV chemotherapy includes Oxaliplatin/Camptosar/Leucovorin/5FU, had 5 rounds with last occurrence on 1/24/24.  No radiation. CT chest 1/27  showed T11 sclerotic lesion with suspicion for mets.     Acute hypoxic respiratory failure secondary to pleural effusion, resolving  Required supplemental up to oxygen 3 L via nasal cannula. Currently, SpO2 96 on 2 L NC.  Wean oxygen as tolerated. Pulmonary following.    Small pericardial effusion  On TTE 1/27/2025. Hemodynamically stable. Malignancy versus viral. Patient has no chest pain.    Acute macrocytic anemia  Hgb 7.4. Baseline 11-12. no overt bleeding. Iron studies: Ferritin 1128, iron 19, TIBC 113, iron percent saturation 17, folate more than 20, vitamin B-12 1073. Reticulocyte absolute count 2.2.  Immature reticulocyte fraction 43.8. Patient will  need to be discharged with iron tablets.    Goals of care  Palliative care following. Patient will like to pursue all cancer treatments.  Patient started on pain meds.    Chronic  History of pulmonary embolism hold Eliquis 5 mg twice daily. Continue heparin gtt.    LDA: []CVC / []PICC / []Midline / []Cahvez / []Drains / []Mediport / [x]None  Antibiotics: N/A  Steroids: N/A  Labs (still needed?): [x]Yes / []No  IVF (still needed?): []Yes / [x]No    Level of care: []Step Down / [x]Med-Surg  Bed Status: [x]Inpatient / []Observation  Telemetry: []Yes / []No  PT/OT: []Yes / [x]No    DVT Prophylaxis: [] Lovenox / [x] Heparin / [] SCDs / [] Already on Systemic Anticoagulation / [] None     Expected discharge date: TBD  Disposition: Home  Code status: Full code    ===================================================================    Chief Complaint: SOB   Subjective (past 24 hours):     Patient denied shortness of breath, chest pain. Has some abdominal pain.    HPI / Hospital Course:  54-year-old male past medical history of stage IV pancreatic adenocarcinoma currently undergoing chemotherapy presented with 4 to 5 days of shortness of breath.  Shortness of breath worsened when he was up and moving.  Prior to being seen in the ED here he was seen by his primary care provider who took an x-ray and relayed to him that his lung was full of fluid.  He reports having some decreased appetite but denies nausea, vomiting or diarrhea.  He states he does have intermittent abdominal pain that he describes as a stretching sensation since the diagnosis of the cancer.  His last chemotherapy treatment was Tuesday.  He relates that he has been tolerating the chemotherapy okay.  Denies lightheadedness, dizziness, chest pain, headache, fevers, chills, numbness or tingling.  Per patient's wife the Port-A-Cath in the patient's belly has been draining appropriately every 5 days.  Patient had recent admission for small bowel obstruction and

## 2025-01-30 NOTE — PROCEDURES
PROCEDURE NOTE  Date: 1/30/2025   Name: Gustabo Machado  YOB: 1970    Procedures  EKG completed, given to Godwin TRAN

## 2025-01-30 NOTE — PLAN OF CARE
Have been in open communication with nursing this morning about chest tube output, heparin gtt., and anemia. Had originally opted to continue heparin given initial stability/improvement of hemoglobin and thrombectomy for PE in 11/2024. Repeat hgb now at 6.2 and chest tube has put approximately 260 mL of jonel sanguinous output since 7am. Have discussed with pulmonology service, who is planning CTS consult.     Patient maintains stability for medsurg unit at this time. Have discussed in detail with primary nurse that if needs become more than reasonable for 5K can place transfer to stepdown unit.     1 unit PRBC ordered, hold heparin gtt., monitor hgb    Electronically signed by Cory Bucio, DO on 1/30/2025 at 12:26 PM    Plan discussed w/ dr garcia.

## 2025-01-31 ENCOUNTER — APPOINTMENT (OUTPATIENT)
Dept: GENERAL RADIOLOGY | Age: 55
DRG: 919 | End: 2025-01-31
Payer: COMMERCIAL

## 2025-01-31 ENCOUNTER — APPOINTMENT (OUTPATIENT)
Dept: INTERVENTIONAL RADIOLOGY/VASCULAR | Age: 55
DRG: 919 | End: 2025-01-31
Payer: COMMERCIAL

## 2025-01-31 PROBLEM — K65.9 PERITONITIS (HCC): Status: ACTIVE | Noted: 2025-01-31

## 2025-01-31 PROBLEM — J91.0 MALIGNANT PLEURAL EFFUSION: Status: ACTIVE | Noted: 2025-01-31

## 2025-01-31 LAB
ANION GAP SERPL CALC-SCNC: 9 MEQ/L (ref 8–16)
BUN SERPL-MCNC: 11 MG/DL (ref 7–22)
CA-I BLD ISE-SCNC: 1.04 MMOL/L (ref 1.12–1.32)
CALCIUM SERPL-MCNC: 6.5 MG/DL (ref 8.5–10.5)
CHARACTER, BODY FLUID: ABNORMAL
CHLORIDE SERPL-SCNC: 110 MEQ/L (ref 98–111)
CO2 SERPL-SCNC: 22 MEQ/L (ref 23–33)
COLOR FLD: ABNORMAL
CREAT SERPL-MCNC: 0.4 MG/DL (ref 0.4–1.2)
DEPRECATED RDW RBC AUTO: 62.1 FL (ref 35–45)
DEPRECATED RDW RBC AUTO: 62.2 FL (ref 35–45)
ERYTHROCYTE [DISTWIDTH] IN BLOOD BY AUTOMATED COUNT: 17.7 % (ref 11.5–14.5)
ERYTHROCYTE [DISTWIDTH] IN BLOOD BY AUTOMATED COUNT: 17.9 % (ref 11.5–14.5)
GFR SERPL CREATININE-BSD FRML MDRD: > 90 ML/MIN/1.73M2
GLUCOSE SERPL-MCNC: 91 MG/DL (ref 70–108)
GRANULOCYTES NFR FLD AUTO: 0 %
HAPTOGLOB SERPL-MCNC: 164 MG/DL (ref 30–200)
HCT VFR BLD AUTO: 23.7 % (ref 42–52)
HCT VFR BLD AUTO: 24.2 % (ref 42–52)
HGB BLD-MCNC: 7.4 GM/DL (ref 14–18)
HGB BLD-MCNC: 7.5 GM/DL (ref 14–18)
MAGNESIUM SERPL-MCNC: 1.5 MG/DL (ref 1.6–2.4)
MAGNESIUM SERPL-MCNC: 2.2 MG/DL (ref 1.6–2.4)
MCH RBC QN AUTO: 30.7 PG (ref 26–33)
MCH RBC QN AUTO: 31 PG (ref 26–33)
MCHC RBC AUTO-ENTMCNC: 31 GM/DL (ref 32.2–35.5)
MCHC RBC AUTO-ENTMCNC: 31.2 GM/DL (ref 32.2–35.5)
MCV RBC AUTO: 99.2 FL (ref 80–94)
MCV RBC AUTO: 99.2 FL (ref 80–94)
MONONUC CELLS NFR FLD AUTO: 0 %
NUC CELL # FLD AUTO: 8538 /CUMM (ref 0–500)
PATHOLOGIST REVIEW: ABNORMAL
PHOSPHATE SERPL-MCNC: 2.3 MG/DL (ref 2.4–4.7)
PHOSPHATE SERPL-MCNC: 3.2 MG/DL (ref 2.4–4.7)
PLATELET # BLD AUTO: 103 THOU/MM3 (ref 130–400)
PLATELET # BLD AUTO: 89 THOU/MM3 (ref 130–400)
PMV BLD AUTO: 9.7 FL (ref 9.4–12.4)
PMV BLD AUTO: 9.8 FL (ref 9.4–12.4)
POTASSIUM SERPL-SCNC: 3 MEQ/L (ref 3.5–5.2)
POTASSIUM SERPL-SCNC: 4.5 MEQ/L (ref 3.5–5.2)
RBC # BLD AUTO: 2.39 MILL/MM3 (ref 4.7–6.1)
RBC # BLD AUTO: 2.44 MILL/MM3 (ref 4.7–6.1)
RBC # FLD AUTO: ABNORMAL /CUMM
REVIEWED BY: NORMAL
SCAN OF BLOOD SMEAR: NORMAL
SMEAR REVIEW: NORMAL
SODIUM SERPL-SCNC: 141 MEQ/L (ref 135–145)
SPECIMEN: ABNORMAL
TOTAL VOLUME RECEIVED BODY FLUID: 50 ML
WBC # BLD AUTO: 3.4 THOU/MM3 (ref 4.8–10.8)
WBC # BLD AUTO: 4.4 THOU/MM3 (ref 4.8–10.8)

## 2025-01-31 PROCEDURE — 84100 ASSAY OF PHOSPHORUS: CPT

## 2025-01-31 PROCEDURE — 6370000000 HC RX 637 (ALT 250 FOR IP): Performed by: HOSPITALIST

## 2025-01-31 PROCEDURE — 2580000003 HC RX 258

## 2025-01-31 PROCEDURE — 6360000002 HC RX W HCPCS

## 2025-01-31 PROCEDURE — 99255 IP/OBS CONSLTJ NEW/EST HI 80: CPT | Performed by: THORACIC SURGERY (CARDIOTHORACIC VASCULAR SURGERY)

## 2025-01-31 PROCEDURE — 6370000000 HC RX 637 (ALT 250 FOR IP)

## 2025-01-31 PROCEDURE — 85027 COMPLETE CBC AUTOMATED: CPT

## 2025-01-31 PROCEDURE — 99233 SBSQ HOSP IP/OBS HIGH 50: CPT | Performed by: PHYSICIAN ASSISTANT

## 2025-01-31 PROCEDURE — 2500000003 HC RX 250 WO HCPCS

## 2025-01-31 PROCEDURE — 80048 BASIC METABOLIC PNL TOTAL CA: CPT

## 2025-01-31 PROCEDURE — 49422 REMOVE TUNNELED IP CATH: CPT

## 2025-01-31 PROCEDURE — 99233 SBSQ HOSP IP/OBS HIGH 50: CPT | Performed by: STUDENT IN AN ORGANIZED HEALTH CARE EDUCATION/TRAINING PROGRAM

## 2025-01-31 PROCEDURE — 71045 X-RAY EXAM CHEST 1 VIEW: CPT

## 2025-01-31 PROCEDURE — 2140000000 HC CCU INTERMEDIATE R&B

## 2025-01-31 PROCEDURE — APPSS30 APP SPLIT SHARED TIME 16-30 MINUTES: Performed by: PHYSICIAN ASSISTANT

## 2025-01-31 PROCEDURE — 99233 SBSQ HOSP IP/OBS HIGH 50: CPT | Performed by: INTERNAL MEDICINE

## 2025-01-31 PROCEDURE — 84132 ASSAY OF SERUM POTASSIUM: CPT

## 2025-01-31 PROCEDURE — 0WP9X0Z REMOVAL OF DRAINAGE DEVICE FROM RIGHT PLEURAL CAVITY, EXTERNAL APPROACH: ICD-10-PCS | Performed by: FAMILY MEDICINE

## 2025-01-31 PROCEDURE — 83735 ASSAY OF MAGNESIUM: CPT

## 2025-01-31 PROCEDURE — 82330 ASSAY OF CALCIUM: CPT

## 2025-01-31 PROCEDURE — 2709999900 IR REMOVE TUNNELED INTRAPERITONEAL CATH

## 2025-01-31 RX ORDER — MAGNESIUM SULFATE IN WATER 40 MG/ML
2000 INJECTION, SOLUTION INTRAVENOUS PRN
Status: DISCONTINUED | OUTPATIENT
Start: 2025-01-31 | End: 2025-02-07 | Stop reason: HOSPADM

## 2025-01-31 RX ORDER — POTASSIUM CHLORIDE 7.45 MG/ML
10 INJECTION INTRAVENOUS PRN
Status: DISPENSED | OUTPATIENT
Start: 2025-01-31 | End: 2025-02-01

## 2025-01-31 RX ORDER — POTASSIUM CHLORIDE 1500 MG/1
40 TABLET, EXTENDED RELEASE ORAL PRN
Status: ACTIVE | OUTPATIENT
Start: 2025-01-31 | End: 2025-02-01

## 2025-01-31 RX ADMIN — POTASSIUM CHLORIDE 10 MEQ: 7.46 INJECTION, SOLUTION INTRAVENOUS at 09:24

## 2025-01-31 RX ADMIN — SODIUM PHOSPHATE, MONOBASIC, MONOHYDRATE AND SODIUM PHOSPHATE, DIBASIC, ANHYDROUS 15 MMOL: 142; 276 INJECTION, SOLUTION INTRAVENOUS at 06:39

## 2025-01-31 RX ADMIN — POTASSIUM CHLORIDE 10 MEQ: 7.46 INJECTION, SOLUTION INTRAVENOUS at 08:13

## 2025-01-31 RX ADMIN — WATER 2000 MG: 1 INJECTION INTRAMUSCULAR; INTRAVENOUS; SUBCUTANEOUS at 21:10

## 2025-01-31 RX ADMIN — ZOLPIDEM TARTRATE 5 MG: 5 TABLET, FILM COATED ORAL at 21:14

## 2025-01-31 RX ADMIN — POTASSIUM CHLORIDE 10 MEQ: 7.46 INJECTION, SOLUTION INTRAVENOUS at 10:29

## 2025-01-31 RX ADMIN — FAMOTIDINE 20 MG: 20 TABLET, FILM COATED ORAL at 08:09

## 2025-01-31 RX ADMIN — POTASSIUM CHLORIDE 10 MEQ: 7.46 INJECTION, SOLUTION INTRAVENOUS at 07:10

## 2025-01-31 RX ADMIN — MAGNESIUM SULFATE HEPTAHYDRATE 2000 MG: 40 INJECTION, SOLUTION INTRAVENOUS at 06:20

## 2025-01-31 RX ADMIN — POTASSIUM CHLORIDE 10 MEQ: 7.46 INJECTION, SOLUTION INTRAVENOUS at 11:38

## 2025-01-31 RX ADMIN — POTASSIUM CHLORIDE 10 MEQ: 7.46 INJECTION, SOLUTION INTRAVENOUS at 06:16

## 2025-01-31 RX ADMIN — SODIUM CHLORIDE 1000 ML: 9 INJECTION, SOLUTION INTRAVENOUS at 01:25

## 2025-01-31 RX ADMIN — POTASSIUM CHLORIDE 10 MEQ: 750 TABLET, FILM COATED, EXTENDED RELEASE ORAL at 08:09

## 2025-01-31 RX ADMIN — SENNOSIDES 8.6 MG: 8.6 TABLET, COATED ORAL at 08:09

## 2025-01-31 NOTE — CARE COORDINATION
1/31/25, 11:35 AM EST    DISCHARGE ON GOING EVALUATION    [facility] day: 4  Location: [facility]28/028-A Reason for admit: Pericardial effusion [I31.39]  Pleural effusion [J90]  Pancreatic adenocarcinoma (HCC) [C25.9]  Pleural effusion, bilateral [J90]  Hypervolemia, unspecified hypervolemia type [E87.70]     Procedures:   1/28 planned ultrasound guided thoracentesis   1/31 Pleurx removed  Imaging since last note: CXR ordered    Barriers to Discharge: Transfer from . Chest tube in place. Pleurx being removed today. Hospitalist, Pulm, ID, CTS following. IV rocephin. Electrolyte replacement.     PCP: Daniel Agrawal MD  Readmission Risk Score: 30.4    Patient Goals/Plan/Treatment Preferences: From home with wife and CHP Kari ROSALES.

## 2025-01-31 NOTE — PROGRESS NOTES
Comprehensive Nutrition Assessment    Type and Reason for Visit:  Reassess    Nutrition Recommendations/Plan:   Continue diet per provider and encourage adequate PO intake at best efforts.   Continue ONS: Ensure PLUS (TID) and Ensure Clear (Breakfast)  Recommend consideration for appetite stimulant     Malnutrition Assessment:  Malnutrition Status:  Severe malnutrition (01/28/25 1449)    Context:  Chronic Illness     Findings of the 6 clinical characteristics of malnutrition:  Energy Intake:  75% or less estimated energy requirements for 1 month or longer  Weight Loss:  Greater than 10% over 6 months (26.7% (57 lb) in the last 5 months which is significant)     Body Fat Loss:  Severe body fat loss Orbital, Triceps, Buccal region   Muscle Mass Loss:  Severe muscle mass loss Temples (temporalis), Clavicles (pectoralis & deltoids), Calf (gastrocnemius), Hand (interosseous)  Fluid Accumulation:  No fluid accumulation     Strength:  Not Performed    Nutrition Assessment:     Pt. severely malnourished AEB criteria listed above. At risk for further nutritional compromise r/t admitted d/t SOB, CXR at [place] showed left basilar consolidative opactiy with moderate left pleural effusion - s/p thoracentesis 1.2L, peritonitis, hemothorax - not surgical candidate, metastatic pancreatic cancer - concerns for progressing disease. Noted recent admission in January 2024 d/t SBO with ascites. Noted underlying medical condition (PMHx pancreatic cancer dx 10/2024- undergoing aggressive chemo and pulmonary emboli in 2024 s/p thrombectomy, GERD, HTN).     Nutrition Related Findings:  Pt. Report/Treatments/Miscellaneous:   1/31: Pt seen, wife at bedside with pt getting ready to set pt up for lunch - brought soup from home. Pt ate well for breakfast this AM; fruit and cereal and tolerated well. Has been continuing to drink ONS intermittently. Denies N/V or chewing/swallowing difficulties.   1/28: Patient seen, sitting up in bed with

## 2025-01-31 NOTE — PROGRESS NOTES
TRANSFER - OUT REPORT:    Verbal report given to Kalli RN(name) on Gustabo Machado being transferred to (unit) for routine progression of patient care       Report consisted of patient's Situation, Background, Assessment and   Recommendations(SBAR).     Information from the following report(s) Nurse Handoff Report, Surgery Report, and MAR was reviewed with the receiving nurse.    Opportunity for questions and clarification was provided.      Patient transported with:   Monitor

## 2025-01-31 NOTE — PLAN OF CARE
Problem: ABCDS Injury Assessment  Goal: Absence of physical injury  1/31/2025 1224 by Molly Maldonado RN  Outcome: Progressing     Problem: Safety - Adult  Goal: Free from fall injury  1/31/2025 1224 by Molly Maldonado RN  Outcome: Progressing     Problem: Nutrition Deficit:  Goal: Optimize nutritional status  1/31/2025 1224 by Molly Maldonado RN  Outcome: Progressing     Problem: Discharge Planning  Goal: Discharge to home or other facility with appropriate resources  1/31/2025 1224 by Molly Maldonado RN  Outcome: Progressing  Flowsheets (Taken 1/31/2025 0810)  Discharge to home or other facility with appropriate resources: Identify barriers to discharge with patient and caregiver     Problem: Respiratory - Adult  Goal: Achieves optimal ventilation and oxygenation  Outcome: Progressing     Problem: Pain  Goal: Verbalizes/displays adequate comfort level or baseline comfort level  1/31/2025 1224 by Molly Maldonado RN  Outcome: Progressing  Flowsheets (Taken 1/31/2025 0759)  Verbalizes/displays adequate comfort level or baseline comfort level: Encourage patient to monitor pain and request assistance     Problem: Chronic Conditions and Co-morbidities  Goal: Patient's chronic conditions and co-morbidity symptoms are monitored and maintained or improved  1/31/2025 1224 by Molly Maldonado RN  Outcome: Progressing  Flowsheets (Taken 1/31/2025 0810)  Care Plan - Patient's Chronic Conditions and Co-Morbidity Symptoms are Monitored and Maintained or Improved: Monitor and assess patient's chronic conditions and comorbid symptoms for stability, deterioration, or improvement     Problem: Skin/Tissue Integrity  Goal: Skin integrity remains intact  Description: 1.  Monitor for areas of redness and/or skin breakdown  2.  Assess vascular access sites hourly  3.  Every 4-6 hours minimum:  Change oxygen saturation probe site  4.  Every 4-6 hours:  If on nasal continuous positive airway pressure, respiratory therapy  Adult  Goal: Maintains adequate nutritional intake  Outcome: Progressing     Problem: Infection - Adult  Goal: Absence of infection at discharge  Outcome: Progressing  Flowsheets (Taken 1/31/2025 0810)  Absence of infection at discharge: Assess and monitor for signs and symptoms of infection     Problem: Infection - Adult  Goal: Absence of infection during hospitalization  Outcome: Progressing     Problem: Metabolic/Fluid and Electrolytes - Adult  Goal: Electrolytes maintained within normal limits  Outcome: Progressing     Problem: Metabolic/Fluid and Electrolytes - Adult  Goal: Hemodynamic stability and optimal renal function maintained  Outcome: Progressing     Care plan reviewed with patient.  Patient verbalizes understanding of the care plan and contributed to goal setting.

## 2025-01-31 NOTE — PROGRESS NOTES
Provider Progress Note        Patient:   Gustabo Machado  YOB: 1970  Age:  54 y.o.  Room:  3B-28/028-A  MRN:  [id number]   Acct: [id number]  PCP: Daniel Agrawal MD    Date of Admission:  1/27/2025  1:05 PM  Date of Service:  1/31/2025    Reason for Consult: Goals of Care, Symptom Management, and Continuity of Care    History Obtained From:-  Patient, Spouse, Electronic Medical Record, and Patient's primary nurse             Subjective   Gustabo Machado was seen in their room today for follow up with the palliative care team. There was family present at the bedside. Patient denies pain, shortness of breath, nausea, vomiting, constipation, and diarrhea. They have Senna 8.6 mg twice a day for scheduled symptom relief. From 8 AM yesterday to 8 AM today, they have used No Medications for as needed symptom relief.  Their comfort medications are working well to control their symptoms.  They did get a good night sleep last night. The patient is eating or receiving tube feeds. They have not had a bowel movement in the last 24 hours documented.       Objective   Vitals:-    /79   Pulse (!) 114   Temp 98 °F (36.7 °C) (Oral)   Resp 16   Ht 1.829 m (6')   Wt 71.2 kg (157 lb)   SpO2 95%   BMI 21.29 kg/m²     Physical Exam  Vitals and nursing note reviewed.   Constitutional:       General: He is awake. He is not in acute distress.     Appearance: He is ill-appearing.   HENT:      Head: Normocephalic and atraumatic.      Right Ear: External ear normal.      Left Ear: External ear normal.      Nose: No rhinorrhea.   Eyes:      General:         Right eye: No discharge.         Left eye: No discharge.   Cardiovascular:      Rate and Rhythm: Tachycardia present.   Pulmonary:      Effort: Pulmonary effort is normal. No respiratory distress.   Musculoskeletal:      Cervical back: Neck supple.   Neurological:      General: No focal deficit present.      Mental Status: He is  alert.   Psychiatric:         Mood and Affect: Affect normal.         Speech: Speech normal. He is communicative.         Behavior: Behavior is not agitated, aggressive or hyperactive. Behavior is cooperative.         Cognition and Memory: Cognition normal. Cognition is not impaired.          Palliative Performance Scale   50%  Mainly sit/lie; Extensive disease; Can't do any work; Considerable assist; intake normal or reduced; LOC full/confusion     Assessment and Plan   Gustabo Machado is a 54 y.o. who is admitted to [facility] for Pleural effusion, bilateral. Palliative care is consulted for Goals of Care, Symptom Management, and Continuity of Care.  Patient's cytology on the thoracentesis returned as positive for malignancy consistent with poorly differentiated adenocarcinoma.  This most likely represents metastasis from his pancreatic cancer.  I discussed this with the patient today stating that his cancer has progressed despite treatment.  I told him and his wife that I would reach out to oncology to see what options are available for further treatment.  I remain worried that with his decline he may not be a candidate for further treatment given declining and performance status as well as progression of disease.  His oncologist was not present in the office today, however we will reach out on Monday for possible consult/recommendations for further outpatient treatment. Palliative care will continue to have goals of care discussions with the patient while they are hospitalized.  Palliative Care will continue to follow the patient while they are hospitalized. Patient will continue to follow with palliative care in the palliative care clinic on discharge.         Principal Problem:    Pleural effusion, bilateral  Active Problems:    Palliative care patient    Cancer related pain    Metastatic adenocarcinoma to liver (HCC)    Peritoneal carcinomatosis (HCC)    Malignant ascites    Pancreatic

## 2025-01-31 NOTE — PLAN OF CARE
Problem: ABCDS Injury Assessment  Goal: Absence of physical injury  Outcome: Progressing  Flowsheets (Taken 1/29/2025 1201 by Gail Huggins, RN)  Absence of Physical Injury: Implement safety measures based on patient assessment     Problem: Safety - Adult  Goal: Free from fall injury  Outcome: Progressing  Flowsheets (Taken 1/30/2025 0149 by Chantale Chao, RN)  Free From Fall Injury: Instruct family/caregiver on patient safety     Problem: Nutrition Deficit:  Goal: Optimize nutritional status  Outcome: Progressing  Flowsheets (Taken 1/30/2025 0149 by Chantale Chao, RN)  Nutrient intake appropriate for improving, restoring, or maintaining nutritional needs:   Assess nutritional status and recommend course of action   Monitor oral intake, labs, and treatment plans     Problem: Discharge Planning  Goal: Discharge to home or other facility with appropriate resources  Outcome: Progressing  Flowsheets (Taken 1/30/2025 0149 by Chantale Chao, RN)  Discharge to home or other facility with appropriate resources:   Identify barriers to discharge with patient and caregiver   Arrange for needed discharge resources and transportation as appropriate   Identify discharge learning needs (meds, wound care, etc)     Problem: Pain  Goal: Verbalizes/displays adequate comfort level or baseline comfort level  Outcome: Progressing  Flowsheets (Taken 1/30/2025 1820 by Molly Maldonado, RN)  Verbalizes/displays adequate comfort level or baseline comfort level: Encourage patient to monitor pain and request assistance     Problem: Chronic Conditions and Co-morbidities  Goal: Patient's chronic conditions and co-morbidity symptoms are monitored and maintained or improved  Outcome: Progressing  Flowsheets (Taken 1/30/2025 0149 by Chantale Chao, RN)  Care Plan - Patient's Chronic Conditions and Co-Morbidity Symptoms are Monitored and Maintained or Improved: Monitor and assess patient's chronic conditions and

## 2025-01-31 NOTE — PROGRESS NOTES
CT/CV Surgery Progress Note    2025 8:08 AM  Surgeon:  Dr. uGardado    Subjective:  Mr. Machado is resting comfortably in bed on RA, alert, and in no acute distress. Pt denies chest pressure, SOB, fever,chills, N/V/D. Hgb has remained stable following transfusion and discontinuation of heparin.    Intake/Output Summary (Last 24 hours) at 2025 0808  Last data filed at 2025 0620  Gross per 24 hour   Intake 800 ml   Output 450 ml   Net 350 ml     Vital Signs: /73   Pulse (!) 104   Temp 98.2 °F (36.8 °C) (Oral)   Resp 16   Ht 1.829 m (6')   Wt 71.2 kg (157 lb)   SpO2 95%   BMI 21.29 kg/m²    Temp (24hrs), Av.1 °F (36.7 °C), Min:97.9 °F (36.6 °C), Max:98.7 °F (37.1 °C)    Labs:   CBC:  Recent Labs     25  2332 25  0343 25  1131 25  1443 25  1857 25  0340   WBC 6.1  --  4.9  --   --   --  3.4*   HGB 7.4*   < > 7.1* 6.2*  --  7.5* 7.5*   HCT 23.5*   < > 23.7* 20.1*  --  24.0* 24.2*   MCV 98.7*  --  101.3*  --   --   --  99.2*     --  142  --   --   --  89*   APTT 85.7*  --  74.0*  --  36.9  --   --    INR  --   --   --   --  1.32*  --   --     < > = values in this interval not displayed.     BMP:   Recent Labs     25  034    139 141   K 4.6 4.9 3.0*    103 110   CO2 24 27 22*   PHOS  --   --  2.3*   BUN 13 16 11   CREATININE 0.7 0.7 0.4   MG  --  2.1 1.5*     Last HgA1C:   Lab Results   Component Value Date    LABA1C 5.0 2025     Imaging:  CXR: 2025  pending    Scheduled Meds:    senna  1 tablet Oral BID    cefTRIAXone (ROCEPHIN) IV  2,000 mg IntraVENous Q24H    [Held by provider] apixaban  5 mg Oral BID    [Held by provider] bumetanide  1 mg Oral Daily    famotidine  20 mg Oral Daily    potassium chloride  10 mEq Oral Daily     ROS: All neg unless specifically mentioned in subjective section.     Exam:  General Appearance: alert ,conversing, in no acute  distress  Cardiovascular: normal rate, regular rhythm, normal S1 and S2, no murmurs, rubs, clicks, or gallops  Pulmonary/Chest: basilar crackles, otherwise clear  Neurological: alert, oriented, normal speech, no focal findings or movement disorder noted    Assessment:   Patient Active Problem List   Diagnosis    Pancreatic mass    Pancreatic abnormality    Abdominal pain, right lower quadrant    Acute right-sided low back pain without sciatica    Liver lesion    Palliative care patient    Cancer related pain    Malignant neoplasm of tail of pancreas (HCC)    Metastatic adenocarcinoma to liver (HCC)    Hyponatremia    Peritoneal carcinomatosis (HCC)    Malignant ascites    Bilateral pulmonary embolism (HCC)    Moderate malnutrition (HCC)    Small bowel obstruction (HCC)    Severe malnutrition (HCC)    Pancreatic adenocarcinoma (HCC)    Pleural effusion, bilateral    Hypervolemia    Pericardial effusion    Dyspnea on exertion     Plan:   Chest tube drainage has been minimal even with being hooked to suction since around 2pm yesterday.  He is not a surgical candidate so we will sign off.     The plan of care was discussed in detail with Dr. Debby Yi PA-C

## 2025-01-31 NOTE — PROGRESS NOTES
0925 Patient received in IR for Abdominal pleurex removal.  0930 This procedure has been fully reviewed with the patient and written informed consent has been obtained.  0945 Procedure started with   0950 Pleurex drain removed.  0951 Procedure completed; patient tolerated well. 1 Suture to area, Dressing to abdomen, gauze and opsite no bleeding noted.  0956 Patient remains on bed; comfort ensured.  0957 Patient taken to 3B via bed/transport. Pt alert and oriented x3; follows commands. Skin pink, warm, and dry. Respirations easy, regular, and nonlabored.

## 2025-01-31 NOTE — PROGRESS NOTES
Family Meeting Note        Patient:   Gustabo Machado  YOB: 1970  Age:  54 y.o.  Room:  3B-28/028-A  MRN:  [id number]   Acct: [id number]  PCP: Daniel Agrawal MD    Date of Admission:  1/27/2025  1:05 PM  Date of Service:  1/30/2025    Reason for Consult: Goals of Care, Symptom Management, and Continuity of Care    Reason for Meeting:   Routine meeting  Discuss goals of care  Treatment options/plans  Provide clinical updates and answer questions  Share information and provider education for the family  Listen to patient/family concerns  Assess family understanding, concerns, and coping  Discuss chronic critical illness  Poor prognosis is anticipated             Meeting Attendees   Present for the family meeting was Patient, Spouse, Palliative Care Attending, Primary Team Attending, and Primary Team Resident    Meeting Description   The primary team attending provided a medical update.  He explained that the patient's condition had declined and there was concern that his cancer has spread causing the pleural effusion.  He also explained that the catheter in his abdomen was infected and would need to be removed.  He also explained that poor prognosis is anticipated given the multiple new problems that have occurred during this hospitalization.  The palliative care attending discussed the plan of care going forward.  We discussed is important to determine what the cause of the pleural effusion was as this could significantly alter the treatment plan.  I explicitly talked about how if his pleural effusion is malignant and his pericardial effusion is malignant this would be indicated of very poor prognosis.  I asked him to think about if he knew his time sure how he would want to spend his time.  He is not yet ready to change his CODE STATUS.  Support was provided.    Patient denies pain and shortness of breath. Their comfort medications are working well to control their

## 2025-01-31 NOTE — PROGRESS NOTES
[facility]      Patient - Gustabo Machado   MRN -  [id number]   [facility] # - [id number]   - 1970      Date of Admission -  2025  1:05 PM  Date of evaluation -  2025  Room - 3B--A   Hospital Day - 3  Consulting - Meagan Epps PA Primary Care Physician - Daniel Agrawal MD     Problem List      Active Hospital Problems    Diagnosis Date Noted    Pleural effusion, bilateral [J90] 2025    Hypervolemia [E87.70] 2025    Pericardial effusion [I31.39] 2025    Dyspnea on exertion [R06.09] 2025    Pancreatic adenocarcinoma (HCC) [C25.9] 2025    Peritoneal carcinomatosis (HCC) [C78.6] 2024    Malignant ascites [R18.0] 2024    Metastatic adenocarcinoma to liver (HCC) [C78.7] 10/29/2024    Palliative care patient [Z51.5] 10/23/2024    Cancer related pain [G89.3] 10/23/2024     Reason for Consult    Bilateral pleural effusion   HPI   History Obtained From: Patient, his daughter and electronic medical record.    Gustabo Machado is a 54 y.o. male with past medical history of stage IV pancreatic cancer, PE, presented to the ER on 2025 for worsening shortness of breath.  Patient reports onset of shortness of breath for the past 5 days worsen with ambulation, mild improvement with rest.  Denies any shortness of breath at rest also complains of orthopnea.  Associated bilateral leg swelling.  Has recently visited PCP for leg swelling, chest x-ray was done which reported pleural effusion and was sent to the ER for further management. Denies fever/chills, chest pain, chest palpitation, abdominal pain.  Of note patient is being treated for pancreatic cancer with chemotherapy, currently on the fifth cycle the last cycle was on Tuesday, 2025.  Patient is currently continuing the chemotherapy.  Patient has a peritoneal, which is drained by his daughter at home every 3 to 5 days and is also managed by nurse for the  Liquid Oral Supplement  Allergies    Patient has no known allergies.    Vitals     height is 1.829 m (6') and weight is 71.2 kg (157 lb). His oral temperature is 98.7 °F (37.1 °C). His blood pressure is 101/74 and his pulse is 109 (abnormal). His respiration is 16 and oxygen saturation is 94%.   Body mass index is 21.29 kg/m².    SUPPLEMENTAL O2: O2 Flow Rate (L/min): 1 L/min     I/O      Intake/Output Summary (Last 24 hours) at 1/30/2025 2226  Last data filed at 1/30/2025 2011  Gross per 24 hour   Intake 800 ml   Output 1420 ml   Net -620 ml     I/O last 3 completed shifts:  In: 600 [P.O.:100; Blood:500]  Out: 3010 [Chest Tube:3010]   Patient Vitals for the past 96 hrs (Last 3 readings):   Weight   01/27/25 1306 71.2 kg (157 lb)       Exam   Nursing note and vitals reviewed.  Constitutional: Patient appears moderately built and moderately nourished. No distress. Patient is oriented to person, place, and time.  HENT:   Head: Normocephalic and atraumatic.   Right Ear: External ear normal.   Left Ear: External ear normal.   Mouth/Throat: Oropharynx is clear and moist.  No oral thrush.  Eyes: Conjunctivae are normal. Pupils are equal, round, and reactive to light. No scleral icterus.   Neck: Neck supple. No JVD present. No tracheal deviation present.   Cardiovascular: Normal rate, regular rhythm, normal heart sounds. No murmur heard.   Pulmonary/Chest: Effort normal.  Right-sided chest tube in place.  Decreased breath sounds in the lower part of the posterior no stridor. No respiratory distress.  No wheezes. No rales. Patient exhibits no tenderness.   Abdominal: Soft. Patient exhibits no distension. No tenderness. S/p drainage catheter in place  Musculoskeletal: Normal range of motion.  Extremities: Patient exhibits bilateral 1+ leg edema and no tenderness.   Lymphadenopathy:  No cervical adenopathy.   Neurological: Patient is alert and oriented to person, place, and time.   Skin: Skin is warm and dry. Patient is not  diaphoretic.   Psychiatric: Patient  has a normal mood and affect. Patient behavior is normal.     Labs  - Old records and notes have been reviewed in [facility]   ABG  Lab Results   Component Value Date/Time    PH 7.48 11/17/2024 08:31 AM    PO2 47 11/17/2024 08:31 AM    PCO2 34 11/17/2024 08:31 AM    HCO3 26 11/17/2024 08:31 AM    O2SAT 86 11/17/2024 08:31 AM     Lab Results   Component Value Date/Time    MODE Not entered 11/17/2024 08:31 AM     CBC  Recent Labs     01/28/25  0545 01/29/25  0219 01/29/25  2332 01/30/25  0343 01/30/25  1131 01/30/25  1857   WBC 4.8 6.1  --  4.9  --   --    RBC 2.52* 2.38*  --  2.34*  --   --    HGB 7.7* 7.4*   < > 7.1* 6.2* 7.5*   HCT 24.6* 23.5*   < > 23.7* 20.1* 24.0*   MCV 97.6* 98.7*  --  101.3*  --   --    MCH 30.6 31.1  --  30.3  --   --    MCHC 31.3* 31.5*  --  30.0*  --   --     158  --  142  --   --    MPV 9.1* 9.3*  --  9.9  --   --     < > = values in this interval not displayed.      BMP  Recent Labs     01/28/25  0545 01/29/25 0219 01/30/25  0343    138 139   K 4.6 4.6 4.9    103 103   CO2 26 24 27   BUN 14 13 16   CREATININE 0.6 0.7 0.7   GLUCOSE 97 96 90   MG 2.0  --  2.1   CALCIUM 8.8 8.5 8.5     LFT  Recent Labs     01/30/25  1443   AST 15   ALT 8*   BILITOT 0.3   ALKPHOS 85     TROP  No results found for: \"TROPONINT\"  BNP  No results for input(s): \"BNP\" in the last 72 hours.  Lactic Acid  No results for input(s): \"LACTA\" in the last 72 hours.  INR  Recent Labs     01/30/25  1443   INR 1.32*     PTT  Recent Labs     01/29/25  0219 01/30/25  0343 01/30/25  1443   APTT 85.7* 74.0* 36.9     Glucose  No results for input(s): \"POCGLU\" in the last 72 hours.  UA   Recent Labs     01/30/25  1635   COLORU BROWN   .    PFTs   No PFTs on file to review    Sleep studies   No previous sleep studies to review    Cultures    COVID-19/influenza A/B- Negative       EKG     Echocardiogram     ECHO 1/28/2025    Left Ventricle Normal left ventricular systolic

## 2025-01-31 NOTE — PROGRESS NOTES
[facility]      Patient - Gustabo Machado   MRN -  [id number]   [facility] # - [id number]   - 1970      Date of Admission -  2025  1:05 PM  Date of evaluation -  2025  Room - 3B--A   Hospital Day - 4  Consulting - Meagan Epps PA Primary Care Physician - Daniel Agrawal MD     Problem List      Active Hospital Problems    Diagnosis Date Noted    Peritonitis (HCC) [K65.9] 2025    Malignant pleural effusion [J91.0] 2025    Pleural effusion, bilateral [J90] 2025    Hypervolemia [E87.70] 2025    Pericardial effusion [I31.39] 2025    Dyspnea on exertion [R06.09] 2025    Pancreatic adenocarcinoma (HCC) [C25.9] 2025    Peritoneal carcinomatosis (HCC) [C78.6] 2024    Malignant ascites [R18.0] 2024    Metastatic adenocarcinoma to liver (HCC) [C78.7] 10/29/2024    Palliative care patient [Z51.5] 10/23/2024    Cancer related pain [G89.3] 10/23/2024     Reason for Consult    Bilateral pleural effusion   HPI   History Obtained From: Patient, his daughter and electronic medical record.    Gustabo Machado is a 54 y.o. male with past medical history of stage IV pancreatic cancer, PE, presented to the ER on 2025 for worsening shortness of breath.  Patient reports onset of shortness of breath for the past 5 days worsen with ambulation, mild improvement with rest.  Denies any shortness of breath at rest also complains of orthopnea.  Associated bilateral leg swelling.  Has recently visited PCP for leg swelling, chest x-ray was done which reported pleural effusion and was sent to the ER for further management. Denies fever/chills, chest pain, chest palpitation, abdominal pain.  Of note patient is being treated for pancreatic cancer with chemotherapy, currently on the fifth cycle the last cycle was on Tuesday, 2025.  Patient is currently continuing the chemotherapy.  Patient has a peritoneal, which is  Total Protein: 6.2  Serum LDH: 244      Total protein ratio i.e Pleural fluid total protein/ Serum Total protein: 4/6.2=0.65  LDH ratio i.e Pleural fluid LDH/ Serum LDH: 720/244=2.96    Body fluid Culture-No prelim growth   Cytology-Pending    Radiology    CXR    XR CHEST 1 VIEW    01/28/25    FINDINGS:      There is a large residual right-sided pleural effusion following right-sided  thoracentesis. However, there is improved aeration of the right upper lung zone.  No pneumothorax is seen.     There is a moderate left pleural effusion with dependent left basilar  consolidative atelectasis or pneumonia.     The heart size appears stable from prior examination.     IMPRESSION:  1. There is a large residual right-sided pleural effusion following right-sided  thoracentesis. However, there is improved aeration of the right upper lung zone.  No pneumothorax is seen.  2. There is a moderate left pleural effusion with dependent left basilar  consolidative atelectasis or pneumonia.        1/27/2025 vs. 1/10/2025    IMPRESSION:  1. There is near complete opacification of the right lung which is significantly  worsened when compared to previous chest x-ray dated 1/10/2025.    CT Scans  (See actual reports for details)  1/27/2025    IMPRESSION:  1. Large bilateral pleural effusions with adjacent atelectasis/infiltrate.  2. Small pericardial effusion.  3. Sclerotic lesion at the T11 vertebral suspicious for osseous metastatic  disease.  4. Hypoattenuating lesions throughout the liver suspicious for metastatic  Disease    Assessment   -Bilateral pleural effusion R>L secondary to ? pancreatic cancer vs ADHF vs suspicious of empyema.   -Acute hypoxic respiratory failure, patient is currently on 2 L of oxygen saturating at 94%, overnight patient has a drop in oxygen at 88%.   -Small pericardial effusion  -Possible mets to T11 vertebral, as noted on the CT 1/27/2025, also noted on 1/15/2025.   -Stage IV metastatic pancreatic cancer  Pulmonary & Critical care service team spent by me.    Please see my modifications mentioned below:  He is not in distress  Right-sided chest tube is in place  Pleural fluid is growing gram-positive coccobacilli-empyema  Dr. Magaly Noel MD from infectious disease specialty is following the patient.  Antibiotic management per ID service.  I spoke with the patient and his wife at bedside and informed about my impression and plan  Gustabo Machado educated about my impression and plan. He verbalizes understanding.    Electronically signed by   Maggie Duran MD on 1/31/2025 at 7:01 PM

## 2025-01-31 NOTE — PROGRESS NOTES
Hospitalist Progress Note      Patient:  Gustabo Machado 54 y.o. male     Unit/Bed:3B-28/028-A    Date of Admission: 1/27/2025      ASSESSMENT AND PLAN    Active Problems  Metastatic pancreatic cancer   Palliative Care consulted, case discussed with service & their notes reviewed, appreciate recommendations.    Pleurx drain removed 2/2 peritonitis   PC MD is concerned about progressing disease; speaking with Oncology about options   Pleural Effusions, Hemothorax   CTS consulted, case discussed with service & their notes reviewed, appreciate recommendations.    They signed off today. No surgery indicated at this time. Continue to have chest tube open. They are okay with Heparin restarted.   Bacterial Peritonitis   ID consulted and following  Pleurx drain removed today due to infection.   IV Ceftriaxone. Cell count improving.   Pancytopenia   WBC 3.4, Hgb 7.5, Plts 89. CBC this afternoon.   Hgb stable after 1 unit of pRBC   Pericardial Effusion   2/2 malignancy   Pulmonary Embolism   November 2024 2/2 malignancy   Holding Heparin at this time       LDA: []CVC / []PICC / []Midline / []Chavez / []Drains / []Mediport / [x]None  Antibiotics: None   Steroids: None   Labs (still needed?): [x]Yes / []No  IVF (still needed?): [x]Yes / []No    Level of care: []Step Down / [x]Med-Surg  Bed Status: [x]Inpatient / []Observation  Telemetry: []Yes / [x]No  PT/OT: []Yes / [x]No    DVT Prophylaxis: [x] Lovenox / [] Heparin / [] SCDs / [] Already on Systemic Anticoagulation / [] None   Code status: Full Code     Expected discharge date:  2-3 days   Disposition: Home      ===================================================================    Chief Complaint: Nausea vomiting   Subjective (past 24 hours): No acute events overnight. Pt resting in bed and states that he had a small BM today that minimal blood, he also states that he has a hemorrhoid.       HPI / Hospital Course:  54-year-old male past medical history of stage IV

## 2025-01-31 NOTE — PROGRESS NOTES
bumetanide (BUMEX) 1 MG tablet Take 1 tablet by mouth daily 30 tablet 5    senna-docusate (SENOKOT S) 8.6-50 MG per tablet Take 1 tablet by mouth 2 times daily 60 tablet 2    potassium chloride (KLOR-CON M) 20 MEQ extended release tablet Take 1 tablet by mouth daily 30 tablet 0    famotidine (PEPCID) 20 MG tablet Take 1 tablet by mouth daily 60 tablet 0    apixaban (ELIQUIS) 5 MG TABS tablet Take 1 tablet by mouth 2 times daily 60 tablet 5    Magic Mouthwash (MIRACLE MOUTHWASH) Swish and spit 5 mLs 4 times daily as needed for Irritation Prepare mixture 1:1:1 DIPHENHYDRAMINE HCL,NYSTATIN,LIDOCAINE  mL 2    zolpidem (AMBIEN CR) 12.5 MG extended release tablet Take 1 tablet by mouth nightly as needed for Sleep for up to 180 days. Max Daily Amount: 12.5 mg 30 tablet 5    lidocaine-prilocaine (EMLA) 2.5-2.5 % cream Apply topically as needed. 1 each 0    ondansetron (ZOFRAN-ODT) 4 MG disintegrating tablet Take 1 tablet by mouth every 8 hours as needed for Nausea or Vomiting 30 tablet 0    bumetanide (BUMEX) 1 MG tablet Take 1 tablet by mouth every 72 hours as needed (leg swelling, weight gain, SOB) (Patient not taking: Reported on 1/27/2025) 15 tablet 0    polyethylene glycol (GLYCOLAX) 17 g packet Take 1 packet by mouth daily as needed for Constipation         REVIEW OF SYSTEMS    Constitutional: no fever, no night sweats, no fatigue.  Head: no head ache , no head injury, no migranes.  Eye: no blurring of vision, no double vision.  Ears: no hearing difficulty, no tinnitus  Mouth/throat: no ulceration, dental caries , dysphagia  Lungs: no cough no chest pain  CVS: no palpitation, no chest pain, no shortness of breath  GI: no abdominal pain, no nausea , no vomiting, no constipation  MAXIME: no dysuria, frequency and urgency, no hematuria, no kidney stones  Musculoskeletal: no joint pain, swelling , stiffness,  Endocrine: no polyuria, polydipsia, no cold or heat intolerance  Hematology: no anemia, no easy brusing or

## 2025-01-31 NOTE — PROGRESS NOTES
This RN called report to Molly on 3B. No further questions at this time. Patient transferred to 3B at this time. Family at bedside.

## 2025-01-31 NOTE — PROGRESS NOTES
This RN drained peritoneal pleurex cath. Approximately 100ml of nicolasa serous fluid removed.  Fluid labeled and sent to lab for testing.

## 2025-02-01 ENCOUNTER — APPOINTMENT (OUTPATIENT)
Dept: GENERAL RADIOLOGY | Age: 55
DRG: 919 | End: 2025-02-01
Payer: COMMERCIAL

## 2025-02-01 ENCOUNTER — APPOINTMENT (OUTPATIENT)
Dept: INTERVENTIONAL RADIOLOGY/VASCULAR | Age: 55
DRG: 919 | End: 2025-02-01
Payer: COMMERCIAL

## 2025-02-01 LAB
ANION GAP SERPL CALC-SCNC: 8 MEQ/L (ref 8–16)
BACTERIA BLD AEROBE CULT: NORMAL
BACTERIA BLD AEROBE CULT: NORMAL
BUN SERPL-MCNC: 11 MG/DL (ref 7–22)
CA-I BLD ISE-SCNC: 1.16 MMOL/L (ref 1.12–1.32)
CALCIUM SERPL-MCNC: 7.9 MG/DL (ref 8.5–10.5)
CHLORIDE SERPL-SCNC: 104 MEQ/L (ref 98–111)
CO2 SERPL-SCNC: 25 MEQ/L (ref 23–33)
CREAT SERPL-MCNC: 0.4 MG/DL (ref 0.4–1.2)
DEPRECATED RDW RBC AUTO: 60.8 FL (ref 35–45)
ECHO BSA: 1.9 M2
ERYTHROCYTE [DISTWIDTH] IN BLOOD BY AUTOMATED COUNT: 17.3 % (ref 11.5–14.5)
GFR SERPL CREATININE-BSD FRML MDRD: > 90 ML/MIN/1.73M2
GLUCOSE SERPL-MCNC: 99 MG/DL (ref 70–108)
HCT VFR BLD AUTO: 23.1 % (ref 42–52)
HGB BLD-MCNC: 7.3 GM/DL (ref 14–18)
MAGNESIUM SERPL-MCNC: 2.2 MG/DL (ref 1.6–2.4)
MCH RBC QN AUTO: 31.3 PG (ref 26–33)
MCHC RBC AUTO-ENTMCNC: 31.6 GM/DL (ref 32.2–35.5)
MCV RBC AUTO: 99.1 FL (ref 80–94)
PLATELET # BLD AUTO: 91 THOU/MM3 (ref 130–400)
PMV BLD AUTO: 9.9 FL (ref 9.4–12.4)
POTASSIUM SERPL-SCNC: 4.3 MEQ/L (ref 3.5–5.2)
RBC # BLD AUTO: 2.33 MILL/MM3 (ref 4.7–6.1)
SODIUM SERPL-SCNC: 137 MEQ/L (ref 135–145)
WBC # BLD AUTO: 4.2 THOU/MM3 (ref 4.8–10.8)

## 2025-02-01 PROCEDURE — 2500000003 HC RX 250 WO HCPCS

## 2025-02-01 PROCEDURE — 99233 SBSQ HOSP IP/OBS HIGH 50: CPT | Performed by: STUDENT IN AN ORGANIZED HEALTH CARE EDUCATION/TRAINING PROGRAM

## 2025-02-01 PROCEDURE — 6370000000 HC RX 637 (ALT 250 FOR IP): Performed by: HOSPITALIST

## 2025-02-01 PROCEDURE — 6370000000 HC RX 637 (ALT 250 FOR IP)

## 2025-02-01 PROCEDURE — 71045 X-RAY EXAM CHEST 1 VIEW: CPT

## 2025-02-01 PROCEDURE — 2140000000 HC CCU INTERMEDIATE R&B

## 2025-02-01 PROCEDURE — 85027 COMPLETE CBC AUTOMATED: CPT

## 2025-02-01 PROCEDURE — 83735 ASSAY OF MAGNESIUM: CPT

## 2025-02-01 PROCEDURE — 93970 EXTREMITY STUDY: CPT

## 2025-02-01 PROCEDURE — 80048 BASIC METABOLIC PNL TOTAL CA: CPT

## 2025-02-01 PROCEDURE — 99233 SBSQ HOSP IP/OBS HIGH 50: CPT | Performed by: PHYSICIAN ASSISTANT

## 2025-02-01 PROCEDURE — 82330 ASSAY OF CALCIUM: CPT

## 2025-02-01 PROCEDURE — 6360000002 HC RX W HCPCS

## 2025-02-01 RX ADMIN — SENNOSIDES 8.6 MG: 8.6 TABLET, COATED ORAL at 20:37

## 2025-02-01 RX ADMIN — POTASSIUM CHLORIDE 10 MEQ: 750 TABLET, FILM COATED, EXTENDED RELEASE ORAL at 09:33

## 2025-02-01 RX ADMIN — SENNOSIDES 8.6 MG: 8.6 TABLET, COATED ORAL at 09:32

## 2025-02-01 RX ADMIN — FAMOTIDINE 20 MG: 20 TABLET, FILM COATED ORAL at 09:32

## 2025-02-01 RX ADMIN — WATER 2000 MG: 1 INJECTION INTRAMUSCULAR; INTRAVENOUS; SUBCUTANEOUS at 18:37

## 2025-02-01 RX ADMIN — ZOLPIDEM TARTRATE 5 MG: 5 TABLET, FILM COATED ORAL at 22:23

## 2025-02-01 NOTE — PROGRESS NOTES
[facility] Infectious Disease         Progress Note      Gustabo Machado  MEDICAL RECORD NUMBER:  [id number]  AGE: 54 y.o.   GENDER: male  : 1970  EPISODE DATE:  2025      Assessment:     Patient is a 54-year-old male with stage IV pancreatic cancer and liver metastasis who I am consulted for peritonitis.     In the setting of an elevated total nucleated cell count of 18,000 and neutrophils of 98%, this is concerning for peritonitis.  This patient has an indwelling Pleurx drain due to recurrent ascites in the setting of metastatic malignancy.       On review of ISPD guidelines for peritonitis, this cannot be extrapolated to this patient with a Pleurx catheter for recurrent ascites.  Unfortunately, I suspect that use of intraperitoneal antibiotics will be fairly toxic as it is not given with dialysate solution and cycled out.  I would recommend continuing systemic therapy with ceftriaxone 2 g daily.  This patient requires removal of the peritoneal catheter.    Though the ideal is vancomycin for coverage of corynebacterium, I would opt to continue monotherapy with ceftriaxone given I have a lower suspicion of this being the pathogenic organism.  I suspect more likely that this is a colonizer found in the catheter itself.  The improving cell counts with ceftriaxone alone do argue as well that this is an effective agent.     Recommend systemic therapy with ceftriaxone              I would continue this is a parenteral agent, do not recommend intraperitoneal administration  Cell counts have improved from peritoneal fluid to 8000, corynebacterium identified, this can be penicillin resistant  Peritoneal catheter removed, last drainage performed on   We will plan for repeat paracentesis on  or 3      Subjective:     Chief Complaint   Patient presents with    Shortness of Breath         Currently on 3B, no new concerns or complaints this morning.  No episodes of fever overnight.   difficulty, no tinnitus  Mouth/throat: no ulceration, dental caries , dysphagia  Lungs: no cough no chest pain  CVS: no palpitation, no chest pain, no shortness of breath  GI: no abdominal pain, no nausea , no vomiting, no constipation  MAXIME: no dysuria, frequency and urgency, no hematuria, no kidney stones  Musculoskeletal: no joint pain, swelling , stiffness,  Endocrine: no polyuria, polydipsia, no cold or heat intolerance  Hematology: no anemia, no easy brusing or bleeding, no hx of clotting disorder  Dermatology: no skin rash, no eczema, no pruritis,  Psychiatry: no depression, no anxiety,no panic attacks, no suicide ideation      Objective:      /77   Pulse (!) 111   Temp 98.7 °F (37.1 °C) (Oral)   Resp 18   Ht 1.829 m (6')   Wt 82.5 kg (181 lb 14.1 oz)   SpO2 98%   BMI 24.67 kg/m²     Wt Readings from Last 3 Encounters:   02/01/25 82.5 kg (181 lb 14.1 oz)   01/27/25 75.8 kg (167 lb)   01/21/25 78.6 kg (173 lb 3.2 oz)       Immunization History   Administered Date(s) Administered    TDaP, ADACEL (age 10y-64y), BOOSTRIX (age 10y+), IM, 0.5mL 10/25/2019       PHYSICAL EXAM    /77   Pulse (!) 111   Temp 98.7 °F (37.1 °C) (Oral)   Resp 18   Ht 1.829 m (6')   Wt 82.5 kg (181 lb 14.1 oz)   SpO2 98%   BMI 24.67 kg/m²   General:  Awake, alert, not in distress.  HEENT: pink conjunctiva, unicteric sclera, moist oral mucosa.  Chest:  bilateral air entry, Clear to auscultation,   Cardiovascular:  RRR ,S1S2, no murmur or gallop.  Abdomen:  Soft, non tender to palpation.  Extremities: no edema  Skin:  Warm and dry.  CNS: aox3    Puncture 11/17/24 Femoral (Active)   Number of days: 74       LABS       CBC:   Lab Results   Component Value Date/Time    WBC 4.4 01/31/2025 02:05 PM    HGB 7.4 01/31/2025 02:05 PM    HCT 23.7 01/31/2025 02:05 PM    MCV 99.2 01/31/2025 02:05 PM     01/31/2025 02:05 PM     BMP:   Lab Results   Component Value Date/Time     01/31/2025 03:40 AM    K 4.5 01/31/2025  02:05 PM    K 3.0 01/31/2025 03:40 AM     01/31/2025 03:40 AM    CO2 22 01/31/2025 03:40 AM    PHOS 3.2 01/31/2025 11:15 AM    BUN 11 01/31/2025 03:40 AM    CREATININE 0.4 01/31/2025 03:40 AM     PT/INR:   Lab Results   Component Value Date/Time    INR 1.32 01/30/2025 02:43 PM     Prealbumin: No results found for: \"PREALBUMIN\"  Albumin:No results found for: \"LABALBU\"  Sed Rate:  Lab Results   Component Value Date/Time    SEDRATE 4 05/20/2017 02:35 PM     CRP:   Lab Results   Component Value Date/Time    CRP 0.04 05/20/2017 02:35 PM     Micro:   Lab Results   Component Value Date/Time    BC No growth 24 hours. No growth 48 hours. 01/27/2025 02:40 PM      Hemoglobin A1C:   Lab Results   Component Value Date/Time    LABA1C 5.0 01/28/2025 05:45 AM           Electronically signed by Bert Duvall MD TD@ at 7:13 AM

## 2025-02-01 NOTE — PROGRESS NOTES
Hospitalist Progress Note      Patient:  Gustabo Machado 54 y.o. male     Unit/Bed:3B-28/028-A    Date of Admission: 1/27/2025      ASSESSMENT AND PLAN    Active Problems  Metastatic pancreatic cancer   Palliative Care consulted, case discussed with service & their notes reviewed, appreciate recommendations.    Pleurx drain removed 2/2 peritonitis   Pleurx drain cannot be replaced until ABX therapy is finished.   PC MD is concerned about progressing disease; speaking with Oncology about options   Pleural Effusions, Hemothorax   Pulm consulted, case discussed with service & their notes reviewed, appreciate recommendations.   They recommend to hold off on restarting Heparin drip due to bloody fluid in chest tube.   They will pull chest tube when OP is less than 200cc.   CTS consulted - They signed off today. No surgery indicated at this time. Continue to have chest tube open. They are okay with Heparin restarted.   Bacterial Peritonitis   ID consulted and following  Pleurx drain removed .   IV Ceftriaxone. Cell count improving.   Pancytopenia   WBC 4.2, Hgb 7.3, Plts 91. CBC in the AM. Holding Heparin drip.   Hgb stable after 1 unit of pRBC   Pericardial Effusion   2/2 malignancy   Pulmonary Embolism   November 2024 2/2 malignancy   Holding Heparin at this time       LDA: []CVC / []PICC / []Midline / []Chavez / []Drains / []Mediport / [x]None  Antibiotics: None   Steroids: None   Labs (still needed?): [x]Yes / []No  IVF (still needed?): [x]Yes / []No    Level of care: []Step Down / [x]Med-Surg  Bed Status: [x]Inpatient / []Observation  Telemetry: []Yes / [x]No  PT/OT: []Yes / [x]No    DVT Prophylaxis: [x] Lovenox / [] Heparin / [] SCDs / [] Already on Systemic Anticoagulation / [] None   Code status: Full Code     Expected discharge date:  2-3 days   Disposition: Home      ===================================================================    Chief Complaint: Nausea vomiting   Subjective (past 24 hours): No acute  events overnight. Pt resting in bed. Pt is in good spirits. Wife at bedside. Pt states that abdomen is become more taunt. No new issues.       HPI / Hospital Course:  54-year-old male past medical history of stage IV pancreatic adenocarcinoma currently undergoing chemotherapy presented with 4 to 5 days of shortness of breath.  Shortness of breath worsened when he was up and moving.  Prior to being seen in the ED here he was seen by his primary care provider who took an x-ray and relayed to him that his lung was full of fluid.  He reports having some decreased appetite but denies nausea, vomiting or diarrhea.  He states he does have intermittent abdominal pain that he describes as a stretching sensation since the diagnosis of the cancer.  His last chemotherapy treatment was Tuesday.  He relates that he has been tolerating the chemotherapy okay.  Denies lightheadedness, dizziness, chest pain, headache, fevers, chills, numbness or tingling.  Per patient's wife the Port-A-Cath in the patient's belly has been draining appropriately every 5 days.  Patient had recent admission for small bowel obstruction and recurrent ascites.     ED course: Chest x-ray revealed complete whiteout of the right lung.  CT chest was obtained revealing bilateral pleural effusions right greater than left and a T11 sclerotic lesion.  Lab workup in the ED was stable.     Hospital course: Patient admitted for shortness of breath and bilateral pleural effusions.  IR performed thoracentesis and placed chest tube for continuous drainage. Chest tube had output of 200 ml bloody drainage. Patient required blood transfusion. CT surgery consulted.  Palliative care and pulmonology consulting. Ascitic fluid analyzed and results concerning for bacterial peritonitis. Ceftriaxone added and ID consulted. ID recommended addition of vancomycin.     Medications:    Infusion Medications    sodium chloride      [Held by provider] heparin (PORCINE) Infusion Stopped

## 2025-02-01 NOTE — PLAN OF CARE
Problem: ABCDS Injury Assessment  Goal: Absence of physical injury  1/31/2025 2309 by Christi Dumont RN  Outcome: Progressing  Flowsheets (Taken 1/31/2025 2309)  Absence of Physical Injury: Implement safety measures based on patient assessment     Problem: Safety - Adult  Goal: Free from fall injury  1/31/2025 2309 by Christi Dumont RN  Outcome: Progressing  Flowsheets (Taken 1/31/2025 2309)  Free From Fall Injury: Instruct family/caregiver on patient safety     Problem: Nutrition Deficit:  Goal: Optimize nutritional status  1/31/2025 2309 by Christi Dumont RN  Outcome: Progressing  Flowsheets (Taken 1/31/2025 2309)  Nutrient intake appropriate for improving, restoring, or maintaining nutritional needs:   Monitor oral intake, labs, and treatment plans   Assess nutritional status and recommend course of action     Problem: Discharge Planning  Goal: Discharge to home or other facility with appropriate resources  1/31/2025 2309 by Christi Dumont RN  Outcome: Progressing  Flowsheets (Taken 1/31/2025 2309)  Discharge to home or other facility with appropriate resources:   Identify barriers to discharge with patient and caregiver   Identify discharge learning needs (meds, wound care, etc)   Refer to discharge planning if patient needs post-hospital services based on physician order or complex needs related to functional status, cognitive ability or social support system   Arrange for needed discharge resources and transportation as appropriate     Problem: Respiratory - Adult  Goal: Achieves optimal ventilation and oxygenation  1/31/2025 2309 by Christi Dumont RN  Outcome: Progressing  Flowsheets (Taken 1/31/2025 2309)  Achieves optimal ventilation and oxygenation: Assess for changes in respiratory status     Problem: Cardiovascular - Adult  Goal: Maintains optimal cardiac output and hemodynamic stability  1/31/2025 2309 by Christi Dumont RN  Outcome: Progressing  Flowsheets (Taken 1/31/2025

## 2025-02-02 ENCOUNTER — APPOINTMENT (OUTPATIENT)
Dept: ULTRASOUND IMAGING | Age: 55
DRG: 919 | End: 2025-02-02
Payer: COMMERCIAL

## 2025-02-02 ENCOUNTER — APPOINTMENT (OUTPATIENT)
Dept: GENERAL RADIOLOGY | Age: 55
DRG: 919 | End: 2025-02-02
Payer: COMMERCIAL

## 2025-02-02 LAB
ANION GAP SERPL CALC-SCNC: 11 MEQ/L (ref 8–16)
BACTERIA SPEC ANAEROBE CULT: NORMAL
BACTERIA SPEC BFLD CULT: NORMAL
BUN SERPL-MCNC: 11 MG/DL (ref 7–22)
CA-I BLD ISE-SCNC: 1.19 MMOL/L (ref 1.12–1.32)
CALCIUM SERPL-MCNC: 8.2 MG/DL (ref 8.5–10.5)
CHLORIDE SERPL-SCNC: 104 MEQ/L (ref 98–111)
CO2 SERPL-SCNC: 23 MEQ/L (ref 23–33)
CREAT SERPL-MCNC: 0.5 MG/DL (ref 0.4–1.2)
DEPRECATED RDW RBC AUTO: 63.7 FL (ref 35–45)
ERYTHROCYTE [DISTWIDTH] IN BLOOD BY AUTOMATED COUNT: 17.4 % (ref 11.5–14.5)
GFR SERPL CREATININE-BSD FRML MDRD: > 90 ML/MIN/1.73M2
GLUCOSE SERPL-MCNC: 95 MG/DL (ref 70–108)
GRAM STN SPEC: NORMAL
HCT VFR BLD AUTO: 25.7 % (ref 42–52)
HGB BLD-MCNC: 8 GM/DL (ref 14–18)
MAGNESIUM SERPL-MCNC: 2 MG/DL (ref 1.6–2.4)
MCH RBC QN AUTO: 31.3 PG (ref 26–33)
MCHC RBC AUTO-ENTMCNC: 31.1 GM/DL (ref 32.2–35.5)
MCV RBC AUTO: 100.4 FL (ref 80–94)
PLATELET # BLD AUTO: 115 THOU/MM3 (ref 130–400)
PMV BLD AUTO: 10.4 FL (ref 9.4–12.4)
POTASSIUM SERPL-SCNC: 4.2 MEQ/L (ref 3.5–5.2)
RBC # BLD AUTO: 2.56 MILL/MM3 (ref 4.7–6.1)
SODIUM SERPL-SCNC: 138 MEQ/L (ref 135–145)
WBC # BLD AUTO: 6.1 THOU/MM3 (ref 4.8–10.8)

## 2025-02-02 PROCEDURE — 2140000000 HC CCU INTERMEDIATE R&B

## 2025-02-02 PROCEDURE — 6370000000 HC RX 637 (ALT 250 FOR IP)

## 2025-02-02 PROCEDURE — 0WPGX0Z REMOVAL OF DRAINAGE DEVICE FROM PERITONEAL CAVITY, EXTERNAL APPROACH: ICD-10-PCS | Performed by: FAMILY MEDICINE

## 2025-02-02 PROCEDURE — 99233 SBSQ HOSP IP/OBS HIGH 50: CPT | Performed by: STUDENT IN AN ORGANIZED HEALTH CARE EDUCATION/TRAINING PROGRAM

## 2025-02-02 PROCEDURE — 82150 ASSAY OF AMYLASE: CPT

## 2025-02-02 PROCEDURE — 87205 SMEAR GRAM STAIN: CPT

## 2025-02-02 PROCEDURE — 49083 ABD PARACENTESIS W/IMAGING: CPT

## 2025-02-02 PROCEDURE — 0W9G3ZZ DRAINAGE OF PERITONEAL CAVITY, PERCUTANEOUS APPROACH: ICD-10-PCS | Performed by: FAMILY MEDICINE

## 2025-02-02 PROCEDURE — 83615 LACTATE (LD) (LDH) ENZYME: CPT

## 2025-02-02 PROCEDURE — 88305 TISSUE EXAM BY PATHOLOGIST: CPT

## 2025-02-02 PROCEDURE — 82945 GLUCOSE OTHER FLUID: CPT

## 2025-02-02 PROCEDURE — 84157 ASSAY OF PROTEIN OTHER: CPT

## 2025-02-02 PROCEDURE — 87070 CULTURE OTHR SPECIMN AEROBIC: CPT

## 2025-02-02 PROCEDURE — 85730 THROMBOPLASTIN TIME PARTIAL: CPT

## 2025-02-02 PROCEDURE — 85027 COMPLETE CBC AUTOMATED: CPT

## 2025-02-02 PROCEDURE — 99232 SBSQ HOSP IP/OBS MODERATE 35: CPT | Performed by: INTERNAL MEDICINE

## 2025-02-02 PROCEDURE — 88108 CYTOPATH CONCENTRATE TECH: CPT

## 2025-02-02 PROCEDURE — 82042 OTHER SOURCE ALBUMIN QUAN EA: CPT

## 2025-02-02 PROCEDURE — 2580000003 HC RX 258

## 2025-02-02 PROCEDURE — 6370000000 HC RX 637 (ALT 250 FOR IP): Performed by: HOSPITALIST

## 2025-02-02 PROCEDURE — 80048 BASIC METABOLIC PNL TOTAL CA: CPT

## 2025-02-02 PROCEDURE — 88112 CYTOPATH CELL ENHANCE TECH: CPT

## 2025-02-02 PROCEDURE — 99233 SBSQ HOSP IP/OBS HIGH 50: CPT | Performed by: PHYSICIAN ASSISTANT

## 2025-02-02 PROCEDURE — 71045 X-RAY EXAM CHEST 1 VIEW: CPT

## 2025-02-02 PROCEDURE — 2500000003 HC RX 250 WO HCPCS

## 2025-02-02 PROCEDURE — 89051 BODY FLUID CELL COUNT: CPT

## 2025-02-02 PROCEDURE — 87075 CULTR BACTERIA EXCEPT BLOOD: CPT

## 2025-02-02 PROCEDURE — 82330 ASSAY OF CALCIUM: CPT

## 2025-02-02 PROCEDURE — 6360000002 HC RX W HCPCS

## 2025-02-02 PROCEDURE — 83735 ASSAY OF MAGNESIUM: CPT

## 2025-02-02 RX ADMIN — WATER 2000 MG: 1 INJECTION INTRAMUSCULAR; INTRAVENOUS; SUBCUTANEOUS at 21:11

## 2025-02-02 RX ADMIN — SODIUM CHLORIDE: 9 INJECTION, SOLUTION INTRAVENOUS at 05:03

## 2025-02-02 RX ADMIN — ZOLPIDEM TARTRATE 5 MG: 5 TABLET, FILM COATED ORAL at 21:11

## 2025-02-02 RX ADMIN — SENNOSIDES 8.6 MG: 8.6 TABLET, COATED ORAL at 08:21

## 2025-02-02 RX ADMIN — SENNOSIDES 8.6 MG: 8.6 TABLET, COATED ORAL at 21:11

## 2025-02-02 RX ADMIN — POTASSIUM CHLORIDE 10 MEQ: 750 TABLET, FILM COATED, EXTENDED RELEASE ORAL at 08:21

## 2025-02-02 RX ADMIN — FAMOTIDINE 20 MG: 20 TABLET, FILM COATED ORAL at 08:21

## 2025-02-02 NOTE — PLAN OF CARE
Problem: ABCDS Injury Assessment  Goal: Absence of physical injury  Outcome: Progressing  Flowsheets (Taken 2/1/2025 2305)  Absence of Physical Injury: Implement safety measures based on patient assessment     Problem: Safety - Adult  Goal: Free from fall injury  Outcome: Progressing  Flowsheets (Taken 2/1/2025 2305)  Free From Fall Injury: Instruct family/caregiver on patient safety     Problem: Nutrition Deficit:  Goal: Optimize nutritional status  Outcome: Progressing  Flowsheets (Taken 2/1/2025 2305)  Nutrient intake appropriate for improving, restoring, or maintaining nutritional needs:   Assess nutritional status and recommend course of action   Monitor oral intake, labs, and treatment plans     Problem: Discharge Planning  Goal: Discharge to home or other facility with appropriate resources  Outcome: Progressing  Flowsheets (Taken 2/1/2025 2305)  Discharge to home or other facility with appropriate resources:   Identify barriers to discharge with patient and caregiver   Arrange for needed discharge resources and transportation as appropriate   Identify discharge learning needs (meds, wound care, etc)   Refer to discharge planning if patient needs post-hospital services based on physician order or complex needs related to functional status, cognitive ability or social support system     Problem: Respiratory - Adult  Goal: Achieves optimal ventilation and oxygenation  Outcome: Progressing  Flowsheets (Taken 2/1/2025 2305)  Achieves optimal ventilation and oxygenation: Assess for changes in respiratory status     Problem: Cardiovascular - Adult  Goal: Maintains optimal cardiac output and hemodynamic stability  Outcome: Progressing  Flowsheets (Taken 2/1/2025 2305)  Maintains optimal cardiac output and hemodynamic stability:   Monitor blood pressure and heart rate   Assess for signs of decreased cardiac output     Problem: Cardiovascular - Adult  Goal: Absence of cardiac dysrhythmias or at baseline  Outcome:

## 2025-02-02 NOTE — PROGRESS NOTES
[facility] Infectious Disease         Progress Note      Gustabo Machado  MEDICAL RECORD NUMBER:  [id number]  AGE: 54 y.o.   GENDER: male  : 1970  EPISODE DATE:  2025      Assessment:     Patient is a 54-year-old male with stage IV pancreatic cancer and liver metastasis who I am consulted for peritonitis.     In the setting of an elevated total nucleated cell count of 18,000 and neutrophils of 98%, this is concerning for peritonitis.  This patient has an indwelling Pleurx drain due to recurrent ascites in the setting of metastatic malignancy.       On review of ISPD guidelines for peritonitis, this cannot be extrapolated to this patient with a Pleurx catheter for recurrent ascites.  Unfortunately, I suspect that use of intraperitoneal antibiotics will be fairly toxic as it is not given with dialysate solution and cycled out.  I would recommend continuing systemic therapy with ceftriaxone 2 g daily.  This patient requires removal of the peritoneal catheter.    Though the ideal is vancomycin for coverage of corynebacterium, I would opt to continue monotherapy with ceftriaxone given I have a lower suspicion of this being the pathogenic organism.  I suspect more likely that this is a colonizer found in the catheter itself.  The improving cell counts with ceftriaxone alone do argue as well that this is an effective agent.     Recommend systemic therapy with ceftriaxone              Held off on vancomycin  Cell counts have improved from peritoneal fluid to 8000, corynebacterium identified, this can be penicillin resistant  Peritoneal catheter removed, last drainage performed on   Will likely require paracentesis today or tomorrow, ideally will be both diagnostic/therapeutic      Subjective:     Chief Complaint   Patient presents with    Shortness of Breath         No acute events overnight.  No episodes of fever, white count appears stable.  Patient is tolerating therapy with  dental caries , dysphagia  Lungs: no cough no chest pain  CVS: no palpitation, no chest pain, no shortness of breath  GI: no abdominal pain, no nausea , no vomiting, no constipation  MAXIME: no dysuria, frequency and urgency, no hematuria, no kidney stones  Musculoskeletal: no joint pain, swelling , stiffness,  Endocrine: no polyuria, polydipsia, no cold or heat intolerance  Hematology: no anemia, no easy brusing or bleeding, no hx of clotting disorder  Dermatology: no skin rash, no eczema, no pruritis,  Psychiatry: no depression, no anxiety,no panic attacks, no suicide ideation      Objective:      /74   Pulse (!) 109   Temp 98.2 °F (36.8 °C)   Resp 18   Ht 1.829 m (6')   Wt 82.5 kg (181 lb 14.1 oz)   SpO2 96%   BMI 24.67 kg/m²     Wt Readings from Last 3 Encounters:   02/02/25 82.5 kg (181 lb 14.1 oz)   01/27/25 75.8 kg (167 lb)   01/21/25 78.6 kg (173 lb 3.2 oz)       Immunization History   Administered Date(s) Administered    TDaP, ADACEL (age 10y-64y), BOOSTRIX (age 10y+), IM, 0.5mL 10/25/2019       PHYSICAL EXAM    /74   Pulse (!) 109   Temp 98.2 °F (36.8 °C)   Resp 18   Ht 1.829 m (6')   Wt 82.5 kg (181 lb 14.1 oz)   SpO2 96%   BMI 24.67 kg/m²   General:  Awake, alert, not in distress.  HEENT: pink conjunctiva, unicteric sclera, moist oral mucosa.  Chest:  bilateral air entry, Clear to auscultation,   Cardiovascular:  RRR ,S1S2, no murmur or gallop.  Abdomen:  Soft, non tender to palpation.  Extremities: no edema  Skin:  Warm and dry.  CNS: aox3    Puncture 11/17/24 Femoral (Active)   Number of days: 74       LABS       CBC:   Lab Results   Component Value Date/Time    WBC 6.1 02/02/2025 05:10 AM    HGB 8.0 02/02/2025 05:10 AM    HCT 25.7 02/02/2025 05:10 AM    .4 02/02/2025 05:10 AM     02/02/2025 05:10 AM     BMP:   Lab Results   Component Value Date/Time     02/02/2025 05:10 AM    K 4.2 02/02/2025 05:10 AM     02/02/2025 05:10 AM    CO2 23 02/02/2025 05:10

## 2025-02-02 NOTE — PROGRESS NOTES
Secure chat sent to Meagan SALGADO to verify restart of heparin gtts. Hold heparin until after paracentesis is completed. Will continue to monitor.

## 2025-02-02 NOTE — PROGRESS NOTES
Hospitalist Progress Note      Patient:  Gustabo Machado 54 y.o. male     Unit/Bed:3B-28/028-A    Date of Admission: 1/27/2025      ASSESSMENT AND PLAN    Active Problems  Metastatic pancreatic cancer   Palliative Care consulted, case discussed with service & their notes reviewed, appreciate recommendations.    Pleurx drain removed 2/2 peritonitis   Pleurx drain cannot be replaced until ABX therapy is finished.  Pt's ascites is worsening. Spoke with IR today for paracentesis; they plan to do paracentesis today. US guided paracentesis ordered today with labs ordered.   HANNAH PATHAK is concerned about progressing disease; speaking with Oncology about options   Pleural Effusions, Hemothorax   Pulm consulted & following; case discussed     They will pull chest tube when OP is less than 150cc / 24hr.   They are okay with restarting heparin.   CTS consulted - They signed off today. No surgery indicated at this time. Continue to have chest tube open. They are okay with Heparin restarted.   Bacterial Peritonitis   ID consulted and following  Pleurx drain removed .   IV Ceftriaxone. Cell count improving.   Pancytopenia, resolving    WBC 6.1, Hgb 8.0, Plts 115. CBC in the AM. Holding Heparin drip.   Hgb stable after 1 unit of pRBC   Pericardial Effusion   2/2 malignancy   Pulmonary Embolism   November 2024 2/2 malignancy   Restarted heparin today after paracentesis.       LDA: []CVC / []PICC / []Midline / []Chavez / []Drains / []Mediport / [x]None  Antibiotics: None   Steroids: None   Labs (still needed?): [x]Yes / []No  IVF (still needed?): [x]Yes / []No    Level of care: []Step Down / [x]Med-Surg  Bed Status: [x]Inpatient / []Observation  Telemetry: []Yes / [x]No  PT/OT: []Yes / [x]No    DVT Prophylaxis: [x] Lovenox / [] Heparin / [] SCDs / [] Already on Systemic Anticoagulation / [] None   Code status: Full Code     Expected discharge date:  2-3 days   Disposition: Home   care and pulmonology consulting. Ascitic fluid analyzed and results concerning for bacterial peritonitis. Ceftriaxone added and ID consulted. ID recommended addition of vancomycin.     Medications:    Infusion Medications    [Held by provider] heparin (PORCINE) Infusion Stopped (01/30/25 1205)    Scheduled Medications    senna  1 tablet Oral BID    cefTRIAXone (ROCEPHIN) IV  2,000 mg IntraVENous Q24H    [Held by provider] apixaban  5 mg Oral BID    [Held by provider] bumetanide  1 mg Oral Daily    famotidine  20 mg Oral Daily    potassium chloride  10 mEq Oral Daily    PRN Meds: magnesium sulfate, sodium phosphate 15 mmol in sodium chloride 0.9 % 250 mL IVPB, morphine, oxyCODONE, [Held by provider] bumetanide, magic (miracle) mouthwash, ondansetron, polyethylene glycol, zolpidem, heparin (porcine), heparin (porcine)    Exam:  /76   Pulse (!) 107   Temp 99.4 °F (37.4 °C) (Oral)   Resp 18   Ht 1.829 m (6')   Wt 82.5 kg (181 lb 14.1 oz)   SpO2 98%   BMI 24.67 kg/m²   General: No distress, appears stated age. Thin, Frail.   Eyes:  PERRL. Conjunctivae/corneas clear.  HENT: Head normal appearing. Nares normal. Oral mucosa moist.  Hearing intact.   Neck: Supple, with full range of motion. Trachea midline.  No gross JVD appreciated.  Respiratory:  Normal effort. Breath sounds diminished.   Cardiovascular: Normal rate, regular rhythm with normal S1/S2 without murmurs.    No lower extremity edema.   Abdomen: Soft, nontender, BSx4, distended.   Musculoskeletal: No joint swelling or tenderness. Normal tone. No abnormal movements.   Skin: Warm and dry. No rashes or lesions.  Neurologic:  No focal sensory/motor deficits in the upper or lower extremities. Cranial nerves:  grossly non-focal 2-12.     Psychiatric: Alert and oriented, normal insight and thought content.   Capillary Refill: Brisk,< 3 seconds.  Peripheral Pulses: +2 palpable, equal bilaterally.       Labs/Radiology: See chart or assessment above.

## 2025-02-02 NOTE — PROGRESS NOTES
[facility] for Pulmonary, Sleep and Critical Care Medicine      Patient - Gustabo Machado   MRN -  [id number]   [facility] # - [id number]   - 1970      Date of Admission -  2025  1:05 PM  Date of evaluation -  2025  Room - 3B--A   Hospital Day - 6  Consulting - Meagan Epps PA Primary Care Physician - Daniel Agrawal MD     Problem List      Active Hospital Problems    Diagnosis Date Noted    Peritonitis (HCC) [K65.9] 2025    Malignant pleural effusion [J91.0] 2025    Pleural effusion, bilateral [J90] 2025    Hypervolemia [E87.70] 2025    Pericardial effusion [I31.39] 2025    Dyspnea on exertion [R06.09] 2025    Pancreatic adenocarcinoma (HCC) [C25.9] 2025    Peritoneal carcinomatosis (HCC) [C78.6] 2024    Malignant ascites [R18.0] 2024    Metastatic adenocarcinoma to liver (HCC) [C78.7] 10/29/2024    Palliative care patient [Z51.5] 10/23/2024    Cancer related pain [G89.3] 10/23/2024     Reason for Consult    Bilateral pleural effusion   HPI   History Obtained From: Patient, his daughter and electronic medical record.    Gustabo Machado is a 54 y.o. male with past medical history of stage IV pancreatic cancer, PE, presented to the ER on 2025 for worsening shortness of breath.  Patient reports onset of shortness of breath for the past 5 days worsen with ambulation, mild improvement with rest.  Denies any shortness of breath at rest also complains of orthopnea.  Associated bilateral leg swelling.  Has recently visited PCP for leg swelling, chest x-ray was done which reported pleural effusion and was sent to the ER for further management. Denies fever/chills, chest pain, chest palpitation, abdominal pain.  Of note patient is being treated for pancreatic cancer with chemotherapy, currently on the fifth cycle the last cycle was on Tuesday, 2025.  Patient is currently continuing the chemotherapy.  Patient has a peritoneal, which is  suspicious for osseous metastatic  disease.  4. Hypoattenuating lesions throughout the liver suspicious for metastatic  Disease    Assessment   -Bilateral pleural effusion R>L secondary to pancreatic cancer vs ADHF   -Acute hypoxic respiratory failure, patient is currently on 2 L of oxygen saturating at 94%, overnight patient has a drop in oxygen at 88%.   -Small pericardial effusion  -Possible mets to T11 vertebral, as noted on the CT 1/27/2025, also noted on 1/15/2025.   -Stage IV metastatic pancreatic cancer with metastasis in the abdomen including liver, intra-aortic abdominal adenopathy and carcinomatosis. (Dx on October 22, 2024, with pathological biopsy reported metastatic adenocarcinoma) Currently on chemotherapy, 5th cycle.  Not on any radiation.  Chemotherapy with FOLFIRINOX.  Patient also has a peritoneal port, which is drained by his daughter every 5 days and is followed by nursing aide at home for dressing.     Plan   -Monitor SpO2 wean supplemental O2 to maintain SpO2 >90%  -Pleural fluid exudative cytology pending  -Hemoglobin stable, can resume anticoagulation and monitor re bleeding through chest tube and H and H  Transfuse for Hgb < 7  -Pleural fluid studies consistent with exudative effusion suspect 2/2 known malignancy in abdomen will await cytology to see if able to confirm presence of malignant cells   Will remove chest tube once drainage less than 150 ml/24 hours       Questions and concerns addressed.    Electronically signed by   Freddie Mccann MD on 2/2/2025 at 11:56 AM

## 2025-02-03 ENCOUNTER — APPOINTMENT (OUTPATIENT)
Dept: GENERAL RADIOLOGY | Age: 55
DRG: 919 | End: 2025-02-03
Payer: COMMERCIAL

## 2025-02-03 ENCOUNTER — CASE MANAGEMENT (OUTPATIENT)
Dept: CASE MANAGEMENT | Age: 55
End: 2025-02-03

## 2025-02-03 DIAGNOSIS — C25.2 MALIGNANT NEOPLASM OF TAIL OF PANCREAS (HCC): Primary | ICD-10-CM

## 2025-02-03 LAB
AFB CULTURE & SMEAR: NORMAL
ALBUMIN FLD-MCNC: 1.4 GM/DL
AMYLASE FLD-CCNC: 67 U/L
ANION GAP SERPL CALC-SCNC: 11 MEQ/L (ref 8–16)
APTT PPP: 124.1 SECONDS (ref 22–38)
APTT PPP: 124.5 SECONDS (ref 22–38)
APTT PPP: 59.5 SECONDS (ref 22–38)
BUN SERPL-MCNC: 11 MG/DL (ref 7–22)
CA-I BLD ISE-SCNC: 1.21 MMOL/L (ref 1.12–1.32)
CALCIUM SERPL-MCNC: 8 MG/DL (ref 8.5–10.5)
CHARACTER, BODY FLUID: NORMAL
CHLORIDE SERPL-SCNC: 102 MEQ/L (ref 98–111)
CO2 SERPL-SCNC: 23 MEQ/L (ref 23–33)
COLOR FLD: NORMAL
CREAT SERPL-MCNC: 0.4 MG/DL (ref 0.4–1.2)
DEPRECATED RDW RBC AUTO: 63.8 FL (ref 35–45)
ERYTHROCYTE [DISTWIDTH] IN BLOOD BY AUTOMATED COUNT: 17.2 % (ref 11.5–14.5)
FUNGUS SPEC CULT: NORMAL
FUNGUS SPEC FUNGUS STN: NORMAL
GFR SERPL CREATININE-BSD FRML MDRD: > 90 ML/MIN/1.73M2
GLUCOSE FLD-MCNC: < 2 MG/DL
GLUCOSE SERPL-MCNC: 99 MG/DL (ref 70–108)
GRANULOCYTES NFR FLD AUTO: 90.3 %
HCT VFR BLD AUTO: 23.8 % (ref 42–52)
HGB BLD-MCNC: 7.3 GM/DL (ref 14–18)
LDH FLD L TO P-CCNC: > 2500 U/L
MAGNESIUM SERPL-MCNC: 2 MG/DL (ref 1.6–2.4)
MCH RBC QN AUTO: 31.5 PG (ref 26–33)
MCHC RBC AUTO-ENTMCNC: 30.7 GM/DL (ref 32.2–35.5)
MCV RBC AUTO: 102.6 FL (ref 80–94)
MONONUC CELLS NFR FLD AUTO: 9.7 %
NUC CELL # FLD AUTO: 351 /CUMM (ref 0–500)
PATHOLOGIST REVIEW: NORMAL
PLATELET # BLD AUTO: 94 THOU/MM3 (ref 130–400)
PMV BLD AUTO: 10.4 FL (ref 9.4–12.4)
POTASSIUM SERPL-SCNC: 3.6 MEQ/L (ref 3.5–5.2)
PROT FLD-MCNC: 3.2 GM/DL
RBC # BLD AUTO: 2.32 MILL/MM3 (ref 4.7–6.1)
RBC # FLD AUTO: NORMAL /CUMM
SODIUM SERPL-SCNC: 136 MEQ/L (ref 135–145)
SPECIMEN: NORMAL
TOTAL VOLUME RECEIVED BODY FLUID: 70 ML
WBC # BLD AUTO: 3.3 THOU/MM3 (ref 4.8–10.8)

## 2025-02-03 PROCEDURE — 71045 X-RAY EXAM CHEST 1 VIEW: CPT

## 2025-02-03 PROCEDURE — 85027 COMPLETE CBC AUTOMATED: CPT

## 2025-02-03 PROCEDURE — 82330 ASSAY OF CALCIUM: CPT

## 2025-02-03 PROCEDURE — 80048 BASIC METABOLIC PNL TOTAL CA: CPT

## 2025-02-03 PROCEDURE — 0W9G3ZZ DRAINAGE OF PERITONEAL CAVITY, PERCUTANEOUS APPROACH: ICD-10-PCS | Performed by: FAMILY MEDICINE

## 2025-02-03 PROCEDURE — 99233 SBSQ HOSP IP/OBS HIGH 50: CPT | Performed by: INTERNAL MEDICINE

## 2025-02-03 PROCEDURE — 2140000000 HC CCU INTERMEDIATE R&B

## 2025-02-03 PROCEDURE — 99233 SBSQ HOSP IP/OBS HIGH 50: CPT | Performed by: STUDENT IN AN ORGANIZED HEALTH CARE EDUCATION/TRAINING PROGRAM

## 2025-02-03 PROCEDURE — 6360000002 HC RX W HCPCS

## 2025-02-03 PROCEDURE — 6370000000 HC RX 637 (ALT 250 FOR IP)

## 2025-02-03 PROCEDURE — 2500000003 HC RX 250 WO HCPCS

## 2025-02-03 PROCEDURE — 85730 THROMBOPLASTIN TIME PARTIAL: CPT

## 2025-02-03 PROCEDURE — 6370000000 HC RX 637 (ALT 250 FOR IP): Performed by: HOSPITALIST

## 2025-02-03 PROCEDURE — 99232 SBSQ HOSP IP/OBS MODERATE 35: CPT | Performed by: PHYSICIAN ASSISTANT

## 2025-02-03 PROCEDURE — 83735 ASSAY OF MAGNESIUM: CPT

## 2025-02-03 RX ADMIN — SENNOSIDES 8.6 MG: 8.6 TABLET, COATED ORAL at 10:31

## 2025-02-03 RX ADMIN — SENNOSIDES 8.6 MG: 8.6 TABLET, COATED ORAL at 21:07

## 2025-02-03 RX ADMIN — WATER 2000 MG: 1 INJECTION INTRAMUSCULAR; INTRAVENOUS; SUBCUTANEOUS at 18:37

## 2025-02-03 RX ADMIN — POTASSIUM CHLORIDE 10 MEQ: 750 TABLET, FILM COATED, EXTENDED RELEASE ORAL at 10:31

## 2025-02-03 RX ADMIN — FAMOTIDINE 20 MG: 20 TABLET, FILM COATED ORAL at 10:30

## 2025-02-03 RX ADMIN — ZOLPIDEM TARTRATE 5 MG: 5 TABLET, FILM COATED ORAL at 21:07

## 2025-02-03 NOTE — PROGRESS NOTES
[facility] Infectious Disease         Progress Note      Gustabo Machado  MEDICAL RECORD NUMBER:  [id number]  AGE: 54 y.o.   GENDER: male  : 1970  EPISODE DATE:  2025      Assessment:     Patient is a 54-year-old male with stage IV pancreatic cancer and liver metastasis who I am consulted for peritonitis.     In the setting of an elevated total nucleated cell count of 18,000 and neutrophils of 98%, this is concerning for peritonitis.  This patient has an indwelling Pleurx drain due to recurrent ascites in the setting of metastatic malignancy.       On review of ISPD guidelines for peritonitis, this cannot be extrapolated to this patient with a Pleurx catheter for recurrent ascites.  Unfortunately, I suspect that use of intraperitoneal antibiotics will be fairly toxic as it is not given with dialysate solution and cycled out.  I would recommend continuing systemic therapy with ceftriaxone 2 g daily.  This patient requires removal of the peritoneal catheter.    Cultures have identified Acinetobacter ursingii.  The improving cell counts to now 350 total nucleated cells argues strongly against the possibility of this being pathogenic in the abdomen.  I would recommend continuing current peritoneal fluid treatment with ceftriaxone.  My only concern is the pleural fluid collection.  This has no evidence of bacterial growth on culture and is less likely infected.  It is reasonable to place a Pleurx drain into the pleural cavity for drainage.  I would recommend continuing paracentesis via needle based drainage rather than placing a new abdominal Pleurx catheter.  In my opinion, I would only recommend placement of this after completion of IV antimicrobials.     Recommend systemic therapy with ceftriaxone    Cell counts have improved from peritoneal fluid to 8000, corynebacterium identified, this can be penicillin resistant  Acinetobacter ursingii identified from peritoneal fluid, of note cell counts  taking: Reported on 1/27/2025) 15 tablet 0    polyethylene glycol (GLYCOLAX) 17 g packet Take 1 packet by mouth daily as needed for Constipation         REVIEW OF SYSTEMS    Constitutional: no fever, no night sweats, no fatigue.  Head: no head ache , no head injury, no migranes.  Eye: no blurring of vision, no double vision.  Ears: no hearing difficulty, no tinnitus  Mouth/throat: no ulceration, dental caries , dysphagia  Lungs: no cough no chest pain  CVS: no palpitation, no chest pain, no shortness of breath  GI: no abdominal pain, no nausea , no vomiting, no constipation  MAXIME: no dysuria, frequency and urgency, no hematuria, no kidney stones  Musculoskeletal: no joint pain, swelling , stiffness,  Endocrine: no polyuria, polydipsia, no cold or heat intolerance  Hematology: no anemia, no easy brusing or bleeding, no hx of clotting disorder  Dermatology: no skin rash, no eczema, no pruritis,  Psychiatry: no depression, no anxiety,no panic attacks, no suicide ideation      Objective:      /77   Pulse (!) 108   Temp 97.2 °F (36.2 °C) (Oral)   Resp 18   Ht 1.829 m (6')   Wt 82.1 kg (181 lb)   SpO2 96%   BMI 24.55 kg/m²     Wt Readings from Last 3 Encounters:   02/03/25 82.1 kg (181 lb)   01/27/25 75.8 kg (167 lb)   01/21/25 78.6 kg (173 lb 3.2 oz)       Immunization History   Administered Date(s) Administered    TDaP, ADACEL (age 10y-64y), BOOSTRIX (age 10y+), IM, 0.5mL 10/25/2019       PHYSICAL EXAM    /77   Pulse (!) 108   Temp 97.2 °F (36.2 °C) (Oral)   Resp 18   Ht 1.829 m (6')   Wt 82.1 kg (181 lb)   SpO2 96%   BMI 24.55 kg/m²   General:  Awake, alert, not in distress.  HEENT: pink conjunctiva, unicteric sclera, moist oral mucosa.  Chest:  bilateral air entry, Clear to auscultation,   Cardiovascular:  RRR ,S1S2, no murmur or gallop.  Abdomen:  Soft, non tender to palpation.  Extremities: no edema  Skin:  Warm and dry.  CNS: aox3    Puncture 11/17/24 Femoral (Active)   Number of days: 74

## 2025-02-03 NOTE — PLAN OF CARE
Problem: ABCDS Injury Assessment  Goal: Absence of physical injury  Outcome: Progressing  Flowsheets (Taken 2/3/2025 0128)  Absence of Physical Injury: Implement safety measures based on patient assessment     Problem: Safety - Adult  Goal: Free from fall injury  Outcome: Progressing  Flowsheets (Taken 2/3/2025 0128)  Free From Fall Injury: Instruct family/caregiver on patient safety     Problem: Nutrition Deficit:  Goal: Optimize nutritional status  Outcome: Progressing  Flowsheets (Taken 2/3/2025 0128)  Nutrient intake appropriate for improving, restoring, or maintaining nutritional needs: Monitor oral intake, labs, and treatment plans     Problem: Discharge Planning  Goal: Discharge to home or other facility with appropriate resources  Outcome: Progressing  Flowsheets (Taken 2/3/2025 0128)  Discharge to home or other facility with appropriate resources:   Identify barriers to discharge with patient and caregiver   Identify discharge learning needs (meds, wound care, etc)   Refer to discharge planning if patient needs post-hospital services based on physician order or complex needs related to functional status, cognitive ability or social support system   Arrange for needed discharge resources and transportation as appropriate     Problem: Respiratory - Adult  Goal: Achieves optimal ventilation and oxygenation  Outcome: Progressing  Flowsheets (Taken 2/3/2025 0128)  Achieves optimal ventilation and oxygenation:   Assess for changes in respiratory status   Assess for changes in mentation and behavior     Problem: Cardiovascular - Adult  Goal: Maintains optimal cardiac output and hemodynamic stability  Outcome: Progressing  Flowsheets (Taken 2/3/2025 0128)  Maintains optimal cardiac output and hemodynamic stability:   Monitor blood pressure and heart rate   Assess for signs of decreased cardiac output     Problem: Cardiovascular - Adult  Goal: Absence of cardiac dysrhythmias or at baseline  Outcome:

## 2025-02-03 NOTE — PROGRESS NOTES
Provider Progress Note        Patient:   Gustabo Machado  YOB: 1970  Age:  54 y.o.  Room:  3B-28/028-A  MRN:  [id number]   Acct: [id number]  PCP: Daniel Agrawal MD    Date of Admission:  1/27/2025  1:05 PM  Date of Service:  2/3/2025    Reason for Consult: Goals of Care, Symptom Management, and Continuity of Care    History Obtained From:-  Patient, Spouse, Electronic Medical Record, and Patient's primary nurse             Subjective   Gustabo Machado was seen in their room today for follow up with the palliative care team. There was family present at the bedside. Patient denies pain, shortness of breath, nausea, vomiting, and constipation. They have Senna 8.6 mg twice a day for scheduled symptom relief. From 8 AM yesterday to 8 AM today, they have used No Medications for as needed symptom relief.  Their comfort medications are working well to control their symptoms.  The patient is eating or receiving tube feeds. They have had a bowel movement in the last 24 hours documented.       Objective   Vitals:-    /77   Pulse (!) 108   Temp 99.2 °F (37.3 °C) (Oral)   Resp 18   Ht 1.829 m (6')   Wt 82.1 kg (181 lb)   SpO2 98%   BMI 24.55 kg/m²     Physical Exam  Vitals and nursing note reviewed.   Constitutional:       General: He is awake. He is not in acute distress.     Appearance: He is ill-appearing.   HENT:      Head: Normocephalic and atraumatic.      Right Ear: External ear normal.      Left Ear: External ear normal.      Nose: No rhinorrhea.   Eyes:      General:         Right eye: No discharge.         Left eye: No discharge.   Cardiovascular:      Rate and Rhythm: Tachycardia present.   Pulmonary:      Effort: Pulmonary effort is normal. No respiratory distress.   Musculoskeletal:      Cervical back: Neck supple.   Neurological:      General: No focal deficit present.      Mental Status: He is alert.   Psychiatric:         Mood and Affect: Affect normal.

## 2025-02-03 NOTE — PROGRESS NOTES
Name: Gustabo Machado  : 1970  MRN: [id number]    Oncology Navigation     Contact Type:  Telephone    Notes: oliverio received call from spouse informing pt currently in hospital -will not be here for tx tomorrow. Referral from hospitals for Medical Oncology to see being placed today.   Would like a referral to [facility] for second opinion. Oliverio spoke to Dr. Irving -referral placed.   Registration informed.      Electronically signed by Shawna Box RN on 2/3/2025 at 2:54 PM

## 2025-02-03 NOTE — PROGRESS NOTES
Hospitalist Progress Note      Patient:  Gustabo Machado 54 y.o. male     Unit/Bed:3B-28/028-A    Date of Admission: 1/27/2025      ASSESSMENT AND PLAN    Active Problems  Metastatic pancreatic cancer   Palliative Care consulted, case discussed with service & their notes reviewed, appreciate recommendations.    Pleurx drain removed 2/2 peritonitis   Pleurx drain cannot be replaced until ABX therapy is finished.  Paracentesis completed on 2/2 - 250cc removed.   PC MD is concerned about progressing disease; speaking with Oncology about options   Pleural Effusions, Hemothorax   Pulm consulted & following; case discussed     They will pull chest tube when OP is less than 150cc / 24hr.   They are okay with restarting heparin.   CTS consulted - They signed off. No surgery indicated at this time. Continue to have chest tube open. They are okay with Heparin restarted.   Bacterial Peritonitis   Case discussed with ID.   Pleurx drain removed .   IV Ceftriaxone. Cell count improving.   Pancytopenia, resolving    WBC 3.3, Hgb 7.3, Plts 94. CBC in the AM. Holding Heparin drip due to anemia.   Hgb stable after 1 unit of pRBC   Pericardial Effusion   2/2 malignancy   Pulmonary Embolism   November 2024 2/2 malignancy   Holding heparin due to anemia & chest tube OP.       LDA: []CVC / []PICC / []Midline / []Chavez / []Drains / []Mediport / [x]None  Antibiotics: None   Steroids: None   Labs (still needed?): [x]Yes / []No  IVF (still needed?): [x]Yes / []No    Level of care: []Step Down / [x]Med-Surg  Bed Status: [x]Inpatient / []Observation  Telemetry: []Yes / [x]No  PT/OT: []Yes / [x]No    DVT Prophylaxis: [x] Lovenox / [] Heparin / [] SCDs / [] Already on Systemic Anticoagulation / [] None   Code status: Full Code     Expected discharge date:  2-3 days   Disposition: Home      ===================================================================    Chief Complaint: Nausea vomiting   Subjective (past 24 hours): No acute evens

## 2025-02-03 NOTE — PROGRESS NOTES
[facility] for Pulmonary, Sleep and Critical Care Medicine      Patient - Gustabo Machado   MRN -  [id number]   [facility] # - [id number]   - 1970      Date of Admission -  2025  1:05 PM  Date of evaluation -  2/3/2025  Room - 3B--A   Hospital Day - 7  Consulting - Meagan Epps PA Primary Care Physician - Daniel Agrawal MD     Problem List      Active Hospital Problems    Diagnosis Date Noted    Peritonitis (HCC) [K65.9] 2025    Malignant pleural effusion [J91.0] 2025    Pleural effusion, bilateral [J90] 2025    Hypervolemia [E87.70] 2025    Pericardial effusion [I31.39] 2025    Dyspnea on exertion [R06.09] 2025    Pancreatic adenocarcinoma (HCC) [C25.9] 2025    Peritoneal carcinomatosis (HCC) [C78.6] 2024    Malignant ascites [R18.0] 2024    Metastatic adenocarcinoma to liver (HCC) [C78.7] 10/29/2024    Palliative care patient [Z51.5] 10/23/2024    Cancer related pain [G89.3] 10/23/2024     Reason for Consult    Bilateral Pleural Effusion  Subjective   Patient seen and evaluated at the bedside with wife present.  He denies fever, chills, nausea, vomiting, change in work of breathing, change in urination/defecation.  Review of systems is positive for cough without sputum production as well as chest pain at the site of chest tube.  Patient's wife, per chest tube drainage has improved in lucency.    Case discussed with infectious disease attending.  Secondary to consideration for abdominal peritonitis, patient would not be a candidate for catheter placement for 1 week.  Patient is cleared for conversion of chest tube to Pleurx catheter from infectious disease perspective.    Care coordinated with primary RN.  Brown   Past Medical History      Diagnosis Date    Cancer (HCC)     Pancreatic cancer (HCC)     Pulmonary emboli (HCC) 2024      Past Surgical History        Procedure Laterality Date    COLONOSCOPY  10/31/2022    CT GUIDED CHEST TUBE

## 2025-02-03 NOTE — CARE COORDINATION
DISCHARGE PLANNING EVALUATION  2/3/25, 11:42 AM EST    Reason for Referral: current with [facility]  Decision Maker: Independent with decision making.   Current Services: [facility] for nursing only.  Will not need a new order per Sharon at [facility].   New Services Requested: None  Family/ Social/ Home environment: From home with spouse and children.  Family is very supportive.   Payment Source:[facility] [place]   Transportation at Discharge: Family   Post-acute (PAC) provider list was provided to patient. Patient was informed of their freedom to choose PAC provider. Discussed and offered to show the patient the relevant PAC Providers quality and resource use measures on Medicare Compare web site via computer based on patient's goals of care and treatment preferences. Questions regarding selection process were answered.      Teach Back Method used with Jalil and his spouse Nanci regarding care plan and discharge planning.   Patient and spouse verbalized understanding of the plan of care and contribute to goal setting.       Patient preferences and discharge plan: Jalil plans to return home with his family and resume [facility] for nursing only.  Denied other needs at this time.      Electronically signed by LORI Iniguez on 2/3/2025 at 11:42 AM

## 2025-02-04 ENCOUNTER — HOSPITAL ENCOUNTER (OUTPATIENT)
Dept: INFUSION THERAPY | Age: 55
End: 2025-02-04

## 2025-02-04 PROBLEM — A41.59 SEPSIS DUE TO ACINETOBACTER (HCC): Status: ACTIVE | Noted: 2025-02-04

## 2025-02-04 LAB
BACTERIA SPEC ANAEROBE CULT: ABNORMAL
BACTERIA SPEC ANAEROBE CULT: ABNORMAL
BACTERIA SPEC BFLD CULT: ABNORMAL
DEPRECATED RDW RBC AUTO: 61.7 FL (ref 35–45)
ERYTHROCYTE [DISTWIDTH] IN BLOOD BY AUTOMATED COUNT: 16.9 % (ref 11.5–14.5)
GRAM STN SPEC: ABNORMAL
GRAM STN SPEC: ABNORMAL
HCT VFR BLD AUTO: 24.8 % (ref 42–52)
HGB BLD-MCNC: 7.7 GM/DL (ref 14–18)
MCH RBC QN AUTO: 31.3 PG (ref 26–33)
MCHC RBC AUTO-ENTMCNC: 31 GM/DL (ref 32.2–35.5)
MCV RBC AUTO: 100.8 FL (ref 80–94)
ORGANISM: ABNORMAL
ORGANISM: ABNORMAL
PLATELET # BLD AUTO: 108 THOU/MM3 (ref 130–400)
PMV BLD AUTO: 10.1 FL (ref 9.4–12.4)
RBC # BLD AUTO: 2.46 MILL/MM3 (ref 4.7–6.1)
WBC # BLD AUTO: 3 THOU/MM3 (ref 4.8–10.8)

## 2025-02-04 PROCEDURE — 94669 MECHANICAL CHEST WALL OSCILL: CPT

## 2025-02-04 PROCEDURE — 2140000000 HC CCU INTERMEDIATE R&B

## 2025-02-04 PROCEDURE — 6360000002 HC RX W HCPCS: Performed by: STUDENT IN AN ORGANIZED HEALTH CARE EDUCATION/TRAINING PROGRAM

## 2025-02-04 PROCEDURE — 6370000000 HC RX 637 (ALT 250 FOR IP): Performed by: PHYSICIAN ASSISTANT

## 2025-02-04 PROCEDURE — 2580000003 HC RX 258: Performed by: STUDENT IN AN ORGANIZED HEALTH CARE EDUCATION/TRAINING PROGRAM

## 2025-02-04 PROCEDURE — 6370000000 HC RX 637 (ALT 250 FOR IP): Performed by: HOSPITALIST

## 2025-02-04 PROCEDURE — 2580000003 HC RX 258: Performed by: PHYSICIAN ASSISTANT

## 2025-02-04 PROCEDURE — 85027 COMPLETE CBC AUTOMATED: CPT

## 2025-02-04 PROCEDURE — 6370000000 HC RX 637 (ALT 250 FOR IP): Performed by: STUDENT IN AN ORGANIZED HEALTH CARE EDUCATION/TRAINING PROGRAM

## 2025-02-04 PROCEDURE — 87040 BLOOD CULTURE FOR BACTERIA: CPT

## 2025-02-04 PROCEDURE — 99255 IP/OBS CONSLTJ NEW/EST HI 80: CPT | Performed by: NURSE PRACTITIONER

## 2025-02-04 PROCEDURE — 99233 SBSQ HOSP IP/OBS HIGH 50: CPT | Performed by: PHYSICIAN ASSISTANT

## 2025-02-04 PROCEDURE — 6370000000 HC RX 637 (ALT 250 FOR IP)

## 2025-02-04 PROCEDURE — 36415 COLL VENOUS BLD VENIPUNCTURE: CPT

## 2025-02-04 PROCEDURE — 99233 SBSQ HOSP IP/OBS HIGH 50: CPT | Performed by: INTERNAL MEDICINE

## 2025-02-04 PROCEDURE — 99233 SBSQ HOSP IP/OBS HIGH 50: CPT | Performed by: STUDENT IN AN ORGANIZED HEALTH CARE EDUCATION/TRAINING PROGRAM

## 2025-02-04 RX ORDER — SODIUM FLUORIDE 5 MG/ML
1 PASTE, DENTIFRICE DENTAL 2 TIMES DAILY
COMMUNITY

## 2025-02-04 RX ORDER — ZOLPIDEM TARTRATE 5 MG/1
10 TABLET ORAL NIGHTLY PRN
Status: DISCONTINUED | OUTPATIENT
Start: 2025-02-04 | End: 2025-02-07 | Stop reason: HOSPADM

## 2025-02-04 RX ORDER — LEVOFLOXACIN 750 MG/1
750 TABLET, FILM COATED ORAL DAILY
Status: DISCONTINUED | OUTPATIENT
Start: 2025-02-04 | End: 2025-02-07 | Stop reason: HOSPADM

## 2025-02-04 RX ORDER — SODIUM CHLORIDE, SODIUM LACTATE, POTASSIUM CHLORIDE, CALCIUM CHLORIDE 600; 310; 30; 20 MG/100ML; MG/100ML; MG/100ML; MG/100ML
INJECTION, SOLUTION INTRAVENOUS CONTINUOUS
Status: ACTIVE | OUTPATIENT
Start: 2025-02-04 | End: 2025-02-05

## 2025-02-04 RX ORDER — SULFAMETHOXAZOLE AND TRIMETHOPRIM 800; 160 MG/1; MG/1
1 TABLET ORAL EVERY 8 HOURS
Status: DISCONTINUED | OUTPATIENT
Start: 2025-02-04 | End: 2025-02-04

## 2025-02-04 RX ORDER — NYSTATIN 100000 [USP'U]/ML
500000 SUSPENSION ORAL 4 TIMES DAILY
COMMUNITY

## 2025-02-04 RX ADMIN — AMPICILLIN SODIUM AND SULBACTAM SODIUM 9000 MG: 2; 1 INJECTION, POWDER, FOR SOLUTION INTRAMUSCULAR; INTRAVENOUS at 17:38

## 2025-02-04 RX ADMIN — POTASSIUM CHLORIDE 10 MEQ: 750 TABLET, FILM COATED, EXTENDED RELEASE ORAL at 07:47

## 2025-02-04 RX ADMIN — LEVOFLOXACIN 750 MG: 750 TABLET, FILM COATED ORAL at 12:50

## 2025-02-04 RX ADMIN — SENNOSIDES 8.6 MG: 8.6 TABLET, COATED ORAL at 21:31

## 2025-02-04 RX ADMIN — SULFAMETHOXAZOLE AND TRIMETHOPRIM 1 TABLET: 800; 160 TABLET ORAL at 12:50

## 2025-02-04 RX ADMIN — SENNOSIDES 8.6 MG: 8.6 TABLET, COATED ORAL at 07:47

## 2025-02-04 RX ADMIN — ZOLPIDEM TARTRATE 10 MG: 5 TABLET, FILM COATED ORAL at 21:35

## 2025-02-04 RX ADMIN — FAMOTIDINE 20 MG: 20 TABLET, FILM COATED ORAL at 07:47

## 2025-02-04 RX ADMIN — SODIUM CHLORIDE, POTASSIUM CHLORIDE, SODIUM LACTATE AND CALCIUM CHLORIDE: 600; 310; 30; 20 INJECTION, SOLUTION INTRAVENOUS at 12:56

## 2025-02-04 NOTE — PLAN OF CARE
Problem: ABCDS Injury Assessment  Goal: Absence of physical injury  Outcome: Progressing  Flowsheets (Taken 2/3/2025 2223)  Absence of Physical Injury: Implement safety measures based on patient assessment     Problem: Safety - Adult  Goal: Free from fall injury  Outcome: Progressing  Flowsheets (Taken 2/3/2025 2223)  Free From Fall Injury: Instruct family/caregiver on patient safety     Problem: Nutrition Deficit:  Goal: Optimize nutritional status  Outcome: Progressing  Flowsheets (Taken 2/3/2025 2223)  Nutrient intake appropriate for improving, restoring, or maintaining nutritional needs: Monitor oral intake, labs, and treatment plans     Problem: Discharge Planning  Goal: Discharge to home or other facility with appropriate resources  2/3/2025 2223 by Christi Dumont RN  Outcome: Progressing  Flowsheets (Taken 2/3/2025 2223)  Discharge to home or other facility with appropriate resources:   Identify barriers to discharge with patient and caregiver   Identify discharge learning needs (meds, wound care, etc)   Refer to discharge planning if patient needs post-hospital services based on physician order or complex needs related to functional status, cognitive ability or social support system   Arrange for needed discharge resources and transportation as appropriate     Problem: Respiratory - Adult  Goal: Achieves optimal ventilation and oxygenation  Outcome: Progressing  Flowsheets (Taken 2/3/2025 2223)  Achieves optimal ventilation and oxygenation: Assess for changes in respiratory status     Problem: Cardiovascular - Adult  Goal: Maintains optimal cardiac output and hemodynamic stability  Outcome: Progressing  Flowsheets (Taken 2/3/2025 2223)  Maintains optimal cardiac output and hemodynamic stability:   Monitor blood pressure and heart rate   Assess for signs of decreased cardiac output     Problem: Cardiovascular - Adult  Goal: Absence of cardiac dysrhythmias or at baseline  Outcome: Progressing  Flowsheets  Metabolic/Fluid and Electrolytes - Adult  Goal: Hemodynamic stability and optimal renal function maintained  Outcome: Progressing  Flowsheets (Taken 2/3/2025 2223)  Hemodynamic stability and optimal renal function maintained:   Monitor labs and assess for signs and symptoms of volume excess or deficit   Monitor intake, output and patient weight     Problem: Pain  Goal: Verbalizes/displays adequate comfort level or baseline comfort level  Outcome: Progressing  Flowsheets (Taken 2/3/2025 2223)  Verbalizes/displays adequate comfort level or baseline comfort level: Encourage patient to monitor pain and request assistance     Problem: Chronic Conditions and Co-morbidities  Goal: Patient's chronic conditions and co-morbidity symptoms are monitored and maintained or improved  Outcome: Progressing  Flowsheets (Taken 2/3/2025 2223)  Care Plan - Patient's Chronic Conditions and Co-Morbidity Symptoms are Monitored and Maintained or Improved: Monitor and assess patient's chronic conditions and comorbid symptoms for stability, deterioration, or improvement

## 2025-02-04 NOTE — PLAN OF CARE
Problem: ABCDS Injury Assessment  Goal: Absence of physical injury  2/4/2025 1020 by She Peterson RN  Outcome: Progressing  Flowsheets (Taken 2/4/2025 1020)  Absence of Physical Injury: Implement safety measures based on patient assessment  2/3/2025 2223 by Christi Dumont RN  Outcome: Progressing  Flowsheets (Taken 2/3/2025 2223)  Absence of Physical Injury: Implement safety measures based on patient assessment     Problem: Safety - Adult  Goal: Free from fall injury  2/4/2025 1020 by She Peterson RN  Outcome: Progressing  Flowsheets (Taken 2/4/2025 1020)  Free From Fall Injury: Instruct family/caregiver on patient safety  2/3/2025 2223 by Christi Dumont RN  Outcome: Progressing  Flowsheets (Taken 2/3/2025 2223)  Free From Fall Injury: Instruct family/caregiver on patient safety     Problem: Nutrition Deficit:  Goal: Optimize nutritional status  2/4/2025 1020 by She Peterson RN  Outcome: Progressing  Flowsheets (Taken 2/4/2025 1020)  Nutrient intake appropriate for improving, restoring, or maintaining nutritional needs: Assess nutritional status and recommend course of action  2/3/2025 2223 by Christi Dumont RN  Outcome: Progressing  Flowsheets (Taken 2/3/2025 2223)  Nutrient intake appropriate for improving, restoring, or maintaining nutritional needs: Monitor oral intake, labs, and treatment plans     Problem: Discharge Planning  Goal: Discharge to home or other facility with appropriate resources  2/4/2025 1020 by She Peterson RN  Outcome: Progressing  Flowsheets (Taken 2/4/2025 1020)  Discharge to home or other facility with appropriate resources: Identify barriers to discharge with patient and caregiver  2/3/2025 2223 by Christi Dumont RN  Outcome: Progressing  Flowsheets (Taken 2/3/2025 2223)  Discharge to home or other facility with appropriate resources:   Identify barriers to discharge with patient and caregiver   Identify discharge learning needs (meds, wound care,

## 2025-02-04 NOTE — CONSULTS
CONTRAST CLINICAL INFORMATION: abdominal catheter nonfunctioning COMPARISON: No prior study. TECHNIQUE: 5 mm axial imaging through the abdomen and pelvis without IV contrast.  Coronal and sagittal reconstructions were performed. All CT scans at this facility use dose modulation, iterative reconstruction, and/or weight based dosing when appropriate to reduce the radiation dose to as low as reasonably achievable. FINDINGS: LIMITATIONS: The evaluation of the solid organs is limited without IV contrast. Lung bases: Large bilateral pleural effusions send compressive atelectasis/consolidation is visualized. Liver/gallbladder/bilary tree: The peritoneal catheter terminates at the dome of the right hepatic lobe, unchanged. Innumerable multifocal metastatic liver lesions appear similar accounting for lack of IV contrast. The gallbladder is contracted. No biliary ductal dilatation is observed. Pancreas: Stable noncontrast appearance. Spleen : Stable. Adrenal glands: Unremarkable noncontrast appearance. Kidneys/ ureters/ bladder: No renal calculus, hydronephrosis, or hydroureter is visualized. The urinary bladder is partially distended and grossly unremarkable. Gastrointestinal: The small amount of perihepatic ascites appears similar measuring up to 52 mm in thickness along the left hepatic lobe (previously measured 54 mm in thickness). The small amount of ascites within the pelvis appear similar measuring up to 57 mm in thickness (previously measured up to 56 mm). Small bowel loops are no longer dilated. No bowel obstruction is observed. No organized fluid collection is identified. Omental thickening/infiltration along the anterior abdominal wall is unchanged. Retroperitoneum / lymph nodes: The aorta is not dilated. Retroperitoneal lymph nodes appear stable measuring up to 14 mm in short axis (previously measured 14 mm). Pelvis: The prostate gland is not enlarged. Musculoskeletal: Sclerotic T11 vertebral body lesion measuring  embolism TECHNIQUE: CT of the chest was performed without the use of intravenous contrast. Axial images as well as coronal and sagittal reconstructions were obtained. All CT scans at this facility use dose modulation, iterative reconstruction, and/or weight-based dosing when appropriate to reduce radiation dose to as low as reasonably achievable. COMPARISON: CT chest 1/15/2025 FINDINGS: Cardiac size is normal. Aneurysmal dilation of the ascending thoracic aorta measures up to 4.4 cm in diameter (image 34). There is a small pericardial effusion. There are large bilateral pleural effusions with areas of adjacent lung consolidation. Calcified granulomas are present in the lungs. There is a chest tube at the right lung base. There are calcified mediastinal and hilar lymph nodes. There is no axillary lymphadenopathy. Degenerative changes in the thoracic spine are stable. There is a stable sclerotic lesion at the T11 vertebra. There are stable hypoattenuating lesions throughout the liver.     1. Large bilateral pleural effusions with adjacent atelectasis/infiltrate. 2. Small pericardial effusion. 3. Sclerotic lesion at the T11 vertebral suspicious for osseous metastatic disease. 4. Hypoattenuating lesions throughout the liver suspicious for metastatic disease. **This report has been created using voice recognition software. It may contain minor errors which are inherent in voice recognition technology.** Electronically signed by Dr. Darion Jose    XR CHEST (2 VW)    Addendum Date: 1/27/2025    Addendum: In addition to the original findings, there is also left basilar consolidative opacity along with a moderate left pleural effusion. Of note, the opacity on the right likely represents a combination of consolidation, possibly related to postobstructive consolidation as well as a large right pleural effusion. These findings were discussed with the referring clinician at the time of dictation of this amendment on

## 2025-02-04 NOTE — PROGRESS NOTES
Hospitalist Progress Note      Patient:  Gustabo Machado 54 y.o. male     Unit/Bed:3B-28/028-A    Date of Admission: 1/27/2025      ASSESSMENT AND PLAN    Active Problems  Acinetobacter Infection   Case discussed extensively with Dr. Duvall, Dr. Posada, Oncology NP, Pulmonology resident, pharmacy.   2 cultures positive for Acinetobacter (1 pleural fluid & 1 ascites fluid)   Originally thought this was colonization from pleurx drain. Second culture grew Acinetobacter today. Therefore, this will be treated as a pathogenic infection.   All lines will need to be removed for a 3-5 day line holiday.   Chest tube removed (2/4) Pleurx drain removed (1/31)   Unasyn & Levaquin started. We will continue ABX for 3 days and then re-culture fluid to ensure bacteria has cleared prior to drain placement.   Mediport culture & peripheral blood culture obtained today to ensure Acinetobacter has not entered bloodstream.   It is unlikely due to afebrile & no WBC. However, patient is immunocompromised. Unsure if patient can mount a normal immune response.   If blood cultures are positive, Mediport will need to be removed.   Pt & wife understand that he cannot have any chemo treatment while he is on ABX treatment.    Daily CXR, CMP, CBC.   Metastatic pancreatic cancer   Case discussed with Oncology NP   No chemo while on ABX.   Pt is asking to go to [facility] for a second opinion as an OP.   Pleural Effusions, Hemothorax   Pulm consulted & following; case discussed     Chest tube pulled today due to infection.   Pleural fluid growing Acinetobacter.   700 cc output in the last 24 hours.   Daily CXR to monitor pleural effusion development.   Pt will likely need multiple thoracentesis while on drain holiday.   Pancytopenia, resolving    WBC 3.0, Hgb 7.7, Plts 108. CBC in the AM. Holding Heparin drip due to anemia.   Hgb stable after 1 unit of pRBC   Pericardial Effusion   2/2 malignancy   Pulmonary Embolism   November 2024 2/2 malignancy

## 2025-02-04 NOTE — PROGRESS NOTES
[facility] Infectious Disease         Progress Note      Gustabo Machado  MEDICAL RECORD NUMBER:  [id number]  AGE: 54 y.o.   GENDER: male  : 1970  EPISODE DATE:  2025      Assessment:     Patient is a 54-year-old male with stage IV pancreatic cancer and liver metastasis who I am consulted for peritonitis.     In the setting of an elevated total nucleated cell count of 18,000 and neutrophils of 98%, this is concerning for peritonitis.  This patient has an indwelling Pleurx drain due to recurrent ascites in the setting of metastatic malignancy.       Discussed case with pulmonology  There is evidence of Acinetobacter positive from pleural fluid as well as ascitic fluid   The presence of bacterial growth from 2 separate sites is more concerning for pathogenic infection even in the presence of improving cell counts  Do not recommend abdominal Pleurx catheter placement  Would not recommend placing pleural catheter  I would recommend that this patient receive 3 days of Bactrim and levofloxacin.  On , I would repeat paracentesis to assess cell counts as well as repeat fluid studies from pleura.  Ideally, I would then wait 48 hours after paracentesis to determine if there is evidence of bacterial growth though this may be delayed while on antimicrobials.  It would then be reasonable to replace abdominal catheter and pleural catheter.  It is also very likely that given the resistance of Acinetobacter as well as persistent recurrence of ascitic fluid, we may have significant difficulty in clearing this organism.  There is an extremely high risk of recurrence.  This patient should likely be considered for a more hospice based approach as the likelihood for reinfection with Acinetobacter is fairly high.    My only concern is the indwelling right sided pleural catheter as this may be colonized with Acinetobacter.  This patient's likelihood of clearance is very poor as bacteria are present in 2

## 2025-02-04 NOTE — PROGRESS NOTES
[facility]      Patient - Gustabo Machado   MRN -  [id number]   [facility] # - [id number]   - 1970      Date of Admission -  2025  1:05 PM  Date of evaluation -  2025  Room - 3B--A   Hospital Day - 8  Consulting - Meagan Epps PA Primary Care Physician - Daniel Agrawal MD     Problem List      Active Hospital Problems    Diagnosis Date Noted    Sepsis due to Acinetobacter (HCC) [A41.59] 2025    Peritonitis (HCC) [K65.9] 2025    Malignant pleural effusion [J91.0] 2025    Pleural effusion, bilateral [J90] 2025    Hypervolemia [E87.70] 2025    Pericardial effusion [I31.39] 2025    Dyspnea on exertion [R06.09] 2025    Pancreatic adenocarcinoma (HCC) [C25.9] 2025    Peritoneal carcinomatosis (HCC) [C78.6] 2024    Malignant ascites [R18.0] 2024    Metastatic adenocarcinoma to liver (HCC) [C78.7] 10/29/2024    Palliative care patient [Z51.5] 10/23/2024    Cancer related pain [G89.3] 10/23/2024     Reason for Consult    Bilateral Pleural Effusion  Subjective   Patient seen and evaluated at the bedside with wife present.  He denies fever, chills, nausea, vomiting, lightheadedness, dizziness.  On physical examination the patient was noted to have increased work of breathing with leaning forward which he acknowledged.  He endorsed minimal chest pain at the site of his chest tube.    Microbiology lab contacted in a.m. to confirm cultures.  2025 pleural fluid culture showed evidence of Acinetobacter which was finalized; confirmed that there was no other organisms identified on this culture with microbiology lab technician.  Reaffirmed that prior pleural fluid culture from 2025 had shown no growth and was finalized.    Case reviewed extensively with infectious disease attending Dr. Bert Duvall.  Recommendation for a removal of chest tube from infectious disease perspective with serial  normal.         Behavior: Behavior normal.          Labs  - Old records and notes have been reviewed in [facility]   ABG  Lab Results   Component Value Date/Time    PH 7.48 11/17/2024 08:31 AM    PO2 47 11/17/2024 08:31 AM    PCO2 34 11/17/2024 08:31 AM    HCO3 26 11/17/2024 08:31 AM    O2SAT 86 11/17/2024 08:31 AM     Lab Results   Component Value Date/Time    MODE Not entered 11/17/2024 08:31 AM     CBC  Recent Labs     02/02/25  0510 02/03/25  0335 02/04/25  0345   WBC 6.1 3.3* 3.0*   RBC 2.56* 2.32* 2.46*   HGB 8.0* 7.3* 7.7*   HCT 25.7* 23.8* 24.8*   .4* 102.6* 100.8*   MCH 31.3 31.5 31.3   MCHC 31.1* 30.7* 31.0*   * 94* 108*   MPV 10.4 10.4 10.1      BMP  Recent Labs     02/02/25  0510 02/03/25  0335    136   K 4.2 3.6    102   CO2 23 23   BUN 11 11   CREATININE 0.5 0.4   GLUCOSE 95 99   MG 2.0 2.0   CALCIUM 8.2* 8.0*     LFT  No results for input(s): \"AST\", \"ALT\", \"BILITOT\", \"ALKPHOS\", \"AMYLASE\", \"LIPASE\" in the last 72 hours.    Invalid input(s): \"ALB\"  TROP  No results found for: \"TROPONINT\"  BNP  No results for input(s): \"BNP\" in the last 72 hours.  Lactic Acid  No results for input(s): \"LACTA\" in the last 72 hours.  INR  No results for input(s): \"INR\", \"PROTIME\" in the last 72 hours.  PTT  Recent Labs     02/02/25  2340 02/03/25  0335 02/03/25  0900   APTT 59.5* 124.1* 124.5*     Glucose  No results for input(s): \"POCGLU\" in the last 72 hours.  UA   Recent Labs     02/02/25  1810   COLORU BROWN     -01/28/2025 pleural effusion (right chest) positive for poorly differentiated metastatic adenocarcinoma  -01/28/2025 flow cytometry showing low viability specimen with no abnormal myeloid, plasma cell, B-cell, T-cell, or NK cell population  -01/29/2025 pleural effusion cytology consistent with rare atypical epithelial cells for which carcinoma cannot be excluded  -02/02/2025 ascites fluid cytology showing very rare atypical cells with WBCs and degenerating debris    PFTs   No PFT on file  Magaly Noel MD from infectious disease specialty over phone.  Due to ongoing Acinetobacter infection, he requested me to remove right-sided chest tube to facilitate infection control.  He recommended serial thoracenteses if needed for symptomatic improvement of shortness of breath.  Appreciated infectious disease consultation by MD Gustabo Mendoza educated about my impression and plan. He verbalizes understanding.    Electronically signed by   Maggie Duran MD on 2/4/2025 at 7:39 PM

## 2025-02-04 NOTE — CARE COORDINATION
2/4/25, 7:59 AM EST    DISCHARGE ON GOING EVALUATION    [facility] day: 8  Location: -28/028-A Reason for admit: Pericardial effusion [I31.39]  Pleural effusion [J90]  Pancreatic adenocarcinoma (HCC) [C25.9]  Pleural effusion, bilateral [J90]  Hypervolemia, unspecified hypervolemia type [E87.70]     Procedures:   1/28 Thoracentesis: 1.2L Red fluid removed   1/28 ECHO: EF 60-65% Pericardium: Small (<1 cm) pericardial effusion present. No indication of cardiac tamponade;  Extracardiac: Left pleural effusion.  1/30 1 unit PRBC   1/31 Pleurx removed  2/2 Paracentesis: 0.25L Thick Brown fluid removed    Imaging since last note:   2/1 CXR: Mild progression bilateral consolidations. Right chest tube stable with minimal pneumothorax laterally.  2/1 BLE Venous DUP: Only one of the paired left posterior tibial veins is identified and without thrombosis. Otherwise, there is is no sonographic evidence of deep venous thrombosis within the remaining bilateral lower extremity veins.  2/2 CXR: Stable left and improving right lung consolidations. Right chest tube stable. No visible pneumothorax.  2/3 CXR: Stable bilateral consolidations, left-greater-than-right. No visible pneumothorax.  Stable small effusions.  2/3 CXR: Normal heart size. Mediport right side, catheter tip in SVC. Pigtail catheter in the pleural space right side. 2. Small pleural effusion right side. Moderate size pleural effusion left side. 3. Moderate pneumonia/pulmonary edema involving both lungs relatively diffusely, worse on the left. 4. Overall appearance of chest slightly worse than prior.    Barriers to Discharge: Hospitalist, Pulmonology, ID and Palliative Care following. Oncology to see.  Pleurx is unable to be replaced until abx completed 2/11. Eliquis held. IV rocephin q24h. Morphine prn. Roxicodone prn. 98% on RA. Hgb 7.7 (7.3) Chest tube care (655ml out in 24hrs)     PCP: Daniel Agrawal MD  Readmission Risk Score: 29.2    Patient

## 2025-02-04 NOTE — CONSULTS
CONSULTATION NOTE :ID       Patient - Gustabo Machado,  Age - 54 y.o.    - 1970      Room Number - 3B-28/028-A   MRN -  [id number]   [facility] # - [id number]  Date of Admission -  2025  1:05 PM  Patient's PCP: Daniel Agrawal MD     Requesting Physician: Meagan Epps PA    REASON FOR CONSULTATION   Second opinon  CHIEF COMPLAINT   Shortness of breath    HISTORY OF PRESENT ILLNESS       This is a very pleasant 54 y.o. male who was admitted to the hospital with a chief complaints of shortness of breath. He has stage IV metastatic pancreatic cancer was admitted for shortness of breath. He was found to have infection of the pleurex catheter. He is on active chemotherapy. His chemotherapy was on hold and started on iv antibiotics. He unfortunately has a positive cx from the fluid from abdomen and pleura. He has no fever or chills. He is following by Dr Duvall who updated me on the patient condition    PAST MEDICAL  HISTORY       Past Medical History:   Diagnosis Date    Cancer (HCC)     Pancreatic cancer (HCC)     Pulmonary emboli (HCC) 2024       PAST SURGICAL HISTORY     Past Surgical History:   Procedure Laterality Date    COLONOSCOPY  10/31/2022    CT GUIDED CHEST TUBE  2025    CT GUIDED CHEST TUBE 2025 [facility] CT SCAN    CT NEEDLE BIOPSY LIVER PERCUTANEOUS  10/22/2024    CT NEEDLE BIOPSY LIVER PERCUTANEOUS 10/22/2024 [facility] CT SCAN    IR GUIDED THROMB MECH VEIN  2024    IR THROMB MECH VEIN 2024 [facility] SPECIAL PROCEDURES    IR INSERT TUNNELED PLEURA CATH W CUFF  2024    IR INSERT TUNNELED PLEURA CATH W CUFF 2024 [facility] PROCEDURES    PORT SURGERY N/A 10/31/2024    Single lumen Smartport insertion performed by Alexander Colon MD at [facility] OR    SKIN CANCER EXCISION      BCC_ nose         MEDICATIONS:       Scheduled Meds:   levoFLOXacin  750 mg Oral Daily    sulfamethoxazole-trimethoprim  1 tablet Oral q8h    senna  1 tablet Oral BID     [Held by provider] apixaban  5 mg Oral BID    bumetanide  1 mg Oral Daily    famotidine  20 mg Oral Daily    potassium chloride  10 mEq Oral Daily     Continuous Infusions:   lactated ringers 50 mL/hr at 02/04/25 1256    [Held by provider] heparin (PORCINE) Infusion Stopped (02/03/25 1425)     PRN Meds:magnesium sulfate, sodium phosphate 15 mmol in sodium chloride 0.9 % 250 mL IVPB, morphine, oxyCODONE, [Held by provider] bumetanide, magic (miracle) mouthwash, ondansetron, polyethylene glycol, zolpidem, heparin (porcine), heparin (porcine)  Allergies:   ALLERGIES:    Patient has no known allergies.        SOCIAL HISTORY:     TOBACCO:   reports that he has never smoked. He has never used smokeless tobacco.     ETOH:   reports that he does not currently use alcohol.  Patient currently lives with family        FAMILY HISTORY:         Problem Relation Age of Onset    Diabetes Mother     Stroke Mother        REVIEW OF SYSTEMS:     Constitutional: no fever, no night sweats, + fatigue, no weight loss.  Head: no head ache , no head injury, no migranes.  Eye: no eye discharge, blurring of vision, no double vision,no eye pain.  Ears: no hearing difficulty, no tinnitus  Mouth/throat: no ulceration, dental caries , dysphagia, no hoarseness and voice change  Respiratory: as noted in HPI  CVS: no palpitation, no chest pain,   GI: no abdominal pain, no nausea , no vomiting, no constipation,no diarrhea.  MAXIME: no dysuria, frequency and urgency, no hematuria, no kidney stones  Musculoskeletal: no joint pain, swelling , stiffness,  Endocrine: no polyuria, polydipsia, no cold or heat intolerance  Hematology: no anemia, no easy brusing or bleeding, no hx of clotting disorder  Dermatology: no skin rash, no skin lesions, no pruritis,  Neurological:no headaches,no dizziness, no seizure, no numbness.  Psychiatry: no depression, no anxiety,no panic attacks, no suicide ideation    PHYSICAL EXAM:     /79   Pulse (!) 116   Temp 97.8 °F  (36.6 °C) (Oral)   Resp 19   Ht 1.829 m (6')   Wt 82.1 kg (181 lb)   SpO2 97%   BMI 24.55 kg/m²   General apperance:  Awake, alert, chronically sick looking  HEENT: pale conjunctiva, unicteric sclera, moist oral mucosa.  Chest:  right side chest tube, diminished breath sound on the lower lung fields bilaterally  Cardiovascular:  RRR ,S1S2, no murmur or gallop.  Abdomen:  Soft, non tender to palpation.flank fullness  Extremities: no edema  Skin:  Warm and dry.  CNS:awake and oriented        LABS:     CBC:   Recent Labs     02/02/25  0510 02/03/25  0335 02/04/25  0345   WBC 6.1 3.3* 3.0*   HGB 8.0* 7.3* 7.7*   * 94* 108*     BMP:    Recent Labs     02/02/25  0510 02/03/25  0335    136   K 4.2 3.6    102   CO2 23 23   BUN 11 11   CREATININE 0.5 0.4   GLUCOSE 95 99     Calcium:  Recent Labs     02/03/25  0335   CALCIUM 8.0*     Ionized Calcium:Invalid input(s): \"IONCA\"  Magnesium:  Recent Labs     02/03/25  0335   MG 2.0        UA:   Recent Labs     02/02/25  1810   COLORU BROWN         IMAGING:    Micro:   Lab Results   Component Value Date/Time    BC  01/27/2025 02:40 PM     No growth 24 hours. No growth 48 hours. No growth at 5 days       Problem list of patient      Patient Active Problem List   Diagnosis Code    Pancreatic mass K86.89    Pancreatic abnormality Q45.3    Abdominal pain, right lower quadrant R10.31    Acute right-sided low back pain without sciatica M54.50    Liver lesion K76.9    Palliative care patient Z51.5    Cancer related pain G89.3    Malignant neoplasm of tail of pancreas (HCC) C25.2    Metastatic adenocarcinoma to liver (HCC) C78.7    Hyponatremia E87.1    Peritoneal carcinomatosis (HCC) C78.6    Malignant ascites R18.0    Bilateral pulmonary embolism (HCC) I26.99    Moderate malnutrition (HCC) E44.0    Small bowel obstruction (HCC) K56.609    Severe malnutrition (HCC) E43    Pancreatic adenocarcinoma (HCC) C25.9    Pleural effusion, bilateral J90    Hypervolemia

## 2025-02-04 NOTE — PROGRESS NOTES
Pharmacy Medication History Note    List of current medications patient is taking is complete.    Source of information: patient, patient's wife and fill history     Changes made to medication list:  Medications removed (include reason, ex. therapy complete or physician discontinued):  Bumetanide 1 mg Q72H  Polyethylene glycol packets - takes Senna instead    Medications added:  Sodium fluoride 1.1% 1 application BID  Nystatin 100,000 unit/mL suspension 4 times daily - used 1-2 times daily PRN    Other notes (ex. Recent course of antibiotics, Coumadin dosing):  Before admission patient was taking bumetanide 1 mg every other day. New script was sent in for daily administration right before admission but was not started yet by patient.   New RX also sent in for potassium 10 mEq 1 tab daily to start taking with bumetanide increase, in addition to 20 mEq daily. Patient did not start prior to admission.   Denies use of other OTC or herbal medications.    Allergies reviewed    Electronically signed by Mouna Wadsworth on 2/4/2025 at 10:19 AM

## 2025-02-05 ENCOUNTER — APPOINTMENT (OUTPATIENT)
Dept: GENERAL RADIOLOGY | Age: 55
DRG: 919 | End: 2025-02-05
Payer: COMMERCIAL

## 2025-02-05 ENCOUNTER — APPOINTMENT (OUTPATIENT)
Dept: INTERVENTIONAL RADIOLOGY/VASCULAR | Age: 55
DRG: 919 | End: 2025-02-05
Payer: COMMERCIAL

## 2025-02-05 PROBLEM — D70.8 OTHER NEUTROPENIA: Status: ACTIVE | Noted: 2025-02-05

## 2025-02-05 PROBLEM — D64.9 ANEMIA: Status: ACTIVE | Noted: 2025-02-05

## 2025-02-05 PROBLEM — A49.8 INFECTION DUE TO ACINETOBACTER SPECIES: Status: ACTIVE | Noted: 2025-01-01

## 2025-02-05 LAB
ALBUMIN SERPL BCG-MCNC: 1.9 G/DL (ref 3.5–5.1)
ALP SERPL-CCNC: 96 U/L (ref 38–126)
ALT SERPL W/O P-5'-P-CCNC: 18 U/L (ref 11–66)
ANION GAP SERPL CALC-SCNC: 5 MEQ/L (ref 8–16)
AST SERPL-CCNC: 27 U/L (ref 5–40)
BASOPHILS ABSOLUTE: 0 THOU/MM3 (ref 0–0.1)
BASOPHILS NFR BLD AUTO: 0.9 %
BILIRUB SERPL-MCNC: 0.3 MG/DL (ref 0.3–1.2)
BUN SERPL-MCNC: 9 MG/DL (ref 7–22)
CALCIUM SERPL-MCNC: 7.9 MG/DL (ref 8.5–10.5)
CHLORIDE SERPL-SCNC: 103 MEQ/L (ref 98–111)
CO2 SERPL-SCNC: 28 MEQ/L (ref 23–33)
CREAT SERPL-MCNC: 0.4 MG/DL (ref 0.4–1.2)
DEPRECATED RDW RBC AUTO: 60.6 FL (ref 35–45)
EKG ATRIAL RATE: 111 BPM
EKG P AXIS: 33 DEGREES
EKG P-R INTERVAL: 128 MS
EKG Q-T INTERVAL: 320 MS
EKG QRS DURATION: 88 MS
EKG QTC CALCULATION (BAZETT): 435 MS
EKG R AXIS: 30 DEGREES
EKG T AXIS: 33 DEGREES
EKG VENTRICULAR RATE: 111 BPM
EOSINOPHIL NFR BLD AUTO: 3 %
EOSINOPHILS ABSOLUTE: 0.1 THOU/MM3 (ref 0–0.4)
ERYTHROCYTE [DISTWIDTH] IN BLOOD BY AUTOMATED COUNT: 16.7 % (ref 11.5–14.5)
GFR SERPL CREATININE-BSD FRML MDRD: > 90 ML/MIN/1.73M2
GLUCOSE SERPL-MCNC: 90 MG/DL (ref 70–108)
HCT VFR BLD AUTO: 23.9 % (ref 42–52)
HGB BLD-MCNC: 7.4 GM/DL (ref 14–18)
IMM GRANULOCYTES # BLD AUTO: 0.01 THOU/MM3 (ref 0–0.07)
IMM GRANULOCYTES NFR BLD AUTO: 0.4 %
LYMPHOCYTES ABSOLUTE: 0.3 THOU/MM3 (ref 1–4.8)
LYMPHOCYTES NFR BLD AUTO: 14.2 %
MCH RBC QN AUTO: 30.7 PG (ref 26–33)
MCHC RBC AUTO-ENTMCNC: 31 GM/DL (ref 32.2–35.5)
MCV RBC AUTO: 99.2 FL (ref 80–94)
MONOCYTES ABSOLUTE: 0.6 THOU/MM3 (ref 0.4–1.3)
MONOCYTES NFR BLD AUTO: 24 %
NEUTROPHILS ABSOLUTE: 1.3 THOU/MM3 (ref 1.8–7.7)
NEUTROPHILS NFR BLD AUTO: 57.5 %
NRBC BLD AUTO-RTO: 0 /100 WBC
PLATELET # BLD AUTO: 115 THOU/MM3 (ref 130–400)
PMV BLD AUTO: 9.7 FL (ref 9.4–12.4)
POTASSIUM SERPL-SCNC: 3.6 MEQ/L (ref 3.5–5.2)
PROT SERPL-MCNC: 5 G/DL (ref 6.1–8)
RBC # BLD AUTO: 2.41 MILL/MM3 (ref 4.7–6.1)
SODIUM SERPL-SCNC: 136 MEQ/L (ref 135–145)
WBC # BLD AUTO: 2.3 THOU/MM3 (ref 4.8–10.8)

## 2025-02-05 PROCEDURE — 6370000000 HC RX 637 (ALT 250 FOR IP): Performed by: HOSPITALIST

## 2025-02-05 PROCEDURE — 6370000000 HC RX 637 (ALT 250 FOR IP): Performed by: STUDENT IN AN ORGANIZED HEALTH CARE EDUCATION/TRAINING PROGRAM

## 2025-02-05 PROCEDURE — 6370000000 HC RX 637 (ALT 250 FOR IP): Performed by: PHYSICIAN ASSISTANT

## 2025-02-05 PROCEDURE — 2140000000 HC CCU INTERMEDIATE R&B

## 2025-02-05 PROCEDURE — 99232 SBSQ HOSP IP/OBS MODERATE 35: CPT | Performed by: INTERNAL MEDICINE

## 2025-02-05 PROCEDURE — 80053 COMPREHEN METABOLIC PANEL: CPT

## 2025-02-05 PROCEDURE — 99233 SBSQ HOSP IP/OBS HIGH 50: CPT | Performed by: STUDENT IN AN ORGANIZED HEALTH CARE EDUCATION/TRAINING PROGRAM

## 2025-02-05 PROCEDURE — 93005 ELECTROCARDIOGRAM TRACING: CPT | Performed by: STUDENT IN AN ORGANIZED HEALTH CARE EDUCATION/TRAINING PROGRAM

## 2025-02-05 PROCEDURE — 6370000000 HC RX 637 (ALT 250 FOR IP)

## 2025-02-05 PROCEDURE — 71045 X-RAY EXAM CHEST 1 VIEW: CPT

## 2025-02-05 PROCEDURE — 6360000002 HC RX W HCPCS: Performed by: STUDENT IN AN ORGANIZED HEALTH CARE EDUCATION/TRAINING PROGRAM

## 2025-02-05 PROCEDURE — 93010 ELECTROCARDIOGRAM REPORT: CPT | Performed by: NUCLEAR MEDICINE

## 2025-02-05 PROCEDURE — 6360000002 HC RX W HCPCS: Performed by: NURSE PRACTITIONER

## 2025-02-05 PROCEDURE — 2580000003 HC RX 258: Performed by: STUDENT IN AN ORGANIZED HEALTH CARE EDUCATION/TRAINING PROGRAM

## 2025-02-05 PROCEDURE — 85025 COMPLETE CBC W/AUTO DIFF WBC: CPT

## 2025-02-05 PROCEDURE — 0WP9X0Z REMOVAL OF DRAINAGE DEVICE FROM RIGHT PLEURAL CAVITY, EXTERNAL APPROACH: ICD-10-PCS | Performed by: FAMILY MEDICINE

## 2025-02-05 RX ORDER — METOPROLOL TARTRATE 25 MG/1
12.5 TABLET, FILM COATED ORAL 2 TIMES DAILY
Status: DISCONTINUED | OUTPATIENT
Start: 2025-02-05 | End: 2025-02-07 | Stop reason: HOSPADM

## 2025-02-05 RX ADMIN — BUMETANIDE 1 MG: 1 TABLET ORAL at 10:52

## 2025-02-05 RX ADMIN — METOPROLOL TARTRATE 12.5 MG: 25 TABLET, FILM COATED ORAL at 14:53

## 2025-02-05 RX ADMIN — METOPROLOL TARTRATE 12.5 MG: 25 TABLET, FILM COATED ORAL at 20:59

## 2025-02-05 RX ADMIN — SENNOSIDES 8.6 MG: 8.6 TABLET, COATED ORAL at 10:52

## 2025-02-05 RX ADMIN — FAMOTIDINE 20 MG: 20 TABLET, FILM COATED ORAL at 10:52

## 2025-02-05 RX ADMIN — AMPICILLIN SODIUM AND SULBACTAM SODIUM 9000 MG: 2; 1 INJECTION, POWDER, FOR SOLUTION INTRAMUSCULAR; INTRAVENOUS at 09:57

## 2025-02-05 RX ADMIN — POTASSIUM CHLORIDE 10 MEQ: 750 TABLET, FILM COATED, EXTENDED RELEASE ORAL at 10:52

## 2025-02-05 RX ADMIN — AMPICILLIN SODIUM AND SULBACTAM SODIUM 9000 MG: 2; 1 INJECTION, POWDER, FOR SOLUTION INTRAMUSCULAR; INTRAVENOUS at 17:24

## 2025-02-05 RX ADMIN — FILGRASTIM-AAFI 480 MCG: 480 INJECTION, SOLUTION SUBCUTANEOUS at 21:03

## 2025-02-05 RX ADMIN — ZOLPIDEM TARTRATE 10 MG: 5 TABLET, FILM COATED ORAL at 20:59

## 2025-02-05 RX ADMIN — LEVOFLOXACIN 750 MG: 750 TABLET, FILM COATED ORAL at 10:52

## 2025-02-05 RX ADMIN — AMPICILLIN SODIUM AND SULBACTAM SODIUM 9000 MG: 2; 1 INJECTION, POWDER, FOR SOLUTION INTRAMUSCULAR; INTRAVENOUS at 01:20

## 2025-02-05 NOTE — PLAN OF CARE
Problem: ABCDS Injury Assessment  Goal: Absence of physical injury  2/5/2025 1302 by She Peterson RN  Outcome: Progressing  Flowsheets (Taken 2/5/2025 1302)  Absence of Physical Injury: Implement safety measures based on patient assessment  2/4/2025 2318 by Lita Seth RN  Outcome: Progressing  Flowsheets (Taken 2/4/2025 1020 by She Peterson RN)  Absence of Physical Injury: Implement safety measures based on patient assessment     Problem: Safety - Adult  Goal: Free from fall injury  2/5/2025 1302 by She Peterson RN  Outcome: Progressing  Flowsheets (Taken 2/5/2025 1302)  Free From Fall Injury: Instruct family/caregiver on patient safety  2/4/2025 2318 by Lita Seth RN  Outcome: Progressing  Flowsheets (Taken 2/4/2025 1020 by She Peterson RN)  Free From Fall Injury: Instruct family/caregiver on patient safety     Problem: Nutrition Deficit:  Goal: Optimize nutritional status  2/5/2025 1302 by She Peterson RN  Outcome: Progressing  Flowsheets (Taken 2/5/2025 1302)  Nutrient intake appropriate for improving, restoring, or maintaining nutritional needs: Assess nutritional status and recommend course of action  2/4/2025 2318 by Lita Seth RN  Outcome: Progressing  Flowsheets (Taken 2/4/2025 1020 by She Peterson RN)  Nutrient intake appropriate for improving, restoring, or maintaining nutritional needs: Assess nutritional status and recommend course of action     Problem: Respiratory - Adult  Goal: Achieves optimal ventilation and oxygenation  2/5/2025 1302 by She Peterson RN  Outcome: Progressing  Flowsheets (Taken 2/5/2025 1302)  Achieves optimal ventilation and oxygenation:   Assess for changes in respiratory status   Assess for changes in mentation and behavior  2/4/2025 2318 by Lita Seth RN  Outcome: Progressing  Flowsheets (Taken 2/4/2025 1020 by She Peterson RN)  Achieves optimal ventilation and oxygenation: Assess for changes in respiratory  hospitalization  2/5/2025 1302 by She Peterson RN  Outcome: Progressing  Flowsheets (Taken 2/5/2025 1302)  Absence of infection during hospitalization: Monitor lab/diagnostic results  2/4/2025 2318 by Lita Seth RN  Outcome: Progressing  Flowsheets (Taken 2/4/2025 1020 by She Peterson RN)  Absence of infection during hospitalization: Monitor lab/diagnostic results     Problem: Metabolic/Fluid and Electrolytes - Adult  Goal: Electrolytes maintained within normal limits  2/5/2025 1302 by She Peterson RN  Outcome: Progressing  Flowsheets (Taken 2/5/2025 1302)  Electrolytes maintained within normal limits: Administer electrolyte replacement as ordered  2/4/2025 2318 by Lita Seth RN  Outcome: Progressing  Flowsheets (Taken 2/4/2025 1020 by She Peterson RN)  Electrolytes maintained within normal limits: Administer electrolyte replacement as ordered  Goal: Hemodynamic stability and optimal renal function maintained  2/5/2025 1302 by She Peterson RN  Outcome: Progressing  Flowsheets (Taken 2/5/2025 1302)  Hemodynamic stability and optimal renal function maintained: Monitor intake, output and patient weight  2/4/2025 2318 by Lita Seth RN  Outcome: Progressing     Problem: Pain  Goal: Verbalizes/displays adequate comfort level or baseline comfort level  2/5/2025 1302 by She Peterson RN  Outcome: Progressing  Flowsheets (Taken 2/5/2025 1302)  Verbalizes/displays adequate comfort level or baseline comfort level: Assess pain using appropriate pain scale  2/4/2025 2318 by Lita Seth RN  Outcome: Progressing  Flowsheets (Taken 2/4/2025 1020 by She Peterson RN)  Verbalizes/displays adequate comfort level or baseline comfort level: Assess pain using appropriate pain scale     Problem: Chronic Conditions and Co-morbidities  Goal: Patient's chronic conditions and co-morbidity symptoms are monitored and maintained or improved  2/5/2025 1302 by She Peterson RN  Outcome:

## 2025-02-05 NOTE — PROGRESS NOTES
[facility]      Patient - Gustabo Machado   MRN -  [id number]   [facility] # - [id number]   - 1970      Date of Admission -  2025  1:05 PM  Date of evaluation -  2025  Room - 3B-028-A   Hospital Day - 9  Consulting - Mike Ellis MD Primary Care Physician - Daniel Agrawal MD     Problem List      Active Hospital Problems    Diagnosis Date Noted    Anemia [D64.9] 2025    Other neutropenia (HCC) [D70.8] 2025    Infection due to Acinetobacter species [A49.8] 2025    Sepsis due to Acinetobacter (HCC) [A41.59] 2025    Peritonitis (HCC) [K65.9] 2025    Malignant pleural effusion [J91.0] 2025    Pleural effusion, bilateral [J90] 2025    Hypervolemia [E87.70] 2025    Pericardial effusion [I31.39] 2025    Dyspnea on exertion [R06.09] 2025    Pancreatic adenocarcinoma (HCC) [C25.9] 2025    Peritoneal carcinomatosis (HCC) [C78.6] 2024    Malignant ascites [R18.0] 2024    Metastatic adenocarcinoma to liver (HCC) [C78.7] 10/29/2024    Palliative care patient [Z51.5] 10/23/2024    Cancer related pain [G89.3] 10/23/2024     Reason for Consult    Bilateral Pleural Effusion  HPI   History Obtained From: Patient, his daughter and electronic medical record.     Gustabo Machado is a 54 y.o. male with past medical history of stage IV pancreatic cancer, PE, presented to the ER on 2025 for worsening shortness of breath.  Patient reports onset of shortness of breath for the past 5 days worsen with ambulation, mild improvement with rest.  Denies any shortness of breath at rest also complains of orthopnea.  Associated bilateral leg swelling.  Has recently visited PCP for leg swelling, chest x-ray was done which reported pleural effusion and was sent to the ER for further management. Denies fever/chills, chest pain, chest palpitation, abdominal pain.  Of note patient is being treated for  pancreatic cancer with chemotherapy, currently on the fifth cycle the last cycle was on Tuesday, 1/21/2025.  Patient is currently continuing the chemotherapy.  Patient has a peritoneal, which is drained by his daughter at home every 3 to 5 days and is also managed by nurse for the dressing.   In ED patient was saturating 97% on room air.  EKG showed sinus tachycardia.  Chest x-ray showed worsening right-sided pleural effusion compared to chest x-ray on 1/10/2025.  CT scan showed bilateral worsening pleural effusion.        Past 24 hrs   -On RA in NAD   -Afebrile reports breathing is doing better after chest tube removal   -HGB stable 7.7-7.4 CXR stable mild bilateral pleural effusions  Albumin down trending 1.9 today  All other systems reviewed    PMHx   Past Medical History      Diagnosis Date    Cancer (HCC)     Pancreatic cancer (HCC)     Pulmonary emboli (HCC) 11/2024      Past Surgical History        Procedure Laterality Date    COLONOSCOPY  10/31/2022    CT GUIDED CHEST TUBE  1/29/2025    CT GUIDED CHEST TUBE 1/29/2025 [facility] CT SCAN    CT NEEDLE BIOPSY LIVER PERCUTANEOUS  10/22/2024    CT NEEDLE BIOPSY LIVER PERCUTANEOUS 10/22/2024 [facility] CT SCAN    IR GUIDED THROMB MECH VEIN  11/17/2024    IR THROMB MECH VEIN 11/17/2024 [facility] SPECIAL PROCEDURES    IR INSERT TUNNELED PLEURA CATH W CUFF  11/19/2024    IR INSERT TUNNELED PLEURA CATH W CUFF 11/19/2024 [facility] SPECIAL PROCEDURES    PORT SURGERY N/A 10/31/2024    Single lumen Smartport insertion performed by Alexander Colon MD at [facility] OR    SKIN CANCER EXCISION  2011    BCC_ nose     Meds    Current Medications    filgrastim/filgrastim biosimilar  480 mcg SubCUTAneous QPM    metoprolol tartrate  12.5 mg Oral BID    levoFLOXacin  750 mg Oral Daily    ampicillin-sulbactam (UNASYN) 9,000 mg in sodium chloride 0.9 % 500 mL IVPB  9 g IntraVENous Q8H    senna  1 tablet Oral BID    [Held by provider] apixaban  5 mg Oral BID    bumetanide  1 mg Oral Daily    famotidine  20 mg

## 2025-02-05 NOTE — PROGRESS NOTES
Spiritual Health History and Assessment/Progress Note  [facility]    (P) Follow-up,  ,  ,      Name: Gustabo Machado MRN: [id number]    Age: 54 y.o.     Sex: male   Language: English   Taoism: Jehovah's witness   Pleural effusion, bilateral     Date: 2/5/2025            Total Time Calculated: (P) 5 min              Spiritual Assessment continued in [facility] CCU-STEPDOWN 3B        Referral/Consult From: (P) Rounding   Encounter Overview/Reason: (P) Follow-up  Service Provided For: (P) Patient and family together    Isabel, Belief, Meaning:   Patient Other: None  Family/Friends No family/friends present      Importance and Influence:  Patient has spiritual/personal beliefs that influence decisions regarding their health  Family/Friends No family/friends present    Community:  Patient Other: None  Family/Friends No family/friends present    Assessment and Plan of Care:     Patient Interventions include: Facilitated expression of thoughts and feelings  Family/Friends Interventions include: No family/friends present    Patient Plan of Care: Spiritual Care available upon further referral  Family/Friends Plan of Care: No family/friends present    Electronically signed by KELLY Burleson on 2/5/2025 at 5:22 PM

## 2025-02-05 NOTE — PLAN OF CARE
Problem: ABCDS Injury Assessment  Goal: Absence of physical injury  2/4/2025 2318 by Lita Seth RN  Outcome: Progressing  Flowsheets (Taken 2/4/2025 1020 by She Peterson RN)  Absence of Physical Injury: Implement safety measures based on patient assessment     Problem: Safety - Adult  Goal: Free from fall injury  2/4/2025 2318 by Lita Seth RN  Outcome: Progressing  Flowsheets (Taken 2/4/2025 1020 by She Peterson RN)  Free From Fall Injury: Instruct family/caregiver on patient safety     Problem: Nutrition Deficit:  Goal: Optimize nutritional status  2/4/2025 2318 by Lita Seth RN  Outcome: Progressing  Flowsheets (Taken 2/4/2025 1020 by She Peterson RN)  Nutrient intake appropriate for improving, restoring, or maintaining nutritional needs: Assess nutritional status and recommend course of action     Problem: Discharge Planning  Goal: Discharge to home or other facility with appropriate resources  2/4/2025 2318 by Lita eSth RN  Outcome: Progressing  Flowsheets (Taken 2/4/2025 1020 by Seh ePterson RN)  Discharge to home or other facility with appropriate resources: Identify barriers to discharge with patient and caregiver     Problem: Respiratory - Adult  Goal: Achieves optimal ventilation and oxygenation  2/4/2025 2318 by Lita Seth RN  Outcome: Progressing  Flowsheets (Taken 2/4/2025 1020 by She Peterson RN)  Achieves optimal ventilation and oxygenation: Assess for changes in respiratory status     Problem: Cardiovascular - Adult  Goal: Maintains optimal cardiac output and hemodynamic stability  2/4/2025 2318 by Lita Seth RN  Outcome: Progressing  Flowsheets (Taken 2/4/2025 1020 by She Peterson RN)  Maintains optimal cardiac output and hemodynamic stability: Monitor blood pressure and heart rate     Problem: Cardiovascular - Adult  Goal: Absence of cardiac dysrhythmias or at baseline  2/4/2025 2318 by Lita Seth RN  Outcome:

## 2025-02-05 NOTE — PROGRESS NOTES
[facility] Infectious Disease         Progress Note      Gustabo Machado  MEDICAL RECORD NUMBER:  [id number]  AGE: 54 y.o.   GENDER: male  : 1970  EPISODE DATE:  2025      Assessment:     Patient is a 54-year-old male with stage IV pancreatic cancer and liver metastasis who I am consulted for peritonitis.     In the setting of an elevated total nucleated cell count of 18,000 and neutrophils of 98%, this is concerning for peritonitis.  This patient has an indwelling Pleurx drain due to recurrent ascites in the setting of metastatic malignancy.       Discussed case with pulmonology  There is evidence of Acinetobacter positive from pleural fluid as well as ascitic fluid   The presence of bacterial growth from 2 separate sites is more concerning for pathogenic infection even in the presence of improving cell counts  Do not recommend abdominal Pleurx catheter placement  Would not recommend placing pleural catheter  I would recommend that this patient receive 3 days of Bactrim and levofloxacin.  On , I would repeat paracentesis to assess cell counts as well as repeat fluid studies from pleura.     This patient will likely require multiple thoracenteses and paracenteses to be performed in the event of reaccumulating fluid.  I would recommend obtaining cell studies and treat these as both diagnostic/therapeutic procedures.     Continue therapy with Unasyn high-dose for Acinetobacter  Continue combination with levofloxacin  Recommend checking cultures from pleural and ascitic fluid    There is unfortunately a very high risk for recurrence with another indwelling drain.  This patient would likely benefit from continued regular paracentesis and thoracentesis.  Acinetobacter initially grew at 7 days from culture while not on effective therapy.      Subjective:     Chief Complaint   Patient presents with    Shortness of Breath         No acute events overnight.  No episodes of fever, white count

## 2025-02-05 NOTE — PROGRESS NOTES
0828 Patient received in IR for right chest tube removal. This procedure has been fully reviewed with the patient and verbal informed consent has been obtained.  0837 Procedure started with .   0839 Procedure completed; patient tolerated well. Dressing to right chest; no bleeding noted.  0840 Patient taken to x-ray via Liana RN and Aracelis RT. Pt alert and oriented x3; follows commands. Skin pink, warm, and dry. Respirations easy, regular, and nonlabored.

## 2025-02-05 NOTE — PROCEDURES
PROCEDURE NOTE  Date: 2/5/2025   Name: Gustabo Machado  YOB: 1970    Procedures    EKG completed, given to Tonja TRAN

## 2025-02-05 NOTE — PROGRESS NOTES
Hospitalist Progress Note      Patient:  Gustabo Machado 54 y.o. male     Unit/Bed:3B-28/028-A    Date of Admission: 1/27/2025      ASSESSMENT AND PLAN    Active Problems  Acinetobacter Infection   Case discussed extensively with Dr. Duvall, Dr. Posada, Oncology NP, Pulmonology resident, pharmacy.   2 cultures positive for Acinetobacter (1 pleural fluid & 1 ascites fluid)   Originally thought this was colonization from pleurx drain. Second culture grew Acinetobacter. Therefore, this will be treated as a pathogenic infection.   All lines will need to be removed for a 3-5 day line holiday.   Chest tube removed (2/4) Pleurx drain removed (1/31)   Unasyn & Levaquin started. We will continue ABX for 3 days and then re-culture fluid to ensure bacteria has cleared prior to drain placement.   Mediport culture & peripheral blood culture obtained today to ensure Acinetobacter has not entered bloodstream.   It is unlikely due to afebrile & no WBC. However, patient is immunocompromised. Unsure if patient can mount a normal immune response.   If blood cultures are positive, Mediport will need to be removed.   Pt & wife understand that he cannot have any chemo treatment while he is on ABX treatment.    Daily CXR, CMP, CBC.   Metastatic pancreatic cancer   Case discussed with Oncology NP   Prognosis and has a high risk of decompensating during his hospital stay.  No chemo while on ABX.   Pt is asking to go to [facility] for a second opinion as an OP.   Pleural Effusions, Hemothorax   Pulm consulted & following; case discussed     Chest tube pulled today due to infection.   Pleural fluid growing Acinetobacter.   700 cc output in the last 24 hours.   Daily CXR to monitor pleural effusion development.   Pt will likely need multiple thoracentesis while on drain holiday.   Pancytopenia, resolving    WBC 3.0, Hgb 7.7, Plts 108. CBC in the AM. Holding Heparin drip due to anemia.   Hgb stable after 1 unit of pRBC   Pericardial Effusion   2/2

## 2025-02-05 NOTE — CARE COORDINATION
2/5/25, 2:52 PM EST    DISCHARGE ON GOING EVALUATION    Gustabo RODRIGUEZ [facility] day: 9  Location: -28/028-A Reason for admit: Pericardial effusion [I31.39]  Pleural effusion [J90]  Pancreatic adenocarcinoma (HCC) [C25.9]  Pleural effusion, bilateral [J90]  Hypervolemia, unspecified hypervolemia type [E87.70]     Procedures:   1/28 Thoracentesis: 1.2L Red fluid removed   1/28 ECHO: EF 60-65% Pericardium: Small (<1 cm) pericardial effusion present. No indication of cardiac tamponade;  Extracardiac: Left pleural effusion.  1/30 1 unit PRBC   1/31 Pleurx removed  2/2 Paracentesis: 0.25L Thick Brown fluid removed  2/5 Chest Tube Removed     Imaging since last note:   2/5 CXR: Borderline heart size. Mediport right side, catheter tip in SVC. 2. Prior chest tube right side has been removed. No pneumothorax is seen. 3. Small effusion right side. Moderate-sized effusion left side. Moderate Francia/pneumonia left midlung and both lung bases.    Barriers to Discharge: Hospitalist, Pulmonology, ID, Palliative Care and Oncology following. GOC discussion today and patient/wife wish to continue full code. Chest tube removed this AM. Low Microbial diet/Neutropenic precautions. Repeat blood cultures drawn 2/4- if positive, Mediport will need removed. IV unasyn q8h. PO bumex. PO levaquin. Eliquis remains held. Started on lopressor BID. HR tachy; up to 140s with ambulation.     PCP: Daniel Agrawal MD  Readmission Risk Score: 31.2    Patient Goals/Plan/Treatment Preferences: Met w/ Gustabo and his wife Nanci. Discussed plans for abx at discharge (IV unasyn q8h x 14 days and PO levaquin). Nanci states she will be able to manage at home IV abx as long as someone shows her how; explained that they will have HH along with HI. They are current with [facility] and prefer to continue with their services, adding IV abx. They don't have a preference for HI and are okay with [facility] as long as it is in-network with insurance.  Referral not called at this time as we are waiting on repeat blood cultures and unsure of discharge date. Denies needs for additional DME at this time. CM to follow.

## 2025-02-05 NOTE — PROGRESS NOTES
WDL 0x4 - pt speech clear. Pupil size 3, round, brisk bilaterally. Hand  moderate bilaterally. Foot dorsiflexion and plantar flexion modeate bilaterally. Responds to commands and has full sensation in all extremities. Depth shallow, unlabored with dyspnea. Chest expansion symmetrical. Breath sounds diminished bilaterally. Chest tube present with serosanguinous drainage. Dressing is clean, dry, and intact. Heart sound regular with S1 and S2. Sinus tachycardia (pulse 125). Bowel sounds present in all 4 quadrants. Abdomen soft and nontender. Voiding with nicolasa color urine.+1 pitting in left lower extremity. Pulse weak bilaterally. Warm to touch with no pain. Pneumatic compression devices used for anti embolism. Skin pale,dry, warm, with ecchymosis scattered. Incision on right side - chest tube

## 2025-02-05 NOTE — PROGRESS NOTES
Comprehensive Nutrition Assessment    Type and Reason for Visit:  Reassess (po/supplement)    Nutrition Recommendations/Plan:   Recommend consider MVI and an appetite stimulant.  ONS modified: Ensure Clear (apple), Magic Cup BID.  Continue current diet.  Encouraged small frequent meals d/t early satiety, good protein sources, and continued ONS use.  Wife bringing in food to offer some variety and encourage oral intake as well.      Malnutrition Assessment:  Malnutrition Status:  Severe malnutrition (01/28/25 1449)    Context:  Chronic Illness     Findings of the 6 clinical characteristics of malnutrition:  Energy Intake:  75% or less estimated energy requirements for 1 month or longer  Weight Loss:  Greater than 10% over 6 months (26.7% (57 lb) in the last 5 months which is significant)     Body Fat Loss:  Severe body fat loss Orbital, Triceps, Buccal region   Muscle Mass Loss:  Severe muscle mass loss Temples (temporalis), Clavicles (pectoralis & deltoids), Calf (gastrocnemius), Hand (interosseous)  Fluid Accumulation:  No fluid accumulation     Strength:  Not Performed    Nutrition Assessment:      Pt. severely malnourished AEB criteria listed above. At risk for further nutritional compromise r/t admitted d/t SOB, CXR at [facility] showed left basilar consolidative opactiy with moderate left pleural effusion - s/p thoracentesis 1.2L, peritonitis, hemothorax - not surgical candidate, metastatic pancreatic cancer - concerns for progressing disease. Noted recent admission in January 2024 d/t SBO with ascites. Noted underlying medical condition (PMHx pancreatic cancer dx 10/2024- undergoing aggressive chemo and pulmonary emboli in 2024 s/p thrombectomy, GERD, HTN).     Nutrition Related Findings:    Pt. Report/Treatments/Miscellaneous: Patient and wife seen.  Appetite is doing a  little better, wife reports she has been bringing some food from home patient likes. Drinking Ensure Clear if apple.  Not drinking much  Ensure shakes.  Will try magic cups instead.    GI Status: BM 2/3/25, denied any abdomen pain or nausea  Pertinent Labs: 2/5/25 BMP wnls  Pertinent Meds: unasyn, levaquin, nivestym, bumex, pepcid, klor-con, senokot     Wound Type:  (1/28/25 back incision-thoracentesis site)       Current Nutrition Intake & Therapies:    Average Meal Intake: %, 26-50%, 1-25% (more % meals)  Average Supplements Intake:  (drinking Ensure Clear, not drinking much Ensure Plus, trialing Magic Cup BID)  ADULT DIET; Regular; Low Microbial  ADULT ORAL NUTRITION SUPPLEMENT; Breakfast; Clear Liquid Oral Supplement  ADULT ORAL NUTRITION SUPPLEMENT; Lunch, Dinner; Frozen Oral Supplement    Anthropometric Measures:  Height: 182.9 cm (6')  Ideal Body Weight (IBW): 178 lbs (81 kg)    Admission Body Weight: 71.2 kg (157 lb) (1/27, +1 pitting edema)  Current Body Weight: 82.2 kg (181 lb 3.5 oz) (2/5/25 +1 LE edema),     Current BMI (kg/m2): 24.6  Usual Body Weight:  (12/24: 173 lb; 11/14: 201 lb; 10/21: 210 lb; 8/9: 214 lb)        Weight Adjustment For: No Adjustment                 BMI Categories: Normal Weight (BMI 18.5-24.9)    Estimated Daily Nutrient Needs:  Energy Requirements Based On: Kcal/kg  Weight Used for Energy Requirements: Admission  Energy (kcal/day): 4299-1136 kcal (25-30 kcal/kg)  Weight Used for Protein Requirements: Admission  Protein (g/day):  g (1.2-1.5 g/kg)  Method Used for Fluid Requirements: Defer to provider  Fluid (ml/day): per provider    Nutrition Diagnosis:   Severe malnutrition, in context of chronic illness related to inadequate protein-energy intake, early satiety, decreased appetite as evidenced by criteria as identified in malnutrition assessment    Nutrition Interventions:   Food and/or Nutrient Delivery: Continue Current Diet, Modify Oral Nutrition Supplement  Nutrition Education/Counseling: Education/Counseling initiated  Coordination of Nutrition Care: Continue to monitor while inpatient

## 2025-02-05 NOTE — PROGRESS NOTES
Provider Progress Note        Patient:   Gustabo Machado  YOB: 1970  Age:  54 y.o.  Room:  3B-28/028-A  MRN:  [id number]   Acct: [id number]  PCP: Daniel Agrawal MD    Date of Admission:  1/27/2025  1:05 PM  Date of Service:  2/5/2025    Reason for Consult: Goals of Care and Symptom Management    History Obtained From:-  Patient, Spouse, Electronic Medical Record, and Patient's primary nurse             Subjective   Gustabo Machado was seen in their room today for follow up with the palliative care team. There was family present at the bedside. Patient denies pain, shortness of breath not controlled on oral medication, insomnia, fatigue, drowsiness, nausea, vomiting, constipation, and diarrhea. They have Senna 8.6 mg twice a day for scheduled symptom relief. From 8 AM yesterday to 8 AM today, they have used No Medications for as needed symptom relief.  Their comfort medications are working well to control their symptoms.  They did get a good night sleep last night. The patient is eating or receiving tube feeds. They have not had a bowel movement in the last 24 hours documented.       Objective   Vitals:-    /79   Pulse (!) 116   Temp 98 °F (36.7 °C) (Oral)   Resp 18   Ht 1.829 m (6')   Wt 82.2 kg (181 lb 3.5 oz)   SpO2 95%   BMI 24.58 kg/m²     Physical Exam  Vitals and nursing note reviewed.   Constitutional:       General: He is awake. He is not in acute distress.     Appearance: He is ill-appearing.   HENT:      Head: Normocephalic and atraumatic.      Right Ear: External ear normal.      Left Ear: External ear normal.      Nose: No rhinorrhea.   Eyes:      General:         Right eye: No discharge.         Left eye: No discharge.   Cardiovascular:      Rate and Rhythm: Tachycardia present.   Pulmonary:      Effort: Pulmonary effort is normal. No respiratory distress.   Musculoskeletal:      Cervical back: Neck supple.   Neurological:      General: No

## 2025-02-06 ENCOUNTER — TELEPHONE (OUTPATIENT)
Dept: PALLATIVE CARE | Age: 55
End: 2025-02-06

## 2025-02-06 ENCOUNTER — TELEPHONE (OUTPATIENT)
Dept: PULMONOLOGY | Age: 55
End: 2025-02-06

## 2025-02-06 DIAGNOSIS — C25.2 MALIGNANT NEOPLASM OF TAIL OF PANCREAS (HCC): Primary | ICD-10-CM

## 2025-02-06 DIAGNOSIS — J91.0 MALIGNANT PLEURAL EFFUSION: ICD-10-CM

## 2025-02-06 DIAGNOSIS — R18.0 MALIGNANT ASCITES: ICD-10-CM

## 2025-02-06 LAB
ALBUMIN SERPL BCG-MCNC: 2.2 G/DL (ref 3.5–5.1)
ALP SERPL-CCNC: 93 U/L (ref 38–126)
ALT SERPL W/O P-5'-P-CCNC: 17 U/L (ref 11–66)
ANION GAP SERPL CALC-SCNC: 17 MEQ/L (ref 8–16)
AST SERPL-CCNC: 25 U/L (ref 5–40)
BASOPHILS ABSOLUTE: 0 THOU/MM3 (ref 0–0.1)
BASOPHILS NFR BLD AUTO: 0.8 %
BILIRUB SERPL-MCNC: 0.3 MG/DL (ref 0.3–1.2)
BUN SERPL-MCNC: 9 MG/DL (ref 7–22)
CALCIUM SERPL-MCNC: 7.8 MG/DL (ref 8.5–10.5)
CHLORIDE SERPL-SCNC: 100 MEQ/L (ref 98–111)
CO2 SERPL-SCNC: 22 MEQ/L (ref 23–33)
CREAT SERPL-MCNC: 0.5 MG/DL (ref 0.4–1.2)
DEPRECATED RDW RBC AUTO: 62.7 FL (ref 35–45)
DEPRECATED RDW RBC AUTO: 63.6 FL (ref 35–45)
EOSINOPHIL NFR BLD AUTO: 3.1 %
EOSINOPHILS ABSOLUTE: 0.1 THOU/MM3 (ref 0–0.4)
ERYTHROCYTE [DISTWIDTH] IN BLOOD BY AUTOMATED COUNT: 16.9 % (ref 11.5–14.5)
ERYTHROCYTE [DISTWIDTH] IN BLOOD BY AUTOMATED COUNT: 16.9 % (ref 11.5–14.5)
GFR SERPL CREATININE-BSD FRML MDRD: > 90 ML/MIN/1.73M2
GLUCOSE SERPL-MCNC: 82 MG/DL (ref 70–108)
HCT VFR BLD AUTO: 24.2 % (ref 42–52)
HCT VFR BLD AUTO: 24.5 % (ref 42–52)
HCT VFR BLD AUTO: 24.8 % (ref 42–52)
HGB BLD-MCNC: 7.4 GM/DL (ref 14–18)
HGB BLD-MCNC: 7.5 GM/DL (ref 14–18)
HGB BLD-MCNC: 7.6 GM/DL (ref 14–18)
IMM GRANULOCYTES # BLD AUTO: 0.01 THOU/MM3 (ref 0–0.07)
IMM GRANULOCYTES NFR BLD AUTO: 0.3 %
LYMPHOCYTES ABSOLUTE: 0.5 THOU/MM3 (ref 1–4.8)
LYMPHOCYTES NFR BLD AUTO: 12.2 %
MAGNESIUM SERPL-MCNC: 1.6 MG/DL (ref 1.6–2.4)
MCH RBC QN AUTO: 31 PG (ref 26–33)
MCH RBC QN AUTO: 31.2 PG (ref 26–33)
MCHC RBC AUTO-ENTMCNC: 30.2 GM/DL (ref 32.2–35.5)
MCHC RBC AUTO-ENTMCNC: 30.6 GM/DL (ref 32.2–35.5)
MCV RBC AUTO: 102.1 FL (ref 80–94)
MCV RBC AUTO: 102.5 FL (ref 80–94)
MONOCYTES ABSOLUTE: 0.9 THOU/MM3 (ref 0.4–1.3)
MONOCYTES NFR BLD AUTO: 23.2 %
NEUTROPHILS ABSOLUTE: 2.4 THOU/MM3 (ref 1.8–7.7)
NEUTROPHILS NFR BLD AUTO: 60.4 %
NRBC BLD AUTO-RTO: 0 /100 WBC
PLATELET # BLD AUTO: 131 THOU/MM3 (ref 130–400)
PLATELET # BLD AUTO: 139 THOU/MM3 (ref 130–400)
PMV BLD AUTO: 10.4 FL (ref 9.4–12.4)
PMV BLD AUTO: 10.6 FL (ref 9.4–12.4)
POTASSIUM SERPL-SCNC: 3.3 MEQ/L (ref 3.5–5.2)
PROT SERPL-MCNC: 4.9 G/DL (ref 6.1–8)
RBC # BLD AUTO: 2.37 MILL/MM3 (ref 4.7–6.1)
RBC # BLD AUTO: 2.42 MILL/MM3 (ref 4.7–6.1)
SODIUM SERPL-SCNC: 139 MEQ/L (ref 135–145)
WBC # BLD AUTO: 3.8 THOU/MM3 (ref 4.8–10.8)
WBC # BLD AUTO: 3.9 THOU/MM3 (ref 4.8–10.8)

## 2025-02-06 PROCEDURE — 85018 HEMOGLOBIN: CPT

## 2025-02-06 PROCEDURE — 85014 HEMATOCRIT: CPT

## 2025-02-06 PROCEDURE — 6370000000 HC RX 637 (ALT 250 FOR IP)

## 2025-02-06 PROCEDURE — 99232 SBSQ HOSP IP/OBS MODERATE 35: CPT | Performed by: STUDENT IN AN ORGANIZED HEALTH CARE EDUCATION/TRAINING PROGRAM

## 2025-02-06 PROCEDURE — 6360000002 HC RX W HCPCS: Performed by: STUDENT IN AN ORGANIZED HEALTH CARE EDUCATION/TRAINING PROGRAM

## 2025-02-06 PROCEDURE — 6360000002 HC RX W HCPCS: Performed by: NURSE PRACTITIONER

## 2025-02-06 PROCEDURE — 85027 COMPLETE CBC AUTOMATED: CPT

## 2025-02-06 PROCEDURE — 2140000000 HC CCU INTERMEDIATE R&B

## 2025-02-06 PROCEDURE — 2500000003 HC RX 250 WO HCPCS: Performed by: STUDENT IN AN ORGANIZED HEALTH CARE EDUCATION/TRAINING PROGRAM

## 2025-02-06 PROCEDURE — 99233 SBSQ HOSP IP/OBS HIGH 50: CPT | Performed by: STUDENT IN AN ORGANIZED HEALTH CARE EDUCATION/TRAINING PROGRAM

## 2025-02-06 PROCEDURE — 80053 COMPREHEN METABOLIC PANEL: CPT

## 2025-02-06 PROCEDURE — 83735 ASSAY OF MAGNESIUM: CPT

## 2025-02-06 PROCEDURE — 6370000000 HC RX 637 (ALT 250 FOR IP): Performed by: STUDENT IN AN ORGANIZED HEALTH CARE EDUCATION/TRAINING PROGRAM

## 2025-02-06 PROCEDURE — 85025 COMPLETE CBC W/AUTO DIFF WBC: CPT

## 2025-02-06 PROCEDURE — 2580000003 HC RX 258: Performed by: STUDENT IN AN ORGANIZED HEALTH CARE EDUCATION/TRAINING PROGRAM

## 2025-02-06 PROCEDURE — 6370000000 HC RX 637 (ALT 250 FOR IP): Performed by: PHYSICIAN ASSISTANT

## 2025-02-06 PROCEDURE — 6370000000 HC RX 637 (ALT 250 FOR IP): Performed by: HOSPITALIST

## 2025-02-06 RX ORDER — LIDOCAINE HYDROCHLORIDE 10 MG/ML
50 INJECTION, SOLUTION EPIDURAL; INFILTRATION; INTRACAUDAL; PERINEURAL ONCE
Status: DISCONTINUED | OUTPATIENT
Start: 2025-02-06 | End: 2025-02-07 | Stop reason: HOSPADM

## 2025-02-06 RX ORDER — SODIUM CHLORIDE 0.9 % (FLUSH) 0.9 %
5-40 SYRINGE (ML) INJECTION PRN
Status: DISCONTINUED | OUTPATIENT
Start: 2025-02-06 | End: 2025-02-07 | Stop reason: HOSPADM

## 2025-02-06 RX ORDER — LEVOFLOXACIN 750 MG/1
750 TABLET, FILM COATED ORAL DAILY
Qty: 48 TABLET | Refills: 0 | Status: SHIPPED | OUTPATIENT
Start: 2025-02-06 | End: 2025-03-26

## 2025-02-06 RX ORDER — SODIUM CHLORIDE 0.9 % (FLUSH) 0.9 %
5-40 SYRINGE (ML) INJECTION EVERY 12 HOURS SCHEDULED
Status: DISCONTINUED | OUTPATIENT
Start: 2025-02-06 | End: 2025-02-07 | Stop reason: HOSPADM

## 2025-02-06 RX ORDER — SODIUM CHLORIDE 9 MG/ML
INJECTION, SOLUTION INTRAVENOUS PRN
Status: DISCONTINUED | OUTPATIENT
Start: 2025-02-06 | End: 2025-02-07 | Stop reason: HOSPADM

## 2025-02-06 RX ADMIN — AMPICILLIN SODIUM AND SULBACTAM SODIUM 9000 MG: 2; 1 INJECTION, POWDER, FOR SOLUTION INTRAMUSCULAR; INTRAVENOUS at 11:17

## 2025-02-06 RX ADMIN — BUMETANIDE 1 MG: 1 TABLET ORAL at 08:50

## 2025-02-06 RX ADMIN — APIXABAN 5 MG: 5 TABLET, FILM COATED ORAL at 19:51

## 2025-02-06 RX ADMIN — SENNOSIDES 8.6 MG: 8.6 TABLET, COATED ORAL at 19:51

## 2025-02-06 RX ADMIN — FILGRASTIM-AAFI 480 MCG: 480 INJECTION, SOLUTION SUBCUTANEOUS at 19:51

## 2025-02-06 RX ADMIN — METOPROLOL TARTRATE 12.5 MG: 25 TABLET, FILM COATED ORAL at 19:51

## 2025-02-06 RX ADMIN — POTASSIUM CHLORIDE 10 MEQ: 750 TABLET, FILM COATED, EXTENDED RELEASE ORAL at 08:50

## 2025-02-06 RX ADMIN — AMPICILLIN SODIUM AND SULBACTAM SODIUM 9000 MG: 2; 1 INJECTION, POWDER, FOR SOLUTION INTRAMUSCULAR; INTRAVENOUS at 01:08

## 2025-02-06 RX ADMIN — SODIUM CHLORIDE, PRESERVATIVE FREE 10 ML: 5 INJECTION INTRAVENOUS at 20:09

## 2025-02-06 RX ADMIN — METOPROLOL TARTRATE 12.5 MG: 25 TABLET, FILM COATED ORAL at 08:49

## 2025-02-06 RX ADMIN — SODIUM CHLORIDE, PRESERVATIVE FREE 10 ML: 5 INJECTION INTRAVENOUS at 10:00

## 2025-02-06 RX ADMIN — SENNOSIDES 8.6 MG: 8.6 TABLET, COATED ORAL at 08:50

## 2025-02-06 RX ADMIN — LEVOFLOXACIN 750 MG: 750 TABLET, FILM COATED ORAL at 08:50

## 2025-02-06 RX ADMIN — ZOLPIDEM TARTRATE 10 MG: 5 TABLET, FILM COATED ORAL at 20:01

## 2025-02-06 RX ADMIN — FAMOTIDINE 20 MG: 20 TABLET, FILM COATED ORAL at 08:50

## 2025-02-06 NOTE — PROGRESS NOTES
Provider Progress Note        Patient:   Gustabo Machado  YOB: 1970  Age:  54 y.o.  Room:  3B-28/028-A  MRN:  [id number]   Acct: [id number]  PCP: Daniel Agrawal MD    Date of Admission:  1/27/2025  1:05 PM  Date of Service:  2/6/2025    Reason for Consult: Goals of Care, Symptom Management, and Continuity of Care    History Obtained From:-  Patient, Spouse, Electronic Medical Record, and Patient's primary nurse             Subjective   Gustabo Machado was seen in their room today for follow up with the palliative care team. There was family present at the bedside. Patient denies pain, shortness of breath, fatigue, drowsiness, nausea, vomiting, constipation, and diarrhea. They have Senna 8.6 mg twice a day for scheduled symptom relief. From 8 AM yesterday to 8 AM today, they have used No Medications for as needed symptom relief.  Their comfort medications are working well to control their symptoms.  They did get a good night sleep last night. The patient is eating or receiving tube feeds. They have not had a bowel movement in the last 24 hours documented.       Objective   Vitals:-    /75   Pulse (!) 111   Temp 97.7 °F (36.5 °C) (Oral)   Resp 18   Ht 1.829 m (6')   Wt 77.3 kg (170 lb 6.7 oz)   SpO2 92%   BMI 23.11 kg/m²     Physical Exam  Vitals and nursing note reviewed.   Constitutional:       General: He is awake. He is not in acute distress.     Appearance: He is ill-appearing.   HENT:      Head: Normocephalic and atraumatic.      Right Ear: External ear normal.      Left Ear: External ear normal.      Nose: No rhinorrhea.   Eyes:      General:         Right eye: No discharge.         Left eye: No discharge.   Cardiovascular:      Rate and Rhythm: Tachycardia present.   Pulmonary:      Effort: Pulmonary effort is normal. No respiratory distress.   Musculoskeletal:      Cervical back: Neck supple.   Neurological:      General: No focal deficit present.

## 2025-02-06 NOTE — TELEPHONE ENCOUNTER
----- Message from Patrick Bianchi, JESSI - CNP sent at 2/5/2025  5:18 PM EST -----    1 month with CXR PA/L follow pleural effusion     Thank you,   Patrick Bianchi

## 2025-02-06 NOTE — TELEPHONE ENCOUNTER
IR called.... How much fluid do you want removed every time? Do you want Drain to Dry? They  have to have an amount of DTD on the order.

## 2025-02-06 NOTE — TELEPHONE ENCOUNTER
For outpatient Thoracentesis and Paracentesis IR orders:    Standing orders have to be faxed to [phone].    Orders need to include How often draining and how much draining.

## 2025-02-06 NOTE — PROGRESS NOTES
Hospitalist Progress Note      Patient:  Gustabo Machado 54 y.o. male     Unit/Bed:3B-28/028-A    Date of Admission: 1/27/2025      ASSESSMENT AND PLAN    Active Problems  Acinetobacter Infection   Case discussed extensively with Dr. Duvall, Dr. Posada, Oncology NP, Pulmonology resident, pharmacy.   2 cultures positive for Acinetobacter (1 pleural fluid & 1 ascites fluid)   Originally thought this was colonization from pleurx drain. Second culture grew Acinetobacter. Therefore, this will be treated as a pathogenic infection.   All lines will need to be removed for a 3-5 day line holiday.   Chest tube removed (2/4) Pleurx drain removed (1/31)   Unasyn & Levaquin started. We will continue ABX for 3 days and then re-culture fluid to ensure bacteria has cleared prior to drain placement.   Mediport culture & peripheral blood culture obtained today to ensure Acinetobacter has not entered bloodstream.   It is unlikely due to afebrile & no WBC. However, patient is immunocompromised. Unsure if patient can mount a normal immune response.   High recurrence risk with an indwelling drain; Given challenges with an indwelling line, ID recommend continuing levofloxacin 750 mg daily for 2 more weeks, ending on February 20, 2025.  Pt & wife understand that he cannot have any chemo treatment while he is on ABX treatment.    Daily CXR, CMP, CBC.   Metastatic pancreatic cancer   Case discussed with Oncology NP   Prognosis and has a high risk of decompensating during his hospital stay.  No chemo while on ABX.   Pt is asking to go to [facility] for a second opinion as an OP.   Pleural Effusions, Hemothorax   Pulm consulted & following; case discussed     Chest tube pulled today due to infection.   Pleural fluid growing Acinetobacter.   700 cc output in the last 24 hours.   Daily CXR to monitor pleural effusion development.   Pt will likely need multiple thoracentesis while on drain holiday.   Pancytopenia, resolving    WBC 3.0, Hgb 7.7, Plts  108. CBC in the AM. Holding Heparin drip due to anemia.   Hgb stable after 1 unit of pRBC   Currently on nyvestym  Pericardial Effusion   2/2 malignancy   Pulmonary Embolism   November 2024 2/2 malignancy   Holding heparin due to anemia      LDA: []CVC / []PICC / []Midline / []Chavez / []Drains / []Mediport / [x]None  Antibiotics: Levaquin & Unasyn   Steroids: None   Labs (still needed?): [x]Yes / []No  IVF (still needed?): [x]Yes / []No    Level of care: []Step Down / [x]Med-Surg  Bed Status: [x]Inpatient / []Observation  Telemetry: []Yes / [x]No  PT/OT: []Yes / [x]No    DVT Prophylaxis: [x] Lovenox / [] Heparin / [] SCDs / [] Already on Systemic Anticoagulation / [] None   Code status: Full Code     Expected discharge date:  Unknown   Disposition: Home      ===================================================================    Chief Complaint: Nausea vomiting   Subjective (past 24 hours):   NAEO. Recurrence risk is high with another indwelling drain. The patient would benefit from regular paracentesis and thoracentesis. Acinetobacter grew at 7 days without effective therapy. Given challenges with an indwelling line, ID recommend continuing levofloxacin 750 mg daily for 2 more weeks, ending on February 20, 2025.  [facility] arranged outpatient thoracentesis and paracentesis have been referred to radiology for scheduling.     HPI / Hospital Course:  54-year-old male past medical history of stage IV pancreatic adenocarcinoma currently undergoing chemotherapy presented with 4 to 5 days of shortness of breath.  Shortness of breath worsened when he was up and moving.  Prior to being seen in the ED here he was seen by his primary care provider who took an x-ray and relayed to him that his lung was full of fluid.  He reports having some decreased appetite but denies nausea, vomiting or diarrhea.  He states he does have intermittent abdominal pain that he describes as a stretching sensation since the diagnosis of the cancer.

## 2025-02-06 NOTE — PROGRESS NOTES
[facility] Infectious Disease         Progress Note      Gustabo Machado  MEDICAL RECORD NUMBER:  [id number]  AGE: 54 y.o.   GENDER: male  : 1970  EPISODE DATE:  2025      Assessment:     Patient is a 54-year-old male with stage IV pancreatic cancer and liver metastasis who I am consulted for peritonitis.     In the setting of an elevated total nucleated cell count of 18,000 and neutrophils of 98%, this is concerning for peritonitis.  This patient has an indwelling Pleurx drain due to recurrent ascites in the setting of metastatic malignancy.       Discussed case with pulmonology  There is evidence of Acinetobacter positive from pleural fluid as well as ascitic fluid   The presence of bacterial growth from 2 separate sites is more concerning for pathogenic infection even in the presence of improving cell counts    Continue therapy with Unasyn high-dose for Acinetobacter  Continue combination with levofloxacin  Recommend checking cultures from pleural and ascitic fluid    There is unfortunately a very high risk for recurrence with another indwelling drain.  This patient would likely benefit from continued regular paracentesis and thoracentesis.  Acinetobacter initially grew at 7 days from culture while not on effective therapy.    Due to issues with placing an indwelling line for this patient, I would recommend continuing therapy with levofloxacin alone 750 mg daily  I would continue therapy for an additional 2 weeks with end of therapy 2025      Subjective:     Chief Complaint   Patient presents with    Shortness of Breath         No acute events overnight.  No episodes of fever, white count appears stable.      Patient Active Problem List   Diagnosis Code    Pancreatic mass K86.89    Pancreatic abnormality Q45.3    Abdominal pain, right lower quadrant R10.31    Acute right-sided low back pain without sciatica M54.50    Liver lesion K76.9    Palliative care patient Z51.5    Cancer  dental caries , dysphagia  Lungs: no cough no chest pain  CVS: no palpitation, no chest pain, no shortness of breath  GI: no abdominal pain, no nausea , no vomiting, no constipation  MAXIME: no dysuria, frequency and urgency, no hematuria, no kidney stones  Musculoskeletal: no joint pain, swelling , stiffness,  Endocrine: no polyuria, polydipsia, no cold or heat intolerance  Hematology: no anemia, no easy brusing or bleeding, no hx of clotting disorder  Dermatology: no skin rash, no eczema, no pruritis,  Psychiatry: no depression, no anxiety,no panic attacks, no suicide ideation      Objective:      /70   Pulse (!) 105   Temp 98.5 °F (36.9 °C) (Oral)   Resp 16   Ht 1.829 m (6')   Wt 77.3 kg (170 lb 6.7 oz)   SpO2 94%   BMI 23.11 kg/m²     Wt Readings from Last 3 Encounters:   02/06/25 77.3 kg (170 lb 6.7 oz)   01/27/25 75.8 kg (167 lb)   01/21/25 78.6 kg (173 lb 3.2 oz)       Immunization History   Administered Date(s) Administered    TDaP, ADACEL (age 10y-64y), BOOSTRIX (age 10y+), IM, 0.5mL 10/25/2019       PHYSICAL EXAM    /70   Pulse (!) 105   Temp 98.5 °F (36.9 °C) (Oral)   Resp 16   Ht 1.829 m (6')   Wt 77.3 kg (170 lb 6.7 oz)   SpO2 94%   BMI 23.11 kg/m²   General:  Awake, alert, not in distress.  HEENT: pink conjunctiva, unicteric sclera, moist oral mucosa.  Chest:  bilateral air entry, Clear to auscultation,   Cardiovascular:  RRR ,S1S2, no murmur or gallop.  Abdomen:  Soft, non tender to palpation.  Extremities: no edema  Skin:  Warm and dry.  CNS: aox3    Puncture 11/17/24 Femoral (Active)   Number of days: 74       LABS       CBC:   Lab Results   Component Value Date/Time    WBC 3.8 02/06/2025 05:45 AM    HGB 7.5 02/06/2025 05:45 AM    HCT 24.8 02/06/2025 05:45 AM    .5 02/06/2025 05:45 AM     02/06/2025 05:45 AM     BMP:   Lab Results   Component Value Date/Time     02/05/2025 06:15 AM    K 3.6 02/05/2025 06:15 AM     02/05/2025 06:15 AM    CO2 28

## 2025-02-06 NOTE — CARE COORDINATION
2/6/25, 11:36 AM EST    DISCHARGE ON GOING EVALUATION    Gustabo RODRIGUEZ [facility] day: 10  Location: [facility]28/028-A Reason for admit: Pericardial effusion [I31.39]  Pleural effusion [J90]  Pancreatic adenocarcinoma (HCC) [C25.9]  Pleural effusion, bilateral [J90]  Hypervolemia, unspecified hypervolemia type [E87.70]     Procedures:   1/28 Thoracentesis: 1.2L Red fluid removed   1/28 ECHO: EF 60-65% Pericardium: Small (<1 cm) pericardial effusion present. No indication of cardiac tamponade;  Extracardiac: Left pleural effusion.  1/30 1 unit PRBC   1/31 Pleurx removed  2/2 Paracentesis: 0.25L Thick Brown fluid removed  2/5 Chest Tube Removed   2/6 Plan for Midline placement.     Imaging since last note: none     Barriers to Discharge: Hospitalist, Pulmonology, ID, Palliative Care and Oncology following. Low Microbial diet/Neutropenic precautions. Repeat blood cultures pending- preliminary negative. Plan for Midline placement today for OP abx. IV unasyn q8h. PO bumex. PO levaquin. Plan to resume Eliquis today. Bowel regimen. Lopressor. HR remains tach 100s-110s.    PCP: Daniel Agrawal MD  Readmission Risk Score: 31.6    Patient Goals/Plan/Treatment Preferences: Gustabo plans to return home w/ his wife. Resume [facility] with new [facility] for IV abx. Spoke w/ Dr. Singh; he is arranging OP Paracentesis and Thoracentesis.     Abx plan: Continuous IV unasyn [id number] with weekly CBC and BMP and PO levaquin. Spoke w/ Yong at [facility]; they are able to accept patient for HI. He has met his deductible and has no copay. Informed on plan for discharge tomorrow and start of IV abx tomorrow. SW spoke w/ [facility] to confirm they can start IV abx tomorrow.     IV abx script faxed to [facility].     Updated Gustabo and his wife Nanci on IV abx plan and plan for discharge tomorrow.     Talked w/ Dr. Ellis. They were unable to place a midline. Dr. Duvall and Dr. Posada discussed abx plan. No longer planning IV abx at discharge. Plan

## 2025-02-06 NOTE — TELEPHONE ENCOUNTER
Ir called back, IR does not do Centesis. This procedure has to be done under Guided US. New orders pend.

## 2025-02-06 NOTE — PROGRESS NOTES
Physician Progress Note      PATIENT:               FAITH CASTRO  [facility] #:                  [id number]  :                       1970  ADMIT DATE:       2025 1:05 PM  DISCH DATE:  RESPONDING  PROVIDER #:        Mike Ellis MD          QUERY TEXT:    Dr Ellis,  Patient admitted for SOB with pleural effusions. Per notes: Acute hypoxic   respiratory failure, patient is currently on 2 L of oxygen saturating at 94%,   overnight patient has a drop in oxygen at 88%.  Decreased breath sounds   bilaterally, R>L. Please indicate one of the following and document in the   medical record:    The medical record reflects the following:  Risk Factors: SOB with pleural effusions. Cancer.  Clinical Indicators: Per notes: Acute hypoxic respiratory failure, patient is   currently on 2 L of oxygen saturating at 94%, overnight patient has a drop in   oxygen at 88%. Decreased breath sounds bilaterally, R>L.  Treatment: 2-2.5L NC Supplemental 02  Options provided:  -- Acute Respiratory Failure as evidenced by, Please document evidence.  -- Acute Respiratory Failure ruled out after study  -- Other - I will add my own diagnosis  -- Disagree - Not applicable / Not valid  -- Disagree - Clinically unable to determine / Unknown  -- Refer to Clinical Documentation Reviewer    PROVIDER RESPONSE TEXT:    This patient is in acute respiratory failure as evidenced by    Query created by: Erin Chery on 2025 10:02 AM      Electronically signed by:  Mike Ellis MD 2025 9:04 PM

## 2025-02-06 NOTE — TELEPHONE ENCOUNTER
Hfu on 3.6.25 w instructions to complete cxr prior.   Mailed out appt reminder/ instructions and patient's wife was called and notified.

## 2025-02-06 NOTE — CARE COORDINATION
2/6/25, 3:13 PM EST    DISCHARGE PLANNING EVALUATION    Spoke with Sharon at [facility] and made her aware Gustabo is a potential discharge for tomorrow and he will not have IV antibiotics.

## 2025-02-06 NOTE — PLAN OF CARE
Problem: ABCDS Injury Assessment  Goal: Absence of physical injury  Outcome: Progressing  Flowsheets (Taken 2/5/2025 1302 by She Peterson, RN)  Absence of Physical Injury: Implement safety measures based on patient assessment     Problem: Nutrition Deficit:  Goal: Optimize nutritional status  Outcome: Progressing  Flowsheets (Taken 2/6/2025 1822)  Nutrient intake appropriate for improving, restoring, or maintaining nutritional needs:   Assess nutritional status and recommend course of action   Recommend appropriate diets, oral nutritional supplements, and vitamin/mineral supplements

## 2025-02-07 ENCOUNTER — APPOINTMENT (OUTPATIENT)
Dept: GENERAL RADIOLOGY | Age: 55
DRG: 919 | End: 2025-02-07
Payer: COMMERCIAL

## 2025-02-07 ENCOUNTER — APPOINTMENT (OUTPATIENT)
Dept: ULTRASOUND IMAGING | Age: 55
DRG: 919 | End: 2025-02-07
Payer: COMMERCIAL

## 2025-02-07 VITALS
SYSTOLIC BLOOD PRESSURE: 93 MMHG | BODY MASS INDEX: 23.08 KG/M2 | HEART RATE: 99 BPM | RESPIRATION RATE: 18 BRPM | HEIGHT: 72 IN | DIASTOLIC BLOOD PRESSURE: 70 MMHG | WEIGHT: 170.42 LBS | TEMPERATURE: 97.9 F | OXYGEN SATURATION: 96 %

## 2025-02-07 LAB
ALBUMIN SERPL BCG-MCNC: 2.2 G/DL (ref 3.5–5.1)
ALP SERPL-CCNC: 107 U/L (ref 38–126)
ALT SERPL W/O P-5'-P-CCNC: 18 U/L (ref 11–66)
ANION GAP SERPL CALC-SCNC: 20 MEQ/L (ref 8–16)
AST SERPL-CCNC: 28 U/L (ref 5–40)
BACTERIA SPEC ANAEROBE CULT: NORMAL
BACTERIA SPEC BFLD CULT: NORMAL
BASOPHILS ABSOLUTE: 0.1 THOU/MM3 (ref 0–0.1)
BASOPHILS NFR BLD AUTO: 0.8 %
BILIRUB SERPL-MCNC: 0.3 MG/DL (ref 0.3–1.2)
BUN SERPL-MCNC: 9 MG/DL (ref 7–22)
CALCIUM SERPL-MCNC: 8.4 MG/DL (ref 8.5–10.5)
CHLORIDE SERPL-SCNC: 99 MEQ/L (ref 98–111)
CO2 SERPL-SCNC: 22 MEQ/L (ref 23–33)
CREAT SERPL-MCNC: 0.5 MG/DL (ref 0.4–1.2)
DEPRECATED RDW RBC AUTO: 62.1 FL (ref 35–45)
DOHLE BOD BLD QL SMEAR: PRESENT
EOSINOPHIL NFR BLD AUTO: 2.4 %
EOSINOPHILS ABSOLUTE: 0.2 THOU/MM3 (ref 0–0.4)
ERYTHROCYTE [DISTWIDTH] IN BLOOD BY AUTOMATED COUNT: 16.8 % (ref 11.5–14.5)
GFR SERPL CREATININE-BSD FRML MDRD: > 90 ML/MIN/1.73M2
GLUCOSE SERPL-MCNC: 87 MG/DL (ref 70–108)
GRAM STN SPEC: NORMAL
HCT VFR BLD AUTO: 27.2 % (ref 42–52)
HGB BLD-MCNC: 8.3 GM/DL (ref 14–18)
IMM GRANULOCYTES # BLD AUTO: 0.35 THOU/MM3 (ref 0–0.07)
IMM GRANULOCYTES NFR BLD AUTO: 4.9 %
LYMPHOCYTES ABSOLUTE: 0.7 THOU/MM3 (ref 1–4.8)
LYMPHOCYTES NFR BLD AUTO: 10.2 %
MAGNESIUM SERPL-MCNC: 1.7 MG/DL (ref 1.6–2.4)
MCH RBC QN AUTO: 31.1 PG (ref 26–33)
MCHC RBC AUTO-ENTMCNC: 30.5 GM/DL (ref 32.2–35.5)
MCV RBC AUTO: 101.9 FL (ref 80–94)
MONOCYTES ABSOLUTE: 1.2 THOU/MM3 (ref 0.4–1.3)
MONOCYTES NFR BLD AUTO: 17 %
NEUTROPHILS ABSOLUTE: 4.6 THOU/MM3 (ref 1.8–7.7)
NEUTROPHILS NFR BLD AUTO: 64.7 %
NRBC BLD AUTO-RTO: 0 /100 WBC
PLATELET # BLD AUTO: 204 THOU/MM3 (ref 130–400)
PLATELET BLD QL SMEAR: ADEQUATE
PMV BLD AUTO: 10 FL (ref 9.4–12.4)
POTASSIUM SERPL-SCNC: 3.3 MEQ/L (ref 3.5–5.2)
PROT SERPL-MCNC: 5.3 G/DL (ref 6.1–8)
RBC # BLD AUTO: 2.67 MILL/MM3 (ref 4.7–6.1)
SCAN OF BLOOD SMEAR: NORMAL
SODIUM SERPL-SCNC: 141 MEQ/L (ref 135–145)
TOXIC GRANULES BLD QL SMEAR: PRESENT
WBC # BLD AUTO: 7.1 THOU/MM3 (ref 4.8–10.8)

## 2025-02-07 PROCEDURE — 6370000000 HC RX 637 (ALT 250 FOR IP): Performed by: HOSPITALIST

## 2025-02-07 PROCEDURE — 2500000003 HC RX 250 WO HCPCS: Performed by: STUDENT IN AN ORGANIZED HEALTH CARE EDUCATION/TRAINING PROGRAM

## 2025-02-07 PROCEDURE — 88305 TISSUE EXAM BY PATHOLOGIST: CPT

## 2025-02-07 PROCEDURE — 0W9B3ZZ DRAINAGE OF LEFT PLEURAL CAVITY, PERCUTANEOUS APPROACH: ICD-10-PCS | Performed by: FAMILY MEDICINE

## 2025-02-07 PROCEDURE — 71045 X-RAY EXAM CHEST 1 VIEW: CPT

## 2025-02-07 PROCEDURE — 88112 CYTOPATH CELL ENHANCE TECH: CPT

## 2025-02-07 PROCEDURE — 32555 ASPIRATE PLEURA W/ IMAGING: CPT

## 2025-02-07 PROCEDURE — 6370000000 HC RX 637 (ALT 250 FOR IP)

## 2025-02-07 PROCEDURE — 6360000002 HC RX W HCPCS: Performed by: STUDENT IN AN ORGANIZED HEALTH CARE EDUCATION/TRAINING PROGRAM

## 2025-02-07 PROCEDURE — 6370000000 HC RX 637 (ALT 250 FOR IP): Performed by: STUDENT IN AN ORGANIZED HEALTH CARE EDUCATION/TRAINING PROGRAM

## 2025-02-07 PROCEDURE — 80053 COMPREHEN METABOLIC PANEL: CPT

## 2025-02-07 PROCEDURE — 83735 ASSAY OF MAGNESIUM: CPT

## 2025-02-07 PROCEDURE — 99239 HOSP IP/OBS DSCHRG MGMT >30: CPT | Performed by: STUDENT IN AN ORGANIZED HEALTH CARE EDUCATION/TRAINING PROGRAM

## 2025-02-07 PROCEDURE — 85025 COMPLETE CBC W/AUTO DIFF WBC: CPT

## 2025-02-07 PROCEDURE — 6370000000 HC RX 637 (ALT 250 FOR IP): Performed by: PHYSICIAN ASSISTANT

## 2025-02-07 RX ORDER — HEPARIN 100 UNIT/ML
500 SYRINGE INTRAVENOUS PRN
Status: DISCONTINUED | OUTPATIENT
Start: 2025-02-07 | End: 2025-02-07 | Stop reason: HOSPADM

## 2025-02-07 RX ORDER — METOPROLOL TARTRATE 25 MG/1
12.5 TABLET, FILM COATED ORAL 2 TIMES DAILY
Qty: 60 TABLET | Refills: 3 | Status: SHIPPED | OUTPATIENT
Start: 2025-02-07 | End: 2025-02-07

## 2025-02-07 RX ORDER — POLYETHYLENE GLYCOL 3350 17 G/17G
17 POWDER, FOR SOLUTION ORAL DAILY PRN
Qty: 527 G | Refills: 0 | Status: SHIPPED | OUTPATIENT
Start: 2025-02-07 | End: 2025-03-09

## 2025-02-07 RX ORDER — POTASSIUM CHLORIDE 1500 MG/1
40 TABLET, EXTENDED RELEASE ORAL ONCE
Status: COMPLETED | OUTPATIENT
Start: 2025-02-07 | End: 2025-02-07

## 2025-02-07 RX ORDER — METOPROLOL TARTRATE 25 MG/1
12.5 TABLET, FILM COATED ORAL 2 TIMES DAILY
Qty: 60 TABLET | Refills: 3 | Status: SHIPPED | OUTPATIENT
Start: 2025-02-07 | End: 2025-02-12

## 2025-02-07 RX ADMIN — SODIUM CHLORIDE, PRESERVATIVE FREE 10 ML: 5 INJECTION INTRAVENOUS at 09:48

## 2025-02-07 RX ADMIN — HEPARIN 500 UNITS: 100 SYRINGE at 15:33

## 2025-02-07 RX ADMIN — POTASSIUM CHLORIDE 40 MEQ: 1500 TABLET, EXTENDED RELEASE ORAL at 12:12

## 2025-02-07 RX ADMIN — POTASSIUM CHLORIDE 10 MEQ: 750 TABLET, FILM COATED, EXTENDED RELEASE ORAL at 09:43

## 2025-02-07 RX ADMIN — LEVOFLOXACIN 750 MG: 750 TABLET, FILM COATED ORAL at 09:43

## 2025-02-07 RX ADMIN — SENNOSIDES 8.6 MG: 8.6 TABLET, COATED ORAL at 09:44

## 2025-02-07 RX ADMIN — FAMOTIDINE 20 MG: 20 TABLET, FILM COATED ORAL at 09:43

## 2025-02-07 RX ADMIN — METOPROLOL TARTRATE 12.5 MG: 25 TABLET, FILM COATED ORAL at 09:43

## 2025-02-07 RX ADMIN — BUMETANIDE 1 MG: 1 TABLET ORAL at 09:44

## 2025-02-07 ASSESSMENT — PAIN DESCRIPTION - LOCATION: LOCATION: ABDOMEN;CHEST

## 2025-02-07 ASSESSMENT — PAIN SCALES - GENERAL: PAINLEVEL_OUTOF10: 3

## 2025-02-07 ASSESSMENT — PAIN DESCRIPTION - ORIENTATION: ORIENTATION: MID

## 2025-02-07 ASSESSMENT — PAIN DESCRIPTION - DESCRIPTORS: DESCRIPTORS: SQUEEZING

## 2025-02-07 NOTE — PLAN OF CARE
Problem: ABCDS Injury Assessment  Goal: Absence of physical injury  2/6/2025 2136 by She Peterson RN  Outcome: Progressing  Flowsheets (Taken 2/6/2025 2136)  Absence of Physical Injury: Implement safety measures based on patient assessment  2/6/2025 1822 by Gely Stone RN  Outcome: Progressing  Flowsheets (Taken 2/5/2025 1302 by She Peterson RN)  Absence of Physical Injury: Implement safety measures based on patient assessment     Problem: Safety - Adult  Goal: Free from fall injury  2/6/2025 2136 by She Peterson RN  Outcome: Progressing  Flowsheets (Taken 2/6/2025 2136)  Free From Fall Injury: Instruct family/caregiver on patient safety  2/6/2025 1822 by Gely Stone RN  Outcome: Progressing     Problem: Nutrition Deficit:  Goal: Optimize nutritional status  2/6/2025 2136 by She Peterson RN  Outcome: Progressing  Flowsheets (Taken 2/6/2025 2136)  Nutrient intake appropriate for improving, restoring, or maintaining nutritional needs: Assess nutritional status and recommend course of action  2/6/2025 1822 by Gely Stone RN  Outcome: Progressing  Flowsheets (Taken 2/6/2025 1822)  Nutrient intake appropriate for improving, restoring, or maintaining nutritional needs:   Assess nutritional status and recommend course of action   Recommend appropriate diets, oral nutritional supplements, and vitamin/mineral supplements     Problem: Discharge Planning  Goal: Discharge to home or other facility with appropriate resources  2/6/2025 2136 by She Peterson RN  Outcome: Progressing  Flowsheets (Taken 2/6/2025 2136)  Discharge to home or other facility with appropriate resources: Identify barriers to discharge with patient and caregiver  2/6/2025 1822 by Gely Stone RN  Outcome: Progressing  Flowsheets (Taken 2/6/2025 0830)  Discharge to home or other facility with appropriate resources: Identify barriers to discharge with patient and caregiver     Problem: Respiratory -  intact  Description: 1.  Monitor for areas of redness and/or skin breakdown  2.  Assess vascular access sites hourly  3.  Every 4-6 hours minimum:  Change oxygen saturation probe site  4.  Every 4-6 hours:  If on nasal continuous positive airway pressure, respiratory therapy assess nares and determine need for appliance change or resting period  2/6/2025 2136 by She Peterson, RN  Outcome: Progressing  Flowsheets  Taken 2/6/2025 2136  Skin Integrity Remains Intact: Monitor for areas of redness and/or skin breakdown  Taken 2/6/2025 1945  Skin Integrity Remains Intact: Monitor for areas of redness and/or skin breakdown  2/6/2025 1822 by Gely Stone, RN  Outcome: Progressing  Flowsheets (Taken 2/6/2025 0830)  Skin Integrity Remains Intact: Monitor for areas of redness and/or skin breakdown

## 2025-02-07 NOTE — CARE COORDINATION
Gustabo Machado was evaluated today and a DME order was entered for a standard wheelchair because he requires this to successfully complete daily living tasks of ambulating.  A standard manual wheelchair is necessary due to patient's impaired ambulation and mobility restrictions and would be unable to resolve these daily living tasks using a cane or walker.  The patient is capable of using a standard wheelchair safely in their home and can maneuver within their home with adequate access.  There is a caregiver available to provide necessary assistance.  The need for this equipment was discussed with the patient and he understands, is in agreement, and has not expressed an unwillingness to use the wheelchair.       Gustabo Machado requires elevating legrest due to significant edema of the lower extremities and requires elevation.

## 2025-02-07 NOTE — PROGRESS NOTES
CLINICAL PHARMACY: DISCHARGE MED RECONCILIATION/REVIEW    [facility] Patient?: No  Total # of Interventions Recommended: 1 -   - Discontinued Medication #: 1   -   Total # Interventions Accepted: 1  Intervention Severity:   - Level 1 Intervention Present?: No   - Level 2 #: 0   - Level 3 #: 1   Time Spent (min): 15    Erma eWber, PharmD 2/7/2025 4:45 PM

## 2025-02-07 NOTE — PLAN OF CARE
Problem: ABCDS Injury Assessment  Goal: Absence of physical injury  Outcome: Completed  Flowsheets (Taken 2/7/2025 1737)  Absence of Physical Injury: Implement safety measures based on patient assessment     Problem: Nutrition Deficit:  Goal: Optimize nutritional status  Outcome: Completed  Flowsheets (Taken 2/7/2025 1737)  Nutrient intake appropriate for improving, restoring, or maintaining nutritional needs:   Assess nutritional status and recommend course of action   Recommend appropriate diets, oral nutritional supplements, and vitamin/mineral supplements   Order, calculate, and assess calorie counts as needed

## 2025-02-07 NOTE — CARE COORDINATION
2/7/25, 2:27 PM EST    Patient goals/plan/ treatment preferences discussed by  and .  Patient goals/plan/ treatment preferences reviewed with patient/ family.  Patient/ family verbalize understanding of discharge plan and are in agreement with goal/plan/treatment preferences.  Understanding was demonstrated using the teach back method.  AVS provided by RN at time of discharge, which includes all necessary medical information pertaining to the patients current course of illness, treatment, post-discharge goals of care, and treatment preferences.     Services At/After Discharge: Home Health and Nursing service    Gustabo will be discharged to home today where he lives with his wife and family.  He will resume [facility] for nursing.  Called Sharon at the facility and she is aware of the discharge.

## 2025-02-07 NOTE — CARE COORDINATION
2/7/25, 2:18 PM EST    DISCHARGE ON GOING EVALUATION      Patient Goals/Plan/Treatment Preferences: Met w/ Gustabo and his wife Nanci again today. They expressed need for a wheelchair with elevated leg rest. Prefer to obtain from [facility]. Referral called to [facility]. WC delivered to bedside.

## 2025-02-07 NOTE — DISCHARGE INSTRUCTIONS
Medications and Treatment Plan  Antibiotics:  Continue Levofloxacin 750 mg daily until February 20, 2025 to treat Acinetobacter infection.  No chemotherapy while on antibiotic treatment.  Infection Monitoring:  Re-culture pleural/ascitic fluid after completing antibiotics before considering drain replacement.  Monitor for signs of infection (fever, chills, increased fatigue).  Maintain line holiday for 3-5 days before any new central line placement.  Pleural Effusion Management:  May require repeated thoracentesis if pleural fluid reaccumulates.  Ordered outpatient per pall med  Pancytopenia:  CBC monitoring outpatient.  Pericardial Effusion & Pulmonary Embolism:  Will need outpatient Thora and Para (Ordered)  No immediate intervention required at discharge.    Follow-Up Appointments  Oncology: Follow-up appointment for pancreatic cancer management and second opinion at [facility] (patient requested).  Pulmonology: To assess pleural effusion recurrence and discuss potential future drain placement.  Infectious Disease: Follow-up to ensure resolution of Acinetobacter infection.  Primary Care/Hematology: Monitoring pancytopenia and anticoagulation plan.    When to Seek Medical Attention  Fever >100.4°F (38°C)  New or worsening shortness of breath  Increased chest pain or pressure  Sudden swelling, redness, or pain in legs (signs of blood clot)  Uncontrolled bleeding or significant bruising  Significant decrease in urine output, confusion, or extreme fatigue    Patient & Family Education:  No chemotherapy while on antibiotics.  Understand high risk of infection recurrence with future drain placement.  Discussed prognosis with oncology team.  Ensure strict adherence to antibiotic regimen.

## 2025-02-09 LAB
BACTERIA BLD AEROBE CULT: NORMAL
BACTERIA BLD AEROBE CULT: NORMAL

## 2025-02-09 NOTE — DISCHARGE SUMMARY
Hospitalist Discharge Summary        Patient: Gustabo Machado  YOB: 1970  MRN: [id number]   Acct: [id number]    Primary Care Physician: Daniel Agrawal MD    Admit date  1/27/2025    Discharge date:  2/7/2025 8:33 PM    Chief Complaint on presentation :-    Shortness of breath     Discharge Assessment and Plan:-     Acinetobacter Infection   Case discussed extensively with Dr. Duvall, Dr. Posada, Oncology NP, Pulmonology resident, pharmacy.   2 cultures positive for Acinetobacter (1 pleural fluid & 1 ascites fluid)   Originally thought this was colonization from pleurx drain. Second culture grew Acinetobacter. Therefore, this will be treated as a pathogenic infection.   All lines will need to be removed for a 3-5 day line holiday.   Chest tube removed (2/4) Pleurx drain removed (1/31)   Unasyn & Levaquin started. We will continue ABX for 3 days and then re-culture fluid to ensure bacteria has cleared prior to drain placement.   Mediport culture & peripheral blood culture obtained to ensure Acinetobacter has not entered bloodstream.   It is unlikely due to afebrile & no WBC. However, patient is immunocompromised. Unsure if patient can mount a normal immune response.   Given challenges with an indwelling line, ID recommend continuing levofloxacin 750 mg daily for 2 more weeks, ending on February 20, 2025.  Pt & wife understand that he cannot have any chemo treatment while he is on ABX treatment.    Daily CXR, CMP, CBC.   Metastatic pancreatic cancer   Case discussed with Oncology NP   Prognosis and has a high risk of decompensating during his hospital stay.  No chemo while on ABX.   Pt is asking to go to [facility] for a second opinion as an OP.   Pleural Effusions, Hemothorax   Pulm consulted & following; case discussed     Chest tube pulled due to infection.   Pleural fluid growing Acinetobacter.   700 cc output in the last 24 hours.   Daily CXR to monitor pleural effusion development.   Pt will likely  recognition technology.** Electronically signed by Dr. Orion Ley       Follow-up scheduled after discharge :-    Future Appointments         Provider Specialty Dept Phone    2/12/2025 9:00 AM (Arrive by 8:45 AM) Ultrasound, Str Radiologist; STR ULTRASOUND RM 1 Radiology [phone]    2/12/2025 2:00 PM Daniel Agrawal MD Family Medicine [phone]    2/14/2025 9:00 AM (Arrive by 8:45 AM) Ultrasound, Str Radiologist; STR ULTRASOUND RM 1 Radiology [phone]    2/17/2025 3:00 PM Nicanor Singh MD Palliative Care [phone]    2/21/2025 9:00 AM (Arrive by 8:45 AM) Ultrasound, Str Radiologist; STR ULTRASOUND RM 1 Radiology [phone]    2/26/2025 9:00 AM (Arrive by 8:15 AM) Ultrasound, Str Radiologist; STR CT/MR/US NURSE; STR ULTRASOUND RM 1 Radiology [phone]    2/28/2025 10:00 AM (Arrive by 9:45 AM) Ultrasound, Str Radiologist; STR ULTRASOUND RM 1 Radiology [phone]    3/6/2025 11:00 AM Isaiah Rodriguez PA-C Pulmonology [phone]    3/7/2025 9:00 AM (Arrive by 8:45 AM) Ultrasound, Str Radiologist; STR ULTRASOUND RM 1 Radiology [phone]    3/12/2025 9:00 AM (Arrive by 8:15 AM) Ultrasound, Str Radiologist; STR CT/MR/US NURSE; STR ULTRASOUND RM 1 Radiology [phone]    3/14/2025 8:00 AM (Arrive by 7:45 AM) Ultrasound, Str Radiologist; STR ULTRASOUND RM 1 Radiology [phone]    3/21/2025 9:00 AM (Arrive by 8:45 AM) Ultrasound, Str Radiologist; STR ULTRASOUND RM 1 Radiology [phone]    3/26/2025 9:00 AM (Arrive by 8:15 AM) Ultrasound, Str Radiologist; STR CT/MR/US NURSE; STR ULTRASOUND RM 1 Radiology [phone]    3/28/2025 9:00 AM (Arrive by 8:45 AM) Ultrasound, Str Radiologist; STR ULTRASOUND RM 1 Radiology [phone]    4/4/2025 9:00 AM (Arrive by 8:45 AM) Ultrasound, Str Radiologist; [facility] ULTRASOUND  1 Radiology [phone]    4/9/2025 9:00 AM (Arrive by 8:15 AM) Ultrasound, Str Radiologist; STR CT/MR/US NURSE; [facility] ULTRASOUND  1 Radiology [phone]    4/11/2025

## 2025-02-10 LAB — AFB CULTURE & SMEAR: NORMAL

## 2025-02-12 ENCOUNTER — HOSPITAL ENCOUNTER (OUTPATIENT)
Dept: GENERAL RADIOLOGY | Age: 55
Discharge: HOME | End: 2025-02-12
Payer: COMMERCIAL

## 2025-02-12 ENCOUNTER — TELEPHONE (OUTPATIENT)
Dept: PALLATIVE CARE | Age: 55
End: 2025-02-12

## 2025-02-12 ENCOUNTER — HOSPITAL ENCOUNTER (OUTPATIENT)
Dept: ULTRASOUND IMAGING | Age: 55
Discharge: HOME | End: 2025-02-12
Payer: COMMERCIAL

## 2025-02-12 DIAGNOSIS — C25.2 MALIGNANT NEOPLASM OF TAIL OF PANCREAS (HCC): ICD-10-CM

## 2025-02-12 DIAGNOSIS — J91.0 MALIGNANT PLEURAL EFFUSION: ICD-10-CM

## 2025-02-12 DIAGNOSIS — C25.2 MALIGNANT NEOPLASM OF TAIL OF PANCREAS (HCC): Primary | ICD-10-CM

## 2025-02-12 DIAGNOSIS — Z98.890 S/P THORACENTESIS: ICD-10-CM

## 2025-02-12 DIAGNOSIS — R18.0 MALIGNANT ASCITES: ICD-10-CM

## 2025-02-12 PROCEDURE — 32555 ASPIRATE PLEURA W/ IMAGING: CPT

## 2025-02-12 PROCEDURE — 71045 X-RAY EXAM CHEST 1 VIEW: CPT

## 2025-02-12 NOTE — TELEPHONE ENCOUNTER
[facility] Radiology department called states that pt is there to get the Thoracentesis and paracentesis. Rad tech states that the orders DO NOT specify Standing order.     Radiology department is needing new orders \"standing\" \"drain to Dry\" and radiology would like the order to state the side that is to be drained would be radiologist recommendation based off of what the US shows. Also for how long exp: 6 months or 1 year    New orders pend, please review and sign.

## 2025-02-14 ENCOUNTER — HOSPITAL ENCOUNTER (OUTPATIENT)
Dept: ULTRASOUND IMAGING | Age: 55
Discharge: HOME | End: 2025-02-14
Payer: COMMERCIAL

## 2025-02-14 ENCOUNTER — HOSPITAL ENCOUNTER (OUTPATIENT)
Dept: GENERAL RADIOLOGY | Age: 55
Discharge: HOME | End: 2025-02-14
Payer: COMMERCIAL

## 2025-02-14 ENCOUNTER — TELEPHONE (OUTPATIENT)
Dept: PALLATIVE CARE | Age: 55
End: 2025-02-14

## 2025-02-14 DIAGNOSIS — Z98.890 STATUS POST THORACENTESIS: ICD-10-CM

## 2025-02-14 DIAGNOSIS — R18.0 MALIGNANT ASCITES: ICD-10-CM

## 2025-02-14 DIAGNOSIS — J90 PLEURAL EFFUSION: ICD-10-CM

## 2025-02-14 DIAGNOSIS — J90 PLEURAL EFFUSION: Primary | ICD-10-CM

## 2025-02-14 PROCEDURE — 71045 X-RAY EXAM CHEST 1 VIEW: CPT

## 2025-02-14 PROCEDURE — 76705 ECHO EXAM OF ABDOMEN: CPT

## 2025-02-14 PROCEDURE — 32555 ASPIRATE PLEURA W/ IMAGING: CPT

## 2025-02-14 NOTE — TELEPHONE ENCOUNTER
Orders placed for right sided thoracentesis after receiving message that patient did not have enough fluid to drain for paracentesis, but fluid in the right side of the chest that needs drained.

## 2025-02-15 NOTE — PROGRESS NOTES
Physician Progress Note      PATIENT:               FAITH CASTRO  CSN #:                  [id number]  :                       1970  ADMIT DATE:       2025 1:05 PM  DISCH DATE:        2025 6:10 PM  RESPONDING  PROVIDER #:        Mike Ellis MD          QUERY TEXT:    Dr Ellis,    Pt admitted with Peritonitis-Acinetobacter Infection. Per notes: Originally   thought this was colonization from pleurx drain. Second culture grew   Acinetobacter. Therefore, this will be treated as a pathogenic infection. If   possible, please document in progress notes and discharge Summary the   relationship, if any, between Acinetobacter Infection and pleurx drain.    The medical record reflects the following:  Risk Factors: Metastatic pancreatic cancer  Clinical Indicators: Cultures obtained from peritoneal fluid with cell counts   showing a total nucleated cell of 18,000. Per notes: Per notes: Originally   thought this was colonization from pleurx drain. Second culture grew   Acinetobacter. Therefore, this will be treated as a pathogenic infection.  Treatment: Chest tube removed () Pleurx drain removed (). Unasyn &   Levaquin IV.  Options provided:  -- Acinetobacter Infection due to peritoneal Dialysis catheter  -- Acinetobacter Infection unrelated to peritoneal Dialysis catheter  -- Other - I will add my own diagnosis  -- Disagree - Not applicable / Not valid  -- Disagree - Clinically unable to determine / Unknown  -- Refer to Clinical Documentation Reviewer    PROVIDER RESPONSE TEXT:    This patient has Acinetobacter Infection, unrelated to peritoneal Dialysis   catheter    Query created by: Erin Chery on 2025 7:11 AM      Electronically signed by:  Mike Ellis MD 2/15/2025 2:10 PM

## 2025-02-17 ENCOUNTER — TELEPHONE (OUTPATIENT)
Dept: INFECTIOUS DISEASES | Age: 55
End: 2025-02-17

## 2025-02-17 ENCOUNTER — TELEPHONE (OUTPATIENT)
Dept: PALLATIVE CARE | Age: 55
End: 2025-02-17

## 2025-02-17 DIAGNOSIS — C25.2 MALIGNANT NEOPLASM OF TAIL OF PANCREAS (HCC): ICD-10-CM

## 2025-02-17 DIAGNOSIS — J91.0 MALIGNANT PLEURAL EFFUSION (HCC): ICD-10-CM

## 2025-02-17 DIAGNOSIS — C78.7 METASTATIC ADENOCARCINOMA TO LIVER (HCC): ICD-10-CM

## 2025-02-17 DIAGNOSIS — J90 PLEURAL EFFUSION: Primary | ICD-10-CM

## 2025-02-17 LAB — AFB CULTURE & SMEAR: NORMAL

## 2025-02-17 RX ORDER — ONDANSETRON 4 MG/1
4 TABLET, ORALLY DISINTEGRATING ORAL EVERY 8 HOURS PRN
Qty: 30 TABLET | Refills: 0 | Status: SHIPPED | OUTPATIENT
Start: 2025-02-17

## 2025-02-17 NOTE — TELEPHONE ENCOUNTER
Returned call to Gustabo, spoke to Nanci (spouse) and informed her that Dr Duvall sent in a order for Zofran for GI symptoms of emesis to [facility]. Directions given to Nanci and told her to call us if the Zofran does not work.

## 2025-02-17 NOTE — TELEPHONE ENCOUNTER
Pt's wife Nanci called asking if the Thoracentesis order can be changed to weekly and PRN. Nanci states that pt is not getting very much out of the Paracentesis and increased amount of the thoracentesis. Wife is asking for new orders.

## 2025-02-18 ENCOUNTER — OFFICE VISIT (OUTPATIENT)
Dept: ONCOLOGY | Age: 55
End: 2025-02-18
Payer: COMMERCIAL

## 2025-02-18 ENCOUNTER — CLINICAL DOCUMENTATION (OUTPATIENT)
Dept: CASE MANAGEMENT | Age: 55
End: 2025-02-18

## 2025-02-18 ENCOUNTER — CASE MANAGEMENT (OUTPATIENT)
Dept: CASE MANAGEMENT | Age: 55
End: 2025-02-18

## 2025-02-18 VITALS
HEIGHT: 72 IN | OXYGEN SATURATION: 93 % | RESPIRATION RATE: 16 BRPM | WEIGHT: 156 LBS | DIASTOLIC BLOOD PRESSURE: 57 MMHG | SYSTOLIC BLOOD PRESSURE: 83 MMHG | TEMPERATURE: 97.8 F | BODY MASS INDEX: 21.13 KG/M2 | HEART RATE: 127 BPM

## 2025-02-18 DIAGNOSIS — C25.2 MALIGNANT NEOPLASM OF TAIL OF PANCREAS (HCC): Primary | ICD-10-CM

## 2025-02-18 PROCEDURE — G8420 CALC BMI NORM PARAMETERS: HCPCS | Performed by: INTERNAL MEDICINE

## 2025-02-18 PROCEDURE — 1036F TOBACCO NON-USER: CPT | Performed by: INTERNAL MEDICINE

## 2025-02-18 PROCEDURE — G8427 DOCREV CUR MEDS BY ELIG CLIN: HCPCS | Performed by: INTERNAL MEDICINE

## 2025-02-18 PROCEDURE — 3017F COLORECTAL CA SCREEN DOC REV: CPT | Performed by: INTERNAL MEDICINE

## 2025-02-18 PROCEDURE — 1111F DSCHRG MED/CURRENT MED MERGE: CPT | Performed by: INTERNAL MEDICINE

## 2025-02-18 PROCEDURE — 99214 OFFICE O/P EST MOD 30 MIN: CPT | Performed by: INTERNAL MEDICINE

## 2025-02-18 RX ORDER — HYDROCODONE BITARTRATE AND ACETAMINOPHEN 10; 325 MG/1; MG/1
1 TABLET ORAL EVERY 6 HOURS PRN
COMMUNITY

## 2025-02-18 NOTE — PROGRESS NOTES
patient is following up today and to resume chemotherapy.  He reported no concerns today.  He has stable and at baseline performance status.  He has chronic mild fatigue but able to perform activities of daily living.  He has no nausea vomiting or abdominal pain.  No neuropathy.  Appetite is fair.  Continue to take Ensure.  Weight stable.  No leg cramps or pain    1/14/2025  Patient is coming today for a follow-up with his daughter.  He was hospitalized for a week due to intestinal obstruction.  He did not undergo surgery but managed with conservative measures.  He received TPN.  He recovered and left the hospital after a week.  He underwent CT scan of abdomen and pelvis while hospitalized on January 4, 2025 which showed either stable lesions or some improvement in the abdominal adenopathy.  Stable carcinomatosis.  Possible new lesions in T11.  CA 19-9 was not measured.  He is today feeling better with good appetite.  He has no nausea or vomiting.  He has a regular bowel movement.  He has no abdominal distention.  He has no abdominal pain.  He is back to his baseline.  He has no peripheral neuropathy.  He is scheduled for chemotherapy next week.    1/21/2025:  Patient's come today with daughter to restart chemotherapy.  He has no new concerns.  He is feeling stronger and willing to start chemo.  He has bilateral feet edema on Bumex 1 mg every third day.  He has not follow-up with his primary care physician.  He has no chest pain shortness of breath cough or hemoptysis no fever or chills.    2/18/25:  Hospitalization between 1/27/2025-2/7/2025: For Acinetobacter infection.2 cultures were positive for Acinetobacter(1 pleural fluid and 1 ascitic fluid)Discharged on oral antibiotics Levaquin.  All drains were removed.  [facility]. Malignant pleural effusions: thoracentesis by interventional radiology on 2/07/25, fluid ( +) for malignant cells.Treated for peritonitis.  He

## 2025-02-18 NOTE — DISCHARGE INSTRUCTIONS
Please contact your Oncologist if you have any questions regarding the line/lab that you received today.      Patient instructed if experience any of the symptoms following today's line/lab to notify MD immediately or go to emergency department.    * dizziness/lightheadedness  *acute nausea/vomiting - not relieved with medication  *headache - not relieved from Tylenol/pain medication  *chest pain/pressure  *rash/itching  *shortness of breath        Drink fluids - 48oz fluids daily  Call if develop fever/ chills/ signs or symptoms of infection     Physician's instructions:  Instructions  Return in about 1 week (around 2/25/2025).  Labs today. No chemotherapy.   Pulmonology: He will reach out to pulmonology to assist with home oxygen.  Infectious disease: We will touch base with Dr. Noel to determine the timing for reinitiation of chemotherapy.  He is scheduled for [facility] appointment for second opinion.  Palliative care: Dr. Leblanc is assisting with thoracentesis/paracentesis orders as needed.  Is scheduled for an appointment with him in March 2025.     Return in about 1 week (around 2/25/2025).MD visit ( Dr. Ttaum)+ labs + treatment visit

## 2025-02-18 NOTE — PLAN OF CARE
Problem: Discharge Planning  Goal: Discharge to home or other facility with appropriate resources  Outcome: Adequate for Discharge  Flowsheets (Taken 2/18/2025 1024)  Discharge to home or other facility with appropriate resources: Identify barriers to discharge with patient and caregiver     Problem: Safety - Adult  Goal: Free from fall injury  Outcome: Adequate for Discharge  Flowsheets (Taken 2/18/2025 1024)  Free From Fall Injury: Instruct family/caregiver on patient safety     Problem: Chronic Conditions and Co-morbidities  Goal: Patient's chronic conditions and co-morbidity symptoms are monitored and maintained or improved  Outcome: Adequate for Discharge  Flowsheets (Taken 2/18/2025 1024)  Care Plan - Patient's Chronic Conditions and Co-Morbidity Symptoms are Monitored and Maintained or Improved: Monitor and assess patient's chronic conditions and comorbid symptoms for stability, deterioration, or improvement     Problem: Infection - Adult  Goal: Absence of infection at discharge  Outcome: Adequate for Discharge  Flowsheets (Taken 2/18/2025 1024)  Absence of infection at discharge: Monitor all insertion sites i.e., indwelling lines, tubes and drains  Note: Mediport site with no redness or warmth. Skin over port site intact with no signs of breakdown noted. Patient verbalizes signs/symptoms of port infection and when to notify the physician.

## 2025-02-18 NOTE — PROGRESS NOTES
Patient tolerated line/lab without any complications.    Last vital signs:   BP (!) 83/57   Pulse (!) 127   Temp 97.8 °F (36.6 °C) (Oral)   Resp 16   Ht 1.829 m (6' 0.01\")   Wt 70.8 kg (156 lb)   SpO2 93%   BMI 21.15 kg/m²     Physician's instructions:  Instructions  Return in about 1 week (around 2/25/2025).  Labs today. No chemotherapy.   Pulmonology: He will reach out to pulmonology to assist with home oxygen.  Infectious disease: We will touch base with Dr. Noel to determine the timing for reinitiation of chemotherapy.  He is scheduled for [facility] appointment for second opinion.  Palliative care: Dr. Leblanc is assisting with thoracentesis/paracentesis orders as needed.  Is scheduled for an appointment with him in March 2025.     Return in about 1 week (around 2/25/2025).MD visit ( Dr. Tatum)+ labs + treatment visit        Physician discussed neutropenia precautions and need to go to the ED immediately for for 100.3 or greater or any other signs of infection.    Patient instructed if experience any symptoms following today's line/lab, he is to notify MD immediately or go to the emergency department.    Discharge instructions given to patient. Verbalizes understanding. Wheelchair assisted off unit by spouse, with belongings.

## 2025-02-18 NOTE — PATIENT INSTRUCTIONS
Labs today. No chemotherapy.   Pulmonology: He will reach out to pulmonology to assist with home oxygen.  Infectious disease: We will touch base with Dr. Noel to determine the timing for reinitiation of chemotherapy.  He is scheduled for [facility] appointment for second opinion.  Palliative care: Dr. Singh is assisting with thoracentesis/paracentesis orders as needed.  Is scheduled for an appointment with him in March 2025.    Return in about 1 week (around 2/25/2025).MD visit ( Dr. Tatum)+ labs + treatment visit

## 2025-02-18 NOTE — PROGRESS NOTES
Physician Progress Note      PATIENT:               FAITH CASTRO  CSN #:                  [id number]  :                       1970  ADMIT DATE:       2025 1:05 PM  DISCH DATE:        2025 6:10 PM  RESPONDING  PROVIDER #:        Mike Ellis MD          QUERY TEXT:    Dr Ellis,    Patient admitted with Acinetobacter Infection, unrelated to peritoneal   Dialysis catheter & Peritonitis. Per notes: Sepsis due to Acinetobacter. Pt   immunocompromised. Initial WBC 4.4, Procalcitonin 0.33. Initial pulse 107.   Chest tube removed (2/4) Pleurx drain removed (). Unasyn & Levaquin IV. If   possible, please document in the progress notes and discharge summary if   Sepsis due to Acinetobacter was:    The medical record reflects the following:  Risk Factors:  Metastatic pancreatic cancer. Acinetobacter Infection,   unrelated to peritoneal Dialysis catheter & Peritonitis.  Clinical Indicators: Per notes: Sepsis due to Acinetobacter. Per ID: Infection   due to Acinetobacter species. Pt immunocompromised. Initial WBC 4.4,   Procalcitonin 0.33. Initial pulse 107.  Treatment:  Chest tube removed (2/4) Pleurx drain removed (). Unasyn &   Levaquin IV.  Options provided:  -- Sepsis due to Acinetobacter confirmed after study  -- Sepsis due to Acinetobacter treated and resolved  -- Sepsis due to Acinetobacter ruled out after study  -- Other - I will add my own diagnosis  -- Disagree - Not applicable / Not valid  -- Disagree - Clinically unable to determine / Unknown  -- Refer to Clinical Documentation Reviewer    PROVIDER RESPONSE TEXT:    Sepsis due to Acinetobacter treated and resolved.    Query created by: Erin Chery on 2025 7:05 AM      Electronically signed by:  Mike Ellis MD 2025 2:06 PM

## 2025-02-19 ENCOUNTER — CASE MANAGEMENT (OUTPATIENT)
Dept: CASE MANAGEMENT | Age: 55
End: 2025-02-19

## 2025-02-19 NOTE — PROGRESS NOTES
Name: Gustabo Machado  : 1970  MRN: [id number]    Oncology Navigation     Contact Type:  Telephone    Notes: oliverio received call from Dr. Bert rao  dawn to proceed with chemotherapy day after thoracentesis 25      Electronically signed by Shawna Box RN on 2025 at 12:27 PM

## 2025-02-19 NOTE — PROGRESS NOTES
Name: Gustabo Machado  : 1970  MRN: [id number]    Oncology Navigation Follow-Up Note    Contact Type:  Telephone    Notes: oliverio GEE @ Dr. Bert Duvall (checking for clearance to resume chemotherapy)    Electronically signed by Shawna Box RN on 2025 at 10:37 AM

## 2025-02-19 NOTE — PROGRESS NOTES
Name: Gustabo Machado  : 1970  MRN: [id number]    Oncology Navigation Follow-Up Note    Contact Type:  Medical Oncology  Spouse accompanied appointment    Subjective: thoracentesis fluid-infection(Acinetobacter)-on Levaquin per Dr Bert Duvall                    Next thoracentesis scheduled 2025. Wheel chair bound- poor performance status. SOB noted at night during night.       Objective: MD F/U from hospital       Oncology Plan of Care:   - hold Bumex until  thoracentesis d/t low BP    -no chemo today  -contact pulmonary regarding ordering O2   -labs today  -keep appt today @[facility] for second opinion   -return MD apt  for lab/assessment/[facility] opinion    Assistance Needed: oliverio to contact Dr. Bert Duvall - able to resume chemotherapy    Receptive to Advanced Care Planning / Palliative Care:  following    Education: oliverio reiterated ONC POC      Notes: oliverio following to assist & support as needed    Electronically signed by Shawna Box, RN on 2025 at 10:51 AM

## 2025-02-24 LAB — AFB CULTURE & SMEAR: NORMAL

## 2025-02-25 ENCOUNTER — HOSPITAL ENCOUNTER (OUTPATIENT)
Dept: INFUSION THERAPY | Age: 55
End: 2025-02-25
Attending: INTERNAL MEDICINE

## 2025-03-03 LAB
AFB CULTURE & SMEAR: NORMAL
FUNGUS SPEC CULT: NORMAL
FUNGUS SPEC FUNGUS STN: NORMAL

## 2025-03-10 LAB — AFB CULTURE & SMEAR: NORMAL

## 2025-03-17 LAB — AFB CULTURE & SMEAR: NORMAL

## (undated) DEVICE — SYRINGE MED 10ML LUERLOCK TIP W/O SFTY DISP

## (undated) DEVICE — DRESSING TRNSPAR W5XL4.5IN FLM SHT SEMIPERMEABLE WIND

## (undated) DEVICE — HYPODERMIC SAFETY NEEDLE: Brand: MAGELLAN

## (undated) DEVICE — GLOVE ORANGE PI 8   MSG9080

## (undated) DEVICE — BAG,BANDED,W/RUBBERBAND,STERILE,30X36: Brand: MEDLINE

## (undated) DEVICE — SUTURE VICRYL SZ 3-0 L18IN ABSRB VLT L26MM SH 1/2 CIR J774D

## (undated) DEVICE — PENCIL SMK EVAC ALL IN 1 DSGN ENH VISIBILITY IMPROVED AIR

## (undated) DEVICE — BREAST HERNIA: Brand: MEDLINE INDUSTRIES, INC.

## (undated) DEVICE — GLOVE ORANGE PI 7   MSG9070

## (undated) DEVICE — SUTURE VICRYL + SZ 4-0 L27IN ABSRB WHT FS-2 3/8 CIR REV CUT VCP422H